# Patient Record
Sex: FEMALE | Race: WHITE | NOT HISPANIC OR LATINO
[De-identification: names, ages, dates, MRNs, and addresses within clinical notes are randomized per-mention and may not be internally consistent; named-entity substitution may affect disease eponyms.]

---

## 2018-07-10 ENCOUNTER — TRANSCRIPTION ENCOUNTER (OUTPATIENT)
Age: 73
End: 2018-07-10

## 2020-12-11 ENCOUNTER — TRANSCRIPTION ENCOUNTER (OUTPATIENT)
Age: 75
End: 2020-12-11

## 2020-12-12 ENCOUNTER — APPOINTMENT (OUTPATIENT)
Dept: DISASTER EMERGENCY | Facility: HOSPITAL | Age: 75
End: 2020-12-12

## 2020-12-12 ENCOUNTER — OUTPATIENT (OUTPATIENT)
Dept: OUTPATIENT SERVICES | Facility: HOSPITAL | Age: 75
LOS: 1 days | End: 2020-12-12
Payer: MEDICARE

## 2020-12-12 VITALS
OXYGEN SATURATION: 95 % | TEMPERATURE: 99 F | HEART RATE: 83 BPM | RESPIRATION RATE: 18 BRPM | HEIGHT: 60 IN | WEIGHT: 179.9 LBS | DIASTOLIC BLOOD PRESSURE: 82 MMHG | SYSTOLIC BLOOD PRESSURE: 138 MMHG

## 2020-12-12 VITALS
TEMPERATURE: 99 F | OXYGEN SATURATION: 97 % | DIASTOLIC BLOOD PRESSURE: 74 MMHG | HEART RATE: 77 BPM | RESPIRATION RATE: 18 BRPM | SYSTOLIC BLOOD PRESSURE: 135 MMHG

## 2020-12-12 DIAGNOSIS — U07.1 COVID-19: ICD-10-CM

## 2020-12-12 PROCEDURE — M0239: CPT

## 2020-12-12 RX ORDER — BAMLANIVIMAB 35 MG/ML
700 INJECTION, SOLUTION INTRAVENOUS ONCE
Refills: 0 | Status: COMPLETED | OUTPATIENT
Start: 2020-12-12 | End: 2020-12-12

## 2020-12-12 RX ADMIN — BAMLANIVIMAB 200 MILLIGRAM(S): 35 INJECTION, SOLUTION INTRAVENOUS at 12:59

## 2020-12-12 NOTE — CHART NOTE - PRIOR COVID TREATMENT
76 y/o F with PMHx of DM, HTN, AFib (Coumadin) and HDL presents referred by [facility] to outpatient infusion clinic for Monoclonal Antibody with Bamlanivimab. Patient with onset of symptoms 12/8/20 (endorsed cough. Tested positive at  facility on 12/11/20.         Exam/findings:    Vital Signs:    T(C): 37   T(F): 98.6   HR: 74   BP: 130/82   RR: 18  SpO2: 96%     PE:     Appearance: A&Ox3, NAD   HEENT: Normal oral mucosa,   Lymphatic: No lymphadenopathy  Cardiovascular: Normal S1 S2, no murmurs   Respiratory: Lungs CTABL, no wheezing   Gastrointestinal: Soft, NT/ND, + BS   Skin: warm and dry         ASSESSMENT:  76 y/o F with PMHx of DM, HTN, AFib (Coumadin) and HDL presents referred by [facility] to outpatient infusion clinic for Monoclonal Antibody with Bamlanivimab. Patient with onset of symptoms 12/8/20 (endorsed cough. Tested positive at  facility on 12/11/20.       Symptoms/ Criteria: HA, malaise, muscle pain    Risk Profile includes: Age >= 65, DM         PLAN:    - Infusion procedure explained to patient     - Consent for monoclonal antibody infusion obtained     - Risk & benefits discussed/all questions answered    - Infuse Bamlanivimab 700mg IV over one hour     - Observe patient for one hour post infusion 74 y/o F with PMHx of DM, HTN, AFib (Coumadin) and HDL presents referred by [facility] to outpatient infusion clinic for Monoclonal Antibody with Bamlanivimab. Patient with onset of symptoms 12/8/20 (endorsed cough. Tested positive at  facility on 12/11/20.         Exam/findings:    Vital Signs:    T(C): 37   T(F): 98.6   HR: 74   BP: 130/82   RR: 18  SpO2: 96%     PE:     Appearance: A&Ox3, NAD   HEENT: Normal oral mucosa,   Lymphatic: No lymphadenopathy  Cardiovascular: Normal S1 S2, no murmurs   Respiratory: Lungs CTABL, no wheezing   Gastrointestinal: Soft, NT/ND, + BS   Skin: warm and dry         ASSESSMENT:  74 y/o F with PMHx of DM, HTN, AFib (Coumadin) and HDL presents referred by [facility] to outpatient infusion clinic for Monoclonal Antibody with Bamlanivimab. Patient with onset of symptoms 12/8/20 (endorsed cough. Tested positive at  facility on 12/11/20.  Allergies reviewed and updated as needed.        Symptoms/ Criteria: HA, malaise, muscle pain    Risk Profile includes: Age >= 65, DM         PLAN:    - Infusion procedure explained to patient     - Consent for monoclonal antibody infusion obtained     - Risk & benefits discussed/all questions answered    - Infuse Bamlanivimab 700mg IV over one hour         Pt tolerated infusion well with no adverse reactions reported/noted. Patient education was provided at discharge.  IV access was removed.  Pt stable for discharge home.- Observe patient for one hour post infusion

## 2020-12-13 ENCOUNTER — TRANSCRIPTION ENCOUNTER (OUTPATIENT)
Age: 75
End: 2020-12-13

## 2020-12-14 PROBLEM — Z00.00 ENCOUNTER FOR PREVENTIVE HEALTH EXAMINATION: Status: ACTIVE | Noted: 2020-12-14

## 2020-12-15 ENCOUNTER — TRANSCRIPTION ENCOUNTER (OUTPATIENT)
Age: 75
End: 2020-12-15

## 2020-12-29 ENCOUNTER — TRANSCRIPTION ENCOUNTER (OUTPATIENT)
Age: 75
End: 2020-12-29

## 2022-06-19 ENCOUNTER — NON-APPOINTMENT (OUTPATIENT)
Age: 77
End: 2022-06-19

## 2022-10-14 ENCOUNTER — APPOINTMENT (OUTPATIENT)
Dept: VASCULAR SURGERY | Facility: CLINIC | Age: 77
End: 2022-10-14

## 2023-11-26 ENCOUNTER — INPATIENT (INPATIENT)
Facility: HOSPITAL | Age: 78
LOS: 8 days | Discharge: HOME | DRG: 563 | End: 2023-12-05
Attending: STUDENT IN AN ORGANIZED HEALTH CARE EDUCATION/TRAINING PROGRAM | Admitting: STUDENT IN AN ORGANIZED HEALTH CARE EDUCATION/TRAINING PROGRAM
Payer: MEDICARE

## 2023-11-26 VITALS
SYSTOLIC BLOOD PRESSURE: 150 MMHG | HEART RATE: 88 BPM | DIASTOLIC BLOOD PRESSURE: 83 MMHG | OXYGEN SATURATION: 98 % | RESPIRATION RATE: 18 BRPM | TEMPERATURE: 97 F
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
## 2023-11-26 DIAGNOSIS — Z29.9 ENCOUNTER FOR PROPHYLACTIC MEASURES, UNSPECIFIED: ICD-10-CM

## 2023-11-26 DIAGNOSIS — E11.9 TYPE 2 DIABETES MELLITUS WITHOUT COMPLICATIONS: ICD-10-CM

## 2023-11-26 DIAGNOSIS — I48.91 UNSPECIFIED ATRIAL FIBRILLATION: ICD-10-CM

## 2023-11-26 DIAGNOSIS — I10 ESSENTIAL (PRIMARY) HYPERTENSION: ICD-10-CM

## 2023-11-26 DIAGNOSIS — E78.5 HYPERLIPIDEMIA, UNSPECIFIED: ICD-10-CM

## 2023-11-26 LAB
ALBUMIN SERPL ELPH-MCNC: 3.4 G/DL — LOW (ref 3.5–5)
ALBUMIN SERPL ELPH-MCNC: 3.4 G/DL — LOW (ref 3.5–5)
ALP SERPL-CCNC: 83 U/L — SIGNIFICANT CHANGE UP (ref 40–120)
ALP SERPL-CCNC: 83 U/L — SIGNIFICANT CHANGE UP (ref 40–120)
ALT FLD-CCNC: 37 U/L DA — SIGNIFICANT CHANGE UP (ref 10–60)
ALT FLD-CCNC: 37 U/L DA — SIGNIFICANT CHANGE UP (ref 10–60)
ANION GAP SERPL CALC-SCNC: 4 MMOL/L — LOW (ref 5–17)
ANION GAP SERPL CALC-SCNC: 4 MMOL/L — LOW (ref 5–17)
AST SERPL-CCNC: 25 U/L — SIGNIFICANT CHANGE UP (ref 10–40)
AST SERPL-CCNC: 25 U/L — SIGNIFICANT CHANGE UP (ref 10–40)
BASOPHILS # BLD AUTO: 0.03 K/UL — SIGNIFICANT CHANGE UP (ref 0–0.2)
BASOPHILS # BLD AUTO: 0.03 K/UL — SIGNIFICANT CHANGE UP (ref 0–0.2)
BASOPHILS NFR BLD AUTO: 0.4 % — SIGNIFICANT CHANGE UP (ref 0–2)
BASOPHILS NFR BLD AUTO: 0.4 % — SIGNIFICANT CHANGE UP (ref 0–2)
BILIRUB SERPL-MCNC: 0.8 MG/DL — SIGNIFICANT CHANGE UP (ref 0.2–1.2)
BILIRUB SERPL-MCNC: 0.8 MG/DL — SIGNIFICANT CHANGE UP (ref 0.2–1.2)
BUN SERPL-MCNC: 16 MG/DL — SIGNIFICANT CHANGE UP (ref 7–18)
BUN SERPL-MCNC: 16 MG/DL — SIGNIFICANT CHANGE UP (ref 7–18)
CALCIUM SERPL-MCNC: 9.1 MG/DL — SIGNIFICANT CHANGE UP (ref 8.4–10.5)
CALCIUM SERPL-MCNC: 9.1 MG/DL — SIGNIFICANT CHANGE UP (ref 8.4–10.5)
CHLORIDE SERPL-SCNC: 104 MMOL/L — SIGNIFICANT CHANGE UP (ref 96–108)
CHLORIDE SERPL-SCNC: 104 MMOL/L — SIGNIFICANT CHANGE UP (ref 96–108)
CK MB BLD-MCNC: 1.9 % — SIGNIFICANT CHANGE UP (ref 0–3.5)
CK MB BLD-MCNC: 1.9 % — SIGNIFICANT CHANGE UP (ref 0–3.5)
CK MB CFR SERPL CALC: 2.2 NG/ML — SIGNIFICANT CHANGE UP (ref 0–3.6)
CK MB CFR SERPL CALC: 2.2 NG/ML — SIGNIFICANT CHANGE UP (ref 0–3.6)
CK SERPL-CCNC: 117 U/L — SIGNIFICANT CHANGE UP (ref 21–215)
CK SERPL-CCNC: 117 U/L — SIGNIFICANT CHANGE UP (ref 21–215)
CO2 SERPL-SCNC: 30 MMOL/L — SIGNIFICANT CHANGE UP (ref 22–31)
CO2 SERPL-SCNC: 30 MMOL/L — SIGNIFICANT CHANGE UP (ref 22–31)
CREAT SERPL-MCNC: 0.79 MG/DL — SIGNIFICANT CHANGE UP (ref 0.5–1.3)
CREAT SERPL-MCNC: 0.79 MG/DL — SIGNIFICANT CHANGE UP (ref 0.5–1.3)
EGFR: 77 ML/MIN/1.73M2 — SIGNIFICANT CHANGE UP
EGFR: 77 ML/MIN/1.73M2 — SIGNIFICANT CHANGE UP
EOSINOPHIL # BLD AUTO: 0.01 K/UL — SIGNIFICANT CHANGE UP (ref 0–0.5)
EOSINOPHIL # BLD AUTO: 0.01 K/UL — SIGNIFICANT CHANGE UP (ref 0–0.5)
EOSINOPHIL NFR BLD AUTO: 0.1 % — SIGNIFICANT CHANGE UP (ref 0–6)
EOSINOPHIL NFR BLD AUTO: 0.1 % — SIGNIFICANT CHANGE UP (ref 0–6)
GLUCOSE SERPL-MCNC: 160 MG/DL — HIGH (ref 70–99)
GLUCOSE SERPL-MCNC: 160 MG/DL — HIGH (ref 70–99)
HCT VFR BLD CALC: 40.2 % — SIGNIFICANT CHANGE UP (ref 34.5–45)
HCT VFR BLD CALC: 40.2 % — SIGNIFICANT CHANGE UP (ref 34.5–45)
HGB BLD-MCNC: 13.3 G/DL — SIGNIFICANT CHANGE UP (ref 11.5–15.5)
HGB BLD-MCNC: 13.3 G/DL — SIGNIFICANT CHANGE UP (ref 11.5–15.5)
IMM GRANULOCYTES NFR BLD AUTO: 0.5 % — SIGNIFICANT CHANGE UP (ref 0–0.9)
IMM GRANULOCYTES NFR BLD AUTO: 0.5 % — SIGNIFICANT CHANGE UP (ref 0–0.9)
LYMPHOCYTES # BLD AUTO: 0.64 K/UL — LOW (ref 1–3.3)
LYMPHOCYTES # BLD AUTO: 0.64 K/UL — LOW (ref 1–3.3)
LYMPHOCYTES # BLD AUTO: 7.6 % — LOW (ref 13–44)
LYMPHOCYTES # BLD AUTO: 7.6 % — LOW (ref 13–44)
MCHC RBC-ENTMCNC: 32.2 PG — SIGNIFICANT CHANGE UP (ref 27–34)
MCHC RBC-ENTMCNC: 32.2 PG — SIGNIFICANT CHANGE UP (ref 27–34)
MCHC RBC-ENTMCNC: 33.1 GM/DL — SIGNIFICANT CHANGE UP (ref 32–36)
MCHC RBC-ENTMCNC: 33.1 GM/DL — SIGNIFICANT CHANGE UP (ref 32–36)
MCV RBC AUTO: 97.3 FL — SIGNIFICANT CHANGE UP (ref 80–100)
MCV RBC AUTO: 97.3 FL — SIGNIFICANT CHANGE UP (ref 80–100)
MONOCYTES # BLD AUTO: 0.44 K/UL — SIGNIFICANT CHANGE UP (ref 0–0.9)
MONOCYTES # BLD AUTO: 0.44 K/UL — SIGNIFICANT CHANGE UP (ref 0–0.9)
MONOCYTES NFR BLD AUTO: 5.2 % — SIGNIFICANT CHANGE UP (ref 2–14)
MONOCYTES NFR BLD AUTO: 5.2 % — SIGNIFICANT CHANGE UP (ref 2–14)
NEUTROPHILS # BLD AUTO: 7.23 K/UL — SIGNIFICANT CHANGE UP (ref 1.8–7.4)
NEUTROPHILS # BLD AUTO: 7.23 K/UL — SIGNIFICANT CHANGE UP (ref 1.8–7.4)
NEUTROPHILS NFR BLD AUTO: 86.2 % — HIGH (ref 43–77)
NEUTROPHILS NFR BLD AUTO: 86.2 % — HIGH (ref 43–77)
NRBC # BLD: 0 /100 WBCS — SIGNIFICANT CHANGE UP (ref 0–0)
NRBC # BLD: 0 /100 WBCS — SIGNIFICANT CHANGE UP (ref 0–0)
PLATELET # BLD AUTO: 155 K/UL — SIGNIFICANT CHANGE UP (ref 150–400)
PLATELET # BLD AUTO: 155 K/UL — SIGNIFICANT CHANGE UP (ref 150–400)
POTASSIUM SERPL-MCNC: 3.7 MMOL/L — SIGNIFICANT CHANGE UP (ref 3.5–5.3)
POTASSIUM SERPL-MCNC: 3.7 MMOL/L — SIGNIFICANT CHANGE UP (ref 3.5–5.3)
POTASSIUM SERPL-SCNC: 3.7 MMOL/L — SIGNIFICANT CHANGE UP (ref 3.5–5.3)
POTASSIUM SERPL-SCNC: 3.7 MMOL/L — SIGNIFICANT CHANGE UP (ref 3.5–5.3)
PROT SERPL-MCNC: 6.8 G/DL — SIGNIFICANT CHANGE UP (ref 6–8.3)
PROT SERPL-MCNC: 6.8 G/DL — SIGNIFICANT CHANGE UP (ref 6–8.3)
RBC # BLD: 4.13 M/UL — SIGNIFICANT CHANGE UP (ref 3.8–5.2)
RBC # BLD: 4.13 M/UL — SIGNIFICANT CHANGE UP (ref 3.8–5.2)
RBC # FLD: 13.8 % — SIGNIFICANT CHANGE UP (ref 10.3–14.5)
RBC # FLD: 13.8 % — SIGNIFICANT CHANGE UP (ref 10.3–14.5)
SODIUM SERPL-SCNC: 138 MMOL/L — SIGNIFICANT CHANGE UP (ref 135–145)
SODIUM SERPL-SCNC: 138 MMOL/L — SIGNIFICANT CHANGE UP (ref 135–145)
TROPONIN I, HIGH SENSITIVITY RESULT: 8.9 NG/L — SIGNIFICANT CHANGE UP
TROPONIN I, HIGH SENSITIVITY RESULT: 8.9 NG/L — SIGNIFICANT CHANGE UP
WBC # BLD: 8.39 K/UL — SIGNIFICANT CHANGE UP (ref 3.8–10.5)
WBC # BLD: 8.39 K/UL — SIGNIFICANT CHANGE UP (ref 3.8–10.5)
WBC # FLD AUTO: 8.39 K/UL — SIGNIFICANT CHANGE UP (ref 3.8–10.5)
WBC # FLD AUTO: 8.39 K/UL — SIGNIFICANT CHANGE UP (ref 3.8–10.5)

## 2023-11-26 PROCEDURE — 73060 X-RAY EXAM OF HUMERUS: CPT | Mod: 26,LT

## 2023-11-26 PROCEDURE — 99285 EMERGENCY DEPT VISIT HI MDM: CPT

## 2023-11-26 PROCEDURE — 73562 X-RAY EXAM OF KNEE 3: CPT | Mod: 26,RT

## 2023-11-26 PROCEDURE — 73110 X-RAY EXAM OF WRIST: CPT | Mod: 26,LT

## 2023-11-26 PROCEDURE — 73030 X-RAY EXAM OF SHOULDER: CPT | Mod: 26,LT

## 2023-11-26 PROCEDURE — 99222 1ST HOSP IP/OBS MODERATE 55: CPT

## 2023-11-26 RX ORDER — APIXABAN 2.5 MG/1
5 TABLET, FILM COATED ORAL
Refills: 0 | Status: DISCONTINUED | OUTPATIENT
Start: 2023-11-26 | End: 2023-11-27

## 2023-11-26 RX ORDER — INSULIN LISPRO 100/ML
VIAL (ML) SUBCUTANEOUS
Refills: 0 | Status: DISCONTINUED | OUTPATIENT
Start: 2023-11-26 | End: 2023-12-01

## 2023-11-26 RX ORDER — ACETAMINOPHEN 500 MG
750 TABLET ORAL ONCE
Refills: 0 | Status: COMPLETED | OUTPATIENT
Start: 2023-11-26 | End: 2023-11-26

## 2023-11-26 RX ORDER — OXYCODONE HYDROCHLORIDE 5 MG/1
5 TABLET ORAL ONCE
Refills: 0 | Status: DISCONTINUED | OUTPATIENT
Start: 2023-11-26 | End: 2023-11-26

## 2023-11-26 RX ORDER — ATORVASTATIN CALCIUM 80 MG/1
20 TABLET, FILM COATED ORAL AT BEDTIME
Refills: 0 | Status: DISCONTINUED | OUTPATIENT
Start: 2023-11-26 | End: 2023-12-05

## 2023-11-26 RX ORDER — DILTIAZEM HCL 120 MG
180 CAPSULE, EXT RELEASE 24 HR ORAL DAILY
Refills: 0 | Status: DISCONTINUED | OUTPATIENT
Start: 2023-11-26 | End: 2023-11-30

## 2023-11-26 RX ORDER — ACETAMINOPHEN 500 MG
650 TABLET ORAL EVERY 6 HOURS
Refills: 0 | Status: DISCONTINUED | OUTPATIENT
Start: 2023-11-26 | End: 2023-12-05

## 2023-11-26 RX ORDER — ONDANSETRON 8 MG/1
4 TABLET, FILM COATED ORAL EVERY 8 HOURS
Refills: 0 | Status: DISCONTINUED | OUTPATIENT
Start: 2023-11-26 | End: 2023-12-05

## 2023-11-26 RX ORDER — LANOLIN ALCOHOL/MO/W.PET/CERES
3 CREAM (GRAM) TOPICAL AT BEDTIME
Refills: 0 | Status: DISCONTINUED | OUTPATIENT
Start: 2023-11-26 | End: 2023-12-05

## 2023-11-26 RX ORDER — METOPROLOL TARTRATE 50 MG
100 TABLET ORAL
Refills: 0 | Status: DISCONTINUED | OUTPATIENT
Start: 2023-11-26 | End: 2023-12-05

## 2023-11-26 RX ORDER — LISINOPRIL 2.5 MG/1
10 TABLET ORAL DAILY
Refills: 0 | Status: DISCONTINUED | OUTPATIENT
Start: 2023-11-26 | End: 2023-12-05

## 2023-11-26 RX ADMIN — Medication 300 MILLIGRAM(S): at 22:26

## 2023-11-26 RX ADMIN — Medication 750 MILLIGRAM(S): at 22:56

## 2023-11-26 NOTE — ED PROVIDER NOTE - CARE PLAN
Principal Discharge DX:	Fall  Secondary Diagnosis:	Unable to ambulate  Secondary Diagnosis:	Fracture of left shoulder   1 Principal Discharge DX:	Humerus fracture  Secondary Diagnosis:	Atrial fibrillation  Secondary Diagnosis:	HTN (hypertension)  Secondary Diagnosis:	HLD (hyperlipidemia)  Secondary Diagnosis:	DM (diabetes mellitus)  Secondary Diagnosis:	Fall

## 2023-11-26 NOTE — H&P ADULT - HISTORY OF PRESENT ILLNESS
77F from home ambulates independently, lives alone with no HHA, PMH of Afib?, on Eliquis, HTN, DM, HLD presenting with fall this evening. Patient states taht she was trying to get something on the floor when she got tripped up and landed hard on her left shoulder. She was too weak and in pain to be able to get up. She was on the ground for 2 hours, until she was found by her son. She has no history of falls, and is unable to explain why she is on Eliquis, and asks to defer to her PCP, Dr. Nieves (doesn't remember name).    She denies, LOC, dizziness, or feeling lightheaded during the event. No fevers, chills, NVD    In the ED, Xrays show communited fracture in left shoulder, pending offical read, labs WNL 77F from home ambulates independently, lives alone with no HHA, PMH of Afib?, on Eliquis, HTN, DM, HLD presenting with fall this evening. Patient states taht she was trying to get something on the floor when she got tripped up and landed hard on her left shoulder. She was too weak and in pain to be able to get up. She was on the ground for 2 hours, until she was found by her son. She has no history of falls, and is unable to explain why she is on Eliquis, and asks to defer to her PCP, Dr. Neives (doesn't remember name).    She denies, LOC, dizziness, or feeling lightheaded during the event. No fevers, chills, NVD    In the ED, Xrays show communited fracture in left shoulder, pending offical read, labs WNL 77F from home ambulates independently, lives alone with no HHA, PMH of Afib?, on Eliquis, HTN, DM, HLD presenting with fall this evening. Patient states taht she was trying to get something on the floor when she got tripped up and landed hard on her left shoulder. She was too weak and in pain to be able to get up. She was on the ground for 2 hours, until she was found by her son. She has no history of falls, and is unable to explain why she is on Eliquis, and asks to defer to her PCP, Dr. Nieves (doesn't remember name).    She denies, LOC, dizziness, or feeling lightheaded during the event. No fevers, chills, NVD    In the ED, Xrays show comminuted fracture/dislocation in left shoulder, pending offical read, labs WNL

## 2023-11-26 NOTE — H&P ADULT - PROBLEM SELECTOR PLAN 2
Patient does not know why shes on Eliquis  Contact PCP  Continue Eliquis s/p fall this evening, no LOC, no dizziness, likely mechanical fall  XR imaging pending official read, but Left shoulder looks  proximal humeral fracture/ s/p sling in ED  Pain Control  PT  Fall Precautions  Ortho Consult if Reducing Needed Primary Team to Consult in AM

## 2023-11-26 NOTE — ED ADULT TRIAGE NOTE - NS ED NURSE AMBULANCES
[facility]

## 2023-11-26 NOTE — H&P ADULT - ATTENDING COMMENTS
Vital Signs Last 24 Hrs  T(C): 36.6 (27 Nov 2023 00:29), Max: 37.3 (26 Nov 2023 19:29)  T(F): 97.9 (27 Nov 2023 00:29), Max: 99.2 (26 Nov 2023 19:29)  HR: 104 (27 Nov 2023 00:29) (88 - 104)  BP: 142/80 (27 Nov 2023 00:29) (142/80 - 151/87)  RR: 18 (27 Nov 2023 00:29) (18 - 18)  SpO2: 96% (27 Nov 2023 00:29) (96% - 98%)  Parameters below as of 27 Nov 2023 00:29  Patient On (Oxygen Delivery Method): room air    Labs   unremarkable     Left shoulder X ray   Displaced proximal humeral fracture dislocation     Plan   77 year old woman with PMH of A- fib on OAC, HTN, DM2, HLD with a mechanical fall onto  the left shoulder who is admitted for inability to care for herself at home.     Problems  # Left proximal humeral fracture  # Mechanical fall   # Physical debility   # DM 2  # HTN     Plan   Admit to medicine   s/p  splinting   Pain control   Fall precaution   Med reconciliation   Resume home meds   PT consult in AM Vital Signs Last 24 Hrs  T(C): 36.6 (27 Nov 2023 00:29), Max: 37.3 (26 Nov 2023 19:29)  T(F): 97.9 (27 Nov 2023 00:29), Max: 99.2 (26 Nov 2023 19:29)  HR: 104 (27 Nov 2023 00:29) (88 - 104)  BP: 142/80 (27 Nov 2023 00:29) (142/80 - 151/87)  RR: 18 (27 Nov 2023 00:29) (18 - 18)  SpO2: 96% (27 Nov 2023 00:29) (96% - 98%)  Parameters below as of 27 Nov 2023 00:29  Patient On (Oxygen Delivery Method): room air    Labs   unremarkable     Left shoulder X ray   Displaced proximal humeral fracture dislocation     Plan   77 year old woman with PMH of A- fib on OAC, HTN, DM2, HLD with a mechanical fall onto  the left shoulder who is admitted for inability to care for herself at home.     Problems  # Left proximal humeral fracture  # suspected left humeral dislocation  # Mechanical fall   # Physical debility   # DM 2  # HTN     Plan   Admit to medicine   s/p  splinting   Pain control   Fall precaution   Med reconciliation   Resume home meds   PT consult in AM Vital Signs Last 24 Hrs  T(C): 36.6 (27 Nov 2023 00:29), Max: 37.3 (26 Nov 2023 19:29)  T(F): 97.9 (27 Nov 2023 00:29), Max: 99.2 (26 Nov 2023 19:29)  HR: 104 (27 Nov 2023 00:29) (88 - 104)  BP: 142/80 (27 Nov 2023 00:29) (142/80 - 151/87)  RR: 18 (27 Nov 2023 00:29) (18 - 18)  SpO2: 96% (27 Nov 2023 00:29) (96% - 98%)  Parameters below as of 27 Nov 2023 00:29  Patient On (Oxygen Delivery Method): room air    - Labs   unremarkable     - Left shoulder X ray   Displaced proximal humeral fracture dislocation     Plan   77 year old woman with PMH of A- fib on OAC, HTN, DM2, HLD with a mechanical fall onto  the left shoulder who is admitted for inability to care for herself at home.     Problems  # Left proximal humeral fracture  # suspected left humeral dislocation  # Mechanical fall   # Physical debility   # DM 2  # HTN     Plan   Admit to medicine   s/p  splinting   Pain control   Fall precaution   Med reconciliation   Resume home meds   PT consult in AM

## 2023-11-26 NOTE — H&P ADULT - PROBLEM SELECTOR PLAN 1
s/p fall this evening, no LOC, no dizziness, likely mechanical fall  XR imaging pending official read, but Left shoulder looks communiated fracture/ s/p sling in ED  Pain Control  PT  Fall Precautions s/p fall this evening, no LOC, no dizziness, likely mechanical fall  XR imaging pending official read, but Left shoulder looks communiated fracture/ s/p sling in ED  Pain Control  PT  Fall Precautions  Ortho Consult if Reducing Needed Primary Team to Consult in AM s/p fall this evening, no LOC, no dizziness, likely mechanical fall  XR imaging pending official read, but Left shoulder looks  proximal humeral fracture/ s/p sling in ED  Pain Control  PT  Fall Precautions  Ortho Consult if Reducing Needed Primary Team to Consult in AM

## 2023-11-26 NOTE — ED ADULT TRIAGE NOTE - CHIEF COMPLAINT QUOTE
Pt c/o left shoulder and right knee pain s/p trip and fall at home x 1 hour ago. Pt denies hitting head, no LOC. Pt currently on Eliquis.

## 2023-11-26 NOTE — ED PROVIDER NOTE - PROGRESS NOTE DETAILS
Received from Dr Saturnino bailey XR pending.  XR showing comminuted L shoulder fx. Sling applied  Pt unable to ambulate and has no services at home. Lives alone. Pt not safe to go home at this time.  Will admit for further care/management

## 2023-11-26 NOTE — H&P ADULT - NSHPREVIEWOFSYSTEMS_GEN_ALL_CORE
CONSTITUTIONAL: No fever  RESPIRATORY:  No shortness of breath  CARDIOVASCULAR: No chest pain  GASTROINTESTINAL: No abdominal pain.  GENITOURINARY: No dysuria   NEUROLOGICAL: No headaches,  ENDOCRINE: No polyuria  musculoskeletal Left shoulder pain  HEME: no easy bruisability  SKIN: No itching, burning, rashes, or lesions .

## 2023-11-26 NOTE — ED PROVIDER NOTE - CLINICAL SUMMARY MEDICAL DECISION MAKING FREE TEXT BOX
77-year-old female history of hypertension on Eliquis presents to ED following mechanical fall.  Patient does live alone and only has 2 to 3 hours a visiting nurse services today.  Left shoulder pain right knee pain.  Will check labs analgesia x-rays reassess.  As patient lives alone may be unsafe for discharge and may need admission.  Will reassess

## 2023-11-26 NOTE — H&P ADULT - PROBLEM SELECTOR PLAN 3
Cont Home Med Pt unable to care for herself post humeral fracture   Admitted to Medicine for SW intervention

## 2023-11-26 NOTE — H&P ADULT - NSHPPHYSICALEXAM_GEN_ALL_CORE
GENERAL: NAD, elderly  HEAD:  Atraumatic, Normocephalic  NERVOUS SYSTEM:  Alert & Oriented X3,  moves extremities spontaneously  CHEST/LUNG: Lungs clear to auscultation bilaterally, No rales, rhonchi, wheezing   HEART: Regular rate and rhythm; No murmurs, rubs, or gallops  ABDOMEN: Soft, Nontender, Nondistended; Bowel sounds present  EXTREMITIES:  No edema  SKIN: Chronic venous stasis ulcers on legs bilaterally

## 2023-11-26 NOTE — ED PROVIDER NOTE - OBJECTIVE STATEMENT
77-year-old female history of hypertension, CAD, heart issues on Eliquis (patient does not know exactly why).  Patient presents to ED following a mechanical fall.  As per patient she was at home and tripped over a bag on the floor landing on her stomach and her left side.  Patient complaining of right knee, left shoulder, left wrist pain.  Patient denies head trauma no LOC.

## 2023-11-26 NOTE — H&P ADULT - ASSESSMENT
77F from home ambulates independently, lives alone with no HHA, PMH of Afib?, on Eliquis, HTN, DM, HLD presenting with fall this evening. Patient states taht she was trying to get something on the floor when she got tripped up and landed hard on her left shoulder. She was too weak and in pain to be able to get up. She was on the ground for 2 hours, until she was found by her son. She has no history of falls, and is unable to explain why she is on Eliquis, and asks to defer to her PCP, Dr. Nieves (doesn't remember name).    She denies, LOC, dizziness, or feeling lightheaded during the event. No fevers, chills, NVD    In the ED, Xrays show communited fracture in left shoulder, pending offical read, labs WNL   77F from home ambulates independently, lives alone with no HHA, PMH of Afib?, on Eliquis, HTN, DM, HLD presenting with fall this evening. Patient states taht she was trying to get something on the floor when she got tripped up and landed hard on her left shoulder. She was too weak and in pain to be able to get up. She was on the ground for 2 hours, until she was found by her son. She has no history of falls, and is unable to explain why she is on Eliquis, and asks to defer to her PCP, Dr. Nieves (doesn't remember name).    She denies, LOC, dizziness, or feeling lightheaded during the event. No fevers, chills, NVD    In the ED, Xrays show comminuted fracture in left shoulder, pending offical read, labs WNL

## 2023-11-26 NOTE — ED PROVIDER NOTE - SECONDARY DIAGNOSIS.
Fracture of left shoulder Unable to ambulate HLD (hyperlipidemia) Atrial fibrillation HTN (hypertension) Fall DM (diabetes mellitus)

## 2023-11-27 DIAGNOSIS — Z65.9 PROBLEM RELATED TO UNSPECIFIED PSYCHOSOCIAL CIRCUMSTANCES: ICD-10-CM

## 2023-11-27 DIAGNOSIS — M19.90 UNSPECIFIED OSTEOARTHRITIS, UNSPECIFIED SITE: ICD-10-CM

## 2023-11-27 DIAGNOSIS — I48.91 UNSPECIFIED ATRIAL FIBRILLATION: ICD-10-CM

## 2023-11-27 DIAGNOSIS — S42.291A OTHER DISPLACED FRACTURE OF UPPER END OF RIGHT HUMERUS, INITIAL ENCOUNTER FOR CLOSED FRACTURE: ICD-10-CM

## 2023-11-27 LAB
A1C WITH ESTIMATED AVERAGE GLUCOSE RESULT: 7 % — HIGH (ref 4–5.6)
A1C WITH ESTIMATED AVERAGE GLUCOSE RESULT: 7 % — HIGH (ref 4–5.6)
ALBUMIN SERPL ELPH-MCNC: 3.1 G/DL — LOW (ref 3.5–5)
ALBUMIN SERPL ELPH-MCNC: 3.1 G/DL — LOW (ref 3.5–5)
ALP SERPL-CCNC: 83 U/L — SIGNIFICANT CHANGE UP (ref 40–120)
ALP SERPL-CCNC: 83 U/L — SIGNIFICANT CHANGE UP (ref 40–120)
ALT FLD-CCNC: 32 U/L DA — SIGNIFICANT CHANGE UP (ref 10–60)
ALT FLD-CCNC: 32 U/L DA — SIGNIFICANT CHANGE UP (ref 10–60)
ANION GAP SERPL CALC-SCNC: 7 MMOL/L — SIGNIFICANT CHANGE UP (ref 5–17)
ANION GAP SERPL CALC-SCNC: 7 MMOL/L — SIGNIFICANT CHANGE UP (ref 5–17)
AST SERPL-CCNC: 18 U/L — SIGNIFICANT CHANGE UP (ref 10–40)
AST SERPL-CCNC: 18 U/L — SIGNIFICANT CHANGE UP (ref 10–40)
BILIRUB DIRECT SERPL-MCNC: 0.3 MG/DL — SIGNIFICANT CHANGE UP (ref 0–0.3)
BILIRUB DIRECT SERPL-MCNC: 0.3 MG/DL — SIGNIFICANT CHANGE UP (ref 0–0.3)
BILIRUB INDIRECT FLD-MCNC: 0.8 MG/DL — SIGNIFICANT CHANGE UP (ref 0.2–1)
BILIRUB INDIRECT FLD-MCNC: 0.8 MG/DL — SIGNIFICANT CHANGE UP (ref 0.2–1)
BILIRUB SERPL-MCNC: 1.1 MG/DL — SIGNIFICANT CHANGE UP (ref 0.2–1.2)
BILIRUB SERPL-MCNC: 1.1 MG/DL — SIGNIFICANT CHANGE UP (ref 0.2–1.2)
BUN SERPL-MCNC: 16 MG/DL — SIGNIFICANT CHANGE UP (ref 7–18)
BUN SERPL-MCNC: 16 MG/DL — SIGNIFICANT CHANGE UP (ref 7–18)
CALCIUM SERPL-MCNC: 9.3 MG/DL — SIGNIFICANT CHANGE UP (ref 8.4–10.5)
CALCIUM SERPL-MCNC: 9.3 MG/DL — SIGNIFICANT CHANGE UP (ref 8.4–10.5)
CHLORIDE SERPL-SCNC: 105 MMOL/L — SIGNIFICANT CHANGE UP (ref 96–108)
CHLORIDE SERPL-SCNC: 105 MMOL/L — SIGNIFICANT CHANGE UP (ref 96–108)
CO2 SERPL-SCNC: 25 MMOL/L — SIGNIFICANT CHANGE UP (ref 22–31)
CO2 SERPL-SCNC: 25 MMOL/L — SIGNIFICANT CHANGE UP (ref 22–31)
CREAT SERPL-MCNC: 0.91 MG/DL — SIGNIFICANT CHANGE UP (ref 0.5–1.3)
CREAT SERPL-MCNC: 0.91 MG/DL — SIGNIFICANT CHANGE UP (ref 0.5–1.3)
EGFR: 65 ML/MIN/1.73M2 — SIGNIFICANT CHANGE UP
EGFR: 65 ML/MIN/1.73M2 — SIGNIFICANT CHANGE UP
ESTIMATED AVERAGE GLUCOSE: 154 MG/DL — HIGH (ref 68–114)
ESTIMATED AVERAGE GLUCOSE: 154 MG/DL — HIGH (ref 68–114)
GLUCOSE BLDC GLUCOMTR-MCNC: 130 MG/DL — HIGH (ref 70–99)
GLUCOSE BLDC GLUCOMTR-MCNC: 130 MG/DL — HIGH (ref 70–99)
GLUCOSE BLDC GLUCOMTR-MCNC: 135 MG/DL — HIGH (ref 70–99)
GLUCOSE BLDC GLUCOMTR-MCNC: 135 MG/DL — HIGH (ref 70–99)
GLUCOSE BLDC GLUCOMTR-MCNC: 137 MG/DL — HIGH (ref 70–99)
GLUCOSE BLDC GLUCOMTR-MCNC: 137 MG/DL — HIGH (ref 70–99)
GLUCOSE BLDC GLUCOMTR-MCNC: 153 MG/DL — HIGH (ref 70–99)
GLUCOSE BLDC GLUCOMTR-MCNC: 153 MG/DL — HIGH (ref 70–99)
GLUCOSE SERPL-MCNC: 217 MG/DL — HIGH (ref 70–99)
GLUCOSE SERPL-MCNC: 217 MG/DL — HIGH (ref 70–99)
HCT VFR BLD CALC: 40.1 % — SIGNIFICANT CHANGE UP (ref 34.5–45)
HCT VFR BLD CALC: 40.1 % — SIGNIFICANT CHANGE UP (ref 34.5–45)
HCV AB S/CO SERPL IA: 0.08 S/CO — SIGNIFICANT CHANGE UP (ref 0–0.99)
HCV AB S/CO SERPL IA: 0.08 S/CO — SIGNIFICANT CHANGE UP (ref 0–0.99)
HCV AB SERPL-IMP: SIGNIFICANT CHANGE UP
HCV AB SERPL-IMP: SIGNIFICANT CHANGE UP
HGB BLD-MCNC: 13.1 G/DL — SIGNIFICANT CHANGE UP (ref 11.5–15.5)
HGB BLD-MCNC: 13.1 G/DL — SIGNIFICANT CHANGE UP (ref 11.5–15.5)
MAGNESIUM SERPL-MCNC: 1.7 MG/DL — SIGNIFICANT CHANGE UP (ref 1.6–2.6)
MAGNESIUM SERPL-MCNC: 1.7 MG/DL — SIGNIFICANT CHANGE UP (ref 1.6–2.6)
MCHC RBC-ENTMCNC: 31.3 PG — SIGNIFICANT CHANGE UP (ref 27–34)
MCHC RBC-ENTMCNC: 31.3 PG — SIGNIFICANT CHANGE UP (ref 27–34)
MCHC RBC-ENTMCNC: 32.7 GM/DL — SIGNIFICANT CHANGE UP (ref 32–36)
MCHC RBC-ENTMCNC: 32.7 GM/DL — SIGNIFICANT CHANGE UP (ref 32–36)
MCV RBC AUTO: 95.7 FL — SIGNIFICANT CHANGE UP (ref 80–100)
MCV RBC AUTO: 95.7 FL — SIGNIFICANT CHANGE UP (ref 80–100)
NRBC # BLD: 0 /100 WBCS — SIGNIFICANT CHANGE UP (ref 0–0)
NRBC # BLD: 0 /100 WBCS — SIGNIFICANT CHANGE UP (ref 0–0)
PHOSPHATE SERPL-MCNC: 2.5 MG/DL — SIGNIFICANT CHANGE UP (ref 2.5–4.5)
PHOSPHATE SERPL-MCNC: 2.5 MG/DL — SIGNIFICANT CHANGE UP (ref 2.5–4.5)
PLATELET # BLD AUTO: 158 K/UL — SIGNIFICANT CHANGE UP (ref 150–400)
PLATELET # BLD AUTO: 158 K/UL — SIGNIFICANT CHANGE UP (ref 150–400)
POTASSIUM SERPL-MCNC: 3.1 MMOL/L — LOW (ref 3.5–5.3)
POTASSIUM SERPL-MCNC: 3.1 MMOL/L — LOW (ref 3.5–5.3)
POTASSIUM SERPL-SCNC: 3.1 MMOL/L — LOW (ref 3.5–5.3)
POTASSIUM SERPL-SCNC: 3.1 MMOL/L — LOW (ref 3.5–5.3)
PROT SERPL-MCNC: 6.7 G/DL — SIGNIFICANT CHANGE UP (ref 6–8.3)
PROT SERPL-MCNC: 6.7 G/DL — SIGNIFICANT CHANGE UP (ref 6–8.3)
RBC # BLD: 4.19 M/UL — SIGNIFICANT CHANGE UP (ref 3.8–5.2)
RBC # BLD: 4.19 M/UL — SIGNIFICANT CHANGE UP (ref 3.8–5.2)
RBC # FLD: 13.8 % — SIGNIFICANT CHANGE UP (ref 10.3–14.5)
RBC # FLD: 13.8 % — SIGNIFICANT CHANGE UP (ref 10.3–14.5)
SODIUM SERPL-SCNC: 137 MMOL/L — SIGNIFICANT CHANGE UP (ref 135–145)
SODIUM SERPL-SCNC: 137 MMOL/L — SIGNIFICANT CHANGE UP (ref 135–145)
WBC # BLD: 7.67 K/UL — SIGNIFICANT CHANGE UP (ref 3.8–10.5)
WBC # BLD: 7.67 K/UL — SIGNIFICANT CHANGE UP (ref 3.8–10.5)
WBC # FLD AUTO: 7.67 K/UL — SIGNIFICANT CHANGE UP (ref 3.8–10.5)
WBC # FLD AUTO: 7.67 K/UL — SIGNIFICANT CHANGE UP (ref 3.8–10.5)

## 2023-11-27 PROCEDURE — 99233 SBSQ HOSP IP/OBS HIGH 50: CPT

## 2023-11-27 PROCEDURE — 93010 ELECTROCARDIOGRAM REPORT: CPT

## 2023-11-27 RX ORDER — POTASSIUM CHLORIDE 20 MEQ
40 PACKET (EA) ORAL ONCE
Refills: 0 | Status: DISCONTINUED | OUTPATIENT
Start: 2023-11-27 | End: 2023-11-27

## 2023-11-27 RX ORDER — DILTIAZEM HCL 120 MG
180 CAPSULE, EXT RELEASE 24 HR ORAL ONCE
Refills: 0 | Status: COMPLETED | OUTPATIENT
Start: 2023-11-27 | End: 2023-11-27

## 2023-11-27 RX ORDER — MAGNESIUM OXIDE 400 MG ORAL TABLET 241.3 MG
400 TABLET ORAL
Refills: 0 | Status: COMPLETED | OUTPATIENT
Start: 2023-11-27 | End: 2023-11-28

## 2023-11-27 RX ORDER — POTASSIUM CHLORIDE 20 MEQ
40 PACKET (EA) ORAL EVERY 4 HOURS
Refills: 0 | Status: COMPLETED | OUTPATIENT
Start: 2023-11-27 | End: 2023-11-27

## 2023-11-27 RX ORDER — POTASSIUM CHLORIDE 20 MEQ
10 PACKET (EA) ORAL
Refills: 0 | Status: DISCONTINUED | OUTPATIENT
Start: 2023-11-27 | End: 2023-11-27

## 2023-11-27 RX ORDER — METOPROLOL TARTRATE 50 MG
5 TABLET ORAL ONCE
Refills: 0 | Status: COMPLETED | OUTPATIENT
Start: 2023-11-27 | End: 2023-11-27

## 2023-11-27 RX ORDER — APIXABAN 2.5 MG/1
5 TABLET, FILM COATED ORAL EVERY 12 HOURS
Refills: 0 | Status: DISCONTINUED | OUTPATIENT
Start: 2023-11-27 | End: 2023-12-05

## 2023-11-27 RX ADMIN — Medication 1: at 12:22

## 2023-11-27 RX ADMIN — MAGNESIUM OXIDE 400 MG ORAL TABLET 400 MILLIGRAM(S): 241.3 TABLET ORAL at 12:21

## 2023-11-27 RX ADMIN — MAGNESIUM OXIDE 400 MG ORAL TABLET 400 MILLIGRAM(S): 241.3 TABLET ORAL at 17:06

## 2023-11-27 RX ADMIN — Medication 100 MILLIEQUIVALENT(S): at 11:15

## 2023-11-27 RX ADMIN — Medication 40 MILLIEQUIVALENT(S): at 12:21

## 2023-11-27 RX ADMIN — Medication 650 MILLIGRAM(S): at 20:55

## 2023-11-27 RX ADMIN — ATORVASTATIN CALCIUM 20 MILLIGRAM(S): 80 TABLET, FILM COATED ORAL at 21:04

## 2023-11-27 RX ADMIN — Medication 180 MILLIGRAM(S): at 17:55

## 2023-11-27 RX ADMIN — Medication 100 MILLIGRAM(S): at 17:06

## 2023-11-27 RX ADMIN — Medication 40 MILLIEQUIVALENT(S): at 13:43

## 2023-11-27 RX ADMIN — Medication 5 MILLIGRAM(S): at 17:55

## 2023-11-27 RX ADMIN — Medication 650 MILLIGRAM(S): at 06:56

## 2023-11-27 RX ADMIN — Medication 650 MILLIGRAM(S): at 20:43

## 2023-11-27 RX ADMIN — APIXABAN 5 MILLIGRAM(S): 2.5 TABLET, FILM COATED ORAL at 17:05

## 2023-11-27 NOTE — CONSULT NOTE ADULT - NS ATTEND AMEND GEN_ALL_CORE FT
pt evaluated I evaluated the patient and agree with above. with above.  treat with closed fracture treatment / care as above.  orger CT scan / MRI.  medical optimizaiton.  will follow

## 2023-11-27 NOTE — ED ADULT NURSE NOTE - OBJECTIVE STATEMENT
As per patient c/o fall. Pt reports slipping and falling at home x1 hour prior to arrival to the ED. Patient denies hitting her head and any LOC. Pt reports taking Eliquis. No apparent distress noted.

## 2023-11-27 NOTE — ED ADULT NURSE NOTE - CAS EDP DISCH DISPOSITION ADMI
[facility] [place] [facility]

## 2023-11-27 NOTE — PATIENT PROFILE ADULT - FOOD INSECURITY
Per patients mom: coming and going of mild eczema flare last night treating with aquaphone    1. Have you been to the ER, urgent care clinic since your last visit? Hospitalized since your last visit? No    2. Have you seen or consulted any other health care providers outside of the [address] [place] [address] since your last visit? Include any pap smears or colon screening.  No     Chief Complaint   Patient presents with    Well Child        Visit Vitals  /70   Pulse 90   Temp 98.7 °F (37.1 °C)   Resp 22   Ht 5' 2\" (1.575 m)   Wt 119 lb 3.2 oz (54.1 kg)   LMP 03/26/2023   SpO2 100%   BMI 21.80 kg/m² no

## 2023-11-27 NOTE — ED ADULT NURSE REASSESSMENT NOTE - NS ED NURSE REASSESS COMMENT FT1
Patient alert and oriented x3. Patient admitted to medicine. Patient brought home medications to ED. Patient told medications had to be given to pharmacy. Pt refused to give home medications to pharmacy. Pt educated to necessity and risk. Charge MIHAELA Kumari made aware.  MD made aware.

## 2023-11-27 NOTE — PROGRESS NOTE ADULT - PROBLEM SELECTOR PLAN 8
on home med janumet and jardiance   a1c 7.0   Insulin Sliding Scale before meals and at bedtime   achs   diabetic diet  glucose controlled

## 2023-11-27 NOTE — CONSULT NOTE ADULT - TIME BILLING
- Review of records, telemetry, vital signs and daily labs.   - General and cardiovascular physical examination.  - Generation of cardiovascular treatment plan.  - Coordination of care.      Patient was seen and examined by me on  11/28/2023,interim events noted,labs and radiology studies reviewed.  Vic Novak MD,FACC.  [address] [place], [place] [id number].  [phone] - Review of records, telemetry, vital signs and daily labs.   - General and cardiovascular physical examination.  - Generation of cardiovascular treatment plan.  - Coordination of care.      Patient was seen and examined by me on  11/28/2023,interim events noted,labs and radiology studies reviewed.  Vic Novak MD,FACC.  [address] [place], [place] [id number].  [phone] - Review of records, telemetry, vital signs and daily labs.   - General and cardiovascular physical examination.  - Generation of cardiovascular treatment plan.  - Coordination of care.      Patient was seen and examined by me on  11/28/2023,interim events noted,labs and radiology studies reviewed.  Vic Novak MD,FACC.  [address] [place], [place] [id number].  [phone] - Review of records, telemetry, vital signs and daily labs.   - General and cardiovascular physical examination.  - Generation of cardiovascular treatment plan.  - Coordination of care.      Patient was seen and examined by me on  11/27/2023,interim events noted,labs and radiology studies reviewed.  Vic Novak MD,FACC.  [address] [place], [place] [id number].  [phone] - Review of records, telemetry, vital signs and daily labs.   - General and cardiovascular physical examination.  - Generation of cardiovascular treatment plan.  - Coordination of care.      Patient was seen and examined by me on  11/27/2023,interim events noted,labs and radiology studies reviewed.  Vic Novak MD,FACC.  [address] [place], [place] [id number].  [phone] - Review of records, telemetry, vital signs and daily labs.   - General and cardiovascular physical examination.  - Generation of cardiovascular treatment plan.  - Coordination of care.      Patient was seen and examined by me on  11/27/2023,interim events noted,labs and radiology studies reviewed.  Vic Novak MD,FACC.  [address] [place], [place] [id number].  [phone]

## 2023-11-27 NOTE — PROGRESS NOTE ADULT - SUBJECTIVE AND OBJECTIVE BOX
Patient is a 77y old  Female who presents with a chief complaint of Fall with left proximal humeral fx (27 Nov 2023 13:48)    OVERNIGHT EVENTS: no acute changes.     Pt is aox3, comfortable appearing, eating well. Pt has difficulty ambulating due to weakness.     REVIEW OF SYSTEMS:  CONSTITUTIONAL: No fever, chills  ENMT:  No difficulty hearing, no change in vision  NECK: No pain or stiffness  RESPIRATORY: No cough, SOB  CARDIOVASCULAR: No chest pain, palpitations  GASTROINTESTINAL: No abdominal pain. No nausea, vomiting, or diarrhea  GENITOURINARY: No dysuria  NEUROLOGICAL: No HA  SKIN: No itching, burning, rashes, or lesions   LYMPH NODES: No enlarged glands  ENDOCRINE: No heat or cold intolerance; No hair loss  MUSCULOSKELETAL: +left arm pain  PSYCHIATRIC: No depression, anxiety  HEME/LYMPH: No easy bruising, or bleeding gums    T(C): 36.6 (11-27-23 @ 13:50), Max: 37.3 (11-26-23 @ 19:29)  HR: 101 (11-27-23 @ 13:50) (88 - 104)  BP: 153/74 (11-27-23 @ 13:50) (138/85 - 153/87)  RR: 18 (11-27-23 @ 13:50) (16 - 18)  SpO2: 98% (11-27-23 @ 13:50) (95% - 98%)  Wt(kg): --Vital Signs Last 24 Hrs  T(C): 36.6 (27 Nov 2023 13:50), Max: 37.3 (26 Nov 2023 19:29)  T(F): 97.8 (27 Nov 2023 13:50), Max: 99.2 (26 Nov 2023 19:29)  HR: 101 (27 Nov 2023 13:50) (88 - 104)  BP: 153/74 (27 Nov 2023 13:50) (138/85 - 153/87)  BP(mean): --  RR: 18 (27 Nov 2023 13:50) (16 - 18)  SpO2: 98% (27 Nov 2023 13:50) (95% - 98%)    Parameters below as of 27 Nov 2023 13:50  Patient On (Oxygen Delivery Method): room air        MEDICATIONS  (STANDING):  apixaban 5 milliGRAM(s) Oral every 12 hours  atorvastatin 20 milliGRAM(s) Oral at bedtime  diltiazem    milliGRAM(s) Oral daily  insulin lispro (ADMELOG) corrective regimen sliding scale   SubCutaneous Before meals and at bedtime  lisinopril 10 milliGRAM(s) Oral daily  magnesium oxide 400 milliGRAM(s) Oral three times a day with meals  metoprolol tartrate 100 milliGRAM(s) Oral two times a day    MEDICATIONS  (PRN):  acetaminophen     Tablet .. 650 milliGRAM(s) Oral every 6 hours PRN Temp greater or equal to 38C (100.4F), Mild Pain (1 - 3)  aluminum hydroxide/magnesium hydroxide/simethicone Suspension 30 milliLiter(s) Oral every 4 hours PRN Dyspepsia  melatonin 3 milliGRAM(s) Oral at bedtime PRN Insomnia  ondansetron Injectable 4 milliGRAM(s) IV Push every 8 hours PRN Nausea and/or Vomiting      PHYSICAL EXAM:  GENERAL: NAD  EYES: clear conjunctiva  ENMT: Moist mucous membranes  NECK: Supple, No JVD, Normal thyroid  CHEST/LUNG: Clear to auscultation bilaterally; No rales, rhonchi, wheezing, or rubs  HEART: S1, S2, Regular rate and rhythm  ABDOMEN: Soft, Nontender, Nondistended; Bowel sounds present  NEURO: Alert & Oriented X3  EXTREMITIES: +Left shoulder swelling and sling in place, +LROM left arm. +B/L LE PVD with varicose veins. No LE edema, no calf tenderness  LYMPH: No lymphadenopathy noted  SKIN: No rashes or lesions    Consultant(s) Notes Reviewed:  [x ] YES  [ ] NO  Care Discussed with Consultants/Other Providers [ x] YES  [ ] NO    LABS:                        13.1   7.67  )-----------( 158      ( 27 Nov 2023 09:50 )             40.1     11-27    137  |  105  |  16  ----------------------------<  217<H>  3.1<L>   |  25  |  0.91    Ca    9.3      27 Nov 2023 09:50  Phos  2.5     11-27  Mg     1.7     11-27    TPro  6.7  /  Alb  3.1<L>  /  TBili  1.1  /  DBili  0.3  /  AST  18  /  ALT  32  /  AlkPhos  83  11-27      CAPILLARY BLOOD GLUCOSE      POCT Blood Glucose.: 153 mg/dL (27 Nov 2023 11:59)  POCT Blood Glucose.: 135 mg/dL (27 Nov 2023 08:52)        Urinalysis Basic - ( 27 Nov 2023 09:50 )    Color: x / Appearance: x / SG: x / pH: x  Gluc: 217 mg/dL / Ketone: x  / Bili: x / Urobili: x   Blood: x / Protein: x / Nitrite: x   Leuk Esterase: x / RBC: x / WBC x   Sq Epi: x / Non Sq Epi: x / Bacteria: x        RADIOLOGY & ADDITIONAL TESTS:  < from: Xray Shoulder 2 Views, Left (11.26.23 @ 20:33) >    ACC: [id number] EXAM:  XR HUMERUS MIN 2 VIEWS LT   ORDERED BY: AYAD HERNÁNDEZ     ACC: [id number] EXAM:  XR KNEE AP LAT OBL 3 VIEWS RT   ORDERED BY: AYAD HERNÁNDEZ     ACC: [id number] EXAM:  XR SHOULDER COMP MIN 2V LT   ORDERED BY: AYAD HERNÁNDEZ     ACC: [id number] EXAM:  XR WRIST COMP MIN 3 VIEWS LT   ORDERED BY: AYAD HERNÁNDEZ     PROCEDURE DATE:  11/26/2023          INTERPRETATION:  Clinical History: 77-year-old female, fall.    Three views of the left wrist, 2 views of the left humerus, the left   shoulder and 3 views of the right knee.    FINDINGS: Severely comminuted/displaced proximal humeral fracture.    No fracture/dislocation/suprapatellar effusion at the knee.    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    IMPRESSION:    Severely comminuted/displaced proximal humeral fracture    --- End of Report ---            LJ OWENS DO; Attending Radiologist  This document has been electronically signed. Nov 27 2023 11:39AM    < end of copied text >      Imaging Personally Reviewed:  [ ] YES  [ ] NO   Patient is a 77y old  Female who presents with a chief complaint of Fall with left proximal humeral fx (27 Nov 2023 13:48)    OVERNIGHT EVENTS: no acute changes.     Pt is aox3, comfortable appearing, eating well. Pt has difficulty ambulating due to weakness.     REVIEW OF SYSTEMS:  CONSTITUTIONAL: No fever, chills  ENMT:  No difficulty hearing, no change in vision  NECK: No pain or stiffness  RESPIRATORY: No cough, SOB  CARDIOVASCULAR: No chest pain, palpitations  GASTROINTESTINAL: No abdominal pain. No nausea, vomiting, or diarrhea  GENITOURINARY: No dysuria  NEUROLOGICAL: No HA  SKIN: No itching, burning, rashes, or lesions   LYMPH NODES: No enlarged glands  ENDOCRINE: No heat or cold intolerance; No hair loss  MUSCULOSKELETAL: +left arm pain  PSYCHIATRIC: No depression, anxiety  HEME/LYMPH: No easy bruising, or bleeding gums    T(C): 36.6 (11-27-23 @ 13:50), Max: 37.3 (11-26-23 @ 19:29)  HR: 101 (11-27-23 @ 13:50) (88 - 104)  BP: 153/74 (11-27-23 @ 13:50) (138/85 - 153/87)  RR: 18 (11-27-23 @ 13:50) (16 - 18)  SpO2: 98% (11-27-23 @ 13:50) (95% - 98%)  Wt(kg): --Vital Signs Last 24 Hrs  T(C): 36.6 (27 Nov 2023 13:50), Max: 37.3 (26 Nov 2023 19:29)  T(F): 97.8 (27 Nov 2023 13:50), Max: 99.2 (26 Nov 2023 19:29)  HR: 101 (27 Nov 2023 13:50) (88 - 104)  BP: 153/74 (27 Nov 2023 13:50) (138/85 - 153/87)  BP(mean): --  RR: 18 (27 Nov 2023 13:50) (16 - 18)  SpO2: 98% (27 Nov 2023 13:50) (95% - 98%)    Parameters below as of 27 Nov 2023 13:50  Patient On (Oxygen Delivery Method): room air        MEDICATIONS  (STANDING):  apixaban 5 milliGRAM(s) Oral every 12 hours  atorvastatin 20 milliGRAM(s) Oral at bedtime  diltiazem    milliGRAM(s) Oral daily  insulin lispro (ADMELOG) corrective regimen sliding scale   SubCutaneous Before meals and at bedtime  lisinopril 10 milliGRAM(s) Oral daily  magnesium oxide 400 milliGRAM(s) Oral three times a day with meals  metoprolol tartrate 100 milliGRAM(s) Oral two times a day    MEDICATIONS  (PRN):  acetaminophen     Tablet .. 650 milliGRAM(s) Oral every 6 hours PRN Temp greater or equal to 38C (100.4F), Mild Pain (1 - 3)  aluminum hydroxide/magnesium hydroxide/simethicone Suspension 30 milliLiter(s) Oral every 4 hours PRN Dyspepsia  melatonin 3 milliGRAM(s) Oral at bedtime PRN Insomnia  ondansetron Injectable 4 milliGRAM(s) IV Push every 8 hours PRN Nausea and/or Vomiting      PHYSICAL EXAM:  GENERAL: NAD  EYES: clear conjunctiva  ENMT: Moist mucous membranes  NECK: Supple, No JVD, Normal thyroid  CHEST/LUNG: Clear to auscultation bilaterally; No rales, rhonchi, wheezing, or rubs  HEART: S1, S2, Regular rate and rhythm  ABDOMEN: Soft, Nontender, Nondistended; Bowel sounds present  NEURO: Alert & Oriented X3  EXTREMITIES: +Left shoulder swelling and sling in place, +LROM left arm. +B/L LE PVD with varicose veins. No LE edema, no calf tenderness  LYMPH: No lymphadenopathy noted  SKIN: No rashes or lesions    Consultant(s) Notes Reviewed:  [x ] YES  [ ] NO  Care Discussed with Consultants/Other Providers [ x] YES  [ ] NO    LABS:                        13.1   7.67  )-----------( 158      ( 27 Nov 2023 09:50 )             40.1     11-27    137  |  105  |  16  ----------------------------<  217<H>  3.1<L>   |  25  |  0.91    Ca    9.3      27 Nov 2023 09:50  Phos  2.5     11-27  Mg     1.7     11-27    TPro  6.7  /  Alb  3.1<L>  /  TBili  1.1  /  DBili  0.3  /  AST  18  /  ALT  32  /  AlkPhos  83  11-27      CAPILLARY BLOOD GLUCOSE      POCT Blood Glucose.: 153 mg/dL (27 Nov 2023 11:59)  POCT Blood Glucose.: 135 mg/dL (27 Nov 2023 08:52)        Urinalysis Basic - ( 27 Nov 2023 09:50 )    Color: x / Appearance: x / SG: x / pH: x  Gluc: 217 mg/dL / Ketone: x  / Bili: x / Urobili: x   Blood: x / Protein: x / Nitrite: x   Leuk Esterase: x / RBC: x / WBC x   Sq Epi: x / Non Sq Epi: x / Bacteria: x        RADIOLOGY & ADDITIONAL TESTS:  < from: Xray Shoulder 2 Views, Left (11.26.23 @ 20:33) >    ACC: [id number] EXAM:  XR HUMERUS MIN 2 VIEWS LT   ORDERED BY: AYAD HERNÁNDEZ     ACC: [id number] EXAM:  XR KNEE AP LAT OBL 3 VIEWS RT   ORDERED BY: AYAD HERNÁNDEZ     ACC: [id number] EXAM:  XR SHOULDER COMP MIN 2V LT   ORDERED BY: AYAD HERNÁNDEZ     ACC: [id number] EXAM:  XR WRIST COMP MIN 3 VIEWS LT   ORDERED BY: AYAD HERNÁNDEZ     PROCEDURE DATE:  11/26/2023          INTERPRETATION:  Clinical History: 77-year-old female, fall.    Three views of the left wrist, 2 views of the left humerus, the left   shoulder and 3 views of the right knee.    FINDINGS: Severely comminuted/displaced proximal humeral fracture.    No fracture/dislocation/suprapatellar effusion at the knee.    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    IMPRESSION:    Severely comminuted/displaced proximal humeral fracture    --- End of Report ---            LJ OWENS DO; Attending Radiologist  This document has been electronically signed. Nov 27 2023 11:39AM    < end of copied text >      Imaging Personally Reviewed:  [ ] YES  [ ] NO   Patient is a 77y old  Female who presents with a chief complaint of Fall with left proximal humeral fx (27 Nov 2023 13:48)    OVERNIGHT EVENTS: no acute changes.     Pt is aox3, comfortable appearing, eating well. Pt has difficulty ambulating due to weakness.     REVIEW OF SYSTEMS:  CONSTITUTIONAL: No fever, chills  ENMT:  No difficulty hearing, no change in vision  NECK: No pain or stiffness  RESPIRATORY: No cough, SOB  CARDIOVASCULAR: No chest pain, palpitations  GASTROINTESTINAL: No abdominal pain. No nausea, vomiting, or diarrhea  GENITOURINARY: No dysuria  NEUROLOGICAL: No HA  SKIN: No itching, burning, rashes, or lesions   LYMPH NODES: No enlarged glands  ENDOCRINE: No heat or cold intolerance; No hair loss  MUSCULOSKELETAL: +left arm pain  PSYCHIATRIC: No depression, anxiety  HEME/LYMPH: No easy bruising, or bleeding gums    T(C): 36.6 (11-27-23 @ 13:50), Max: 37.3 (11-26-23 @ 19:29)  HR: 101 (11-27-23 @ 13:50) (88 - 104)  BP: 153/74 (11-27-23 @ 13:50) (138/85 - 153/87)  RR: 18 (11-27-23 @ 13:50) (16 - 18)  SpO2: 98% (11-27-23 @ 13:50) (95% - 98%)  Wt(kg): --Vital Signs Last 24 Hrs  T(C): 36.6 (27 Nov 2023 13:50), Max: 37.3 (26 Nov 2023 19:29)  T(F): 97.8 (27 Nov 2023 13:50), Max: 99.2 (26 Nov 2023 19:29)  HR: 101 (27 Nov 2023 13:50) (88 - 104)  BP: 153/74 (27 Nov 2023 13:50) (138/85 - 153/87)  BP(mean): --  RR: 18 (27 Nov 2023 13:50) (16 - 18)  SpO2: 98% (27 Nov 2023 13:50) (95% - 98%)    Parameters below as of 27 Nov 2023 13:50  Patient On (Oxygen Delivery Method): room air        MEDICATIONS  (STANDING):  apixaban 5 milliGRAM(s) Oral every 12 hours  atorvastatin 20 milliGRAM(s) Oral at bedtime  diltiazem    milliGRAM(s) Oral daily  insulin lispro (ADMELOG) corrective regimen sliding scale   SubCutaneous Before meals and at bedtime  lisinopril 10 milliGRAM(s) Oral daily  magnesium oxide 400 milliGRAM(s) Oral three times a day with meals  metoprolol tartrate 100 milliGRAM(s) Oral two times a day    MEDICATIONS  (PRN):  acetaminophen     Tablet .. 650 milliGRAM(s) Oral every 6 hours PRN Temp greater or equal to 38C (100.4F), Mild Pain (1 - 3)  aluminum hydroxide/magnesium hydroxide/simethicone Suspension 30 milliLiter(s) Oral every 4 hours PRN Dyspepsia  melatonin 3 milliGRAM(s) Oral at bedtime PRN Insomnia  ondansetron Injectable 4 milliGRAM(s) IV Push every 8 hours PRN Nausea and/or Vomiting      PHYSICAL EXAM:  GENERAL: NAD  EYES: clear conjunctiva  ENMT: Moist mucous membranes  NECK: Supple, No JVD, Normal thyroid  CHEST/LUNG: Clear to auscultation bilaterally; No rales, rhonchi, wheezing, or rubs  HEART: S1, S2, Regular rate and rhythm  ABDOMEN: Soft, Nontender, Nondistended; Bowel sounds present  NEURO: Alert & Oriented X3  EXTREMITIES: +Left shoulder swelling and sling in place, +LROM left arm. +B/L LE PVD with varicose veins. No LE edema, no calf tenderness  LYMPH: No lymphadenopathy noted  SKIN: No rashes or lesions    Consultant(s) Notes Reviewed:  [x ] YES  [ ] NO  Care Discussed with Consultants/Other Providers [ x] YES  [ ] NO    LABS:                        13.1   7.67  )-----------( 158      ( 27 Nov 2023 09:50 )             40.1     11-27    137  |  105  |  16  ----------------------------<  217<H>  3.1<L>   |  25  |  0.91    Ca    9.3      27 Nov 2023 09:50  Phos  2.5     11-27  Mg     1.7     11-27    TPro  6.7  /  Alb  3.1<L>  /  TBili  1.1  /  DBili  0.3  /  AST  18  /  ALT  32  /  AlkPhos  83  11-27      CAPILLARY BLOOD GLUCOSE      POCT Blood Glucose.: 153 mg/dL (27 Nov 2023 11:59)  POCT Blood Glucose.: 135 mg/dL (27 Nov 2023 08:52)        Urinalysis Basic - ( 27 Nov 2023 09:50 )    Color: x / Appearance: x / SG: x / pH: x  Gluc: 217 mg/dL / Ketone: x  / Bili: x / Urobili: x   Blood: x / Protein: x / Nitrite: x   Leuk Esterase: x / RBC: x / WBC x   Sq Epi: x / Non Sq Epi: x / Bacteria: x        RADIOLOGY & ADDITIONAL TESTS:  < from: Xray Shoulder 2 Views, Left (11.26.23 @ 20:33) >    ACC: [id number] EXAM:  XR HUMERUS MIN 2 VIEWS LT   ORDERED BY: AYAD HERNÁNDEZ     ACC: [id number] EXAM:  XR KNEE AP LAT OBL 3 VIEWS RT   ORDERED BY: AYAD HERNÁNDEZ     ACC: [id number] EXAM:  XR SHOULDER COMP MIN 2V LT   ORDERED BY: AYAD HERNÁNDEZ     ACC: [id number] EXAM:  XR WRIST COMP MIN 3 VIEWS LT   ORDERED BY: AYAD HERNÁNDEZ     PROCEDURE DATE:  11/26/2023          INTERPRETATION:  Clinical History: 77-year-old female, fall.    Three views of the left wrist, 2 views of the left humerus, the left   shoulder and 3 views of the right knee.    FINDINGS: Severely comminuted/displaced proximal humeral fracture.    No fracture/dislocation/suprapatellar effusion at the knee.    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    IMPRESSION:    Severely comminuted/displaced proximal humeral fracture    --- End of Report ---            LJ OWENS DO; Attending Radiologist  This document has been electronically signed. Nov 27 2023 11:39AM    < end of copied text >      Imaging Personally Reviewed:  [ ] YES  [ ] NO

## 2023-11-27 NOTE — ED ADULT NURSE NOTE - BEFAST SCREENING
Spiritual Plan of Care    Pt Name: Chadd Greer  Pt : 1953  Date: 2019    Trigger and consult from interdisciplinary team. Pt unavailable and receiving cares.  Lisa will attempt to visit another time. Additional  support available prn.   Negative

## 2023-11-27 NOTE — PHYSICAL THERAPY INITIAL EVALUATION ADULT - MANUAL MUSCLE TESTING RESULTS, REHAB EVAL
MMT on right UE and both LE grossly graded 4/5; No MMT done on left UE due to Severely comminuted/displaced proximal humeral fracture. + movement against gravity of left elbow, wrist, and hand

## 2023-11-27 NOTE — CONSULT NOTE ADULT - ASSESSMENT
77 year old woman with PMH of A- fib on OAC, HTN, DM2, HLD with a mechanical fall onto  the left shoulder -# Left proximal humeral fracture 77 year old woman with PMH of A- fib on OAC, HTN, DM2, HLD with a mechanical fall onto  the left shoulder -# Left proximal humeral fracture    #Atrial fibrillation with RVR.   ·  Plan: pt found to have afib with Rvr 130-150 due to not receiving cardizem  will order metoprolol 5 mg IVP x1 dose now  restart home med cardizem 180 mg qd, metoprolol succinate 200 mg qd, eliquis 5 mg bid  transfer to telemetry  HR now controlled      #Fracture of humeral head, right, closed.   ·  Plan: s/p fall this evening, no LOC, no dizziness, likely mechanical fall  xray left shoulder - Severely comminuted/displaced proximal humeral fracture.   Pain Control  PT  Fall Precautions   Scheduled for ORIF    - Overall this patient is at   intermediate risk (for cardiac death, nonfatal myocardial infarction, and nonfatal cardiac arrest perioperatively for this        intermediate  risk procedure).     The patient is however without evidence of ACS, Decompensated Heart Failure,Obstructive Valvular Heart disease or Unstable arrhythmia.   There  are  no further recommendation for risk stratifying imaging/stress testing prior to planned surgery    Hold DOAC 48 hrs prior to procedure      #HTN (hypertension).   ·  Plan: continue home med ramipril 2.5 mg bid, metoprolol succinate 200 mg qd  BP goal <140/90.       #HLD (hyperlipidemia).   ·  Plan: continue home med atorvastatin 20 mg qd.      #DM (diabetes mellitus).   ·  Plan: on home med janumet and jardiance   a1c 7.0   Insulin Sliding Scale before meals and at bedtime

## 2023-11-27 NOTE — PROGRESS NOTE ADULT - PROBLEM SELECTOR PLAN 1
s/p fall this evening, no LOC, no dizziness, likely mechanical fall  xray left shoulder - Severely comminuted/displaced proximal humeral fracture.   Pain Control  PT  Fall Precautions  Ortho consulted, rec surgical intervention from rehab facility  Cardiology Dr. Novak following for clearance pt found to have afib with Rvr 130-150 due to not receiving cardizem  will order metoprolol 5 mg IVP x1 dose now  restart home med cardizem 180 mg qd, metoprolol succinate 200 mg qd, eliquis 5 mg bid  transfer to telemetry  Cardiology Dr. Novak following for cardiac clearance  Discussed with PCP/Cardiology Dr. Nieves, pt has hx of afib

## 2023-11-27 NOTE — PHYSICAL THERAPY INITIAL EVALUATION ADULT - NSPTDISCHREC_GEN_A_CORE
due to limitation in mobility and ADLs from NWB and no ROM on left UE; patient lives alone and demonstrates having difficulty performing usual activities with current imposed limitation/Sub-acute Rehab

## 2023-11-27 NOTE — PHYSICAL THERAPY INITIAL EVALUATION ADULT - PATIENT/FAMILY/SIGNIFICANT OTHER GOALS STATEMENT, PT EVAL
will be able to perform transfers, ambulation, and ADLs independently and pain-free without an assistive device

## 2023-11-27 NOTE — PHYSICAL THERAPY INITIAL EVALUATION ADULT - GENERAL OBSERVATIONS, REHAB EVAL
awake, alert, NAD; +peripheral IV access on right antecubital area; +left arm sling in place; +moderate edema on left arm

## 2023-11-27 NOTE — PROGRESS NOTE ADULT - ASSESSMENT
78 y/o F from home ambulates independently, lives alone with no HHA, PMH of Afib on Eliquis, HTN, DM, HLD presenting with fall this evening. Patient states that she was trying to get something on the floor when she got tripped up and landed hard on her left shoulder. She was too weak and in pain to be able to get up. She was on the ground for 2 hours, until she was found by her son. Admitted to medicine for left proximal humeral fracture. Ortho consulted.     Pt follows with Cardiologist Dr. Myla Nieves MD, [phone].   In the ED, Xrays show Severely comminuted/displaced proximal humeral fracture.   Pt is now pending CT and MR Left shoulder.     Once optimized, physical therapy rec MEG. CM following for placement.   Awaiting possible surgery of left shoulder next week from rehab. Cardiology following for clearance.    76 y/o F from home ambulates independently, lives alone with no HHA, PMH of Afib on Eliquis, HTN, DM, HLD presenting with fall this evening. Patient states that she was trying to get something on the floor when she got tripped up and landed hard on her left shoulder. She was too weak and in pain to be able to get up. She was on the ground for 2 hours, until she was found by her son. Admitted to medicine for left proximal humeral fracture. Ortho consulted.     Pt follows with Cardiologist Dr. Myla Nieves MD, [phone].   In the ED, Xrays show Severely comminuted/displaced proximal humeral fracture.   Pt is now pending CT and MR Left shoulder.     Once optimized, physical therapy rec MEG. CM following for placement.   Awaiting possible surgery of left shoulder next week from rehab. Cardiology following for clearance.    78 y/o F from home ambulates independently, lives alone with no HHA, PMH of Afib on Eliquis, HTN, DM, HLD presenting with fall this evening. Patient states that she was trying to get something on the floor when she got tripped up and landed hard on her left shoulder. She was too weak and in pain to be able to get up. She was on the ground for 2 hours, until she was found by her son. Admitted to medicine for left proximal humeral fracture. Ortho consulted.     Pt follows with Cardiologist Dr. Myla Nieves MD, [phone].   In the ED, Xrays show Severely comminuted/displaced proximal humeral fracture.   Pt is now pending CT and MR Left shoulder.     Once optimized, physical therapy rec MEG. CM following for placement.   Awaiting possible surgery of left shoulder next week from rehab. Cardiology following for clearance.    76 y/o F from home ambulates independently, lives alone with no HHA, PMH of Afib on Eliquis, HTN, DM, HLD presenting with fall this evening. Patient states that she was trying to get something on the floor when she got tripped up and landed hard on her left shoulder. She was too weak and in pain to be able to get up. She was on the ground for 2 hours, until she was found by her son. Admitted to medicine for left proximal humeral fracture. Ortho consulted.     Pt follows with Cardiologist Dr. Myla Nieves MD, [phone].   In the ED, Xrays show Severely comminuted/displaced proximal humeral fracture.   Pt is now pending CT and MR Left shoulder.     Hospital course complicated by afib with rvr, transferred to telemetry. Cardiology Dr. Novak following.     Once optimized, physical therapy rec MEG. CM following for placement.   Awaiting possible surgery of left shoulder next week from rehab. Cardiology following for clearance.    76 y/o F from home ambulates independently, lives alone with no HHA, PMH of Afib on Eliquis, HTN, DM, HLD presenting with fall this evening. Patient states that she was trying to get something on the floor when she got tripped up and landed hard on her left shoulder. She was too weak and in pain to be able to get up. She was on the ground for 2 hours, until she was found by her son. Admitted to medicine for left proximal humeral fracture. Ortho consulted.     Pt follows with Cardiologist Dr. Myla Nieves MD, [phone].   In the ED, Xrays show Severely comminuted/displaced proximal humeral fracture.   Pt is now pending CT and MR Left shoulder.     Hospital course complicated by afib with rvr, transferred to telemetry. Cardiology Dr. Novak following.     Once optimized, physical therapy rec MEG. CM following for placement.   Awaiting possible surgery of left shoulder next week from rehab. Cardiology following for clearance.    76 y/o F from home ambulates independently, lives alone with no HHA, PMH of Afib on Eliquis, HTN, DM, HLD presenting with fall this evening. Patient states that she was trying to get something on the floor when she got tripped up and landed hard on her left shoulder. She was too weak and in pain to be able to get up. She was on the ground for 2 hours, until she was found by her son. Admitted to medicine for left proximal humeral fracture. Ortho consulted.     Pt follows with Cardiologist Dr. Myla Nieves MD, [phone].   In the ED, Xrays show Severely comminuted/displaced proximal humeral fracture.   Pt is now pending CT and MR Left shoulder.     Hospital course complicated by afib with rvr, transferred to telemetry. Cardiology Dr. Novak following.     Once optimized, physical therapy rec MEG. CM following for placement.   Awaiting possible surgery of left shoulder next week from rehab. Cardiology following for clearance.

## 2023-11-27 NOTE — PHYSICAL THERAPY INITIAL EVALUATION ADULT - ACTIVE RANGE OF MOTION EXAMINATION, REHAB EVAL
left shoulder no AROM due to "Severely comminuted/displaced proximal humeral fracture"/bilateral upper extremity Active ROM was WFL (within functional limits)/bilateral  lower extremity Active ROM was WFL (within functional limits)

## 2023-11-27 NOTE — PHYSICAL THERAPY INITIAL EVALUATION ADULT - PERTINENT HX OF CURRENT PROBLEM, REHAB EVAL
s/p fall at home; x-ray of left shoulder revealed :" Severely comminuted/displaced proximal humeral fracture"

## 2023-11-27 NOTE — PROGRESS NOTE ADULT - PROBLEM SELECTOR PLAN 4
Pt unable to care for herself post humeral fracture   CM following, unsafe dc home pt p/w weakness   PT rec MEG

## 2023-11-27 NOTE — PHYSICAL THERAPY INITIAL EVALUATION ADULT - IMPAIRMENTS FOUND, PT EVAL
Activities of Daily Living/aerobic capacity/endurance/gait, locomotion, and balance/joint integrity and mobility/muscle strength/posture/ROM

## 2023-11-27 NOTE — PHYSICAL THERAPY INITIAL EVALUATION ADULT - DIAGNOSIS, PT EVAL
s/p fall at home; Severely comminuted/displaced proximal humeral fracture on left UE; limited mobility and utilization of left UE; unsteady gait and transfers

## 2023-11-27 NOTE — CONSULT NOTE ADULT - SUBJECTIVE AND OBJECTIVE BOX
MR:- [id number] :  NAME:-ROMI MONZON:-    DATE OF SERVICE:11-27-23 @ 11:17    Patient was seen,examined and evaluated  by Vic Novak MD [id number] @ 11:17 .  ER evaluation, Labs and Hospital course was reviewed,    CHIEF COMPLAINT:Fall    HPI:HPI:  77F from home ambulates independently, lives alone with no HHA, PMH of Afib?, on Eliquis, HTN, DM, HLD presenting with fall this evening. Patient states taht she was trying to get something on the floor when she got tripped up and landed hard on her left shoulder. She was too weak and in pain to be able to get up. She was on the ground for 2 hours, until she was found by her son. She has no history of falls, and is unable to explain why she is on Eliquis, and asks to defer to her PCP, Dr. Nieves (doesn't remember name).    She denies, LOC, dizziness, or feeling lightheaded during the event. No fevers, chills, NVD    In the ED, Xrays show comminuted fracture/dislocation in left shoulder, pending offical read, labs WNL (26 Nov 2023 21:55)        CARDIAC HISTORY:  [ ] CAD [ [PCI [ ] CABG [ ] Prior Cath  [x ] Atrial Fibrillation  Devices[ ] PPM [ ] ICD [ ]ILR  Heart Failure [ ] HFrEF [ ] HFpEF    PAST MEDICAL & SURGICAL HISTORY:  HTN (hypertension)          MEDICATIONS  (STANDING):  apixaban 5 milliGRAM(s) Oral every 12 hours  atorvastatin 20 milliGRAM(s) Oral at bedtime  diltiazem    milliGRAM(s) Oral daily  insulin lispro (ADMELOG) corrective regimen sliding scale   SubCutaneous Before meals and at bedtime  lisinopril 10 milliGRAM(s) Oral daily  magnesium oxide 400 milliGRAM(s) Oral three times a day with meals  metoprolol tartrate 100 milliGRAM(s) Oral two times a day  potassium chloride    Tablet ER 40 milliEquivalent(s) Oral once  potassium chloride  10 mEq/100 mL IVPB 10 milliEquivalent(s) IV Intermittent every 1 hour    MEDICATIONS  (PRN):  acetaminophen     Tablet .. 650 milliGRAM(s) Oral every 6 hours PRN Temp greater or equal to 38C (100.4F), Mild Pain (1 - 3)  aluminum hydroxide/magnesium hydroxide/simethicone Suspension 30 milliLiter(s) Oral every 4 hours PRN Dyspepsia  melatonin 3 milliGRAM(s) Oral at bedtime PRN Insomnia  ondansetron Injectable 4 milliGRAM(s) IV Push every 8 hours PRN Nausea and/or Vomiting      FAMILY HISTORY:    No family history of premature coronary artery disease or sudden cardiac death    SOCIAL HISTORY:  Smoking-[ ] Active  [ ] Former [x ] Non Smoker  Alcohol-[ x] Denies [ ] Social [ ] Daily  Ilicit Drug use-[x ] Denies [ ] Active user    REVIEW OF SYSTEMS:  Constitutional: [ ] fever, [ ]weight loss, [ ]fatigue   Activity [ ] Bedbound,[x ] Ambulates [ x] Unassisted[ ] Cane/Walker [ ] Assistence.  Effort tolerance:[ ] Excellent [ ] Good [x ] Fair [ ] Poor [ ]  Eyes: [ ] visual changes  Respiratory: [ ]shortness of breath;  [ ] cough, [ ]wheezing, [ ]chills, [ ]hemoptysis  Cardiovascular: [ ] chest pain, [ ]palpitations, [ ]dizziness,  [ ]leg swelling[ ]orthopnea [ ]PND  Gastrointestinal: [ ] abdominal pain, [ ]nausea, [ ]vomiting,  [ ]diarrhea,[ ]constipation  Genitourinary: [ ] dysuria, [ ] hematuria  Neurologic: [ ] headaches [ ] tremors[ ] weakness  Skin: [ ] itching, [ ]burning, [ ] rashes  Endocrine: [ ] heat or cold intolerance  Musculoskeletal: [ ] joint pain or swelling; [ ] muscle, back, or extremity pain  Psychiatric: [ ] depression, [ ]anxiety, [ ]mood swings, or [ ]difficulty sleeping  Hematologic: [ ] easy bruising, [ ] bleeding gums       [ x] All others negative	  [ ] Unable to obtain    Vital Signs Last 24 Hrs  T(C): 36.6 (27 Nov 2023 08:40), Max: 37.3 (26 Nov 2023 19:29)  T(F): 97.9 (27 Nov 2023 08:40), Max: 99.2 (26 Nov 2023 19:29)  HR: 96 (27 Nov 2023 08:40) (88 - 104)  BP: 138/85 (27 Nov 2023 08:40) (138/85 - 153/87)  RR: 16 (27 Nov 2023 08:40) (16 - 18)  SpO2: 96% (27 Nov 2023 08:40) (95% - 98%)    Parameters below as of 27 Nov 2023 08:40  Patient On (Oxygen Delivery Method): room air      I&O's Summary      PHYSICAL EXAM:  General: No acute distress BMI-29  HEENT: EOMI, PERRL[ ] Icteric  Neck: Supple, [ ] JVD  Lungs: Equal air entry bilaterally; [ ] Rales [ ] Rhonchi [ ] Wheezing  Heart: Regular rate and rhythm;[x ] Murmurs-   2/6 [ x] Systolic [ ] Diastolic [ ] Radiation,No rubs, or gallops  Abdomen: Nontender, bowel sounds present  Extremities: No clubbing, cyanosis, [ ] edema[ ] Calf tenderness  Nervous system:  Alert & Oriented X3, no focal deficits  Psychiatric: Normal affect  Skin: No rashes or lesions      LABS:  11-27    137  |  105  |  16  ----------------------------<  217<H>  3.1<L>   |  25  |  0.91    Ca    9.3      27 Nov 2023 09:50  Phos  2.5     11-27  Mg     1.7     11-27    TPro  6.7  /  Alb  3.1<L>  /  TBili  1.1  /  DBili  0.3  /  AST  18  /  ALT  32  /  AlkPhos  83  11-27    Creatinine Trend: 0.91<--, 0.79<--                        13.1   7.67  )-----------( 158      ( 27 Nov 2023 09:50 )             40.1     Cardiac Enzymes: CARDIAC MARKERS ( 26 Nov 2023 19:00 )  x     / x     / 117 U/L / x     / 2.2 ng/mL         MR:- [id number] :  NAME:-ROMI MONZON:-    DATE OF SERVICE:11-27-23 @ 11:17    Patient was seen,examined and evaluated  by Vic Novak MD [id number] @ 11:17 .  ER evaluation, Labs and Hospital course was reviewed,    CHIEF COMPLAINT:Fall    HPI:HPI:  77F from home ambulates independently, lives alone with no HHA, PMH of Afib?, on Eliquis, HTN, DM, HLD presenting with fall this evening. Patient states taht she was trying to get something on the floor when she got tripped up and landed hard on her left shoulder. She was too weak and in pain to be able to get up. She was on the ground for 2 hours, until she was found by her son. She has no history of falls, and is unable to explain why she is on Eliquis, and asks to defer to her PCP, Dr. Nieves (doesn't remember name).    She denies, LOC, dizziness, or feeling lightheaded during the event. No fevers, chills, NVD    In the ED, Xrays show comminuted fracture/dislocation in left shoulder, pending offical read, labs WNL (26 Nov 2023 21:55)        CARDIAC HISTORY:  [ ] CAD [ [PCI [ ] CABG [ ] Prior Cath  [x ] Atrial Fibrillation  Devices[ ] PPM [ ] ICD [ ]ILR  Heart Failure [ ] HFrEF [ ] HFpEF    PAST MEDICAL & SURGICAL HISTORY:  HTN (hypertension)          MEDICATIONS  (STANDING):  apixaban 5 milliGRAM(s) Oral every 12 hours  atorvastatin 20 milliGRAM(s) Oral at bedtime  diltiazem    milliGRAM(s) Oral daily  insulin lispro (ADMELOG) corrective regimen sliding scale   SubCutaneous Before meals and at bedtime  lisinopril 10 milliGRAM(s) Oral daily  magnesium oxide 400 milliGRAM(s) Oral three times a day with meals  metoprolol tartrate 100 milliGRAM(s) Oral two times a day  potassium chloride    Tablet ER 40 milliEquivalent(s) Oral once  potassium chloride  10 mEq/100 mL IVPB 10 milliEquivalent(s) IV Intermittent every 1 hour    MEDICATIONS  (PRN):  acetaminophen     Tablet .. 650 milliGRAM(s) Oral every 6 hours PRN Temp greater or equal to 38C (100.4F), Mild Pain (1 - 3)  aluminum hydroxide/magnesium hydroxide/simethicone Suspension 30 milliLiter(s) Oral every 4 hours PRN Dyspepsia  melatonin 3 milliGRAM(s) Oral at bedtime PRN Insomnia  ondansetron Injectable 4 milliGRAM(s) IV Push every 8 hours PRN Nausea and/or Vomiting      FAMILY HISTORY:    No family history of premature coronary artery disease or sudden cardiac death    SOCIAL HISTORY:  Smoking-[ ] Active  [ ] Former [x ] Non Smoker  Alcohol-[ x] Denies [ ] Social [ ] Daily  Ilicit Drug use-[x ] Denies [ ] Active user    REVIEW OF SYSTEMS:  Constitutional: [ ] fever, [ ]weight loss, [ ]fatigue   Activity [ ] Bedbound,[x ] Ambulates [ x] Unassisted[ ] Cane/Walker [ ] Assistence.  Effort tolerance:[ ] Excellent [ ] Good [x ] Fair [ ] Poor [ ]  Eyes: [ ] visual changes  Respiratory: [ ]shortness of breath;  [ ] cough, [ ]wheezing, [ ]chills, [ ]hemoptysis  Cardiovascular: [ ] chest pain, [ ]palpitations, [ ]dizziness,  [ ]leg swelling[ ]orthopnea [ ]PND  Gastrointestinal: [ ] abdominal pain, [ ]nausea, [ ]vomiting,  [ ]diarrhea,[ ]constipation  Genitourinary: [ ] dysuria, [ ] hematuria  Neurologic: [ ] headaches [ ] tremors[ ] weakness  Skin: [ ] itching, [ ]burning, [ ] rashes  Endocrine: [ ] heat or cold intolerance  Musculoskeletal: [ ] joint pain or swelling; [ ] muscle, back, or extremity pain  Psychiatric: [ ] depression, [ ]anxiety, [ ]mood swings, or [ ]difficulty sleeping  Hematologic: [ ] easy bruising, [ ] bleeding gums       [ x] All others negative	  [ ] Unable to obtain    Vital Signs Last 24 Hrs  T(C): 36.6 (27 Nov 2023 08:40), Max: 37.3 (26 Nov 2023 19:29)  T(F): 97.9 (27 Nov 2023 08:40), Max: 99.2 (26 Nov 2023 19:29)  HR: 96 (27 Nov 2023 08:40) (88 - 104)  BP: 138/85 (27 Nov 2023 08:40) (138/85 - 153/87)  RR: 16 (27 Nov 2023 08:40) (16 - 18)  SpO2: 96% (27 Nov 2023 08:40) (95% - 98%)    Parameters below as of 27 Nov 2023 08:40  Patient On (Oxygen Delivery Method): room air      I&O's Summary      PHYSICAL EXAM:  General: No acute distress BMI-29  HEENT: EOMI, PERRL[ ] Icteric  Neck: Supple, [ ] JVD  Lungs: Equal air entry bilaterally; [ ] Rales [ ] Rhonchi [ ] Wheezing  Heart: Regular rate and rhythm;[x ] Murmurs-   2/6 [ x] Systolic [ ] Diastolic [ ] Radiation,No rubs, or gallops  Abdomen: Nontender, bowel sounds present  Extremities: No clubbing, cyanosis, [ ] edema[ ] Calf tenderness  Nervous system:  Alert & Oriented X3, no focal deficits  Psychiatric: Normal affect  Skin: No rashes or lesions      LABS:  11-27    137  |  105  |  16  ----------------------------<  217<H>  3.1<L>   |  25  |  0.91    Ca    9.3      27 Nov 2023 09:50  Phos  2.5     11-27  Mg     1.7     11-27    TPro  6.7  /  Alb  3.1<L>  /  TBili  1.1  /  DBili  0.3  /  AST  18  /  ALT  32  /  AlkPhos  83  11-27    Creatinine Trend: 0.91<--, 0.79<--                        13.1   7.67  )-----------( 158      ( 27 Nov 2023 09:50 )             40.1     Cardiac Enzymes: CARDIAC MARKERS ( 26 Nov 2023 19:00 )  x     / x     / 117 U/L / x     / 2.2 ng/mL         MR:- [id number] :  NAME:-ROMI MONZON:-    DATE OF SERVICE:11-27-23 @ 11:17    Patient was seen,examined and evaluated  by Vic Novak MD [id number] @ 11:17 .  ER evaluation, Labs and Hospital course was reviewed,    CHIEF COMPLAINT:Fall    HPI:HPI:  77F from home ambulates independently, lives alone with no HHA, PMH of Afib?, on Eliquis, HTN, DM, HLD presenting with fall this evening. Patient states taht she was trying to get something on the floor when she got tripped up and landed hard on her left shoulder. She was too weak and in pain to be able to get up. She was on the ground for 2 hours, until she was found by her son. She has no history of falls, and is unable to explain why she is on Eliquis, and asks to defer to her PCP, Dr. Nieves (doesn't remember name).    She denies, LOC, dizziness, or feeling lightheaded during the event. No fevers, chills, NVD    In the ED, Xrays show comminuted fracture/dislocation in left shoulder, pending offical read, labs WNL (26 Nov 2023 21:55)        CARDIAC HISTORY:  [ ] CAD [ [PCI [ ] CABG [ ] Prior Cath  [x ] Atrial Fibrillation  Devices[ ] PPM [ ] ICD [ ]ILR  Heart Failure [ ] HFrEF [ ] HFpEF    PAST MEDICAL & SURGICAL HISTORY:  HTN (hypertension)          MEDICATIONS  (STANDING):  apixaban 5 milliGRAM(s) Oral every 12 hours  atorvastatin 20 milliGRAM(s) Oral at bedtime  diltiazem    milliGRAM(s) Oral daily  insulin lispro (ADMELOG) corrective regimen sliding scale   SubCutaneous Before meals and at bedtime  lisinopril 10 milliGRAM(s) Oral daily  magnesium oxide 400 milliGRAM(s) Oral three times a day with meals  metoprolol tartrate 100 milliGRAM(s) Oral two times a day  potassium chloride    Tablet ER 40 milliEquivalent(s) Oral once  potassium chloride  10 mEq/100 mL IVPB 10 milliEquivalent(s) IV Intermittent every 1 hour    MEDICATIONS  (PRN):  acetaminophen     Tablet .. 650 milliGRAM(s) Oral every 6 hours PRN Temp greater or equal to 38C (100.4F), Mild Pain (1 - 3)  aluminum hydroxide/magnesium hydroxide/simethicone Suspension 30 milliLiter(s) Oral every 4 hours PRN Dyspepsia  melatonin 3 milliGRAM(s) Oral at bedtime PRN Insomnia  ondansetron Injectable 4 milliGRAM(s) IV Push every 8 hours PRN Nausea and/or Vomiting      FAMILY HISTORY:    No family history of premature coronary artery disease or sudden cardiac death    SOCIAL HISTORY:  Smoking-[ ] Active  [ ] Former [x ] Non Smoker  Alcohol-[ x] Denies [ ] Social [ ] Daily  Ilicit Drug use-[x ] Denies [ ] Active user    REVIEW OF SYSTEMS:  Constitutional: [ ] fever, [ ]weight loss, [ ]fatigue   Activity [ ] Bedbound,[x ] Ambulates [ x] Unassisted[ ] Cane/Walker [ ] Assistence.  Effort tolerance:[ ] Excellent [ ] Good [x ] Fair [ ] Poor [ ]  Eyes: [ ] visual changes  Respiratory: [ ]shortness of breath;  [ ] cough, [ ]wheezing, [ ]chills, [ ]hemoptysis  Cardiovascular: [ ] chest pain, [ ]palpitations, [ ]dizziness,  [ ]leg swelling[ ]orthopnea [ ]PND  Gastrointestinal: [ ] abdominal pain, [ ]nausea, [ ]vomiting,  [ ]diarrhea,[ ]constipation  Genitourinary: [ ] dysuria, [ ] hematuria  Neurologic: [ ] headaches [ ] tremors[ ] weakness  Skin: [ ] itching, [ ]burning, [ ] rashes  Endocrine: [ ] heat or cold intolerance  Musculoskeletal: [ ] joint pain or swelling; [ ] muscle, back, or extremity pain  Psychiatric: [ ] depression, [ ]anxiety, [ ]mood swings, or [ ]difficulty sleeping  Hematologic: [ ] easy bruising, [ ] bleeding gums       [ x] All others negative	  [ ] Unable to obtain    Vital Signs Last 24 Hrs  T(C): 36.6 (27 Nov 2023 08:40), Max: 37.3 (26 Nov 2023 19:29)  T(F): 97.9 (27 Nov 2023 08:40), Max: 99.2 (26 Nov 2023 19:29)  HR: 96 (27 Nov 2023 08:40) (88 - 104)  BP: 138/85 (27 Nov 2023 08:40) (138/85 - 153/87)  RR: 16 (27 Nov 2023 08:40) (16 - 18)  SpO2: 96% (27 Nov 2023 08:40) (95% - 98%)    Parameters below as of 27 Nov 2023 08:40  Patient On (Oxygen Delivery Method): room air      I&O's Summary      PHYSICAL EXAM:  General: No acute distress BMI-29  HEENT: EOMI, PERRL[ ] Icteric  Neck: Supple, [ ] JVD  Lungs: Equal air entry bilaterally; [ ] Rales [ ] Rhonchi [ ] Wheezing  Heart: Regular rate and rhythm;[x ] Murmurs-   2/6 [ x] Systolic [ ] Diastolic [ ] Radiation,No rubs, or gallops  Abdomen: Nontender, bowel sounds present  Extremities: No clubbing, cyanosis, [ ] edema[ ] Calf tenderness  Nervous system:  Alert & Oriented X3, no focal deficits  Psychiatric: Normal affect  Skin: No rashes or lesions      LABS:  11-27    137  |  105  |  16  ----------------------------<  217<H>  3.1<L>   |  25  |  0.91    Ca    9.3      27 Nov 2023 09:50  Phos  2.5     11-27  Mg     1.7     11-27    TPro  6.7  /  Alb  3.1<L>  /  TBili  1.1  /  DBili  0.3  /  AST  18  /  ALT  32  /  AlkPhos  83  11-27    Creatinine Trend: 0.91<--, 0.79<--                        13.1   7.67  )-----------( 158      ( 27 Nov 2023 09:50 )             40.1     Cardiac Enzymes: CARDIAC MARKERS ( 26 Nov 2023 19:00 )  x     / x     / 117 U/L / x     / 2.2 ng/mL

## 2023-11-27 NOTE — PROGRESS NOTE ADULT - PROBLEM SELECTOR PLAN 5
Discussed with PCP/Cardiology Dr. Nieves, confirmed afib  on home med cardizem 180 mg qd, metoprolol succinate 200 mg qd, eliquis 5 mg bid  Cardiology Dr. Novak following for cardiac clearance Pt unable to care for herself post humeral fracture   CM following, unsafe dc home

## 2023-11-27 NOTE — PATIENT PROFILE ADULT - NSPROSPHOSPCHAPLAINYN_GEN_A_NUR
normal (ped)... In no apparent distress, appears well developed and well nourished. Crying with tears no

## 2023-11-27 NOTE — PROGRESS NOTE ADULT - NS ATTEND AMEND GEN_ALL_CORE FT
[place]  [place]- [place] [id number]  Patient c/o pain in left shoulder, concerned about surgery and living alone at home. I d/w Orthopedics- Bharti CHAPMAN, at bedside, patient will have surgery next week and need CT scan and MRI in the meantime  -I D/w Dr. Dharmesh Brewster- patient w/ h.o afib. c.w home rate control and eliquis for now  -case d/w [place] - STEFAN patient will go to SNF as unable to take care of herself at home  -in evening afib w/ rvr- will do home dosages of cardizem as patient didn't received in the morning- tfer to telemetry [place]  [place]- [place] [id number]  Patient c/o pain in left shoulder, concerned about surgery and living alone at home. I d/w Orthopedics- Bharti CHAPMAN, at bedside, patient will have surgery next week and need CT scan and MRI in the meantime  -I D/w Dr. Dharmesh Brewster- patient w/ h.o afib. c.w home rate control and eliquis for now  -case d/w [place] - STEFAN patient will go to SNF as unable to take care of herself at home  -in evening afib w/ rvr- will do home dosages of cardizem as patient didn't received in the morning- tfer to telemetry [place]  [place]- [place] [id number]  Patient c/o pain in left shoulder, concerned about surgery and living alone at home. I d/w Orthopedics- Bharti CHAPMAN, at bedside, patient will have surgery next week and need CT scan and MRI in the meantime  -I D/w Dr. Dharmesh Brewster- patient w/ h.o afib. c.w home rate control and eliquis for now  -case d/w [place] - STEFAN patient will go to SNF as unable to take care of herself at home  -in evening afib w/ rvr- will do home dosages of cardizem as patient didn't received in the morning- tfer to telemetry

## 2023-11-27 NOTE — PROGRESS NOTE ADULT - PROBLEM SELECTOR PLAN 2
xrays noted for OA  pain control - tylenol PRN s/p fall this evening, no LOC, no dizziness, likely mechanical fall  xray left shoulder - Severely comminuted/displaced proximal humeral fracture.   Pain Control  PT  Fall Precautions  Ortho consulted, rec surgical intervention from rehab facility  Cardiology Dr. Novak following for clearance

## 2023-11-27 NOTE — ED ADULT NURSE NOTE - NSFALLHARMRISKINTERV_ED_ALL_ED
Communicate risk of Fall with Harm to all staff, patient, and family/Provide visual cue: red socks, yellow wristband, yellow gown, etc/Reinforce activity limits and safety measures with patient and family/Bed in lowest position, wheels locked, appropriate side rails in place/Call bell, personal items and telephone in reach/Instruct patient to call for assistance before getting out of bed/chair/stretcher/Non-slip footwear applied when patient is off stretcher/[place] to call system/Physically safe environment - no spills, clutter or unnecessary equipment/Purposeful Proactive Rounding/Room/bathroom lighting operational, light cord in reach Communicate risk of Fall with Harm to all staff, patient, and family/Provide visual cue: red socks, yellow wristband, yellow gown, etc/Reinforce activity limits and safety measures with patient and family/Bed in lowest position, wheels locked, appropriate side rails in place/Call bell, personal items and telephone in reach/Instruct patient to call for assistance before getting out of bed/chair/stretcher/Non-slip footwear applied when patient is off stretcher/[place] to call system/Physically safe environment - no spills, clutter or unnecessary equipment/Purposeful Proactive Rounding/Room/bathroom lighting operational, light cord in reach Communicate risk of Fall with Harm to all staff, patient, and family/Provide visual cue: red socks, yellow wristband, yellow gown, etc/Reinforce activity limits and safety measures with patient and family/Bed in lowest position, wheels locked, appropriate side rails in place/Call bell, personal items and telephone in reach/Instruct patient to call for assistance before getting out of bed/chair/stretcher/Non-slip footwear applied when patient is off stretcher/[place] to call system/Physically safe environment - no spills, clutter or unnecessary equipment/Purposeful Proactive Rounding/Room/bathroom lighting operational, light cord in reach

## 2023-11-27 NOTE — PHYSICAL THERAPY INITIAL EVALUATION ADULT - PASSIVE RANGE OF MOTION EXAMINATION, REHAB EVAL
left shoulder no PROM due to "Severely comminuted/displaced proximal humeral fracture"/bilateral upper extremity Passive ROM was WFL (within functional limits)/bilateral lower extremity Passive ROM was WFL (within functional limits)

## 2023-11-27 NOTE — PATIENT PROFILE ADULT - FALL HARM RISK - HARM RISK INTERVENTIONS
Assistance with ambulation/Assistance OOB with selected safe patient handling equipment/Communicate Risk of Fall with Harm to all staff/Reinforce activity limits and safety measures with patient and family/Tailored Fall Risk Interventions/Visual Cue: Yellow wristband and red socks/Bed in lowest position, wheels locked, appropriate side rails in place/Call bell, personal items and telephone in reach/Instruct patient to call for assistance before getting out of bed or chair/Non-slip footwear when patient is out of bed/[place] to call system/Physically safe environment - no spills, clutter or unnecessary equipment/Purposeful Proactive Rounding/Room/bathroom lighting operational, light cord in reach Assistance with ambulation/Assistance OOB with selected safe patient handling equipment/Communicate Risk of Fall with Harm to all staff/Reinforce activity limits and safety measures with patient and family/Tailored Fall Risk Interventions/Visual Cue: Yellow wristband and red socks/Bed in lowest position, wheels locked, appropriate side rails in place/Call bell, personal items and telephone in reach/Instruct patient to call for assistance before getting out of bed or chair/Non-slip footwear when patient is out of bed/[place] to call system/Physically safe environment - no spills, clutter or unnecessary equipment/Purposeful Proactive Rounding/Room/bathroom lighting operational, light cord in reach Assistance with ambulation/Assistance OOB with selected safe patient handling equipment/Communicate Risk of Fall with Harm to all staff/Reinforce activity limits and safety measures with patient and family/Tailored Fall Risk Interventions/Visual Cue: Yellow wristband and red socks/Bed in lowest position, wheels locked, appropriate side rails in place/Call bell, personal items and telephone in reach/Instruct patient to call for assistance before getting out of bed or chair/Non-slip footwear when patient is out of bed/[place] to call system/Physically safe environment - no spills, clutter or unnecessary equipment/Purposeful Proactive Rounding/Room/bathroom lighting operational, light cord in reach

## 2023-11-28 ENCOUNTER — TRANSCRIPTION ENCOUNTER (OUTPATIENT)
Age: 78
End: 2023-11-28

## 2023-11-28 DIAGNOSIS — I48.91 UNSPECIFIED ATRIAL FIBRILLATION: ICD-10-CM

## 2023-11-28 DIAGNOSIS — S42.309A UNSPECIFIED FRACTURE OF SHAFT OF HUMERUS, UNSPECIFIED ARM, INITIAL ENCOUNTER FOR CLOSED FRACTURE: ICD-10-CM

## 2023-11-28 LAB
ANION GAP SERPL CALC-SCNC: 10 MMOL/L — SIGNIFICANT CHANGE UP (ref 5–17)
ANION GAP SERPL CALC-SCNC: 10 MMOL/L — SIGNIFICANT CHANGE UP (ref 5–17)
BUN SERPL-MCNC: 17 MG/DL — SIGNIFICANT CHANGE UP (ref 7–18)
BUN SERPL-MCNC: 17 MG/DL — SIGNIFICANT CHANGE UP (ref 7–18)
CALCIUM SERPL-MCNC: 9.5 MG/DL — SIGNIFICANT CHANGE UP (ref 8.4–10.5)
CALCIUM SERPL-MCNC: 9.5 MG/DL — SIGNIFICANT CHANGE UP (ref 8.4–10.5)
CHLORIDE SERPL-SCNC: 104 MMOL/L — SIGNIFICANT CHANGE UP (ref 96–108)
CHLORIDE SERPL-SCNC: 104 MMOL/L — SIGNIFICANT CHANGE UP (ref 96–108)
CO2 SERPL-SCNC: 23 MMOL/L — SIGNIFICANT CHANGE UP (ref 22–31)
CO2 SERPL-SCNC: 23 MMOL/L — SIGNIFICANT CHANGE UP (ref 22–31)
CREAT SERPL-MCNC: 0.78 MG/DL — SIGNIFICANT CHANGE UP (ref 0.5–1.3)
CREAT SERPL-MCNC: 0.78 MG/DL — SIGNIFICANT CHANGE UP (ref 0.5–1.3)
EGFR: 78 ML/MIN/1.73M2 — SIGNIFICANT CHANGE UP
EGFR: 78 ML/MIN/1.73M2 — SIGNIFICANT CHANGE UP
GLUCOSE BLDC GLUCOMTR-MCNC: 160 MG/DL — HIGH (ref 70–99)
GLUCOSE BLDC GLUCOMTR-MCNC: 179 MG/DL — HIGH (ref 70–99)
GLUCOSE BLDC GLUCOMTR-MCNC: 179 MG/DL — HIGH (ref 70–99)
GLUCOSE BLDC GLUCOMTR-MCNC: 298 MG/DL — HIGH (ref 70–99)
GLUCOSE BLDC GLUCOMTR-MCNC: 298 MG/DL — HIGH (ref 70–99)
GLUCOSE SERPL-MCNC: 191 MG/DL — HIGH (ref 70–99)
GLUCOSE SERPL-MCNC: 191 MG/DL — HIGH (ref 70–99)
MAGNESIUM SERPL-MCNC: 2 MG/DL — SIGNIFICANT CHANGE UP (ref 1.6–2.6)
MAGNESIUM SERPL-MCNC: 2 MG/DL — SIGNIFICANT CHANGE UP (ref 1.6–2.6)
POTASSIUM SERPL-MCNC: 3.8 MMOL/L — SIGNIFICANT CHANGE UP (ref 3.5–5.3)
POTASSIUM SERPL-MCNC: 3.8 MMOL/L — SIGNIFICANT CHANGE UP (ref 3.5–5.3)
POTASSIUM SERPL-SCNC: 3.8 MMOL/L — SIGNIFICANT CHANGE UP (ref 3.5–5.3)
POTASSIUM SERPL-SCNC: 3.8 MMOL/L — SIGNIFICANT CHANGE UP (ref 3.5–5.3)
SODIUM SERPL-SCNC: 137 MMOL/L — SIGNIFICANT CHANGE UP (ref 135–145)
SODIUM SERPL-SCNC: 137 MMOL/L — SIGNIFICANT CHANGE UP (ref 135–145)

## 2023-11-28 PROCEDURE — 99233 SBSQ HOSP IP/OBS HIGH 50: CPT | Mod: GC

## 2023-11-28 PROCEDURE — 73221 MRI JOINT UPR EXTREM W/O DYE: CPT | Mod: 26,LT

## 2023-11-28 RX ORDER — OXYCODONE HYDROCHLORIDE 5 MG/1
2.5 TABLET ORAL EVERY 6 HOURS
Refills: 0 | Status: DISCONTINUED | OUTPATIENT
Start: 2023-11-28 | End: 2023-11-28

## 2023-11-28 RX ADMIN — Medication 1: at 17:08

## 2023-11-28 RX ADMIN — ATORVASTATIN CALCIUM 20 MILLIGRAM(S): 80 TABLET, FILM COATED ORAL at 21:23

## 2023-11-28 RX ADMIN — Medication 1: at 12:35

## 2023-11-28 RX ADMIN — Medication 180 MILLIGRAM(S): at 06:00

## 2023-11-28 RX ADMIN — Medication 650 MILLIGRAM(S): at 14:05

## 2023-11-28 RX ADMIN — Medication 1: at 08:05

## 2023-11-28 RX ADMIN — Medication 3: at 21:22

## 2023-11-28 RX ADMIN — Medication 100 MILLIGRAM(S): at 06:00

## 2023-11-28 RX ADMIN — APIXABAN 5 MILLIGRAM(S): 2.5 TABLET, FILM COATED ORAL at 17:08

## 2023-11-28 RX ADMIN — Medication 650 MILLIGRAM(S): at 23:32

## 2023-11-28 RX ADMIN — MAGNESIUM OXIDE 400 MG ORAL TABLET 400 MILLIGRAM(S): 241.3 TABLET ORAL at 08:05

## 2023-11-28 RX ADMIN — LISINOPRIL 10 MILLIGRAM(S): 2.5 TABLET ORAL at 10:16

## 2023-11-28 RX ADMIN — Medication 650 MILLIGRAM(S): at 13:05

## 2023-11-28 RX ADMIN — APIXABAN 5 MILLIGRAM(S): 2.5 TABLET, FILM COATED ORAL at 06:00

## 2023-11-28 RX ADMIN — Medication 650 MILLIGRAM(S): at 22:58

## 2023-11-28 RX ADMIN — Medication 100 MILLIGRAM(S): at 17:08

## 2023-11-28 NOTE — DISCHARGE NOTE PROVIDER - NSDCCPCAREPLAN_GEN_ALL_CORE_FT
PRINCIPAL DISCHARGE DIAGNOSIS  Diagnosis: Humeral fracture  Assessment and Plan of Treatment: You presented to the hospital after a mechanical fall. You stated that you tried to  something from the floor, tripped, and landed on your left shoulder. You denied, LOC, dizziness, or feeling lightheaded during the event. XRAY of your shoulder was done, which showed  a severely comminuted/displaced Left proximal humeral fracture. CT shoulder showed XXX. MRI shoulder showed XXX. You were evaluated by Orthopedic Surgery specialists during your hosptial stay. You are stable for discharge and tentatively scheduled for surgical procedure on 12/12/23. You pain was controlled.   Please FOLLOW UP with Dr. El Bauer (Orthopedic Surgery) as outpatient within 1 week, call office at [phone] for appointment.         SECONDARY DISCHARGE DIAGNOSES  Diagnosis: Fall  Assessment and Plan of Treatment: See as above.   Physical therapy evaluated you during your hosptial stay and recommended subacute rehab.       Diagnosis: HTN (hypertension)  Assessment and Plan of Treatment: You have high blood pressure. Please continue to taking your medications as prescribed. Please measure your blood pressure at least once daily. Maintain a healthy diet which includes incorporating more vegetables and decreasing the amount of salt (low sodium) and sugar in your diet such as rice, bread, and pasta low sugar, low fat, low sodium diet. Exercise frequently if possible.  Please follow up with your primary care provider within one week of your discharge.      Diagnosis: HLD (hyperlipidemia)  Assessment and Plan of Treatment: You have hyperlipidemia. Please continue to take your cholesterol medications. Maintain a healthy diet that consist of low sugar, low fat, low sodium. Decrease the amount of fried foods that you consume. Exercise frequently if possible. Please follow up with  your primary care provider within 1 week of your discharge.  You are recommended a DASH Diet that emphasizes mostly vegetables, fruits, and fat-free or low-fat dairy products. Please limit intake of sodium, sweets, beverages w/ high sugar/carbohydrate content.      Diagnosis: DM (diabetes mellitus)  Assessment and Plan of Treatment: Continue with your blood sugar medication. HbA1C on admission was ____. This is a reflection of your blood sugar level over the last 3 months.  Please check your blood sugar levels twice daily - Morning/Afternoon, and Lunch/Bedtime the following day. Keep a record of your blood sugar readings  You must maintain a healthy diet that consists of low sugar and low fat. Your diet must consist of mostly vegetables. Please limit your intake of high sugar and high carbohydrate foods such as pasta, rice, and bread. Exercise frequently if possible. Follow up with primary care physician within one week of your discharge to further manage your diabetes and monitor your Hemoglobin A1c levels after 3 months to evaluate your diabetes control.      Diagnosis: Atrial fibrillation  Assessment and Plan of Treatment: You have history of atrial fibrillation. Please CONTINUE to take you XXX on discharge.   Please follow up with your primary care physician for further management of your atrial fibrillation.       PRINCIPAL DISCHARGE DIAGNOSIS  Diagnosis: Humeral fracture  Assessment and Plan of Treatment: You presented to the hospital after a mechanical fall. You stated that you tried to  something from the floor, tripped, and landed on your left shoulder. You denied, LOC, dizziness, or feeling lightheaded during the event. XRAY of your shoulder was done, which showed  a severely comminuted/displaced Left proximal humeral fracture. CT shoulder showed XXX. MRI shoulder showed XXX. You were evaluated by Orthopedic Surgery specialists during your hosptial stay. You are stable for discharge and tentatively scheduled for surgical procedure on 12/12/23. You pain was controlled.   Please FOLLOW UP with Dr. El Bauer (Orthopedic Surgery) as outpatient within 1 week, call office at [phone] for appointment.         SECONDARY DISCHARGE DIAGNOSES  Diagnosis: Fall  Assessment and Plan of Treatment: See as above.   Physical therapy evaluated you during your hosptial stay and recommended subacute rehab.       Diagnosis: HTN (hypertension)  Assessment and Plan of Treatment: You have high blood pressure. Please continue to taking your medications as prescribed. Please measure your blood pressure at least once daily. Maintain a healthy diet which includes incorporating more vegetables and decreasing the amount of salt (low sodium) and sugar in your diet such as rice, bread, and pasta low sugar, low fat, low sodium diet. Exercise frequently if possible.  Please follow up with your primary care provider within one week of your discharge.      Diagnosis: HLD (hyperlipidemia)  Assessment and Plan of Treatment: You have hyperlipidemia. Please continue to take your cholesterol medications. Maintain a healthy diet that consist of low sugar, low fat, low sodium. Decrease the amount of fried foods that you consume. Exercise frequently if possible. Please follow up with  your primary care provider within 1 week of your discharge.  You are recommended a DASH Diet that emphasizes mostly vegetables, fruits, and fat-free or low-fat dairy products. Please limit intake of sodium, sweets, beverages w/ high sugar/carbohydrate content.      Diagnosis: DM (diabetes mellitus)  Assessment and Plan of Treatment: Continue with your blood sugar medication. HbA1C on admission was ____. This is a reflection of your blood sugar level over the last 3 months.  Please check your blood sugar levels twice daily - Morning/Afternoon, and Lunch/Bedtime the following day. Keep a record of your blood sugar readings  You must maintain a healthy diet that consists of low sugar and low fat. Your diet must consist of mostly vegetables. Please limit your intake of high sugar and high carbohydrate foods such as pasta, rice, and bread. Exercise frequently if possible. Follow up with primary care physician within one week of your discharge to further manage your diabetes and monitor your Hemoglobin A1c levels after 3 months to evaluate your diabetes control.      Diagnosis: Atrial fibrillation  Assessment and Plan of Treatment: You have history of atrial fibrillation. Please CONTINUE to take you XXX on discharge.   Please follow up with your primary care physician for further management of your atrial fibrillation.       PRINCIPAL DISCHARGE DIAGNOSIS  Diagnosis: Humeral fracture  Assessment and Plan of Treatment: You presented to the hospital after a mechanical fall. You stated that you tried to  something from the floor, tripped, and landed on your left shoulder. You denied, LOC, dizziness, or feeling lightheaded during the event. XRAY of your shoulder was done, which showed  a severely comminuted/displaced Left proximal humeral fracture. CT shoulder showed XXX. MRI shoulder showed XXX. You were evaluated by Orthopedic Surgery specialists during your hosptial stay. You are stable for discharge and tentatively scheduled for surgical procedure on 12/12/23. You pain was controlled.   Please FOLLOW UP with Dr. El Bauer (Orthopedic Surgery) as outpatient within 1 week, call office at [phone] for appointment.         SECONDARY DISCHARGE DIAGNOSES  Diagnosis: Fall  Assessment and Plan of Treatment: See as above.   Physical therapy evaluated you during your hosptial stay and recommended subacute rehab.       Diagnosis: HTN (hypertension)  Assessment and Plan of Treatment: You have high blood pressure. Please continue to taking your medications as prescribed. Please measure your blood pressure at least once daily. Maintain a healthy diet which includes incorporating more vegetables and decreasing the amount of salt (low sodium) and sugar in your diet such as rice, bread, and pasta low sugar, low fat, low sodium diet. Exercise frequently if possible.  Please follow up with your primary care provider within one week of your discharge.      Diagnosis: HLD (hyperlipidemia)  Assessment and Plan of Treatment: You have hyperlipidemia. Please continue to take your cholesterol medications. Maintain a healthy diet that consist of low sugar, low fat, low sodium. Decrease the amount of fried foods that you consume. Exercise frequently if possible. Please follow up with  your primary care provider within 1 week of your discharge.  You are recommended a DASH Diet that emphasizes mostly vegetables, fruits, and fat-free or low-fat dairy products. Please limit intake of sodium, sweets, beverages w/ high sugar/carbohydrate content.      Diagnosis: DM (diabetes mellitus)  Assessment and Plan of Treatment: Continue with your blood sugar medication. HbA1C on admission was ____. This is a reflection of your blood sugar level over the last 3 months.  Please check your blood sugar levels twice daily - Morning/Afternoon, and Lunch/Bedtime the following day. Keep a record of your blood sugar readings  You must maintain a healthy diet that consists of low sugar and low fat. Your diet must consist of mostly vegetables. Please limit your intake of high sugar and high carbohydrate foods such as pasta, rice, and bread. Exercise frequently if possible. Follow up with primary care physician within one week of your discharge to further manage your diabetes and monitor your Hemoglobin A1c levels after 3 months to evaluate your diabetes control.      Diagnosis: Atrial fibrillation  Assessment and Plan of Treatment: You have history of atrial fibrillation. Please CONTINUE to take you XXX on discharge.   Please follow up with your primary care physician for further management of your atrial fibrillation.       PRINCIPAL DISCHARGE DIAGNOSIS  Diagnosis: Humeral fracture  Assessment and Plan of Treatment: You presented to the hospital after a mechanical fall. You stated that you tried to  something from the floor, tripped, and landed on your left shoulder. You denied, LOC, dizziness, or feeling lightheaded during the event. XRAY of your shoulder was done, which showed  a severely comminuted/displaced Left proximal humeral fracture. CT shoulder showed XXX. MRI shoulder showed Acute comminuted and displaced fracture of the left humeral neck with extension to the greater tuberosity, as above. Associated inferior subluxation of the humeral head fragment at the glenohumeral joint. You were evaluated by Orthopedic Surgery specialists during your hosptial stay. You are stable for discharge and tentatively scheduled for surgical procedure on 12/12/23. You pain was controlled. Please FOLLOW UP with Dr. El Bauer (Orthopedic Surgery) as outpatient within 1 week, call office at [phone] for appointment.         SECONDARY DISCHARGE DIAGNOSES  Diagnosis: Fall  Assessment and Plan of Treatment: See as above.       Diagnosis: HTN (hypertension)  Assessment and Plan of Treatment: You have high blood pressure. Please continue to taking your medications as prescribed. Please measure your blood pressure at least once daily. Maintain a healthy diet which includes incorporating more vegetables and decreasing the amount of salt (low sodium) and sugar in your diet such as rice, bread, and pasta low sugar, low fat, low sodium diet. Exercise frequently if possible.  Please follow up with your primary care provider within one week of your discharge.      Diagnosis: HLD (hyperlipidemia)  Assessment and Plan of Treatment: You have hyperlipidemia. Please continue to take your cholesterol medications. Maintain a healthy diet that consist of low sugar, low fat, low sodium. Decrease the amount of fried foods that you consume. Exercise frequently if possible. Please follow up with  your primary care provider within 1 week of your discharge.  You are recommended a DASH Diet that emphasizes mostly vegetables, fruits, and fat-free or low-fat dairy products. Please limit intake of sodium, sweets, beverages w/ high sugar/carbohydrate content.      Diagnosis: DM (diabetes mellitus)  Assessment and Plan of Treatment: Continue with your blood sugar medication. HbA1C on admission was 7.0. This is a reflection of your blood sugar level over the last 3 months.  Please check your blood sugar levels twice daily - Morning/Afternoon, and Lunch/Bedtime the following day. Keep a record of your blood sugar readings  You must maintain a healthy diet that consists of low sugar and low fat. Your diet must consist of mostly vegetables. Please limit your intake of high sugar and high carbohydrate foods such as pasta, rice, and bread. Exercise frequently if possible. Follow up with primary care physician within one week of your discharge to further manage your diabetes and monitor your Hemoglobin A1c levels after 3 months to evaluate your diabetes control.      Diagnosis: Atrial fibrillation  Assessment and Plan of Treatment: You have history of atrial fibrillation. Please CONTINUE to take you XXX on discharge.   Please follow up with your primary care physician for further management of your atrial fibrillation.       PRINCIPAL DISCHARGE DIAGNOSIS  Diagnosis: Humeral fracture  Assessment and Plan of Treatment: You presented to the hospital after a mechanical fall. You stated that you tried to  something from the floor, tripped, and landed on your left shoulder. You denied, LOC, dizziness, or feeling lightheaded during the event. XRAY of your shoulder was done, which showed  a severely comminuted/displaced Left proximal humeral fracture. CT shoulder showed XXX. MRI shoulder showed Acute comminuted and displaced fracture of the left humeral neck with extension to the greater tuberosity, as above. Associated inferior subluxation of the humeral head fragment at the glenohumeral joint. You were evaluated by Orthopedic Surgery specialists during your hosptial stay. You are stable for discharge and tentatively scheduled for surgical procedure on 12/12/23. You pain was controlled. Please FOLLOW UP with Dr. El Bauer (Orthopedic Surgery) as outpatient within 1 week, call office at [phone] for appointment.         SECONDARY DISCHARGE DIAGNOSES  Diagnosis: Fall  Assessment and Plan of Treatment: See as above.       Diagnosis: HTN (hypertension)  Assessment and Plan of Treatment: You have high blood pressure. Please continue to taking your medications as prescribed. Please measure your blood pressure at least once daily. Maintain a healthy diet which includes incorporating more vegetables and decreasing the amount of salt (low sodium) and sugar in your diet such as rice, bread, and pasta low sugar, low fat, low sodium diet. Exercise frequently if possible.  Please follow up with your primary care provider within one week of your discharge.      Diagnosis: HLD (hyperlipidemia)  Assessment and Plan of Treatment: You have hyperlipidemia. Please continue to take your cholesterol medications. Maintain a healthy diet that consist of low sugar, low fat, low sodium. Decrease the amount of fried foods that you consume. Exercise frequently if possible. Please follow up with  your primary care provider within 1 week of your discharge.  You are recommended a DASH Diet that emphasizes mostly vegetables, fruits, and fat-free or low-fat dairy products. Please limit intake of sodium, sweets, beverages w/ high sugar/carbohydrate content.      Diagnosis: DM (diabetes mellitus)  Assessment and Plan of Treatment: Continue with your blood sugar medication. HbA1C on admission was 7.0. This is a reflection of your blood sugar level over the last 3 months.  Please check your blood sugar levels twice daily - Morning/Afternoon, and Lunch/Bedtime the following day. Keep a record of your blood sugar readings  You must maintain a healthy diet that consists of low sugar and low fat. Your diet must consist of mostly vegetables. Please limit your intake of high sugar and high carbohydrate foods such as pasta, rice, and bread. Exercise frequently if possible. Follow up with primary care physician within one week of your discharge to further manage your diabetes and monitor your Hemoglobin A1c levels after 3 months to evaluate your diabetes control.      Diagnosis: Atrial fibrillation  Assessment and Plan of Treatment: You have history of atrial fibrillation. Please CONTINUE to take you XXX on discharge.   Please follow up with your primary care physician for further management of your atrial fibrillation.       PRINCIPAL DISCHARGE DIAGNOSIS  Diagnosis: Humeral fracture  Assessment and Plan of Treatment: You presented to the hospital after a mechanical fall. You stated that you tried to  something from the floor, tripped, and landed on your left shoulder. You denied, LOC, dizziness, or feeling lightheaded during the event. XRAY of your shoulder was done, which showed  a severely comminuted/displaced Left proximal humeral fracture. CT shoulder showed XXX. MRI shoulder showed Acute comminuted and displaced fracture of the left humeral neck with extension to the greater tuberosity, as above. Associated inferior subluxation of the humeral head fragment at the glenohumeral joint. You were evaluated by Orthopedic Surgery specialists during your hosptial stay. You are stable for discharge and tentatively scheduled for surgical procedure on 12/12/23. You pain was controlled. Please FOLLOW UP with Dr. El Bauer (Orthopedic Surgery) as outpatient within 1 week, call office at [phone] for appointment.         SECONDARY DISCHARGE DIAGNOSES  Diagnosis: Fall  Assessment and Plan of Treatment: See as above.       Diagnosis: HTN (hypertension)  Assessment and Plan of Treatment: You have high blood pressure. Please continue to taking your medications as prescribed. Please measure your blood pressure at least once daily. Maintain a healthy diet which includes incorporating more vegetables and decreasing the amount of salt (low sodium) and sugar in your diet such as rice, bread, and pasta low sugar, low fat, low sodium diet. Exercise frequently if possible.  Please follow up with your primary care provider within one week of your discharge.      Diagnosis: HLD (hyperlipidemia)  Assessment and Plan of Treatment: You have hyperlipidemia. Please continue to take your cholesterol medications. Maintain a healthy diet that consist of low sugar, low fat, low sodium. Decrease the amount of fried foods that you consume. Exercise frequently if possible. Please follow up with  your primary care provider within 1 week of your discharge.  You are recommended a DASH Diet that emphasizes mostly vegetables, fruits, and fat-free or low-fat dairy products. Please limit intake of sodium, sweets, beverages w/ high sugar/carbohydrate content.      Diagnosis: DM (diabetes mellitus)  Assessment and Plan of Treatment: Continue with your blood sugar medication. HbA1C on admission was 7.0. This is a reflection of your blood sugar level over the last 3 months.  Please check your blood sugar levels twice daily - Morning/Afternoon, and Lunch/Bedtime the following day. Keep a record of your blood sugar readings  You must maintain a healthy diet that consists of low sugar and low fat. Your diet must consist of mostly vegetables. Please limit your intake of high sugar and high carbohydrate foods such as pasta, rice, and bread. Exercise frequently if possible. Follow up with primary care physician within one week of your discharge to further manage your diabetes and monitor your Hemoglobin A1c levels after 3 months to evaluate your diabetes control.      Diagnosis: Atrial fibrillation  Assessment and Plan of Treatment: You have history of atrial fibrillation. Please CONTINUE to take you XXX on discharge.   Please follow up with your primary care physician for further management of your atrial fibrillation.       PRINCIPAL DISCHARGE DIAGNOSIS  Diagnosis: Humeral fracture  Assessment and Plan of Treatment: You presented to the hospital after a mechanical fall. You stated that you tried to  something from the floor, tripped, and landed on your left shoulder. You denied, LOC, dizziness, or feeling lightheaded during the event. XRAY of your shoulder was done, which showed  a severely comminuted/displaced Left proximal humeral fracture. CT shoulder showed XXX. MRI shoulder showed Acute comminuted and displaced fracture of the left humeral neck with extension to the greater tuberosity, as above. Associated inferior subluxation of the humeral head fragment at the glenohumeral joint. You were evaluated by Orthopedic Surgery specialists during your hosptial stay. You are stable for discharge and tentatively scheduled for surgical procedure on 12/12/23. You pain was controlled. Please FOLLOW UP with Dr. El Bauer (Orthopedic Surgery) as outpatient within 1 week, call office at [phone] for appointment.         SECONDARY DISCHARGE DIAGNOSES  Diagnosis: Fall  Assessment and Plan of Treatment: See as above.       Diagnosis: HTN (hypertension)  Assessment and Plan of Treatment: You have high blood pressure. Please continue to taking your medications as prescribed. Please measure your blood pressure at least once daily. Maintain a healthy diet which includes incorporating more vegetables and decreasing the amount of salt (low sodium) and sugar in your diet such as rice, bread, and pasta low sugar, low fat, low sodium diet. Exercise frequently if possible.  Please follow up with your primary care provider within one week of your discharge.      Diagnosis: HLD (hyperlipidemia)  Assessment and Plan of Treatment: You have hyperlipidemia. Please continue to take your cholesterol medication Atorvastatin as prescribed. Maintain a healthy diet that consist of low sugar, low fat, low sodium. Decrease the amount of fried foods that you consume. Exercise frequently if possible. Please follow up with  your primary care provider within 1 week of your discharge.  You are recommended a DASH Diet that emphasizes mostly vegetables, fruits, and fat-free or low-fat dairy products. Please limit intake of sodium, sweets, beverages w/ high sugar/carbohydrate content.      Diagnosis: DM (diabetes mellitus)  Assessment and Plan of Treatment: Continue with your blood sugar medication. HbA1C on admission was 7.0. This is a reflection of your blood sugar level over the last 3 months.  Please check your blood sugar levels twice daily - Morning/Afternoon, and Lunch/Bedtime the following day. Keep a record of your blood sugar readings.  Your home medications Jardiance and Janumet were held during your hospital stay and your sugars were controlled on an insulin sliding scale. Please CONTINUE to indira your Jardiance and Janumet as prescribed on discharge.   You must maintain a healthy diet that consists of low sugar and low fat. Your diet must consist of mostly vegetables. Please limit your intake of high sugar and high carbohydrate foods such as pasta, rice, and bread. Exercise frequently if possible. Follow up with primary care physician within one week of your discharge to further manage your diabetes and monitor your Hemoglobin A1c levels after 3 months to evaluate your diabetes control.      Diagnosis: Atrial fibrillation  Assessment and Plan of Treatment: You have history of atrial fibrillation. Please CONTINUE to take you Eliquis on discharge.   For your future Orthopedic Surgery for your shoulder, you will need to XXX (hold Eliquis for 48 hours prior to procedure).  Please follow up with your primary care physician for further management of your atrial fibrillation.       PRINCIPAL DISCHARGE DIAGNOSIS  Diagnosis: Humeral fracture  Assessment and Plan of Treatment: You presented to the hospital after a mechanical fall. You stated that you tried to  something from the floor, tripped, and landed on your left shoulder. You denied, LOC, dizziness, or feeling lightheaded during the event. XRAY of your shoulder was done, which showed  a severely comminuted/displaced Left proximal humeral fracture. CT shoulder showed XXX. MRI shoulder showed Acute comminuted and displaced fracture of the left humeral neck with extension to the greater tuberosity, as above. Associated inferior subluxation of the humeral head fragment at the glenohumeral joint. You were evaluated by Orthopedic Surgery specialists during your hosptial stay. You are stable for discharge and tentatively scheduled for surgical procedure on 12/12/23. You pain was controlled. Please FOLLOW UP with Dr. El Bauer (Orthopedic Surgery) as outpatient within 1 week, call office at [phone] for appointment.         SECONDARY DISCHARGE DIAGNOSES  Diagnosis: Fall  Assessment and Plan of Treatment: See as above.       Diagnosis: HTN (hypertension)  Assessment and Plan of Treatment: You have high blood pressure. Please continue to taking your medications as prescribed. Please measure your blood pressure at least once daily. Maintain a healthy diet which includes incorporating more vegetables and decreasing the amount of salt (low sodium) and sugar in your diet such as rice, bread, and pasta low sugar, low fat, low sodium diet. Exercise frequently if possible.  Please follow up with your primary care provider within one week of your discharge.      Diagnosis: HLD (hyperlipidemia)  Assessment and Plan of Treatment: You have hyperlipidemia. Please continue to take your cholesterol medication Atorvastatin as prescribed. Maintain a healthy diet that consist of low sugar, low fat, low sodium. Decrease the amount of fried foods that you consume. Exercise frequently if possible. Please follow up with  your primary care provider within 1 week of your discharge.  You are recommended a DASH Diet that emphasizes mostly vegetables, fruits, and fat-free or low-fat dairy products. Please limit intake of sodium, sweets, beverages w/ high sugar/carbohydrate content.      Diagnosis: DM (diabetes mellitus)  Assessment and Plan of Treatment: Continue with your blood sugar medication. HbA1C on admission was 7.0. This is a reflection of your blood sugar level over the last 3 months.  Please check your blood sugar levels twice daily - Morning/Afternoon, and Lunch/Bedtime the following day. Keep a record of your blood sugar readings.  Your home medications Jardiance and Janumet were held during your hospital stay and your sugars were controlled on an insulin sliding scale. Please CONTINUE to indira your Jardiance and Janumet as prescribed on discharge.   You must maintain a healthy diet that consists of low sugar and low fat. Your diet must consist of mostly vegetables. Please limit your intake of high sugar and high carbohydrate foods such as pasta, rice, and bread. Exercise frequently if possible. Follow up with primary care physician within one week of your discharge to further manage your diabetes and monitor your Hemoglobin A1c levels after 3 months to evaluate your diabetes control.      Diagnosis: Atrial fibrillation  Assessment and Plan of Treatment: You have history of atrial fibrillation. Please CONTINUE to take you Eliquis on discharge.   For your future Orthopedic Surgery for your shoulder, you will need to XXX (hold Eliquis for 48 hours prior to procedure).  Please follow up with your primary care physician for further management of your atrial fibrillation.       PRINCIPAL DISCHARGE DIAGNOSIS  Diagnosis: Humeral fracture  Assessment and Plan of Treatment: You presented to the hospital after a mechanical fall. You stated that you tried to  something from the floor, tripped, and landed on your left shoulder. You denied, LOC, dizziness, or feeling lightheaded during the event. XRAY of your shoulder was done, which showed  a severely comminuted/displaced Left proximal humeral fracture. CT shoulder showed XXX. MRI shoulder showed Acute comminuted and displaced fracture of the left humeral neck with extension to the greater tuberosity, as above. Associated inferior subluxation of the humeral head fragment at the glenohumeral joint. You were evaluated by Orthopedic Surgery specialists during your hosptial stay. You are stable for discharge and tentatively scheduled for surgical procedure on 12/12/23. You pain was controlled. Please FOLLOW UP with Dr. El Bauer (Orthopedic Surgery) as outpatient within 1 week, call office at [phone] for appointment.         SECONDARY DISCHARGE DIAGNOSES  Diagnosis: Fall  Assessment and Plan of Treatment: See as above.       Diagnosis: HTN (hypertension)  Assessment and Plan of Treatment: You have high blood pressure. Please continue to taking your medications as prescribed. Please measure your blood pressure at least once daily. Maintain a healthy diet which includes incorporating more vegetables and decreasing the amount of salt (low sodium) and sugar in your diet such as rice, bread, and pasta low sugar, low fat, low sodium diet. Exercise frequently if possible.  Please follow up with your primary care provider within one week of your discharge.      Diagnosis: HLD (hyperlipidemia)  Assessment and Plan of Treatment: You have hyperlipidemia. Please continue to take your cholesterol medication Atorvastatin as prescribed. Maintain a healthy diet that consist of low sugar, low fat, low sodium. Decrease the amount of fried foods that you consume. Exercise frequently if possible. Please follow up with  your primary care provider within 1 week of your discharge.  You are recommended a DASH Diet that emphasizes mostly vegetables, fruits, and fat-free or low-fat dairy products. Please limit intake of sodium, sweets, beverages w/ high sugar/carbohydrate content.      Diagnosis: DM (diabetes mellitus)  Assessment and Plan of Treatment: Continue with your blood sugar medication. HbA1C on admission was 7.0. This is a reflection of your blood sugar level over the last 3 months.  Please check your blood sugar levels twice daily - Morning/Afternoon, and Lunch/Bedtime the following day. Keep a record of your blood sugar readings.  Your home medications Jardiance and Janumet were held during your hospital stay and your sugars were controlled on an insulin sliding scale. Please CONTINUE to indira your Jardiance and Janumet as prescribed on discharge.   You must maintain a healthy diet that consists of low sugar and low fat. Your diet must consist of mostly vegetables. Please limit your intake of high sugar and high carbohydrate foods such as pasta, rice, and bread. Exercise frequently if possible. Follow up with primary care physician within one week of your discharge to further manage your diabetes and monitor your Hemoglobin A1c levels after 3 months to evaluate your diabetes control.      Diagnosis: Atrial fibrillation  Assessment and Plan of Treatment: You have history of atrial fibrillation. Please CONTINUE to take you Eliquis on discharge.   For your future Orthopedic Surgery for your shoulder, you will need to XXX (hold Eliquis for 48 hours prior to procedure).  Please follow up with your primary care physician for further management of your atrial fibrillation.       PRINCIPAL DISCHARGE DIAGNOSIS  Diagnosis: Humerus fracture  Assessment and Plan of Treatment:       SECONDARY DISCHARGE DIAGNOSES  Diagnosis: Fall  Assessment and Plan of Treatment: See as above.       Diagnosis: HTN (hypertension)  Assessment and Plan of Treatment: You have high blood pressure. Please continue to taking your medications as prescribed. Please measure your blood pressure at least once daily. Maintain a healthy diet which includes incorporating more vegetables and decreasing the amount of salt (low sodium) and sugar in your diet such as rice, bread, and pasta low sugar, low fat, low sodium diet. Exercise frequently if possible.  Please follow up with your primary care provider within one week of your discharge.      Diagnosis: HLD (hyperlipidemia)  Assessment and Plan of Treatment: You have hyperlipidemia. Please continue to take your cholesterol medication Atorvastatin as prescribed. Maintain a healthy diet that consist of low sugar, low fat, low sodium. Decrease the amount of fried foods that you consume. Exercise frequently if possible. Please follow up with  your primary care provider within 1 week of your discharge.  You are recommended a DASH Diet that emphasizes mostly vegetables, fruits, and fat-free or low-fat dairy products. Please limit intake of sodium, sweets, beverages w/ high sugar/carbohydrate content.      Diagnosis: DM (diabetes mellitus)  Assessment and Plan of Treatment: Continue with your blood sugar medication. HbA1C on admission was 7.0. This is a reflection of your blood sugar level over the last 3 months.  Please check your blood sugar levels twice daily - Morning/Afternoon, and Lunch/Bedtime the following day. Keep a record of your blood sugar readings.  Your home medications Jardiance and Janumet were held during your hospital stay and your sugars were controlled on an insulin sliding scale. Please CONTINUE to indira your Jardiance and Janumet as prescribed on discharge.   You must maintain a healthy diet that consists of low sugar and low fat. Your diet must consist of mostly vegetables. Please limit your intake of high sugar and high carbohydrate foods such as pasta, rice, and bread. Exercise frequently if possible. Follow up with primary care physician within one week of your discharge to further manage your diabetes and monitor your Hemoglobin A1c levels after 3 months to evaluate your diabetes control.      Diagnosis: Atrial fibrillation  Assessment and Plan of Treatment: You have history of atrial fibrillation.   Your home medication for rate control is Diltiazem 180mg daily. Goal heart rate is <110, however your heart rate continued to run high. Your dose of the medication was increased to Diltiazem 240mg daily with better control of heart rate.   Please CONTINUE to take you Eliquis on discharge.   For your future Orthopedic Surgery for your shoulder, you will need to XXX (hold Eliquis for 48 hours prior to procedure).  Please follow up with your primary care physician for further management of your atrial fibrillation.       PRINCIPAL DISCHARGE DIAGNOSIS  Diagnosis: Humerus fracture  Assessment and Plan of Treatment: You presented after a mechanical fall. You stated that you tried to  something from the floor, tripped, and landed on your left shoulder. You denied any loss of consciouness, dizziness, or feeling lightheaded during the event. Imaging studies were done including XRAY, which sowed severely comminuted/displaced Left proximal humeral fracture. CT shoulder was done. MRI shoulder was also done, which showed Acute comminuted and displaced fracture of the left humeral neck with extension to the greater tuberosity, as above. Associated inferior subluxation of the humeral head fragment at the glenohumeral joint. Orthopedic Surgery was consulted, with recommendation stable for discharge and plan for surgical procedure on 12/12/23. You pain was controlled while you were hospitalized.  Please FOLLOW UP with Orthopedic Surgery Dr. El Bauer as outpatient within 1 week, call office at [phone] for appointment.         SECONDARY DISCHARGE DIAGNOSES  Diagnosis: Fall  Assessment and Plan of Treatment: See as above.       Diagnosis: HTN (hypertension)  Assessment and Plan of Treatment: You have high blood pressure. Please continue to taking your medications as prescribed. Please measure your blood pressure at least once daily. Maintain a healthy diet which includes incorporating more vegetables and decreasing the amount of salt (low sodium) and sugar in your diet such as rice, bread, and pasta low sugar, low fat, low sodium diet. Exercise frequently if possible.  Please follow up with your primary care provider within one week of your discharge.      Diagnosis: HLD (hyperlipidemia)  Assessment and Plan of Treatment: You have hyperlipidemia. Please continue to take your cholesterol medication Atorvastatin as prescribed. Maintain a healthy diet that consist of low sugar, low fat, low sodium. Decrease the amount of fried foods that you consume. Exercise frequently if possible. Please follow up with  your primary care provider within 1 week of your discharge.  You are recommended a DASH Diet that emphasizes mostly vegetables, fruits, and fat-free or low-fat dairy products. Please limit intake of sodium, sweets, beverages w/ high sugar/carbohydrate content.      Diagnosis: DM (diabetes mellitus)  Assessment and Plan of Treatment: Continue with your blood sugar medication. HbA1C on admission was 7.0. This is a reflection of your blood sugar level over the last 3 months.  Please check your blood sugar levels twice daily - Morning/Afternoon, and Lunch/Bedtime the following day. Keep a record of your blood sugar readings.  Your home medications Jardiance and Janumet were held during your hospital stay and your sugars were controlled on an insulin sliding scale. Please CONTINUE to indira your Jardiance and Janumet as prescribed on discharge.   You must maintain a healthy diet that consists of low sugar and low fat. Your diet must consist of mostly vegetables. Please limit your intake of high sugar and high carbohydrate foods such as pasta, rice, and bread. Exercise frequently if possible. Follow up with primary care physician within one week of your discharge to further manage your diabetes and monitor your Hemoglobin A1c levels after 3 months to evaluate your diabetes control.      Diagnosis: Atrial fibrillation  Assessment and Plan of Treatment: You have history of atrial fibrillation.   Your home medication for rate control is Diltiazem 180mg daily and Metoprolol. Your goal heart rate is <110, however your heart rate continued to run high during your hospital stay. Your dose of the medication was increased to Diltiazem 240mg daily with better control of heart rate.   Please START taking Diltiazem at the prescribed dose and CONTINUE to take Metoprolol as prescribed. Please CONTINUE to take you Eliquis on discharge.   Please follow up with your primary care physician for further management of your atrial fibrillation.       PRINCIPAL DISCHARGE DIAGNOSIS  Diagnosis: Humerus fracture  Assessment and Plan of Treatment: You presented after a mechanical fall. You stated that you tried to  something from the floor, tripped, and landed on your left shoulder. You denied any loss of consciouness, dizziness, or feeling lightheaded during the event. Imaging studies were done including XRAY, which sowed severely comminuted/displaced Left proximal humeral fracture. CT shoulder was done. MRI shoulder was also done, which showed Acute comminuted and displaced fracture of the left humeral neck with extension to the greater tuberosity, as above. Associated inferior subluxation of the humeral head fragment at the glenohumeral joint. Orthopedic Surgery was consulted, with recommendation stable for discharge and plan for surgical procedure on 12/12/23. You pain was controlled while you were hospitalized.  Please FOLLOW UP with Orthopedic Surgery Dr. El Bauer as outpatient within 1 week, call office at [phone] for appointment.         SECONDARY DISCHARGE DIAGNOSES  Diagnosis: Fall  Assessment and Plan of Treatment: See as above.       Diagnosis: HTN (hypertension)  Assessment and Plan of Treatment: You have high blood pressure. Please continue to taking your medications as prescribed. Please measure your blood pressure at least once daily. Maintain a healthy diet which includes incorporating more vegetables and decreasing the amount of salt (low sodium) and sugar in your diet such as rice, bread, and pasta low sugar, low fat, low sodium diet. Exercise frequently if possible.  Please follow up with your primary care provider within one week of your discharge.      Diagnosis: HLD (hyperlipidemia)  Assessment and Plan of Treatment: You have hyperlipidemia. Please continue to take your cholesterol medication Atorvastatin as prescribed. Maintain a healthy diet that consist of low sugar, low fat, low sodium. Decrease the amount of fried foods that you consume. Exercise frequently if possible. Please follow up with  your primary care provider within 1 week of your discharge.  You are recommended a DASH Diet that emphasizes mostly vegetables, fruits, and fat-free or low-fat dairy products. Please limit intake of sodium, sweets, beverages w/ high sugar/carbohydrate content.      Diagnosis: DM (diabetes mellitus)  Assessment and Plan of Treatment: Continue with your blood sugar medication. HbA1C on admission was 7.0. This is a reflection of your blood sugar level over the last 3 months.  Please check your blood sugar levels twice daily - Morning/Afternoon, and Lunch/Bedtime the following day. Keep a record of your blood sugar readings.  Your home medications Jardiance and Janumet were held during your hospital stay and your sugars were controlled on an insulin sliding scale. Please CONTINUE to indira your Jardiance and Janumet as prescribed on discharge.   You must maintain a healthy diet that consists of low sugar and low fat. Your diet must consist of mostly vegetables. Please limit your intake of high sugar and high carbohydrate foods such as pasta, rice, and bread. Exercise frequently if possible. Follow up with primary care physician within one week of your discharge to further manage your diabetes and monitor your Hemoglobin A1c levels after 3 months to evaluate your diabetes control.      Diagnosis: Atrial fibrillation  Assessment and Plan of Treatment: You have history of atrial fibrillation.   Your home medication for rate control is Diltiazem 180mg daily and Metoprolol. Your goal heart rate is <110, however your heart rate continued to run high during your hospital stay. Your dose of the medication was increased to Diltiazem 240mg daily with better control of heart rate.   Please START taking Diltiazem at the prescribed dose and CONTINUE to take Metoprolol as prescribed. Please CONTINUE to take you Eliquis on discharge.   Please follow up with your primary care physician for further management of your atrial fibrillation.       PRINCIPAL DISCHARGE DIAGNOSIS  Diagnosis: Humerus fracture  Assessment and Plan of Treatment: You presented after a mechanical fall. You stated that you tried to  something from the floor, tripped, and landed on your left shoulder. You denied any loss of consciouness, dizziness, or feeling lightheaded during the event. Imaging studies were done including XRAY, which sowed severely comminuted/displaced Left proximal humeral fracture. CT shoulder was done. MRI shoulder was also done, which showed Acute comminuted and displaced fracture of the left humeral neck with extension to the greater tuberosity, as above. Associated inferior subluxation of the humeral head fragment at the glenohumeral joint. Orthopedic Surgery was consulted, with recommendation stable for discharge and plan for surgical procedure on 12/12/23. You pain was controlled while you were hospitalized.  Please FOLLOW UP with Orthopedic Surgery Dr. El Bauer as outpatient within 1 week, call office at [phone] for appointment.         SECONDARY DISCHARGE DIAGNOSES  Diagnosis: Fall  Assessment and Plan of Treatment: See as above.       Diagnosis: HTN (hypertension)  Assessment and Plan of Treatment: You have high blood pressure. Please continue to taking your medications as prescribed. Please measure your blood pressure at least once daily. Maintain a healthy diet which includes incorporating more vegetables and decreasing the amount of salt (low sodium) and sugar in your diet such as rice, bread, and pasta low sugar, low fat, low sodium diet. Exercise frequently if possible.  Please follow up with your primary care provider within one week of your discharge.      Diagnosis: HLD (hyperlipidemia)  Assessment and Plan of Treatment: You have hyperlipidemia. Please continue to take your cholesterol medication Atorvastatin as prescribed. Maintain a healthy diet that consist of low sugar, low fat, low sodium. Decrease the amount of fried foods that you consume. Exercise frequently if possible. Please follow up with  your primary care provider within 1 week of your discharge.  You are recommended a DASH Diet that emphasizes mostly vegetables, fruits, and fat-free or low-fat dairy products. Please limit intake of sodium, sweets, beverages w/ high sugar/carbohydrate content.      Diagnosis: DM (diabetes mellitus)  Assessment and Plan of Treatment: Continue with your blood sugar medication. HbA1C on admission was 7.0. This is a reflection of your blood sugar level over the last 3 months.  Please check your blood sugar levels twice daily - Morning/Afternoon, and Lunch/Bedtime the following day. Keep a record of your blood sugar readings.  Your home medications Jardiance and Janumet were held during your hospital stay and your sugars were controlled on an insulin sliding scale. Please CONTINUE to indira your Jardiance and Janumet as prescribed on discharge.   You must maintain a healthy diet that consists of low sugar and low fat. Your diet must consist of mostly vegetables. Please limit your intake of high sugar and high carbohydrate foods such as pasta, rice, and bread. Exercise frequently if possible. Follow up with primary care physician within one week of your discharge to further manage your diabetes and monitor your Hemoglobin A1c levels after 3 months to evaluate your diabetes control.      Diagnosis: Atrial fibrillation  Assessment and Plan of Treatment: You have history of atrial fibrillation.   Your home medication for rate control is Diltiazem 180mg daily and Metoprolol. Your goal heart rate is <110, however your heart rate continued to run high during your hospital stay. Your dose of the medication was increased to Diltiazem 240mg daily with better control of heart rate.   Please START taking Diltiazem at the prescribed dose and CONTINUE to take Metoprolol as prescribed. Please CONTINUE to take you Eliquis on discharge.   Please follow up with your primary care physician for further management of your atrial fibrillation.       PRINCIPAL DISCHARGE DIAGNOSIS  Diagnosis: Humerus fracture  Assessment and Plan of Treatment: You presented after a mechanical fall. You stated that you tried to  something from the floor, tripped, and landed on your left shoulder. You denied any loss of consciouness, dizziness, or feeling lightheaded during the event. Imaging studies were done including XRAY, which sowed severely comminuted/displaced Left proximal humeral fracture. CT shoulder was done. MRI shoulder was also done, which showed Acute comminuted and displaced fracture of the left humeral neck with extension to the greater tuberosity, as above. Associated inferior subluxation of the humeral head fragment at the glenohumeral joint. Orthopedic Surgery was consulted, with recommendation stable for discharge and plan for surgical procedure on 12/12/23. You pain was controlled while you were hospitalized.  HOld eliquis per Dr Bauer recommendation prior to surgery   - hold NSAIDS, such as ibuprofen, motrin, aleve, and ASA for atleast 1 week prior to sugery   Please FOLLOW UP with Orthopedic Surgery Dr. El Bauer as outpatient within 1 week, call office at [phone] for appointment.         SECONDARY DISCHARGE DIAGNOSES  Diagnosis: DM (diabetes mellitus)  Assessment and Plan of Treatment: YOu blood sugar were uncontrolled , we consulted endorcronologist for you to manage your diabetes.  COntinue taking your Jardiance.  YOu were seen by Endocronologist while yoiu were here. and you were given insulin .  You were recommeded to start Metformin 100mg twice a day and Janivia 100mg daily and continue home medicaiton Janivia as recommeded by your doctor.   YOu a1c was 7 here , poorly controlled . It is impotant that you see your endocronologist in 1-2 weeeks    Diagnosis: Atrial fibrillation  Assessment and Plan of Treatment: You have history of atrial fibrillation.   Your home medication for rate control is Diltiazem 180mg daily and Metoprolol. Your goal heart rate is <110, however your heart rate continued to run high during your hospital stay. Your dose of the medication was increased to Diltiazem 240mg daily with better control of heart rate.   Please START taking Diltiazem at the prescribed dose and CONTINUE to take Metoprolol as prescribed. Please CONTINUE to take you Eliquis on discharge.   - Hold eliquis per Dr Bauer recommendation for sugery and your cardirolgist      Diagnosis: HTN (hypertension)  Assessment and Plan of Treatment: You have high blood pressure. Please continue to taking your medications as prescribed. Please measure your blood pressure at least once daily. Maintain a healthy diet which includes incorporating more vegetables and decreasing the amount of salt (low sodium) and sugar in your diet such as rice, bread, and pasta low sugar, low fat, low sodium diet. Exercise frequently if possible.  Please follow up with your primary care provider within one week of your discharge.      Diagnosis: HLD (hyperlipidemia)  Assessment and Plan of Treatment: You have hyperlipidemia. Please continue to take your cholesterol medication Atorvastatin as prescribed. Maintain a healthy diet that consist of low sugar, low fat, low sodium. Decrease the amount of fried foods that you consume. Exercise frequently if possible. Please follow up with  your primary care provider within 1 week of your discharge.  You are recommended a DASH Diet that emphasizes mostly vegetables, fruits, and fat-free or low-fat dairy products. Please limit intake of sodium, sweets, beverages w/ high sugar/carbohydrate content.      Diagnosis: DM (diabetes mellitus)  Assessment and Plan of Treatment: Continue with your blood sugar medication. HbA1C on admission was 7.0. This is a reflection of your blood sugar level over the last 3 months.  Please check your blood sugar levels twice daily - Morning/Afternoon, and Lunch/Bedtime the following day. Keep a record of your blood sugar readings.  Your home medications Jardiance and Janumet were held during your hospital stay and your sugars were controlled on an insulin sliding scale. Please CONTINUE to indira your Jardiance and Janumet as prescribed on discharge.   You must maintain a healthy diet that consists of low sugar and low fat. Your diet must consist of mostly vegetables. Please limit your intake of high sugar and high carbohydrate foods such as pasta, rice, and bread. Exercise frequently if possible. Follow up with primary care physician within one week of your discharge to further manage your diabetes and monitor your Hemoglobin A1c levels after 3 months to evaluate your diabetes control.      Diagnosis: Fall  Assessment and Plan of Treatment: See as above.        PRINCIPAL DISCHARGE DIAGNOSIS  Diagnosis: Humerus fracture  Assessment and Plan of Treatment: You presented after a mechanical fall. You stated that you tried to  something from the floor, tripped, and landed on your left shoulder. You denied any loss of consciouness, dizziness, or feeling lightheaded during the event. Imaging studies were done including XRAY, which sowed severely comminuted/displaced Left proximal humeral fracture. CT shoulder was done. MRI shoulder was also done, which showed Acute comminuted and displaced fracture of the left humeral neck with extension to the greater tuberosity, as above. Associated inferior subluxation of the humeral head fragment at the glenohumeral joint. Orthopedic Surgery was consulted, with recommendation stable for discharge and plan for surgical procedure on 12/12/23. You pain was controlled while you were hospitalized.  HOld eliquis per Dr Bauer recommendation prior to surgery   - hold NSAIDS, such as ibuprofen, motrin, aleve, and ASA for atleast 1 week prior to sugery   Please FOLLOW UP with Orthopedic Surgery Dr. El Bauer as outpatient within 1 week, call office at [phone] for appointment.         SECONDARY DISCHARGE DIAGNOSES  Diagnosis: DM (diabetes mellitus)  Assessment and Plan of Treatment: YOu blood sugar were uncontrolled , we consulted endorcronologist for you to manage your diabetes.  COntinue taking your Jardiance.  YOu were seen by Endocronologist while yoiu were here. and you were given insulin .  You were recommeded to start Metformin 100mg twice a day and Janivia 100mg daily and continue home medicaiton Janivia as recommeded by your doctor.   YOu a1c was 7 here , poorly controlled . It is impotant that you see your endocronologist in 1-2 weeeks    Diagnosis: Atrial fibrillation  Assessment and Plan of Treatment: You have history of atrial fibrillation.   Your home medication for rate control is Diltiazem 180mg daily and Metoprolol. Your goal heart rate is <110, however your heart rate continued to run high during your hospital stay. Your dose of the medication was increased to Diltiazem 240mg daily with better control of heart rate.   Please START taking Diltiazem at the prescribed dose and CONTINUE to take Metoprolol as prescribed. Please CONTINUE to take you Eliquis on discharge.   - Hold eliquis per Dr Bauer recommendation for sugery and your cardirolgist      Diagnosis: HTN (hypertension)  Assessment and Plan of Treatment: You have high blood pressure. Please continue to taking your medications as prescribed. Please measure your blood pressure at least once daily. Maintain a healthy diet which includes incorporating more vegetables and decreasing the amount of salt (low sodium) and sugar in your diet such as rice, bread, and pasta low sugar, low fat, low sodium diet. Exercise frequently if possible.  Please follow up with your primary care provider within one week of your discharge.      Diagnosis: HLD (hyperlipidemia)  Assessment and Plan of Treatment: You have hyperlipidemia. Please continue to take your cholesterol medication Atorvastatin as prescribed. Maintain a healthy diet that consist of low sugar, low fat, low sodium. Decrease the amount of fried foods that you consume. Exercise frequently if possible. Please follow up with  your primary care provider within 1 week of your discharge.  You are recommended a DASH Diet that emphasizes mostly vegetables, fruits, and fat-free or low-fat dairy products. Please limit intake of sodium, sweets, beverages w/ high sugar/carbohydrate content.      Diagnosis: DM (diabetes mellitus)  Assessment and Plan of Treatment: Continue with your blood sugar medication. HbA1C on admission was 7.0. This is a reflection of your blood sugar level over the last 3 months.  Please check your blood sugar levels twice daily - Morning/Afternoon, and Lunch/Bedtime the following day. Keep a record of your blood sugar readings.  Your home medications Jardiance and Janumet were held during your hospital stay and your sugars were controlled on an insulin sliding scale. Please CONTINUE to indira your Jardiance and Janumet as prescribed on discharge.   You must maintain a healthy diet that consists of low sugar and low fat. Your diet must consist of mostly vegetables. Please limit your intake of high sugar and high carbohydrate foods such as pasta, rice, and bread. Exercise frequently if possible. Follow up with primary care physician within one week of your discharge to further manage your diabetes and monitor your Hemoglobin A1c levels after 3 months to evaluate your diabetes control.      Diagnosis: Fall  Assessment and Plan of Treatment: See as above.        PRINCIPAL DISCHARGE DIAGNOSIS  Diagnosis: Humerus fracture  Assessment and Plan of Treatment: You presented after a mechanical fall. You stated that you tried to  something from the floor, tripped, and landed on your left shoulder. You denied any loss of consciouness, dizziness, or feeling lightheaded during the event. Imaging studies were done including XRAY, which sowed severely comminuted/displaced Left proximal humeral fracture. CT shoulder was done. MRI shoulder was also done, which showed Acute comminuted and displaced fracture of the left humeral neck with extension to the greater tuberosity, as above. Associated inferior subluxation of the humeral head fragment at the glenohumeral joint. Orthopedic Surgery was consulted, with recommendation stable for discharge and plan for surgical procedure on 12/12/23. You pain was controlled while you were hospitalized.  HOld eliquis per Dr Bauer recommendation prior to surgery   - hold NSAIDS, such as ibuprofen, motrin, aleve, and ASA for atleast 1 week prior to sugery   Please FOLLOW UP with Orthopedic Surgery Dr. El Bauer as outpatient within 1 week, call office at [phone] for appointment.         SECONDARY DISCHARGE DIAGNOSES  Diagnosis: DM (diabetes mellitus)  Assessment and Plan of Treatment: YOu blood sugar were uncontrolled , we consulted endorcronologist for you to manage your diabetes.  COntinue taking your Jardiance.  YOu were seen by Endocronologist while yoiu were here. and you were given insulin .  You were recommeded to start Metformin 100mg twice a day and Janivia 100mg daily and continue home medicaiton Janivia as recommeded by your doctor.   YOu a1c was 7 here , poorly controlled . It is impotant that you see your endocronologist in 1-2 weeeks    Diagnosis: Atrial fibrillation  Assessment and Plan of Treatment: You have history of atrial fibrillation.   Your home medication for rate control is Diltiazem 180mg daily and Metoprolol. Your goal heart rate is <110, however your heart rate continued to run high during your hospital stay. Your dose of the medication was increased to Diltiazem 240mg daily with better control of heart rate.   Please START taking Diltiazem at the prescribed dose and CONTINUE to take Metoprolol as prescribed. Please CONTINUE to take you Eliquis on discharge.   - Hold eliquis per Dr Bauer recommendation for sugery and your cardirolgist      Diagnosis: HTN (hypertension)  Assessment and Plan of Treatment: You have high blood pressure. Please continue to taking your medications as prescribed. Please measure your blood pressure at least once daily. Maintain a healthy diet which includes incorporating more vegetables and decreasing the amount of salt (low sodium) and sugar in your diet such as rice, bread, and pasta low sugar, low fat, low sodium diet. Exercise frequently if possible.  Please follow up with your primary care provider within one week of your discharge.      Diagnosis: HLD (hyperlipidemia)  Assessment and Plan of Treatment: You have hyperlipidemia. Please continue to take your cholesterol medication Atorvastatin as prescribed. Maintain a healthy diet that consist of low sugar, low fat, low sodium. Decrease the amount of fried foods that you consume. Exercise frequently if possible. Please follow up with  your primary care provider within 1 week of your discharge.  You are recommended a DASH Diet that emphasizes mostly vegetables, fruits, and fat-free or low-fat dairy products. Please limit intake of sodium, sweets, beverages w/ high sugar/carbohydrate content.      Diagnosis: DM (diabetes mellitus)  Assessment and Plan of Treatment: Continue with your blood sugar medication. HbA1C on admission was 7.0. This is a reflection of your blood sugar level over the last 3 months.  Please check your blood sugar levels twice daily - Morning/Afternoon, and Lunch/Bedtime the following day. Keep a record of your blood sugar readings.  Your home medications Jardiance and Janumet were held during your hospital stay and your sugars were controlled on an insulin sliding scale. Please CONTINUE to indira your Jardiance and Janumet as prescribed on discharge.   You must maintain a healthy diet that consists of low sugar and low fat. Your diet must consist of mostly vegetables. Please limit your intake of high sugar and high carbohydrate foods such as pasta, rice, and bread. Exercise frequently if possible. Follow up with primary care physician within one week of your discharge to further manage your diabetes and monitor your Hemoglobin A1c levels after 3 months to evaluate your diabetes control.      Diagnosis: Fall  Assessment and Plan of Treatment: See as above.

## 2023-11-28 NOTE — DISCHARGE NOTE PROVIDER - CARE PROVIDERS DIRECT ADDRESSES
,[email or web address],[email or web address] [email or web address],[email or web address],[email or web address],DirectAddress_Unknown

## 2023-11-28 NOTE — DISCHARGE NOTE PROVIDER - NSDCCAREPROVSEEN_GEN_ALL_CORE_FT
Adam, Basilio Mcneal, Shemar Davis Adam, Basilio Mcneal, Bel Finn, Shemar Caballero, Diego Adam, Basilio Mcneal, Bel Finn, Shemar Caballero, Diego Stafford, Jason

## 2023-11-28 NOTE — PROGRESS NOTE ADULT - PROBLEM SELECTOR PLAN 7
continue home med atorvastatin 20 mg qd Pt on atorvastatin 20 mg qd at home.  - C/w atorvastatin XR noted OA of the hand  - acetaminophen prn for pain control - Eliquis.

## 2023-11-28 NOTE — PROGRESS NOTE ADULT - PROBLEM SELECTOR PLAN 8
on home med janumet and jardiance   a1c 7.0   Insulin Sliding Scale before meals and at bedtime   achs   diabetic diet  glucose controlled Patient is on Janumet and Jardiance at home  - C/w sliding scale insulin while inpatient DVT - on Eliquis

## 2023-11-28 NOTE — PROGRESS NOTE ADULT - ASSESSMENT
76 y/o F from home ambulates independently, lives alone with no HHA, PMH of Afib on Eliquis, HTN, DM, HLD presenting with fall this evening. Patient states that she was trying to get something on the floor when she got tripped up and landed hard on her left shoulder. She was too weak and in pain to be able to get up. She was on the ground for 2 hours, until she was found by her son. Admitted to medicine for left proximal humeral fracture. Ortho consulted.     Pt follows with Cardiologist Dr. Myla Nieves MD, [phone].   In the ED, Xrays show Severely comminuted/displaced proximal humeral fracture.   Pt is now pending CT and MR Left shoulder.     Hospital course complicated by afib with rvr, transferred to telemetry. Cardiology Dr. Novak following.     Once optimized, physical therapy rec MEG. CM following for placement.   Awaiting possible surgery of left shoulder next week from rehab. Cardiology following for clearance.    76 y/o F from home ambulates independently, lives alone with no HHA, PMH of Afib on Eliquis, HTN, DM, HLD presenting with fall this evening. Patient states that she was trying to get something on the floor when she got tripped up and landed hard on her left shoulder. She was too weak and in pain to be able to get up. She was on the ground for 2 hours, until she was found by her son. Admitted to medicine for left proximal humeral fracture. Ortho consulted.     Pt follows with Cardiologist Dr. Myla Nieves MD, [phone].   In the ED, Xrays show Severely comminuted/displaced proximal humeral fracture.   Pt is now pending CT and MR Left shoulder.     Hospital course complicated by afib with rvr, transferred to telemetry. Cardiology Dr. Novak following.     Once optimized, physical therapy rec MEG. CM following for placement.   Awaiting possible surgery of left shoulder next week from rehab. Cardiology following for clearance.    78 y/o F from home ambulates independently, lives alone with no HHA, PMH of Afib on Eliquis, HTN, DM, HLD presenting with fall this evening. Patient states that she was trying to get something on the floor when she got tripped up and landed hard on her left shoulder. She was too weak and in pain to be able to get up. She was on the ground for 2 hours, until she was found by her son. Admitted to medicine for left proximal humeral fracture. Ortho consulted.     Pt follows with Cardiologist Dr. Myla Nieves MD, [phone].   In the ED, Xrays show Severely comminuted/displaced proximal humeral fracture.   Pt is now pending CT and MR Left shoulder.     Hospital course complicated by afib with rvr, transferred to telemetry. Cardiology Dr. Novak following.     Once optimized, physical therapy rec MEG. CM following for placement.   Awaiting possible surgery of left shoulder next week from rehab. Cardiology following for clearance.    77 F, from home, ambulates independently, lives alone, PMHx of Afib on Eliquis, HTN, DM, HLD presenting with fall this evening. Patient states that she was trying to get something on the floor when she got tripped up and landed hard on her left shoulder. She was too weak and in pain to be able to get up. She was on the ground for 2 hours, until she was found by her son. Admitted to medicine for left proximal humeral fracture. Ortho consulted.     Pt follows with Cardiologist Dr. Myla Nieves MD, [phone].   In the ED, Xrays show Severely comminuted/displaced proximal humeral fracture.   Pt is now pending CT and MR Left shoulder.     Hospital course complicated by afib with rvr, transferred to telemetry. Cardiology Dr. Novak following.     Once optimized, physical therapy rec MEG. CM following for placement.   Awaiting possible surgery of left shoulder next week from rehab. Cardiology following for clearance.    77 F, from home, ambulates independently, lives alone, PMHx of Afib on Eliquis, HTN, DM, HLD presenting with fall this evening. Patient states that she was trying to get something on the floor when she got tripped up and landed hard on her left shoulder. She was too weak and in pain to be able to get up. She was on the ground for 2 hours, until she was found by her son. Admitted to medicine for left proximal humeral fracture. Ortho consulted.     Pt follows with Cardiologist Dr. Myla Nieves MD, [phone].   In the ED, Xrays show Severely comminuted/displaced proximal humeral fracture.   Pt is now pending CT and MR Left shoulder.     Hospital course complicated by afib with rvr, transferred to telemetry. Cardiology Dr. Novak following.     Once optimized, physical therapy rec MEG. CM following for placement.   Awaiting possible surgery of left shoulder next week from rehab. Cardiology following for clearance.    77 F, from home, ambulates independently, lives alone, PMHx of Afib on Eliquis, HTN, DM, HLD presenting with fall this evening. Patient states that she was trying to get something on the floor when she got tripped up and landed hard on her left shoulder. She was too weak and in pain to be able to get up. She was on the ground for 2 hours, until she was found by her son. Admitted to medicine for left proximal humeral fracture. Ortho consulted.     Pt follows with Cardiologist Dr. Myla Nieves MD, [phone].   In the ED, Xrays show Severely comminuted/displaced proximal humeral fracture.   Pt is now pending CT and MR Left shoulder.     Hospital course complicated by afib with rvr, transferred to telemetry. Cardiology Dr. Novak following.     Once optimized, physical therapy rec MEG. CM following for placement.   Awaiting possible surgery of left shoulder next week from rehab. Cardiology following for clearance.    77 F, from home, ambulates independently, lives alone, PMHx of Afib on Eliquis, HTN, DM, HLD presenting after mechanical fall. Patient was found to have a left humeral fracture and was admitted for inability to care for herself alone at home post-fracture. Ortho was consulted. Patient's hospital course was c/b A-Fib with RVR therefore patient was transferred to [facility]. Once optimized, pt will be discharged to [facility]. Awaiting possible surgery of left shoulder next week from rehab.    77 F, from home, ambulates independently, lives alone, PMHx of Afib on Eliquis, HTN, DM, HLD presenting after mechanical fall. Patient was found to have a left humeral fracture and was admitted for inability to care for herself alone at home post-fracture. Ortho was consulted. Patient's hospital course was c/b A-Fib with RVR therefore patient was transferred to [facility]. Once optimized, pt will be discharged to [facility]. Awaiting possible surgery of left shoulder next week from rehab.    77 F, from home, ambulates independently, lives alone, PMHx of Afib on Eliquis, HTN, DM, HLD presenting after mechanical fall. Patient was found to have a left humeral fracture and was admitted for inability to care for herself alone at home post-fracture. Ortho was consulted. Patient's hospital course was c/b A-Fib with RVR therefore patient was transferred to [facility]. Once optimized, pt will be discharged to [facility]. Awaiting possible surgery of left shoulder next week from rehab.    77 F, from home, ambulates independently, lives alone, PMHx of Afib on Eliquis, HTN, DM, HLD presenting after mechanical fall. Found to have a left humeral fracture and was admitted for inability to care for herself alone at home post-fracture. Ortho was consulted, pending possible surgical intervention outpatient vs. inpatient (will need to call to verify) ?   77 F, from home, ambulates independently, lives alone, PMHx of Afib on Eliquis, HTN, DM, HLD presenting after mechanical fall. Found to have a left humeral fracture and was admitted for inability to care for herself alone at home post-fracture. Ortho was consulted - patient pending rehab decision - will schedule patient for 12/10 re-admission through the ED for procedure 12/12. If not approved for rehab, will DC on pain control and appointment for procedure as mentioned prior.

## 2023-11-28 NOTE — PROGRESS NOTE ADULT - PROBLEM SELECTOR PLAN 5
Pt unable to care for herself post humeral fracture   CM following, unsafe dc home Unable to care for herself post-humeral fracture, lives alone  - Case Management following h/o HLD, home med atorvastatin 20 mg.  - c/w home med.

## 2023-11-28 NOTE — PROGRESS NOTE ADULT - PROBLEM SELECTOR PLAN 1
pt found to have afib with Rvr 130-150 due to not receiving cardizem  will order metoprolol 5 mg IVP x1 dose now  restart home med cardizem 180 mg qd, metoprolol succinate 200 mg qd, eliquis 5 mg bid  transfer to telemetry  Cardiology Dr. Novak following for cardiac clearance  Discussed with PCP/Cardiology Dr. Nieves, pt has hx of afib Patient found to have A-Fib with RVR due to not receiving home med Cardizem while inpatient, now converted to sinus  - Restarted home med Cardizem 180 mg qd  - C/w home metoprolol succinate 200 mg qd, Eliquis 5 mg bid  - Cardiology Dr. Novak following for cardiac optimization presents after mechanical fall, landed on L. shoulder.  XRAY Left shoulder / humerus - Severely comminuted/displaced proximal humeral fracture.  XRAY Left knee - No fracture/dislocation/suprapatellar effusion at the knee.  Ortho consulted - per Ortho, patient to follow up with Dr. El Bauer as outpatient within 1 week, call office at [phone]  for appointment.  >> will need to call to see when surgery will be ?    - f/u MRI shoulder  - f/u CT shoulder presents after mechanical fall, landed on L. shoulder.  XRAY Left shoulder / humerus - Severely comminuted/displaced proximal humeral fracture.  XRAY Left knee - No fracture/dislocation/suprapatellar effusion at the knee.  Ortho consulted - per Ortho, patient to follow up with Dr. El Bauer as outpatient within 1 week, call office at [phone]  for appointment.  >> will need to call to see when surgery will be ?    - f/u MRI shoulder  - f/u CT shoulder presents after mechanical fall, landed on L. shoulder.  XRAY Left shoulder / humerus - Severely comminuted/displaced proximal humeral fracture.  XRAY Left knee - No fracture/dislocation/suprapatellar effusion at the knee.  Ortho consulted - per Ortho, patient to follow up with Dr. El Bauer as outpatient within 1 week, call office at [phone]  for appointment.  >> will need to call to see when surgery will be ?    - f/u MRI shoulder  - f/u CT shoulder presents after mechanical fall, landed on L. shoulder.  XRAY Left shoulder / humerus - Severely comminuted/displaced proximal humeral fracture.  XRAY Left knee - No fracture/dislocation/suprapatellar effusion at the knee.  Ortho consulted - per Ortho, patient to follow up with Dr. El Bauer as outpatient within 1 week, call office at [phone]  for appointment.  >>pending disposition (home vs. rehab) - patient to be re-admitted through ED 12/10 for procedure 12/12.    - f/u MRI shoulder - pending official read.  - f/u CT shoulder - pending official read. presents after mechanical fall, landed on L. shoulder.  XRAY Left shoulder / humerus - Severely comminuted/displaced proximal humeral fracture.  XRAY Left knee - No fracture/dislocation/suprapatellar effusion at the knee.  Ortho consulted - per Ortho, patient to follow up with Dr. El Bauer as outpatient within 1 week, call office at [phone]  for appointment.  >>pending disposition (home vs. rehab) - patient to be re-admitted through ED 12/10 for procedure 12/12.    - f/u MRI shoulder - pending official read.  - f/u CT shoulder - pending official read. presents after mechanical fall, landed on L. shoulder.  XRAY Left shoulder / humerus - Severely comminuted/displaced proximal humeral fracture.  XRAY Left knee - No fracture/dislocation/suprapatellar effusion at the knee.  Ortho consulted - per Ortho, patient to follow up with Dr. El Bauer as outpatient within 1 week, call office at [phone]  for appointment.  >>pending disposition (home vs. rehab) - patient to be re-admitted through ED 12/10 for procedure 12/12.    - f/u MRI shoulder - pending official read.  - f/u CT shoulder - pending official read.

## 2023-11-28 NOTE — DISCHARGE NOTE PROVIDER - NPI NUMBER (FOR SYSADMIN USE ONLY) :
[[id number]] [[id number]] [[id number]] [[id number]],[UNKNOWN],[UNKNOWN] [[id number]],[UNKNOWN],[UNKNOWN] [[id number]],[UNKNOWN],[UNKNOWN]

## 2023-11-28 NOTE — DISCHARGE NOTE PROVIDER - NSDCMRMEDTOKEN_GEN_ALL_CORE_FT
atorvastatin 20 mg oral tablet: 1 tab(s) orally once a day (at bedtime)  DilTIAZem (Eqv-Cardizem CD) 180 mg/24 hours oral capsule, extended release: 1 cap(s) orally once a day  Eliquis 5 mg oral tablet: 1 tab(s) orally 2 times a day  Janumet 50 mg-1000 mg oral tablet: 1 tab(s) orally once a day  Jardiance 10 mg oral tablet: 1 tab(s) orally once a day  metoprolol succinate 200 mg oral tablet, extended release: 1 tab(s) orally once a day  ramipril 2.5 mg oral tablet: orally once a day   acetaminophen 325 mg oral tablet: 2 tab(s) orally every 6 hours As needed Temp greater or equal to 38C (100.4F), Mild Pain (1 - 3)  atorvastatin 20 mg oral tablet: 1 tab(s) orally once a day (at bedtime)  DilTIAZem (Eqv-Cardizem CD) 180 mg/24 hours oral capsule, extended release: 1 cap(s) orally once a day  Eliquis 5 mg oral tablet: 1 tab(s) orally 2 times a day  Januvia 100 mg oral tablet: 1 tab(s) orally once a day  Jardiance 10 mg oral tablet: 1 tab(s) orally once a day  metFORMIN 1000 mg oral tablet: 1 tab(s) orally 2 times a day  metoprolol succinate 200 mg oral tablet, extended release: 1 tab(s) orally once a day  oxyCODONE 5 mg oral tablet: 1 tab(s) orally every 6 hours as needed for  severe pain MDD: 4  ramipril 2.5 mg oral tablet: orally once a day

## 2023-11-28 NOTE — PROGRESS NOTE ADULT - TIME BILLING
- Review of records, telemetry, vital signs and daily labs.   - General and cardiovascular physical examination.  - Generation of cardiovascular treatment plan.  - Coordination of care.      Patient was seen and examined by me on  11/28/2023,interim events noted,labs and radiology studies reviewed.  Vic Novak MD,FACC.  [address] [place], [place] [id number].  [phone] - Review of records, telemetry, vital signs and daily labs.   - General and cardiovascular physical examination.  - Generation of cardiovascular treatment plan.  - Coordination of care.      Patient was seen and examined by me on  11/28/2023,interim events noted,labs and radiology studies reviewed.  Vic Novak MD,FACC.  [address] [place], [place] [id number].  [phone] - Review of records, telemetry, vital signs and daily labs.   - General and cardiovascular physical examination.  - Generation of cardiovascular treatment plan.  - Coordination of care.      Patient was seen and examined by me on  11/28/2023,interim events noted,labs and radiology studies reviewed.  Vic Novak MD,FACC.  [address] [place], [place] [id number].  [phone]

## 2023-11-28 NOTE — PROGRESS NOTE ADULT - PROBLEM SELECTOR PLAN 3
xrays noted for OA  pain control - tylenol PRN XR noted OA of the hand  - acetaminophen prn for pain control h/o A fib, home med diltiazem and Eliquis.  - c/w home meds.  - tele

## 2023-11-28 NOTE — PROGRESS NOTE ADULT - PROBLEM SELECTOR PLAN 6
continue home med ramipril 2.5 mg bid, metoprolol succinate 200 mg qd  BP goal <140/90 Pt on home ramipril 2.5 mg bid, metoprolol succinate 200 mg qd  - Continue on home meds Patient is on Janumet and Jardiance at home  - C/w sliding scale insulin while inpatient h/o DM, home meds Janumet and Jardiance.  - ISS, FS

## 2023-11-28 NOTE — PROGRESS NOTE ADULT - PROBLEM SELECTOR PLAN 4
pt p/w weakness   PT rec MEG pt p/w weakness   PT recommended MEG h/o HTN, home meds ramipril 2.5 mg bid, metoprolol succinate 200 mg qd  - c/w home meds.

## 2023-11-28 NOTE — PROGRESS NOTE ADULT - SUBJECTIVE AND OBJECTIVE BOX
PATIENT SEEN AND EXAMINED BY EDDY SILVER M.D. ON :- 11/28/23  DATE OF SERVICE:  11/28/23           Interim events noted,Labs ,Radiological studies and Cardiology tests reviewed .  DISCUSSED WITH ACP/MEDICAL RESIDENT ON PLAN OF CARE.    MR#[id number]  PATIENT NAME:[facility] COURSE: HPI:  77F from home ambulates independently, lives alone with no HHA, PMH of Afib?, on Eliquis, HTN, DM, HLD presenting with fall this evening. Patient states taht she was trying to get something on the floor when she got tripped up and landed hard on her left shoulder. She was too weak and in pain to be able to get up. She was on the ground for 2 hours, until she was found by her son. She has no history of falls, and is unable to explain why she is on Eliquis, and asks to defer to her PCP, Dr. Nieves (doesn't remember name).    She denies, LOC, dizziness, or feeling lightheaded during the event. No fevers, chills, NVD    In the ED, Xrays show comminuted fracture/dislocation in left shoulder, pending offical read, labs WNL (26 Nov 2023 21:55)      INTERIM EVENTS:Patient seen at bedside ,interim events noted.      PMH -reviewed admission note, no change since admission  HEART FAILURE: Acute[ ]Chronic[ ] Systolic[ ] Diastolic[ ] Combined Systolic and Diastolic[ ]  CAD[ ] CABG[ ] PCI[ ]  DEVICES[ ] PPM[ ] ICD[ ] ILR[ ]  ATRIAL FIBRILLATION[ ] Paroxysmal[ ] Permanent[ ] CHADS2-[  ]  JIMENEZ[ ] CKD1[ ] CKD2[ ] CKD3[ ] CKD4[ ] ESRD[ ]  COPD[ ] HTN[ ]   DM[ ] Type1[ ] Type 2[ ]   CVA[ ] Paresis[ ]    AMBULATION: Assisted[ ] Cane/walker[ ] Independent[ ]    MEDICATIONS  (STANDING):  apixaban 5 milliGRAM(s) Oral every 12 hours  atorvastatin 20 milliGRAM(s) Oral at bedtime  diltiazem    milliGRAM(s) Oral daily  insulin lispro (ADMELOG) corrective regimen sliding scale   SubCutaneous Before meals and at bedtime  lisinopril 10 milliGRAM(s) Oral daily  metoprolol tartrate 100 milliGRAM(s) Oral two times a day    MEDICATIONS  (PRN):  acetaminophen     Tablet .. 650 milliGRAM(s) Oral every 6 hours PRN Temp greater or equal to 38C (100.4F), Mild Pain (1 - 3)  aluminum hydroxide/magnesium hydroxide/simethicone Suspension 30 milliLiter(s) Oral every 4 hours PRN Dyspepsia  melatonin 3 milliGRAM(s) Oral at bedtime PRN Insomnia  ondansetron Injectable 4 milliGRAM(s) IV Push every 8 hours PRN Nausea and/or Vomiting  oxyCODONE    IR 2.5 milliGRAM(s) Oral every 6 hours PRN Moderate Pain (4 - 6)            REVIEW OF SYSTEMS:  Constitutional: [ ] fever, [ ]weight loss,  [ ]fatigue [ ]weight gain  Eyes: [ ] visual changes  Respiratory: [ ]shortness of breath;  [ ] cough, [ ]wheezing, [ ]chills, [ ]hemoptysis  Cardiovascular: [ ] chest pain, [ ]palpitations, [ ]dizziness,  [ ]leg swelling[ ]orthopnea[ ]PND  Gastrointestinal: [ ] abdominal pain, [ ]nausea, [ ]vomiting,  [ ]diarrhea [ ]Constipation [ ]Melena  Genitourinary: [ ] dysuria, [ ] hematuria [ ]Craft  Neurologic: [ ] headaches [ ] tremors[ ]weakness [ ]Paralysis Right[ ] Left[ ]  Skin: [ ] itching, [ ]burning, [ ] rashes  Endocrine: [ ] heat or cold intolerance  Musculoskeletal: [ ] joint pain or swelling; [ ] muscle, back, or extremity pain  Psychiatric: [ ] depression, [ ]anxiety, [ ]mood swings, or [ ]difficulty sleeping  Hematologic: [ ] easy bruising, [ ] bleeding gums    [ ] All remaining systems negative except as per above.   [ ]Unable to obtain.  [x] No change in ROS since admission      Vital Signs Last 24 Hrs  T(C): 36.6 (28 Nov 2023 20:01), Max: 36.9 (28 Nov 2023 07:47)  T(F): 97.8 (28 Nov 2023 20:01), Max: 98.4 (28 Nov 2023 07:47)  HR: 79 (28 Nov 2023 20:01) (77 - 93)  BP: 129/79 (28 Nov 2023 20:01) (127/83 - 174/121)  BP(mean): --  RR: 18 (28 Nov 2023 20:01) (18 - 18)  SpO2: 98% (28 Nov 2023 20:01) (97% - 98%)    Parameters below as of 28 Nov 2023 20:01  Patient On (Oxygen Delivery Method): room air      I&O's Summary    28 Nov 2023 07:01  -  28 Nov 2023 21:14  --------------------------------------------------------  IN: 430 mL / OUT: 0 mL / NET: 430 mL        PHYSICAL EXAM:  General: No acute distress BMI-  HEENT: EOMI, PERRL  Neck: Supple, [ ] JVD  Lungs: Equal air entry bilaterally; [ ] rales [ ] wheezing [ ] rhonchi  Heart: Regular rate and rhythm; [x ] murmur   2/6 [ x] systolic [ ] diastolic [ ] radiation[ ] rubs [ ]  gallops  Abdomen: Nontender, bowel sounds present  Extremities: No clubbing, cyanosis, [ ] edema [ ]Pulses  equal and intact  Nervous system:  Alert & Oriented X3, no focal deficits  Psychiatric: Normal affect  Skin: No rashes or lesions    LABS:  11-28    137  |  104  |  17  ----------------------------<  191<H>  3.8   |  23  |  0.78    Ca    9.5      28 Nov 2023 06:33  Phos  2.5     11-27  Mg     2.0     11-28    TPro  6.7  /  Alb  3.1<L>  /  TBili  1.1  /  DBili  0.3  /  AST  18  /  ALT  32  /  AlkPhos  83  11-27    Creatinine Trend: 0.78<--, 0.91<--, 0.79<--                        13.1   7.67  )-----------( 158      ( 27 Nov 2023 09:50 )             40.1                PATIENT SEEN AND EXAMINED BY EDDY SILVER M.D. ON :- 11/28/23  DATE OF SERVICE:  11/28/23           Interim events noted,Labs ,Radiological studies and Cardiology tests reviewed .  DISCUSSED WITH ACP/MEDICAL RESIDENT ON PLAN OF CARE.    MR#[id number]  PATIENT NAME:[facility] COURSE: HPI:  77F from home ambulates independently, lives alone with no HHA, PMH of Afib?, on Eliquis, HTN, DM, HLD presenting with fall this evening. Patient states taht she was trying to get something on the floor when she got tripped up and landed hard on her left shoulder. She was too weak and in pain to be able to get up. She was on the ground for 2 hours, until she was found by her son. She has no history of falls, and is unable to explain why she is on Eliquis, and asks to defer to her PCP, Dr. Nieves (doesn't remember name).    She denies, LOC, dizziness, or feeling lightheaded during the event. No fevers, chills, NVD    In the ED, Xrays show comminuted fracture/dislocation in left shoulder, pending offical read, labs WNL (26 Nov 2023 21:55)      INTERIM EVENTS:Patient seen at bedside ,interim events noted.      PMH -reviewed admission note, no change since admission  HEART FAILURE: Acute[ ]Chronic[ ] Systolic[ ] Diastolic[ ] Combined Systolic and Diastolic[ ]  CAD[ ] CABG[ ] PCI[ ]  DEVICES[ ] PPM[ ] ICD[ ] ILR[ ]  ATRIAL FIBRILLATION[ ] Paroxysmal[ ] Permanent[ ] CHADS2-[  ]  JIMENEZ[ ] CKD1[ ] CKD2[ ] CKD3[ ] CKD4[ ] ESRD[ ]  COPD[ ] HTN[ ]   DM[ ] Type1[ ] Type 2[ ]   CVA[ ] Paresis[ ]    AMBULATION: Assisted[ ] Cane/walker[ ] Independent[ ]    MEDICATIONS  (STANDING):  apixaban 5 milliGRAM(s) Oral every 12 hours  atorvastatin 20 milliGRAM(s) Oral at bedtime  diltiazem    milliGRAM(s) Oral daily  insulin lispro (ADMELOG) corrective regimen sliding scale   SubCutaneous Before meals and at bedtime  lisinopril 10 milliGRAM(s) Oral daily  metoprolol tartrate 100 milliGRAM(s) Oral two times a day    MEDICATIONS  (PRN):  acetaminophen     Tablet .. 650 milliGRAM(s) Oral every 6 hours PRN Temp greater or equal to 38C (100.4F), Mild Pain (1 - 3)  aluminum hydroxide/magnesium hydroxide/simethicone Suspension 30 milliLiter(s) Oral every 4 hours PRN Dyspepsia  melatonin 3 milliGRAM(s) Oral at bedtime PRN Insomnia  ondansetron Injectable 4 milliGRAM(s) IV Push every 8 hours PRN Nausea and/or Vomiting  oxyCODONE    IR 2.5 milliGRAM(s) Oral every 6 hours PRN Moderate Pain (4 - 6)            REVIEW OF SYSTEMS:  Constitutional: [ ] fever, [ ]weight loss,  [ ]fatigue [ ]weight gain  Eyes: [ ] visual changes  Respiratory: [ ]shortness of breath;  [ ] cough, [ ]wheezing, [ ]chills, [ ]hemoptysis  Cardiovascular: [ ] chest pain, [ ]palpitations, [ ]dizziness,  [ ]leg swelling[ ]orthopnea[ ]PND  Gastrointestinal: [ ] abdominal pain, [ ]nausea, [ ]vomiting,  [ ]diarrhea [ ]Constipation [ ]Melena  Genitourinary: [ ] dysuria, [ ] hematuria [ ]Craft  Neurologic: [ ] headaches [ ] tremors[ ]weakness [ ]Paralysis Right[ ] Left[ ]  Skin: [ ] itching, [ ]burning, [ ] rashes  Endocrine: [ ] heat or cold intolerance  Musculoskeletal: [ ] joint pain or swelling; [ ] muscle, back, or extremity pain  Psychiatric: [ ] depression, [ ]anxiety, [ ]mood swings, or [ ]difficulty sleeping  Hematologic: [ ] easy bruising, [ ] bleeding gums    [ ] All remaining systems negative except as per above.   [ ]Unable to obtain.  [x] No change in ROS since admission      Vital Signs Last 24 Hrs  T(C): 36.6 (28 Nov 2023 20:01), Max: 36.9 (28 Nov 2023 07:47)  T(F): 97.8 (28 Nov 2023 20:01), Max: 98.4 (28 Nov 2023 07:47)  HR: 79 (28 Nov 2023 20:01) (77 - 93)  BP: 129/79 (28 Nov 2023 20:01) (127/83 - 174/121)  BP(mean): --  RR: 18 (28 Nov 2023 20:01) (18 - 18)  SpO2: 98% (28 Nov 2023 20:01) (97% - 98%)    Parameters below as of 28 Nov 2023 20:01  Patient On (Oxygen Delivery Method): room air      I&O's Summary    28 Nov 2023 07:01  -  28 Nov 2023 21:14  --------------------------------------------------------  IN: 430 mL / OUT: 0 mL / NET: 430 mL        PHYSICAL EXAM:  General: No acute distress BMI-  HEENT: EOMI, PERRL  Neck: Supple, [ ] JVD  Lungs: Equal air entry bilaterally; [ ] rales [ ] wheezing [ ] rhonchi  Heart: Regular rate and rhythm; [x ] murmur   2/6 [ x] systolic [ ] diastolic [ ] radiation[ ] rubs [ ]  gallops  Abdomen: Nontender, bowel sounds present  Extremities: No clubbing, cyanosis, [ ] edema [ ]Pulses  equal and intact  Nervous system:  Alert & Oriented X3, no focal deficits  Psychiatric: Normal affect  Skin: No rashes or lesions    LABS:  11-28    137  |  104  |  17  ----------------------------<  191<H>  3.8   |  23  |  0.78    Ca    9.5      28 Nov 2023 06:33  Phos  2.5     11-27  Mg     2.0     11-28    TPro  6.7  /  Alb  3.1<L>  /  TBili  1.1  /  DBili  0.3  /  AST  18  /  ALT  32  /  AlkPhos  83  11-27    Creatinine Trend: 0.78<--, 0.91<--, 0.79<--                        13.1   7.67  )-----------( 158      ( 27 Nov 2023 09:50 )             40.1                PATIENT SEEN AND EXAMINED BY EDDY SILVER M.D. ON :- 11/28/23  DATE OF SERVICE:  11/28/23           Interim events noted,Labs ,Radiological studies and Cardiology tests reviewed .  DISCUSSED WITH ACP/MEDICAL RESIDENT ON PLAN OF CARE.    MR#[id number]  PATIENT NAME:[facility] COURSE: HPI:  77F from home ambulates independently, lives alone with no HHA, PMH of Afib?, on Eliquis, HTN, DM, HLD presenting with fall this evening. Patient states taht she was trying to get something on the floor when she got tripped up and landed hard on her left shoulder. She was too weak and in pain to be able to get up. She was on the ground for 2 hours, until she was found by her son. She has no history of falls, and is unable to explain why she is on Eliquis, and asks to defer to her PCP, Dr. Nieves (doesn't remember name).    She denies, LOC, dizziness, or feeling lightheaded during the event. No fevers, chills, NVD    In the ED, Xrays show comminuted fracture/dislocation in left shoulder, pending offical read, labs WNL (26 Nov 2023 21:55)      INTERIM EVENTS:Patient seen at bedside ,interim events noted.      PMH -reviewed admission note, no change since admission  HEART FAILURE: Acute[ ]Chronic[ ] Systolic[ ] Diastolic[ ] Combined Systolic and Diastolic[ ]  CAD[ ] CABG[ ] PCI[ ]  DEVICES[ ] PPM[ ] ICD[ ] ILR[ ]  ATRIAL FIBRILLATION[ ] Paroxysmal[ ] Permanent[ ] CHADS2-[  ]  JIMENEZ[ ] CKD1[ ] CKD2[ ] CKD3[ ] CKD4[ ] ESRD[ ]  COPD[ ] HTN[ ]   DM[ ] Type1[ ] Type 2[ ]   CVA[ ] Paresis[ ]    AMBULATION: Assisted[ ] Cane/walker[ ] Independent[ ]    MEDICATIONS  (STANDING):  apixaban 5 milliGRAM(s) Oral every 12 hours  atorvastatin 20 milliGRAM(s) Oral at bedtime  diltiazem    milliGRAM(s) Oral daily  insulin lispro (ADMELOG) corrective regimen sliding scale   SubCutaneous Before meals and at bedtime  lisinopril 10 milliGRAM(s) Oral daily  metoprolol tartrate 100 milliGRAM(s) Oral two times a day    MEDICATIONS  (PRN):  acetaminophen     Tablet .. 650 milliGRAM(s) Oral every 6 hours PRN Temp greater or equal to 38C (100.4F), Mild Pain (1 - 3)  aluminum hydroxide/magnesium hydroxide/simethicone Suspension 30 milliLiter(s) Oral every 4 hours PRN Dyspepsia  melatonin 3 milliGRAM(s) Oral at bedtime PRN Insomnia  ondansetron Injectable 4 milliGRAM(s) IV Push every 8 hours PRN Nausea and/or Vomiting  oxyCODONE    IR 2.5 milliGRAM(s) Oral every 6 hours PRN Moderate Pain (4 - 6)            REVIEW OF SYSTEMS:  Constitutional: [ ] fever, [ ]weight loss,  [ ]fatigue [ ]weight gain  Eyes: [ ] visual changes  Respiratory: [ ]shortness of breath;  [ ] cough, [ ]wheezing, [ ]chills, [ ]hemoptysis  Cardiovascular: [ ] chest pain, [ ]palpitations, [ ]dizziness,  [ ]leg swelling[ ]orthopnea[ ]PND  Gastrointestinal: [ ] abdominal pain, [ ]nausea, [ ]vomiting,  [ ]diarrhea [ ]Constipation [ ]Melena  Genitourinary: [ ] dysuria, [ ] hematuria [ ]Craft  Neurologic: [ ] headaches [ ] tremors[ ]weakness [ ]Paralysis Right[ ] Left[ ]  Skin: [ ] itching, [ ]burning, [ ] rashes  Endocrine: [ ] heat or cold intolerance  Musculoskeletal: [ ] joint pain or swelling; [ ] muscle, back, or extremity pain  Psychiatric: [ ] depression, [ ]anxiety, [ ]mood swings, or [ ]difficulty sleeping  Hematologic: [ ] easy bruising, [ ] bleeding gums    [ ] All remaining systems negative except as per above.   [ ]Unable to obtain.  [x] No change in ROS since admission      Vital Signs Last 24 Hrs  T(C): 36.6 (28 Nov 2023 20:01), Max: 36.9 (28 Nov 2023 07:47)  T(F): 97.8 (28 Nov 2023 20:01), Max: 98.4 (28 Nov 2023 07:47)  HR: 79 (28 Nov 2023 20:01) (77 - 93)  BP: 129/79 (28 Nov 2023 20:01) (127/83 - 174/121)  BP(mean): --  RR: 18 (28 Nov 2023 20:01) (18 - 18)  SpO2: 98% (28 Nov 2023 20:01) (97% - 98%)    Parameters below as of 28 Nov 2023 20:01  Patient On (Oxygen Delivery Method): room air      I&O's Summary    28 Nov 2023 07:01  -  28 Nov 2023 21:14  --------------------------------------------------------  IN: 430 mL / OUT: 0 mL / NET: 430 mL        PHYSICAL EXAM:  General: No acute distress BMI-  HEENT: EOMI, PERRL  Neck: Supple, [ ] JVD  Lungs: Equal air entry bilaterally; [ ] rales [ ] wheezing [ ] rhonchi  Heart: Regular rate and rhythm; [x ] murmur   2/6 [ x] systolic [ ] diastolic [ ] radiation[ ] rubs [ ]  gallops  Abdomen: Nontender, bowel sounds present  Extremities: No clubbing, cyanosis, [ ] edema [ ]Pulses  equal and intact  Nervous system:  Alert & Oriented X3, no focal deficits  Psychiatric: Normal affect  Skin: No rashes or lesions    LABS:  11-28    137  |  104  |  17  ----------------------------<  191<H>  3.8   |  23  |  0.78    Ca    9.5      28 Nov 2023 06:33  Phos  2.5     11-27  Mg     2.0     11-28    TPro  6.7  /  Alb  3.1<L>  /  TBili  1.1  /  DBili  0.3  /  AST  18  /  ALT  32  /  AlkPhos  83  11-27    Creatinine Trend: 0.78<--, 0.91<--, 0.79<--                        13.1   7.67  )-----------( 158      ( 27 Nov 2023 09:50 )             40.1

## 2023-11-28 NOTE — DISCHARGE NOTE PROVIDER - PROVIDER TOKENS
PROVIDER:[TOKEN:[[id number]]] PROVIDER:[TOKEN:[[id number]]] PROVIDER:[TOKEN:[[id number]]] PROVIDER:[TOKEN:[[id number]]],FREE:[LAST:[PCP],FIRST:[PCP],PHONE:[(   )    -],FAX:[(   )    -],FOLLOWUP:[2 weeks]],FREE:[LAST:[ENdocronologist],PHONE:[(   )    -],FAX:[(   )    -],ADDRESS:[Pt has her own Endocrobologist],FOLLOWUP:[1 week]] PROVIDER:[TOKEN:[[id number]]],FREE:[LAST:[PCP],FIRST:[PCP],PHONE:[(   )    -],FAX:[(   )    -],FOLLOWUP:[2 weeks]],FREE:[LAST:[ENdocronologist],PHONE:[(   )    -],FAX:[(   )    -],ADDRESS:[Pt has her own Endocrobologist],FOLLOWUP:[1 week]] PROVIDER:[TOKEN:[[id number]]],FREE:[LAST:[PCP],FIRST:[PCP],PHONE:[(   )    -],FAX:[(   )    -],FOLLOWUP:[2 weeks]],FREE:[LAST:[ENdocronologist],PHONE:[(   )    -],FAX:[(   )    -],ADDRESS:[Pt has her own Endocrobologist],FOLLOWUP:[1 week]]

## 2023-11-28 NOTE — DISCHARGE NOTE PROVIDER - HOSPITAL COURSE
77 F, from home, ambulates independently, lives alone, PMHx of Afib on Eliquis, HTN, DM, HLD presenting after mechanical fall. Patient states that she was tried to  something from the floor, tripped, and landed on her left shoulder. Denied, LOC, dizziness, or feeling lightheaded during the event. Found to have a severely comminuted/displaced Left proximal humeral fracture. Admitted for inability to care for herself alone at home post-fracture. CT shoulder showed XXX. MRI shoulder showed XXX. Ortho was consulted, with recommendation stable for discharge and plan for surgical procedure on 12/12/23. Pain controlled while inpatient.     Home medications resumed for all other chronic medical conditions.    Patient is able to ambulate and tolerate diet prior to discharge. Patient is stable for discharge per attending and is advised to follow up with PCP as outpatient. Patient to follow up with Dr. El Bauer as outpatient within 1 week, call office at [phone]  for appointment. Please refer to patient's complete medical chart with documents for a full hospital course, for this is only a brief summary.         77 F, from home, ambulates independently, lives alone, PMHx of Afib on Eliquis, HTN, DM, HLD presenting after mechanical fall. Patient states that she was tried to  something from the floor, tripped, and landed on her left shoulder. Denied, LOC, dizziness, or feeling lightheaded during the event. Found to have a severely comminuted/displaced Left proximal humeral fracture. Admitted for inability to care for herself alone at home post-fracture. CT shoulder showed XXX. MRI shoulder showed XXX. Ortho was consulted, with recommendation stable for discharge and plan for surgical procedure on 12/12/23. Pain controlled while inpatient.     Home medications resumed for all other chronic medical conditions.    Patient is able to ambulate and tolerate diet prior to discharge. Patient is stable for discharge per attending and is advised to follow up with PCP as outpatient. Patient to follow up with Dr. El Bauer as outpatient within 1 week, call office at [phone]  for appointment. Please refer to patient's complete medical chart with documents for a full hospital course, for this is only a brief summary.         77 F, from home, ambulates independently, lives alone, PMHx of Afib on Eliquis, HTN, DM, HLD presenting after mechanical fall. Patient states that she was tried to  something from the floor, tripped, and landed on her left shoulder. Denied, LOC, dizziness, or feeling lightheaded during the event. Found to have a severely comminuted/displaced Left proximal humeral fracture. Admitted for inability to care for herself alone at home post-fracture. CT shoulder showed XXX. MRI shoulder showed XXX. Ortho was consulted, with recommendation stable for discharge and plan for surgical procedure on 12/12/23. Pain controlled while inpatient.     Home medications resumed for all other chronic medical conditions.    Patient is able to ambulate and tolerate diet prior to discharge. Patient is stable for discharge per attending and is advised to follow up with PCP as outpatient. Patient to follow up with Dr. El Bauer as outpatient within 1 week, call office at [phone]  for appointment. Please refer to patient's complete medical chart with documents for a full hospital course, for this is only a brief summary.         77 F, from home, ambulates independently, lives alone, PMHx of Afib on Eliquis, HTN, DM, HLD presenting after mechanical fall. Patient states that she was tried to  something from the floor, tripped, and landed on her left shoulder. Denied, LOC, dizziness, or feeling lightheaded during the event. Found to have a severely comminuted/displaced Left proximal humeral fracture. Admitted for inability to care for herself alone at home post-fracture. CT shoulder showed XXX. MRI shoulder showed Acute comminuted and displaced fracture of the left humeral neck with extension to the greater tuberosity, as above. Associated inferior subluxation of the humeral head fragment at the glenohumeral joint. Ortho was consulted, with recommendation stable for discharge and plan for surgical procedure on 12/12/23. Pain controlled while inpatient.     Home medications resumed for all other chronic medical conditions.    Patient is able to ambulate and tolerate diet prior to discharge. Patient is stable for discharge per attending and is advised to follow up with PCP as outpatient. Patient to follow up with Dr. El Bauer as outpatient within 1 week, call office at [phone]  for appointment. Please refer to patient's complete medical chart with documents for a full hospital course, for this is only a brief summary.         77 F, from home, ambulates independently, lives alone, PMHx of Afib on Eliquis, HTN, DM, HLD presenting after mechanical fall. Patient states that she was tried to  something from the floor, tripped, and landed on her left shoulder. Denied, LOC, dizziness, or feeling lightheaded during the event. Found to have a severely comminuted/displaced Left proximal humeral fracture. Admitted for inability to care for herself alone at home post-fracture. CT shoulder showed XXX. MRI shoulder showed Acute comminuted and displaced fracture of the left humeral neck with extension to the greater tuberosity, as above. Associated inferior subluxation of the humeral head fragment at the glenohumeral joint. Ortho was consulted, with recommendation stable for discharge and plan for surgical procedure on 12/12/23. Pain controlled while inpatient.     Home medications resumed for all other chronic medical conditions.    Patient is able to ambulate and tolerate diet prior to discharge. Patient is stable for discharge per attending and is advised to follow up with PCP as outpatient. Patient to follow up with Dr. El Bauer as outpatient within 1 week, call office at [phone]  for appointment. Please refer to patient's complete medical chart with documents for a full hospital course, for this is only a brief summary.         77 F, from home, ambulates independently, lives alone, PMHx of Afib on Eliquis, HTN, DM, HLD presenting after mechanical fall. Patient states that she was tried to  something from the floor, tripped, and landed on her left shoulder. Denied, LOC, dizziness, or feeling lightheaded during the event. Found to have a severely comminuted/displaced Left proximal humeral fracture. Admitted for inability to care for herself alone at home post-fracture. CT shoulder showed XXX. MRI shoulder showed Acute comminuted and displaced fracture of the left humeral neck with extension to the greater tuberosity, as above. Associated inferior subluxation of the humeral head fragment at the glenohumeral joint. Ortho was consulted, with recommendation stable for discharge and plan for surgical procedure on 12/12/23. Pain controlled while inpatient.     Home medications resumed for all other chronic medical conditions.    Patient is able to ambulate and tolerate diet prior to discharge. Patient is stable for discharge per attending and is advised to follow up with PCP as outpatient. Patient to follow up with Dr. El Bauer as outpatient within 1 week, call office at [phone]  for appointment. Please refer to patient's complete medical chart with documents for a full hospital course, for this is only a brief summary.         77 F, from home, ambulates independently, lives alone, PMHx of Afib on Eliquis, HTN, DM, HLD presenting after mechanical fall. Patient states that she was tried to  something from the floor, tripped, and landed on her left shoulder. Denied, LOC, dizziness, or feeling lightheaded during the event. Found to have a severely comminuted/displaced Left proximal humeral fracture. Admitted for inability to care for herself alone at home post-fracture. CT shoulder showed XXX. MRI shoulder showed Acute comminuted and displaced fracture of the left humeral neck with extension to the greater tuberosity, as above. Associated inferior subluxation of the humeral head fragment at the glenohumeral joint. Ortho was consulted, with recommendation stable for discharge and plan for surgical procedure on 12/12/23. Pain controlled while inpatient.     Home medications resumed for all other chronic medical conditions.    Patient is able to ambulate and tolerate diet prior to discharge. Patient is stable for discharge per attending and is advised to follow up with PCP as outpatient. Patient to follow up with Dr. El Bauer as outpatient within 1 week, call office at [phone]  for appointment. Please refer to patient's complete medical chart with documents for a full hospital course, for this is only a brief summary.          77 F, from home, ambulates independently, lives alone, PMHx of Afib on Eliquis, HTN, DM, HLD presenting after mechanical fall. Patient states that she was tried to  something from the floor, tripped, and landed on her left shoulder. Denied, LOC, dizziness, or feeling lightheaded during the event. Found to have a severely comminuted/displaced Left proximal humeral fracture. Admitted for inability to care for herself alone at home post-fracture. CT shoulder showed XXX. MRI shoulder showed Acute comminuted and displaced fracture of the left humeral neck with extension to the greater tuberosity, as above. Associated inferior subluxation of the humeral head fragment at the glenohumeral joint. Ortho was consulted, with recommendation stable for discharge and plan for surgical procedure on 12/12/23. Pain controlled while inpatient.     Home medications resumed for all other chronic medical conditions.    Patient is able to ambulate and tolerate diet prior to discharge. Patient is stable for discharge per attending and is advised to follow up with PCP as outpatient. Patient to follow up with Dr. El Bauer as outpatient within 1 week, call office at [phone]  for appointment. Please refer to patient's complete medical chart with documents for a full hospital course, for this is only a brief summary.          77 F, from home, ambulates independently, lives alone, PMHx of Afib on Eliquis, HTN, DM, HLD presenting after mechanical fall. Patient states that she was tried to  something from the floor, tripped, and landed on her left shoulder. Denied, LOC, dizziness, or feeling lightheaded during the event. Found to have a severely comminuted/displaced Left proximal humeral fracture. Admitted for inability to care for herself alone at home post-fracture. CT shoulder showed XXX. MRI shoulder showed Acute comminuted and displaced fracture of the left humeral neck with extension to the greater tuberosity, as above. Associated inferior subluxation of the humeral head fragment at the glenohumeral joint. Ortho was consulted, with recommendation stable for discharge and plan for surgical procedure on 12/12/23. Pain controlled while inpatient.     Home medications resumed for all other chronic medical conditions.    Patient is able to ambulate and tolerate diet prior to discharge. Patient is stable for discharge per attending and is advised to follow up with PCP as outpatient. Patient to follow up with Dr. El Bauer as outpatient within 1 week, call office at [phone]  for appointment. Please refer to patient's complete medical chart with documents for a full hospital course, for this is only a brief summary.          Patient is a 77 F, from home, ambulates independently, lives alone, PMHx of Afib on Eliquis, HTN, DM, HLD presenting after mechanical fall. Patient states that she was tried to  something from the floor, tripped, and landed on her left shoulder. Denied, LOC, dizziness, or feeling lightheaded during the event. Found to have a severely comminuted/displaced Left proximal humeral fracture. Admitted for inability to care for herself alone at home post-fracture. CT shoulder showed XXX. MRI shoulder showed Acute comminuted and displaced fracture of the left humeral neck with extension to the greater tuberosity, as above. Associated inferior subluxation of the humeral head fragment at the glenohumeral joint. Ortho was consulted, with recommendation stable for discharge and plan for surgical procedure on 12/12/23. Pain controlled while inpatient.     Home medications resumed for all other chronic medical conditions.    Patient is able to ambulate and tolerate diet prior to discharge. Patient is stable for discharge per attending and is advised to follow up with PCP as outpatient. Patient to follow up with Dr. El Bauer as outpatient within 1 week, call office at [phone]  for appointment. Please refer to patient's complete medical chart with documents for a full hospital course, for this is only a brief summary.          Patient is a 77 F, from home, ambulates independently, lives alone, PMHx of Afib on Eliquis, HTN, DM, HLD presenting after mechanical fall. Patient states that she was tried to  something from the floor, tripped, and landed on her left shoulder. Denied, LOC, dizziness, or feeling lightheaded during the event. Found to have a severely comminuted/displaced Left proximal humeral fracture. Admitted for inability to care for herself alone at home post-fracture. CT shoulder showed XXX. MRI shoulder showed Acute comminuted and displaced fracture of the left humeral neck with extension to the greater tuberosity, as above. Associated inferior subluxation of the humeral head fragment at the glenohumeral joint. Ortho was consulted, with recommendation stable for discharge and plan for surgical procedure on 12/12/23. Pain controlled while inpatient.     Home medications resumed for all other chronic medical conditions.    Patient is able to ambulate and tolerate diet prior to discharge. Patient is stable for discharge per attending and is advised to follow up with PCP as outpatient. Patient to follow up with Dr. El Bauer as outpatient within 1 week, call office at [phone]  for appointment. Please refer to patient's complete medical chart with documents for a full hospital course, for this is only a brief summary.          Patient is a 77 F, from home, ambulates independently, lives alone, PMHx of Afib on Eliquis, HTN, DM, HLD presenting after mechanical fall. Patient states that she was tried to  something from the floor, tripped, and landed on her left shoulder. Denied, LOC, dizziness, or feeling lightheaded during the event. Found to have a severely comminuted/displaced Left proximal humeral fracture. Admitted for inability to care for herself alone at home post-fracture. CT shoulder showed XXX. MRI shoulder showed Acute comminuted and displaced fracture of the left humeral neck with extension to the greater tuberosity, as above. Associated inferior subluxation of the humeral head fragment at the glenohumeral joint. Ortho was consulted, with recommendation stable for discharge and plan for surgical procedure on 12/12/23. Pain controlled while inpatient.     Home medications resumed for all other chronic medical conditions.    Patient is able to ambulate and tolerate diet prior to discharge. Patient is stable for discharge per attending and is advised to follow up with PCP as outpatient. Patient to follow up with Dr. El Bauer as outpatient within 1 week, call office at [phone]  for appointment. Please refer to patient's complete medical chart with documents for a full hospital course, for this is only a brief summary.          Patient is a 77 F, from home, ambulates independently, lives alone, PMHx of Afib on Eliquis, HTN, DM, HLD presenting after mechanical fall. Patient states that she was tried to  something from the floor, tripped, and landed on her left shoulder. Denied, LOC, dizziness, or feeling lightheaded during the event. Found to have a severely comminuted/displaced Left proximal humeral fracture. Admitted for inability to care for herself alone at home post-fracture. CT shoulder showed XXX. MRI shoulder showed Acute comminuted and displaced fracture of the left humeral neck with extension to the greater tuberosity, as above. Associated inferior subluxation of the humeral head fragment at the glenohumeral joint. Ortho was consulted, with recommendation stable for discharge and plan for surgical procedure on 12/12/23. Pain controlled while inpatient.     Hospital course complicated by Afib with RVR. Patient was monitored on tele. Home meds are Diltiazem 180mg daily. Increased to Diltiazem 240mg daily with better control of heart rate.     Home medications resumed for all other chronic medical conditions.    Patient is able to ambulate and tolerate diet prior to discharge. Patient is stable for discharge per attending and is advised to follow up with PCP as outpatient. Patient to follow up with Dr. El Bauer as outpatient within 1 week, call office at [phone]  for appointment. Please refer to patient's complete medical chart with documents for a full hospital course, for this is only a brief summary.          Patient is a 77 F, from home, ambulates independently, lives alone, PMHx of Afib on Eliquis, HTN, DM, HLD presenting after mechanical fall. Patient states that she was tried to  something from the floor, tripped, and landed on her left shoulder. Denied, LOC, dizziness, or feeling lightheaded during the event. Found to have a severely comminuted/displaced Left proximal humeral fracture. Admitted for inability to care for herself alone at home post-fracture. CT shoulder showed XXX. MRI shoulder showed Acute comminuted and displaced fracture of the left humeral neck with extension to the greater tuberosity, as above. Associated inferior subluxation of the humeral head fragment at the glenohumeral joint. Ortho was consulted, with recommendation stable for discharge and plan for surgical procedure on 12/12/23. Pain controlled while inpatient.     Hospital course complicated by Afib with RVR. Patient was monitored on tele. Home meds are Diltiazem 180mg daily. Increased to Diltiazem 240mg daily with better control of heart rate.     Home medications resumed for all other chronic medical conditions.    Patient is able to ambulate and tolerate diet prior to discharge. Patient is stable for discharge per attending and is advised to follow up with PCP as outpatient. Patient to follow up with Dr. El Bauer as outpatient within 1 week, call office at [phone]  for appointment. Please refer to patient's complete medical chart with documents for a full hospital course, for this is only a brief summary.          Patient is a 77 F, from home, ambulates independently, lives alone, PMHx of Afib on Eliquis, HTN, DM, HLD presenting after mechanical fall. Patient states that she was tried to  something from the floor, tripped, and landed on her left shoulder. Denied, LOC, dizziness, or feeling lightheaded during the event. Found to have a severely comminuted/displaced Left proximal humeral fracture. Admitted for inability to care for herself alone at home post-fracture. CT shoulder showed XXX. MRI shoulder showed Acute comminuted and displaced fracture of the left humeral neck with extension to the greater tuberosity, as above. Associated inferior subluxation of the humeral head fragment at the glenohumeral joint. Ortho was consulted, with recommendation stable for discharge and plan for surgical procedure on 12/12/23. Pain controlled while inpatient.     Hospital course complicated by Afib with RVR. Patient was monitored on tele. Home meds are Diltiazem 180mg daily. Increased to Diltiazem 240mg daily with better control of heart rate.     Home medications resumed for all other chronic medical conditions.    Patient is able to ambulate and tolerate diet prior to discharge. Patient is stable for discharge per attending and is advised to follow up with PCP as outpatient. Patient to follow up with Dr. El Bauer as outpatient within 1 week, call office at [phone]  for appointment. Please refer to patient's complete medical chart with documents for a full hospital course, for this is only a brief summary.          Patient is a 77 F, from home, ambulates independently, lives alone, PMHx of Afib on Eliquis, HTN, DM, HLD presenting after mechanical fall. Patient states that she was tried to  something from the floor, tripped, and landed on her left shoulder. Denied, LOC, dizziness, or feeling lightheaded during the event. Found to have a severely comminuted/displaced Left proximal humeral fracture. Admitted for inability to care for herself alone at home post-fracture. CT shoulder showed XXX. MRI shoulder showed Acute comminuted and displaced fracture of the left humeral neck with extension to the greater tuberosity, as above. Associated inferior subluxation of the humeral head fragment at the glenohumeral joint. Ortho was consulted, with recommendation stable for discharge and plan for surgical procedure on 12/12/23. Pain controlled while inpatient.     Hospital course complicated by Afib with RVR and high fasting sugars. Patient was monitored on tele. Home meds are Diltiazem 180mg daily and Metoprolol tartate 100mg BID. Increased to Diltiazem 240mg daily with better control of heart rate. For DM, patient's home oral glycemic agents were held. A1c 7.0. Fasting sugars in the 220s, thus patient was started on Lantus 6 units with moderate intensity ISS with better control of sugars.    Home medications resumed for all other chronic medical conditions.    Patient is able to ambulate and tolerate diet prior to discharge. Patient is stable for discharge per attending and is advised to follow up with PCP as outpatient. Patient to follow up with Dr. El Bauer as outpatient within 1 week, call office at [phone]  for appointment. Please refer to patient's complete medical chart with documents for a full hospital course, for this is only a brief summary.          Patient is a 77 F, from home, ambulates independently, lives alone, PMHx of Afib on Eliquis, HTN, DM, HLD presenting after mechanical fall. Patient states that she was tried to  something from the floor, tripped, and landed on her left shoulder. Denied, LOC, dizziness, or feeling lightheaded during the event. Found to have a severely comminuted/displaced Left proximal humeral fracture. Admitted for inability to care for herself alone at home post-fracture. CT shoulder showed XXX. MRI shoulder showed Acute comminuted and displaced fracture of the left humeral neck with extension to the greater tuberosity, as above. Associated inferior subluxation of the humeral head fragment at the glenohumeral joint. Ortho was consulted, with recommendation stable for discharge and plan for surgical procedure on 12/12/23. Pain controlled while inpatient.     Hospital course complicated by Afib with RVR and high fasting sugars. Patient was monitored on tele. Home meds are Diltiazem 180mg daily and Metoprolol tartate 100mg BID. Increased to Diltiazem 240mg daily with better control of heart rate. For DM, patient's home oral glycemic agents were held. A1c 7.0. Fasting sugars in the 220s, thus patient was started on Lantus 6 units with moderate intensity ISS with better control of sugars.    Home medications resumed for all other chronic medical conditions.    Patient is able to ambulate and tolerate diet prior to discharge. Patient is stable for discharge per attending and is advised to follow up with PCP as outpatient. Patient to follow up with Dr. El Bauer as outpatient within 1 week, call office at [phone]  for appointment. Please refer to patient's complete medical chart with documents for a full hospital course, for this is only a brief summary.          Patient is a 77 F, from home, ambulates independently, lives alone, PMHx of Afib on Eliquis, HTN, DM, HLD presenting after mechanical fall. Patient states that she was tried to  something from the floor, tripped, and landed on her left shoulder. Denied, LOC, dizziness, or feeling lightheaded during the event. Found to have a severely comminuted/displaced Left proximal humeral fracture. Admitted for inability to care for herself alone at home post-fracture. CT shoulder showed XXX. MRI shoulder showed Acute comminuted and displaced fracture of the left humeral neck with extension to the greater tuberosity, as above. Associated inferior subluxation of the humeral head fragment at the glenohumeral joint. Ortho was consulted, with recommendation stable for discharge and plan for surgical procedure on 12/12/23. Pain controlled while inpatient.     Hospital course complicated by Afib with RVR and high fasting sugars. Patient was monitored on tele. Home meds are Diltiazem 180mg daily and Metoprolol tartate 100mg BID. Increased to Diltiazem 240mg daily with better control of heart rate. For DM, patient's home oral glycemic agents were held. A1c 7.0. Fasting sugars in the 220s, thus patient was started on Lantus 6 units with moderate intensity ISS with better control of sugars.    Home medications resumed for all other chronic medical conditions.    Patient is able to ambulate and tolerate diet prior to discharge. Patient is stable for discharge per attending and is advised to follow up with PCP as outpatient. Patient to follow up with Dr. El Bauer as outpatient within 1 week, call office at [phone]  for appointment. Please refer to patient's complete medical chart with documents for a full hospital course, for this is only a brief summary.          Patient is a 77 F, from home, ambulates independently, lives alone, PMHx of Afib on Eliquis, HTN, DM, HLD presenting after mechanical fall. Patient states that she was tried to  something from the floor, tripped, and landed on her left shoulder. Denied, LOC, dizziness, or feeling lightheaded during the event. Found to have a severely comminuted/displaced Left proximal humeral fracture. Admitted for inability to care for herself alone at home post-fracture. CT shoulder showed XXX. MRI shoulder showed Acute comminuted and displaced fracture of the left humeral neck with extension to the greater tuberosity, as above. Associated inferior subluxation of the humeral head fragment at the glenohumeral joint. Ortho was consulted, with recommendation stable for discharge and plan for surgical procedure on 12/12/23. Pain controlled while inpatient.     Hospital course complicated by Afib with RVR and high fasting sugars. Patient was monitored on tele. Home meds are Diltiazem 180mg daily and Metoprolol tartate 100mg BID. Increased to Diltiazem 240mg daily with better control of heart rate. For DM, patient's home oral glycemic agents were held. A1c 7.0. Fasting sugars in the 220s, thus patient was started on Lantus 6 units with moderate intensity ISS with better control of sugars.    Home medications resumed for all other chronic medical conditions.    Patient is able to ambulate and tolerate diet prior to discharge. Patient is stable for discharge per attending and is advised to follow up with PCP as outpatient. Patient to follow up with Dr. El Bauer as outpatient within 1 week, call office at [phone]  for appointment. Please refer to patient's complete medical chart with documents for a full hospital course, for this is only a brief summary.       d/c instruction given to wilfred Gomez Patient is a 77 F, from home, ambulates independently, lives alone, PMHx of Afib on Eliquis, HTN, DM, HLD presenting after mechanical fall. Patient states that she was tried to  something from the floor, tripped, and landed on her left shoulder. Denied, LOC, dizziness, or feeling lightheaded during the event. Found to have a severely comminuted/displaced Left proximal humeral fracture. Admitted for inability to care for herself alone at home post-fracture. CT shoulder showed XXX. MRI shoulder showed Acute comminuted and displaced fracture of the left humeral neck with extension to the greater tuberosity, as above. Associated inferior subluxation of the humeral head fragment at the glenohumeral joint. Ortho was consulted, with recommendation stable for discharge and plan for surgical procedure on 12/12/23. Pain controlled while inpatient.     Hospital course complicated by Afib with RVR and high fasting sugars. Patient was monitored on tele. Home meds are Diltiazem 180mg daily and Metoprolol tartate 100mg BID. Increased to Diltiazem 240mg daily with better control of heart rate. For DM, patient's home oral glycemic agents were held. A1c 7.0. Fasting sugars in the 220s, thus patient was started on Lantus 6 units with moderate intensity ISS with better control of sugars.    Home medications resumed for all other chronic medical conditions.    Patient is able to ambulate and tolerate diet prior to discharge. Patient is stable for discharge per attending and is advised to follow up with PCP as outpatient. Patient to follow up with Dr. El Bauer as outpatient within 1 week, call office at [phone]  for appointment. Please refer to patient's complete medical chart with documents for a full hospital course, for this is only a brief summary.       d/c instruction given to wilfred Gomez Patient is a 77 F, from home, ambulates independently, lives alone, PMHx of Afib on Eliquis, HTN, DM, HLD presenting after mechanical fall. Patient states that she was tried to  something from the floor, tripped, and landed on her left shoulder. Denied, LOC, dizziness, or feeling lightheaded during the event. Found to have a severely comminuted/displaced Left proximal humeral fracture. Admitted for inability to care for herself alone at home post-fracture. CT shoulder showed XXX. MRI shoulder showed Acute comminuted and displaced fracture of the left humeral neck with extension to the greater tuberosity, as above. Associated inferior subluxation of the humeral head fragment at the glenohumeral joint. Ortho was consulted, with recommendation stable for discharge and plan for surgical procedure on 12/12/23. Pain controlled while inpatient.     Hospital course complicated by Afib with RVR and high fasting sugars. Patient was monitored on tele. Home meds are Diltiazem 180mg daily and Metoprolol tartate 100mg BID. Increased to Diltiazem 240mg daily with better control of heart rate. For DM, patient's home oral glycemic agents were held. A1c 7.0. Fasting sugars in the 220s, thus patient was started on Lantus 6 units with moderate intensity ISS with better control of sugars.    Home medications resumed for all other chronic medical conditions.    Patient is able to ambulate and tolerate diet prior to discharge. Patient is stable for discharge per attending and is advised to follow up with PCP as outpatient. Patient to follow up with Dr. El Bauer as outpatient within 1 week, call office at [phone]  for appointment. Please refer to patient's complete medical chart with documents for a full hospital course, for this is only a brief summary.       d/c instruction given to wilferd Gomez Patient is a 77 F, from home, ambulates independently, lives alone, PMHx of Afib on Eliquis, HTN, DM, HLD presenting after mechanical fall. Patient states that she was tried to  something from the floor, tripped, and landed on her left shoulder. Denied, LOC, dizziness, or feeling lightheaded during the event. Found to have a severely comminuted/displaced Left proximal humeral fracture. Admitted for inability to care for herself alone at home post-fracture. MRI shoulder showed Acute comminuted and displaced fracture of the left humeral neck with extension to the greater tuberosity, as above. Associated inferior subluxation of the humeral head fragment at the glenohumeral joint. Ortho was consulted. Discussed with Dr. Bauer: patient is stable for discharge and planned for surgical procedure on 12/12/23. Family insists on surgery with Dr. Bauer specificially and Dr. Bauer is only available 12/12. However, surgery cannot wait to be scheduled as ambulatory procedure. Therefore, Dr. Bauer recommends discharge and readmit for OR.    Hospital course complicated by Afib with RVR and high fasting sugars. Patient was monitored on tele. Home meds are Diltiazem 180mg daily and Metoprolol tartate 100mg BID. Increased to Diltiazem 240mg daily with better control of heart rate. For DM, patient's home oral glycemic agents were held. A1c 7.0. Fasting sugars in the 220s, thus patient was started on Lantus 6 units with moderate intensity ISS with better control of sugars.    Home medications resumed for all other chronic medical conditions.    Patient is able to ambulate and tolerate diet prior to discharge. Patient is stable for discharge per attending and is advised to follow up with PCP as outpatient. Patient to follow up with Dr. El Bauer as outpatient within 1 week, call office at [phone]  for appointment. Please refer to patient's complete medical chart with documents for a full hospital course, for this is only a brief summary.       d/c instruction given to wilfred Gomez Patient is a 77 F, from home, ambulates independently, lives alone, PMHx of Afib on Eliquis, HTN, DM, HLD presenting after mechanical fall. Patient states that she was tried to  something from the floor, tripped, and landed on her left shoulder. Denied, LOC, dizziness, or feeling lightheaded during the event. Found to have a severely comminuted/displaced Left proximal humeral fracture. Admitted for inability to care for herself alone at home post-fracture. MRI shoulder showed Acute comminuted and displaced fracture of the left humeral neck with extension to the greater tuberosity, as above. Associated inferior subluxation of the humeral head fragment at the glenohumeral joint. Ortho was consulted. Discussed with Dr. Bauer: patient is stable for discharge and planned for surgical procedure on 12/12/23. Family insists on surgery with Dr. Bauer specificially and Dr. Bauer is only available 12/12. However, surgery cannot wait to be scheduled as ambulatory procedure. Therefore, Dr. Bauer recommends discharge and readmit for OR.    Hospital course complicated by Afib with RVR and high fasting sugars. Patient was monitored on tele. Home meds are Diltiazem 180mg daily and Metoprolol tartate 100mg BID. Increased to Diltiazem 240mg daily with better control of heart rate. For DM, patient's home oral glycemic agents were held. A1c 7.0. Fasting sugars in the 220s, thus patient was started on Lantus 6 units with moderate intensity ISS with better control of sugars.    Home medications resumed for all other chronic medical conditions.    Patient is able to ambulate and tolerate diet prior to discharge. Patient is stable for discharge per attending and is advised to follow up with PCP as outpatient. Patient to follow up with Dr. El Bauer as outpatient within 1 week, call office at [phone]  for appointment. Please refer to patient's complete medical chart with documents for a full hospital course, for this is only a brief summary.       d/c instruction given to wilfred Gomez Patient is a 77 F, from home, ambulates independently, lives alone, PMHx of Afib on Eliquis, HTN, DM, HLD presenting after mechanical fall. Patient states that she was tried to  something from the floor, tripped, and landed on her left shoulder. Denied, LOC, dizziness, or feeling lightheaded during the event. Found to have a severely comminuted/displaced Left proximal humeral fracture. Admitted for inability to care for herself alone at home post-fracture. MRI shoulder showed Acute comminuted and displaced fracture of the left humeral neck with extension to the greater tuberosity, as above. Associated inferior subluxation of the humeral head fragment at the glenohumeral joint. Ortho was consulted. Discussed with Dr. Bauer: patient is stable for discharge and planned for surgical procedure on 12/12/23. Family insists on surgery with Dr. Bauer specificially and Dr. Bauer is only available 12/12. However, surgery cannot wait to be scheduled as ambulatory procedure. Therefore, Dr. Bauer recommends discharge and readmit for OR.    Hospital course complicated by Afib with RVR and high fasting sugars. Patient was monitored on tele. Home meds are Diltiazem 180mg daily and Metoprolol tartate 100mg BID. Increased to Diltiazem 240mg daily with better control of heart rate. For DM, patient's home oral glycemic agents were held. A1c 7.0. Fasting sugars in the 220s, thus patient was started on Lantus 6 units with moderate intensity ISS with better control of sugars.    Home medications resumed for all other chronic medical conditions.    Patient is able to ambulate and tolerate diet prior to discharge. Patient is stable for discharge per attending and is advised to follow up with PCP as outpatient. Patient to follow up with Dr. El Bauer as outpatient within 1 week, call office at [phone]  for appointment. Please refer to patient's complete medical chart with documents for a full hospital course, for this is only a brief summary.       d/c instruction given to wilfred Gomez

## 2023-11-28 NOTE — DISCHARGE NOTE PROVIDER - CARE PROVIDER_API CALL
El Bauer  Orthopaedic Surgery  [address] [place] [address], [address] [place] [address]  Phone: [phone]  Fax: [phone]  Follow Up Time:    El Bauer  Orthopaedic Surgery  [address] [place] [address], [address]th [place] [address]  Phone: [phone]  Fax: [phone]  Follow Up Time:    El Bauer  Orthopaedic Surgery  [address] [place] [address], [address]th [place] [address]  Phone: [phone]  Fax: [phone]  Follow Up Time:    El Bauer  Orthopaedic Surgery  [address] [place] [address], [address]th [place] [address]  Phone: [phone]  Fax: [phone]  Follow Up Time:     PCP, PCP  Phone: (   )    -  Fax: (   )    -  Follow Up Time: 2 weeks    ENdocronologist,   Pt has her own Endocrobologist  Phone: (   )    -  Fax: (   )    -  Follow Up Time: 1 week   El Bauer  Orthopaedic Surgery  [address] [place] [address], [address]th [place] [address]  Phone: [phone]  Fax: [phone]  Follow Up Time:     PCP, PCP  Phone: (   )    -  Fax: (   )    -  Follow Up Time: 2 weeks    ENdocronologist,   Pt has her own Endocrobologist  Phone: (   )    -  Fax: (   )    -  Follow Up Time: 1 week   El Bauer  Orthopaedic Surgery  [address] [place] [address], [address]th [place] [address]  Phone: [phone]  Fax: [phone]  Follow Up Time:     PCP, PCP  Phone: (   )    -  Fax: (   )    -  Follow Up Time: 2 weeks    ENdocronologist,   Pt has her own Endocrobologist  Phone: (   )    -  Fax: (   )    -  Follow Up Time: 1 week

## 2023-11-28 NOTE — PROGRESS NOTE ADULT - ASSESSMENT
77 year old woman with PMH of A- fib on OAC, HTN, DM2, HLD with a mechanical fall onto  the left shoulder -# Left proximal humeral fracture    #Atrial fibrillation with RVR.   ·  Plan: pt found to have afib with Rvr 130-150 due to not receiving cardizem  will order metoprolol 5 mg IVP x1 dose now  restart home med cardizem 180 mg qd, metoprolol succinate 200 mg qd, eliquis 5 mg bid  transfer to telemetry  HR now controlled      #Fracture of humeral head, right, closed.   s/p fall  xray left shoulder - Severely comminuted/displaced proximal humeral fracture.   -NWB LUE with sling    -Possible need for surgical intervention plan in future   -Follow up with Dr. El Bauer as OP  fir future  intervention       - Overall this patient is at   intermediate risk (for cardiac death, nonfatal myocardial infarction, and nonfatal cardiac arrest perioperatively for this        intermediate  risk procedure).     The patient is however without evidence of ACS, Decompensated Heart Failure,Obstructive Valvular Heart disease or Unstable arrhythmia.   There  are  no further recommendation for risk stratifying imaging/stress testing prior to planned surgery    Hold DOAC 48 hrs prior to procedure      #HTN (hypertension).   ·  Plan: continue home med ramipril 2.5 mg bid, metoprolol succinate 200 mg qd  BP goal <140/90.       #HLD (hyperlipidemia).   ·  Plan: continue home med atorvastatin 20 mg qd.      #DM (diabetes mellitus).   ·  Plan: on home med janumet and jardiance   a1c 7.0   Insulin Sliding Scale before meals and at bedtime

## 2023-11-28 NOTE — DISCHARGE NOTE PROVIDER - NSDCHHATTENDCERT_GEN_ALL_CORE
normal
My signature below certifies that the above stated patient is homebound and upon completion of the Face-To-Face encounter, has the need for intermittent skilled nursing, physical therapy and/or speech or occupational therapy services in their home for their current diagnosis as outlined in their initial plan of care. These services will continue to be monitored by myself or another physician.

## 2023-11-28 NOTE — PROGRESS NOTE ADULT - PROBLEM SELECTOR PLAN 2
s/p fall this evening, no LOC, no dizziness, likely mechanical fall  xray left shoulder - Severely comminuted/displaced proximal humeral fracture.   Pain Control  PT  Fall Precautions  Ortho consulted, rec surgical intervention from rehab facility  Cardiology Dr. Novak following for clearance S/p mechanical fall   XR reveals left severely comminuted/displaced proximal humeral fracture.   - acetaminophen prn for pain control  - PT consulted, recommend MEG  - Ortho consulted, plan for surgery in 1 week after discharge  - F/u CT and MRI  - Cardiology Dr. Novak following for optimization presents after mechanical fall, landed on L. shoulder.  - PT eval - rec is for MEG.

## 2023-11-28 NOTE — PROGRESS NOTE ADULT - SUBJECTIVE AND OBJECTIVE BOX
PGY-1 Progress Note discussed with attending.    PAGER #: [[phone]] TILL 5:00 PM  PLEASE CONTACT ON CALL TEAM:  - On Call Team (Please refer to Diana) FROM 5:00 PM - 8:30PM  - Nightfloat Team FROM 8:30 -7:30 AM      INTERVAL HPI/OVERNIGHT EVENTS:       REVIEW OF SYSTEMS:  CONSTITUTIONAL: No fever, weight loss, or fatigue.  RESPIRATORY: No cough, wheezing, chills, or hemoptysis. No shortness of breath.  CARDIOVASCULAR: No chest pain, palpitations, dizziness, or leg swelling.  GASTROINTESTINAL: No abdominal pain. No nausea, vomiting, or hematemesis. No diarrhea or constipation. No melena or hematochezia.  GENITOURINARY: No dysuria or hematuria, urinary frequency.  NEUROLOGICAL: No headaches, memory loss, loss of strength, numbness, or tremors.  SKIN: No itching, burning, rashes, or lesions.    Vital Signs Last 24 Hrs  T(C): 36.9 (28 Nov 2023 07:47), Max: 36.9 (27 Nov 2023 20:17)  T(F): 98.4 (28 Nov 2023 07:47), Max: 98.4 (27 Nov 2023 20:17)  HR: 79 (28 Nov 2023 07:47) (79 - 150)  BP: 158/94 (28 Nov 2023 07:47) (119/90 - 174/121)  BP(mean): 95 (27 Nov 2023 20:17) (95 - 95)  RR: 18 (28 Nov 2023 07:47) (18 - 18)  SpO2: 98% (28 Nov 2023 07:47) (97% - 98%)    Parameters below as of 28 Nov 2023 07:47  Patient On (Oxygen Delivery Method): room air        PHYSICAL EXAMINATION:  GENERAL: NAD   HEAD:  Atraumatic, Normocephalic.  EYES:  Conjunctiva and sclera clear.  NECK: Supple, No JVD, Normal thyroid.  CHEST/LUNG: Clear to auscultation. Clear to percussion bilaterally. No rales, rhonchi, wheezing, or rubs.  HEART: Regular rate and rhythm. No murmurs, rubs, or gallops.  ABDOMEN: Soft, Nontender, Nondistended. Bowel sounds present.  NERVOUS SYSTEM:  Alert & Oriented X3  EXTREMITIES:  2+ Peripheral Pulses. No clubbing, cyanosis, or edema.  SKIN: Warm, dry.                          13.1   7.67  )-----------( 158      ( 27 Nov 2023 09:50 )             40.1     11-28    137  |  104  |  17  ----------------------------<  191<H>  3.8   |  23  |  0.78    Ca    9.5      28 Nov 2023 06:33  Phos  2.5     11-27  Mg     2.0     11-28    TPro  6.7  /  Alb  3.1<L>  /  TBili  1.1  /  DBili  0.3  /  AST  18  /  ALT  32  /  AlkPhos  83  11-27    LIVER FUNCTIONS - ( 27 Nov 2023 09:50 )  Alb: 3.1 g/dL / Pro: 6.7 g/dL / ALK PHOS: 83 U/L / ALT: 32 U/L DA / AST: 18 U/L / GGT: x           CARDIAC MARKERS ( 26 Nov 2023 19:00 )  x     / x     / 117 U/L / x     / 2.2 ng/mL          CAPILLARY BLOOD GLUCOSE:      RADIOLOGY & ADDITIONAL TESTS:               PGY-1 Progress Note discussed with attending.    PAGER #: [[phone]] TILL 5:00 PM  PLEASE CONTACT ON CALL TEAM:  - On Call Team (Please refer to Diana) FROM 5:00 PM - 8:30PM  - Nightfloat Team FROM 8:30 -7:30 AM      INTERVAL HPI/OVERNIGHT EVENTS:       REVIEW OF SYSTEMS:  CONSTITUTIONAL: No fever, weight loss, or fatigue.  RESPIRATORY: No cough, wheezing, chills, or hemoptysis. No shortness of breath.  CARDIOVASCULAR: No chest pain, palpitations, dizziness, or leg swelling.  GASTROINTESTINAL: No abdominal pain. No nausea, vomiting, or hematemesis. No diarrhea or constipation. No melena or hematochezia.  GENITOURINARY: No dysuria or hematuria, urinary frequency.  NEUROLOGICAL: No headaches, memory loss, loss of strength, numbness, or tremors.  SKIN: No itching, burning, rashes, or lesions.    Vital Signs Last 24 Hrs  T(C): 36.9 (28 Nov 2023 07:47), Max: 36.9 (27 Nov 2023 20:17)  T(F): 98.4 (28 Nov 2023 07:47), Max: 98.4 (27 Nov 2023 20:17)  HR: 79 (28 Nov 2023 07:47) (79 - 150)  BP: 158/94 (28 Nov 2023 07:47) (119/90 - 174/121)  BP(mean): 95 (27 Nov 2023 20:17) (95 - 95)  RR: 18 (28 Nov 2023 07:47) (18 - 18)  SpO2: 98% (28 Nov 2023 07:47) (97% - 98%)    Parameters below as of 28 Nov 2023 07:47  Patient On (Oxygen Delivery Method): room air        PHYSICAL EXAMINATION:  GENERAL: NAD   HEAD:  Atraumatic, Normocephalic.  EYES:  Conjunctiva and sclera clear.  NECK: Supple, No JVD, Normal thyroid.  CHEST/LUNG: Clear to auscultation. Clear to percussion bilaterally. No rales, rhonchi, wheezing, or rubs.  HEART: Regular rate and rhythm. No murmurs, rubs, or gallops.  ABDOMEN: Soft, Nontender, Nondistended. Bowel sounds present.  NERVOUS SYSTEM:  Alert & Oriented X3  EXTREMITIES:  2+ Peripheral Pulses. No clubbing, cyanosis, or edema.  SKIN: Warm, dry.                          13.1   7.67  )-----------( 158      ( 27 Nov 2023 09:50 )             40.1     11-28    137  |  104  |  17  ----------------------------<  191<H>  3.8   |  23  |  0.78    Ca    9.5      28 Nov 2023 06:33  Phos  2.5     11-27  Mg     2.0     11-28    TPro  6.7  /  Alb  3.1<L>  /  TBili  1.1  /  DBili  0.3  /  AST  18  /  ALT  32  /  AlkPhos  83  11-27    LIVER FUNCTIONS - ( 27 Nov 2023 09:50 )  Alb: 3.1 g/dL / Pro: 6.7 g/dL / ALK PHOS: 83 U/L / ALT: 32 U/L DA / AST: 18 U/L / GGT: x           CARDIAC MARKERS ( 26 Nov 2023 19:00 )  x     / x     / 117 U/L / x     / 2.2 ng/mL          CAPILLARY BLOOD GLUCOSE:      RADIOLOGY & ADDITIONAL TESTS:               PGY-1 Progress Note discussed with attending.    PAGER #: [[phone]] TILL 5:00 PM  PLEASE CONTACT ON CALL TEAM:  - On Call Team (Please refer to Diana) FROM 5:00 PM - 8:30PM  - Nightfloat Team FROM 8:30 -7:30 AM      INTERVAL HPI/OVERNIGHT EVENTS:       REVIEW OF SYSTEMS:  CONSTITUTIONAL: No fever, weight loss, or fatigue.  RESPIRATORY: No cough, wheezing, chills, or hemoptysis. No shortness of breath.  CARDIOVASCULAR: No chest pain, palpitations, dizziness, or leg swelling.  GASTROINTESTINAL: No abdominal pain. No nausea, vomiting, or hematemesis. No diarrhea or constipation. No melena or hematochezia.  GENITOURINARY: No dysuria or hematuria, urinary frequency.  NEUROLOGICAL: No headaches, memory loss, loss of strength, numbness, or tremors.  SKIN: No itching, burning, rashes, or lesions.    Vital Signs Last 24 Hrs  T(C): 36.9 (28 Nov 2023 07:47), Max: 36.9 (27 Nov 2023 20:17)  T(F): 98.4 (28 Nov 2023 07:47), Max: 98.4 (27 Nov 2023 20:17)  HR: 79 (28 Nov 2023 07:47) (79 - 150)  BP: 158/94 (28 Nov 2023 07:47) (119/90 - 174/121)  BP(mean): 95 (27 Nov 2023 20:17) (95 - 95)  RR: 18 (28 Nov 2023 07:47) (18 - 18)  SpO2: 98% (28 Nov 2023 07:47) (97% - 98%)    Parameters below as of 28 Nov 2023 07:47  Patient On (Oxygen Delivery Method): room air        PHYSICAL EXAMINATION:  GENERAL: NAD   HEAD:  Atraumatic, Normocephalic.  EYES:  Conjunctiva and sclera clear.  NECK: Supple, No JVD, Normal thyroid.  CHEST/LUNG: Clear to auscultation. Clear to percussion bilaterally. No rales, rhonchi, wheezing, or rubs.  HEART: Regular rate and rhythm. No murmurs, rubs, or gallops.  ABDOMEN: Soft, Nontender, Nondistended. Bowel sounds present.  NERVOUS SYSTEM:  Alert & Oriented X3  EXTREMITIES:  2+ Peripheral Pulses. No clubbing, cyanosis, or edema.  SKIN: Warm, dry.                          13.1   7.67  )-----------( 158      ( 27 Nov 2023 09:50 )             40.1     11-28    137  |  104  |  17  ----------------------------<  191<H>  3.8   |  23  |  0.78    Ca    9.5      28 Nov 2023 06:33  Phos  2.5     11-27  Mg     2.0     11-28    TPro  6.7  /  Alb  3.1<L>  /  TBili  1.1  /  DBili  0.3  /  AST  18  /  ALT  32  /  AlkPhos  83  11-27    LIVER FUNCTIONS - ( 27 Nov 2023 09:50 )  Alb: 3.1 g/dL / Pro: 6.7 g/dL / ALK PHOS: 83 U/L / ALT: 32 U/L DA / AST: 18 U/L / GGT: x           CARDIAC MARKERS ( 26 Nov 2023 19:00 )  x     / x     / 117 U/L / x     / 2.2 ng/mL          CAPILLARY BLOOD GLUCOSE:      RADIOLOGY & ADDITIONAL TESTS:               PGY-1 Progress Note discussed with attending.    PAGER #: [[phone]] TILL 5:00 PM  PLEASE CONTACT ON CALL TEAM:  - On Call Team (Please refer to Diana) FROM 5:00 PM - 8:30PM  - Nightfloat Team FROM 8:30 -7:30 AM      INTERVAL HPI/OVERNIGHT EVENTS: Patient seen and examined at bedside. Resting comfortably. No acute overnight events. Pain controlled.      REVIEW OF SYSTEMS:  CONSTITUTIONAL: No fever, weight loss, or fatigue.  RESPIRATORY: No cough, wheezing, chills, or hemoptysis. No shortness of breath.  CARDIOVASCULAR: No chest pain, palpitations, dizziness, or leg swelling.  GASTROINTESTINAL: No abdominal pain. No nausea, vomiting, or hematemesis. No diarrhea or constipation. No melena or hematochezia.  GENITOURINARY: No dysuria or hematuria, urinary frequency.  NEUROLOGICAL: No headaches, memory loss, loss of strength, numbness, or tremors.  SKIN: No itching, burning, rashes, or lesions.    Vital Signs Last 24 Hrs  T(C): 36.9 (28 Nov 2023 07:47), Max: 36.9 (27 Nov 2023 20:17)  T(F): 98.4 (28 Nov 2023 07:47), Max: 98.4 (27 Nov 2023 20:17)  HR: 79 (28 Nov 2023 07:47) (79 - 150)  BP: 158/94 (28 Nov 2023 07:47) (119/90 - 174/121)  BP(mean): 95 (27 Nov 2023 20:17) (95 - 95)  RR: 18 (28 Nov 2023 07:47) (18 - 18)  SpO2: 98% (28 Nov 2023 07:47) (97% - 98%)    Parameters below as of 28 Nov 2023 07:47  Patient On (Oxygen Delivery Method): room air        PHYSICAL EXAMINATION:  GENERAL: NAD   HEAD:  Atraumatic, Normocephalic.  EYES:  Conjunctiva and sclera clear.  NECK: Supple, No JVD, Normal thyroid.  CHEST/LUNG: Clear to auscultation. Clear to percussion bilaterally. No rales, rhonchi, wheezing, or rubs.  HEART: Regular rate and rhythm. No murmurs, rubs, or gallops.  ABDOMEN: Soft, Nontender, Nondistended. Bowel sounds present.  NERVOUS SYSTEM:  Alert & Oriented X3  EXTREMITIES: +L shoulder in sling. +chronic lower extremity skin changes. +varicose veins. 2+ Peripheral Pulses. No clubbing, cyanosis, or edema.  SKIN: Warm, dry.                          13.1   7.67  )-----------( 158      ( 27 Nov 2023 09:50 )             40.1     11-28    137  |  104  |  17  ----------------------------<  191<H>  3.8   |  23  |  0.78    Ca    9.5      28 Nov 2023 06:33  Phos  2.5     11-27  Mg     2.0     11-28    TPro  6.7  /  Alb  3.1<L>  /  TBili  1.1  /  DBili  0.3  /  AST  18  /  ALT  32  /  AlkPhos  83  11-27    LIVER FUNCTIONS - ( 27 Nov 2023 09:50 )  Alb: 3.1 g/dL / Pro: 6.7 g/dL / ALK PHOS: 83 U/L / ALT: 32 U/L DA / AST: 18 U/L / GGT: x           CARDIAC MARKERS ( 26 Nov 2023 19:00 )  x     / x     / 117 U/L / x     / 2.2 ng/mL          CAPILLARY BLOOD GLUCOSE:      RADIOLOGY & ADDITIONAL TESTS:               PGY-1 Progress Note discussed with attending.    PAGER #: [[phone]] TILL 5:00 PM  PLEASE CONTACT ON CALL TEAM:  - On Call Team (Please refer to Diana) FROM 5:00 PM - 8:30PM  - Nightfloat Team FROM 8:30 -7:30 AM      INTERVAL HPI/OVERNIGHT EVENTS: Patient seen and examined at bedside. Resting comfortably. No acute overnight events. Pain controlled.      REVIEW OF SYSTEMS:  CONSTITUTIONAL: No fever, weight loss, or fatigue.  RESPIRATORY: No cough, wheezing, chills, or hemoptysis. No shortness of breath.  CARDIOVASCULAR: No chest pain, palpitations, dizziness, or leg swelling.  GASTROINTESTINAL: No abdominal pain. No nausea, vomiting, or hematemesis. No diarrhea or constipation. No melena or hematochezia.  GENITOURINARY: No dysuria or hematuria, urinary frequency.  NEUROLOGICAL: No headaches, memory loss, loss of strength, numbness, or tremors.  SKIN: No itching, burning, rashes, or lesions.    Vital Signs Last 24 Hrs  T(C): 36.9 (28 Nov 2023 07:47), Max: 36.9 (27 Nov 2023 20:17)  T(F): 98.4 (28 Nov 2023 07:47), Max: 98.4 (27 Nov 2023 20:17)  HR: 79 (28 Nov 2023 07:47) (79 - 150)  BP: 158/94 (28 Nov 2023 07:47) (119/90 - 174/121)  BP(mean): 95 (27 Nov 2023 20:17) (95 - 95)  RR: 18 (28 Nov 2023 07:47) (18 - 18)  SpO2: 98% (28 Nov 2023 07:47) (97% - 98%)    Parameters below as of 28 Nov 2023 07:47  Patient On (Oxygen Delivery Method): room air        PHYSICAL EXAMINATION:  GENERAL: NAD   HEAD:  Atraumatic, Normocephalic.  EYES:  Conjunctiva and sclera clear.  NECK: Supple, No JVD, Normal thyroid.  CHEST/LUNG: Clear to auscultation. Clear to percussion bilaterally. No rales, rhonchi, wheezing, or rubs.  HEART: Regular rate and rhythm. No murmurs, rubs, or gallops.  ABDOMEN: Soft, Nontender, Nondistended. Bowel sounds present.  NERVOUS SYSTEM:  Alert & Oriented X3  EXTREMITIES: +L shoulder in sling. +chronic lower extremity skin changes. +varicose veins. 2+ Peripheral Pulses. No clubbing, cyanosis, or edema.  SKIN: Warm, dry.                          13.1   7.67  )-----------( 158      ( 27 Nov 2023 09:50 )             40.1     11-28    137  |  104  |  17  ----------------------------<  191<H>  3.8   |  23  |  0.78    Ca    9.5      28 Nov 2023 06:33  Phos  2.5     11-27  Mg     2.0     11-28    TPro  6.7  /  Alb  3.1<L>  /  TBili  1.1  /  DBili  0.3  /  AST  18  /  ALT  32  /  AlkPhos  83  11-27    LIVER FUNCTIONS - ( 27 Nov 2023 09:50 )  Alb: 3.1 g/dL / Pro: 6.7 g/dL / ALK PHOS: 83 U/L / ALT: 32 U/L DA / AST: 18 U/L / GGT: x           CARDIAC MARKERS ( 26 Nov 2023 19:00 )  x     / x     / 117 U/L / x     / 2.2 ng/mL          CAPILLARY BLOOD GLUCOSE:      RADIOLOGY & ADDITIONAL TESTS:               PGY-1 Progress Note discussed with attending.    PAGER #: [[phone]] TILL 5:00 PM  PLEASE CONTACT ON CALL TEAM:  - On Call Team (Please refer to Diana) FROM 5:00 PM - 8:30PM  - Nightfloat Team FROM 8:30 -7:30 AM      INTERVAL HPI/OVERNIGHT EVENTS: Patient seen and examined at bedside. Resting comfortably. No acute overnight events. Pain controlled.      REVIEW OF SYSTEMS:  CONSTITUTIONAL: No fever, weight loss, or fatigue.  RESPIRATORY: No cough, wheezing, chills, or hemoptysis. No shortness of breath.  CARDIOVASCULAR: No chest pain, palpitations, dizziness, or leg swelling.  GASTROINTESTINAL: No abdominal pain. No nausea, vomiting, or hematemesis. No diarrhea or constipation. No melena or hematochezia.  GENITOURINARY: No dysuria or hematuria, urinary frequency.  NEUROLOGICAL: No headaches, memory loss, loss of strength, numbness, or tremors.  SKIN: No itching, burning, rashes, or lesions.    Vital Signs Last 24 Hrs  T(C): 36.9 (28 Nov 2023 07:47), Max: 36.9 (27 Nov 2023 20:17)  T(F): 98.4 (28 Nov 2023 07:47), Max: 98.4 (27 Nov 2023 20:17)  HR: 79 (28 Nov 2023 07:47) (79 - 150)  BP: 158/94 (28 Nov 2023 07:47) (119/90 - 174/121)  BP(mean): 95 (27 Nov 2023 20:17) (95 - 95)  RR: 18 (28 Nov 2023 07:47) (18 - 18)  SpO2: 98% (28 Nov 2023 07:47) (97% - 98%)    Parameters below as of 28 Nov 2023 07:47  Patient On (Oxygen Delivery Method): room air        PHYSICAL EXAMINATION:  GENERAL: NAD   HEAD:  Atraumatic, Normocephalic.  EYES:  Conjunctiva and sclera clear.  NECK: Supple, No JVD, Normal thyroid.  CHEST/LUNG: Clear to auscultation. Clear to percussion bilaterally. No rales, rhonchi, wheezing, or rubs.  HEART: Regular rate and rhythm. No murmurs, rubs, or gallops.  ABDOMEN: Soft, Nontender, Nondistended. Bowel sounds present.  NERVOUS SYSTEM:  Alert & Oriented X3  EXTREMITIES: +L shoulder in sling. +chronic lower extremity skin changes. +varicose veins. 2+ Peripheral Pulses. No clubbing, cyanosis, or edema.  SKIN: Warm, dry.                          13.1   7.67  )-----------( 158      ( 27 Nov 2023 09:50 )             40.1     11-28    137  |  104  |  17  ----------------------------<  191<H>  3.8   |  23  |  0.78    Ca    9.5      28 Nov 2023 06:33  Phos  2.5     11-27  Mg     2.0     11-28    TPro  6.7  /  Alb  3.1<L>  /  TBili  1.1  /  DBili  0.3  /  AST  18  /  ALT  32  /  AlkPhos  83  11-27    LIVER FUNCTIONS - ( 27 Nov 2023 09:50 )  Alb: 3.1 g/dL / Pro: 6.7 g/dL / ALK PHOS: 83 U/L / ALT: 32 U/L DA / AST: 18 U/L / GGT: x           CARDIAC MARKERS ( 26 Nov 2023 19:00 )  x     / x     / 117 U/L / x     / 2.2 ng/mL          CAPILLARY BLOOD GLUCOSE:      RADIOLOGY & ADDITIONAL TESTS:               PGY-1 Progress Note discussed with attending.    PAGER #: [[phone]] TILL 5:00 PM  PLEASE CONTACT ON CALL TEAM:  - On Call Team (Please refer to Diana) FROM 5:00 PM - 8:30PM  - Nightfloat Team FROM 8:30 -7:30 AM      INTERVAL HPI/OVERNIGHT EVENTS: Patient seen and examined at bedside. Resting comfortably. No acute overnight events. Pain controlled.      REVIEW OF SYSTEMS:  CONSTITUTIONAL: No fever, weight loss, or fatigue.  RESPIRATORY: No cough, wheezing, chills, or hemoptysis. No shortness of breath.  CARDIOVASCULAR: No chest pain, palpitations, dizziness, or leg swelling.  GASTROINTESTINAL: No abdominal pain. No nausea, vomiting, or hematemesis. No diarrhea or constipation. No melena or hematochezia.  GENITOURINARY: No dysuria or hematuria, urinary frequency.  NEUROLOGICAL: No headaches, memory loss, loss of strength, numbness, or tremors.  SKIN: No itching, burning, rashes, or lesions.    Vital Signs Last 24 Hrs  T(C): 36.9 (28 Nov 2023 07:47), Max: 36.9 (27 Nov 2023 20:17)  T(F): 98.4 (28 Nov 2023 07:47), Max: 98.4 (27 Nov 2023 20:17)  HR: 79 (28 Nov 2023 07:47) (79 - 150)  BP: 158/94 (28 Nov 2023 07:47) (119/90 - 174/121)  BP(mean): 95 (27 Nov 2023 20:17) (95 - 95)  RR: 18 (28 Nov 2023 07:47) (18 - 18)  SpO2: 98% (28 Nov 2023 07:47) (97% - 98%)    Parameters below as of 28 Nov 2023 07:47  Patient On (Oxygen Delivery Method): room air        PHYSICAL EXAMINATION:  GENERAL: NAD   HEAD:  Atraumatic, Normocephalic.  EYES:  Conjunctiva and sclera clear.  NECK: Supple, No JVD, Normal thyroid.  CHEST/LUNG: Clear to auscultation. Clear to percussion bilaterally. No rales, rhonchi, wheezing, or rubs.  HEART: Regular rate and rhythm. No murmurs, rubs, or gallops.  ABDOMEN: Soft, Nontender, Nondistended. Bowel sounds present.  NERVOUS SYSTEM:  Alert & Oriented X3  EXTREMITIES: +L shoulder in sling. +chronic lower extremity skin changes. +varicose veins. 2+ Peripheral Pulses. Sensation intact to light touch. No clubbing, cyanosis, or edema.  SKIN: Warm, dry.                          13.1   7.67  )-----------( 158      ( 27 Nov 2023 09:50 )             40.1     11-28    137  |  104  |  17  ----------------------------<  191<H>  3.8   |  23  |  0.78    Ca    9.5      28 Nov 2023 06:33  Phos  2.5     11-27  Mg     2.0     11-28    TPro  6.7  /  Alb  3.1<L>  /  TBili  1.1  /  DBili  0.3  /  AST  18  /  ALT  32  /  AlkPhos  83  11-27    LIVER FUNCTIONS - ( 27 Nov 2023 09:50 )  Alb: 3.1 g/dL / Pro: 6.7 g/dL / ALK PHOS: 83 U/L / ALT: 32 U/L DA / AST: 18 U/L / GGT: x           CARDIAC MARKERS ( 26 Nov 2023 19:00 )  x     / x     / 117 U/L / x     / 2.2 ng/mL          CAPILLARY BLOOD GLUCOSE:      RADIOLOGY & ADDITIONAL TESTS:               PGY-1 Progress Note discussed with attending.    PAGER #: [[phone]] TILL 5:00 PM  PLEASE CONTACT ON CALL TEAM:  - On Call Team (Please refer to Diana) FROM 5:00 PM - 8:30PM  - Nightfloat Team FROM 8:30 -7:30 AM      INTERVAL HPI/OVERNIGHT EVENTS: Patient seen and examined at bedside. Resting comfortably. No acute overnight events. Pain controlled.      REVIEW OF SYSTEMS:  CONSTITUTIONAL: No fever, weight loss, or fatigue.  RESPIRATORY: No cough, wheezing, chills, or hemoptysis. No shortness of breath.  CARDIOVASCULAR: No chest pain, palpitations, dizziness, or leg swelling.  GASTROINTESTINAL: No abdominal pain. No nausea, vomiting, or hematemesis. No diarrhea or constipation. No melena or hematochezia.  GENITOURINARY: No dysuria or hematuria, urinary frequency.  NEUROLOGICAL: No headaches, memory loss, loss of strength, numbness, or tremors.  SKIN: No itching, burning, rashes, or lesions.    Vital Signs Last 24 Hrs  T(C): 36.9 (28 Nov 2023 07:47), Max: 36.9 (27 Nov 2023 20:17)  T(F): 98.4 (28 Nov 2023 07:47), Max: 98.4 (27 Nov 2023 20:17)  HR: 79 (28 Nov 2023 07:47) (79 - 150)  BP: 158/94 (28 Nov 2023 07:47) (119/90 - 174/121)  BP(mean): 95 (27 Nov 2023 20:17) (95 - 95)  RR: 18 (28 Nov 2023 07:47) (18 - 18)  SpO2: 98% (28 Nov 2023 07:47) (97% - 98%)    Parameters below as of 28 Nov 2023 07:47  Patient On (Oxygen Delivery Method): room air        PHYSICAL EXAMINATION:  GENERAL: NAD   HEAD:  Atraumatic, Normocephalic.  EYES:  Conjunctiva and sclera clear.  NECK: Supple, No JVD, Normal thyroid.  CHEST/LUNG: Clear to auscultation. Clear to percussion bilaterally. No rales, rhonchi, wheezing, or rubs.  HEART: Regular rate and rhythm. No murmurs, rubs, or gallops.  ABDOMEN: Soft, Nontender, Nondistended. Bowel sounds present.  NERVOUS SYSTEM:  Alert & Oriented X3  EXTREMITIES: +L shoulder in sling. +chronic lower extremity skin changes. +varicose veins. 2+ Peripheral Pulses. Sensation intact to light touch. No clubbing, cyanosis, or edema.  SKIN: Warm, dry.                          13.1   7.67  )-----------( 158      ( 27 Nov 2023 09:50 )             40.1     11-28    137  |  104  |  17  ----------------------------<  191<H>  3.8   |  23  |  0.78    Ca    9.5      28 Nov 2023 06:33  Phos  2.5     11-27  Mg     2.0     11-28    TPro  6.7  /  Alb  3.1<L>  /  TBili  1.1  /  DBili  0.3  /  AST  18  /  ALT  32  /  AlkPhos  83  11-27    LIVER FUNCTIONS - ( 27 Nov 2023 09:50 )  Alb: 3.1 g/dL / Pro: 6.7 g/dL / ALK PHOS: 83 U/L / ALT: 32 U/L DA / AST: 18 U/L / GGT: x           CARDIAC MARKERS ( 26 Nov 2023 19:00 )  x     / x     / 117 U/L / x     / 2.2 ng/mL          CAPILLARY BLOOD GLUCOSE:      RADIOLOGY & ADDITIONAL TESTS:               PGY-1 Progress Note discussed with attending.    PAGER #: [[phone]] TILL 5:00 PM  PLEASE CONTACT ON CALL TEAM:  - On Call Team (Please refer to Diana) FROM 5:00 PM - 8:30PM  - Nightfloat Team FROM 8:30 -7:30 AM      INTERVAL HPI/OVERNIGHT EVENTS: Patient seen and examined at bedside. Resting comfortably. No acute overnight events. Pain controlled.      REVIEW OF SYSTEMS:  CONSTITUTIONAL: No fever, weight loss, or fatigue.  RESPIRATORY: No cough, wheezing, chills, or hemoptysis. No shortness of breath.  CARDIOVASCULAR: No chest pain, palpitations, dizziness, or leg swelling.  GASTROINTESTINAL: No abdominal pain. No nausea, vomiting, or hematemesis. No diarrhea or constipation. No melena or hematochezia.  GENITOURINARY: No dysuria or hematuria, urinary frequency.  NEUROLOGICAL: No headaches, memory loss, loss of strength, numbness, or tremors.  SKIN: No itching, burning, rashes, or lesions.    Vital Signs Last 24 Hrs  T(C): 36.9 (28 Nov 2023 07:47), Max: 36.9 (27 Nov 2023 20:17)  T(F): 98.4 (28 Nov 2023 07:47), Max: 98.4 (27 Nov 2023 20:17)  HR: 79 (28 Nov 2023 07:47) (79 - 150)  BP: 158/94 (28 Nov 2023 07:47) (119/90 - 174/121)  BP(mean): 95 (27 Nov 2023 20:17) (95 - 95)  RR: 18 (28 Nov 2023 07:47) (18 - 18)  SpO2: 98% (28 Nov 2023 07:47) (97% - 98%)    Parameters below as of 28 Nov 2023 07:47  Patient On (Oxygen Delivery Method): room air        PHYSICAL EXAMINATION:  GENERAL: NAD   HEAD:  Atraumatic, Normocephalic.  EYES:  Conjunctiva and sclera clear.  NECK: Supple, No JVD, Normal thyroid.  CHEST/LUNG: Clear to auscultation. Clear to percussion bilaterally. No rales, rhonchi, wheezing, or rubs.  HEART: Regular rate and rhythm. No murmurs, rubs, or gallops.  ABDOMEN: Soft, Nontender, Nondistended. Bowel sounds present.  NERVOUS SYSTEM:  Alert & Oriented X3  EXTREMITIES: +L shoulder in sling. +chronic lower extremity skin changes. +varicose veins. 2+ Peripheral Pulses. Sensation intact to light touch. No clubbing, cyanosis, or edema.  SKIN: Warm, dry.                          13.1   7.67  )-----------( 158      ( 27 Nov 2023 09:50 )             40.1     11-28    137  |  104  |  17  ----------------------------<  191<H>  3.8   |  23  |  0.78    Ca    9.5      28 Nov 2023 06:33  Phos  2.5     11-27  Mg     2.0     11-28    TPro  6.7  /  Alb  3.1<L>  /  TBili  1.1  /  DBili  0.3  /  AST  18  /  ALT  32  /  AlkPhos  83  11-27    LIVER FUNCTIONS - ( 27 Nov 2023 09:50 )  Alb: 3.1 g/dL / Pro: 6.7 g/dL / ALK PHOS: 83 U/L / ALT: 32 U/L DA / AST: 18 U/L / GGT: x           CARDIAC MARKERS ( 26 Nov 2023 19:00 )  x     / x     / 117 U/L / x     / 2.2 ng/mL          CAPILLARY BLOOD GLUCOSE:      RADIOLOGY & ADDITIONAL TESTS:

## 2023-11-29 LAB
ANION GAP SERPL CALC-SCNC: 5 MMOL/L — SIGNIFICANT CHANGE UP (ref 5–17)
ANION GAP SERPL CALC-SCNC: 5 MMOL/L — SIGNIFICANT CHANGE UP (ref 5–17)
BUN SERPL-MCNC: 21 MG/DL — HIGH (ref 7–18)
BUN SERPL-MCNC: 21 MG/DL — HIGH (ref 7–18)
CALCIUM SERPL-MCNC: 9.4 MG/DL — SIGNIFICANT CHANGE UP (ref 8.4–10.5)
CALCIUM SERPL-MCNC: 9.4 MG/DL — SIGNIFICANT CHANGE UP (ref 8.4–10.5)
CHLORIDE SERPL-SCNC: 107 MMOL/L — SIGNIFICANT CHANGE UP (ref 96–108)
CHLORIDE SERPL-SCNC: 107 MMOL/L — SIGNIFICANT CHANGE UP (ref 96–108)
CO2 SERPL-SCNC: 26 MMOL/L — SIGNIFICANT CHANGE UP (ref 22–31)
CO2 SERPL-SCNC: 26 MMOL/L — SIGNIFICANT CHANGE UP (ref 22–31)
CREAT SERPL-MCNC: 0.81 MG/DL — SIGNIFICANT CHANGE UP (ref 0.5–1.3)
CREAT SERPL-MCNC: 0.81 MG/DL — SIGNIFICANT CHANGE UP (ref 0.5–1.3)
EGFR: 75 ML/MIN/1.73M2 — SIGNIFICANT CHANGE UP
EGFR: 75 ML/MIN/1.73M2 — SIGNIFICANT CHANGE UP
GLUCOSE BLDC GLUCOMTR-MCNC: 206 MG/DL — HIGH (ref 70–99)
GLUCOSE BLDC GLUCOMTR-MCNC: 206 MG/DL — HIGH (ref 70–99)
GLUCOSE BLDC GLUCOMTR-MCNC: 225 MG/DL — HIGH (ref 70–99)
GLUCOSE BLDC GLUCOMTR-MCNC: 225 MG/DL — HIGH (ref 70–99)
GLUCOSE BLDC GLUCOMTR-MCNC: 231 MG/DL — HIGH (ref 70–99)
GLUCOSE BLDC GLUCOMTR-MCNC: 231 MG/DL — HIGH (ref 70–99)
GLUCOSE BLDC GLUCOMTR-MCNC: 235 MG/DL — HIGH (ref 70–99)
GLUCOSE BLDC GLUCOMTR-MCNC: 235 MG/DL — HIGH (ref 70–99)
GLUCOSE SERPL-MCNC: 281 MG/DL — HIGH (ref 70–99)
GLUCOSE SERPL-MCNC: 281 MG/DL — HIGH (ref 70–99)
HCT VFR BLD CALC: 37.4 % — SIGNIFICANT CHANGE UP (ref 34.5–45)
HCT VFR BLD CALC: 37.4 % — SIGNIFICANT CHANGE UP (ref 34.5–45)
HGB BLD-MCNC: 12.4 G/DL — SIGNIFICANT CHANGE UP (ref 11.5–15.5)
HGB BLD-MCNC: 12.4 G/DL — SIGNIFICANT CHANGE UP (ref 11.5–15.5)
MAGNESIUM SERPL-MCNC: 1.9 MG/DL — SIGNIFICANT CHANGE UP (ref 1.6–2.6)
MAGNESIUM SERPL-MCNC: 1.9 MG/DL — SIGNIFICANT CHANGE UP (ref 1.6–2.6)
MCHC RBC-ENTMCNC: 31.6 PG — SIGNIFICANT CHANGE UP (ref 27–34)
MCHC RBC-ENTMCNC: 31.6 PG — SIGNIFICANT CHANGE UP (ref 27–34)
MCHC RBC-ENTMCNC: 33.2 GM/DL — SIGNIFICANT CHANGE UP (ref 32–36)
MCHC RBC-ENTMCNC: 33.2 GM/DL — SIGNIFICANT CHANGE UP (ref 32–36)
MCV RBC AUTO: 95.4 FL — SIGNIFICANT CHANGE UP (ref 80–100)
MCV RBC AUTO: 95.4 FL — SIGNIFICANT CHANGE UP (ref 80–100)
NRBC # BLD: 0 /100 WBCS — SIGNIFICANT CHANGE UP (ref 0–0)
NRBC # BLD: 0 /100 WBCS — SIGNIFICANT CHANGE UP (ref 0–0)
PHOSPHATE SERPL-MCNC: 2.3 MG/DL — LOW (ref 2.5–4.5)
PHOSPHATE SERPL-MCNC: 2.3 MG/DL — LOW (ref 2.5–4.5)
PLATELET # BLD AUTO: 147 K/UL — LOW (ref 150–400)
PLATELET # BLD AUTO: 147 K/UL — LOW (ref 150–400)
POTASSIUM SERPL-MCNC: 3.6 MMOL/L — SIGNIFICANT CHANGE UP (ref 3.5–5.3)
POTASSIUM SERPL-MCNC: 3.6 MMOL/L — SIGNIFICANT CHANGE UP (ref 3.5–5.3)
POTASSIUM SERPL-SCNC: 3.6 MMOL/L — SIGNIFICANT CHANGE UP (ref 3.5–5.3)
POTASSIUM SERPL-SCNC: 3.6 MMOL/L — SIGNIFICANT CHANGE UP (ref 3.5–5.3)
RBC # BLD: 3.92 M/UL — SIGNIFICANT CHANGE UP (ref 3.8–5.2)
RBC # BLD: 3.92 M/UL — SIGNIFICANT CHANGE UP (ref 3.8–5.2)
RBC # FLD: 14.1 % — SIGNIFICANT CHANGE UP (ref 10.3–14.5)
RBC # FLD: 14.1 % — SIGNIFICANT CHANGE UP (ref 10.3–14.5)
SODIUM SERPL-SCNC: 138 MMOL/L — SIGNIFICANT CHANGE UP (ref 135–145)
SODIUM SERPL-SCNC: 138 MMOL/L — SIGNIFICANT CHANGE UP (ref 135–145)
WBC # BLD: 6.96 K/UL — SIGNIFICANT CHANGE UP (ref 3.8–10.5)
WBC # BLD: 6.96 K/UL — SIGNIFICANT CHANGE UP (ref 3.8–10.5)
WBC # FLD AUTO: 6.96 K/UL — SIGNIFICANT CHANGE UP (ref 3.8–10.5)
WBC # FLD AUTO: 6.96 K/UL — SIGNIFICANT CHANGE UP (ref 3.8–10.5)

## 2023-11-29 PROCEDURE — 99232 SBSQ HOSP IP/OBS MODERATE 35: CPT | Mod: GC

## 2023-11-29 RX ORDER — POTASSIUM PHOSPHATE, MONOBASIC POTASSIUM PHOSPHATE, DIBASIC 236; 224 MG/ML; MG/ML
15 INJECTION, SOLUTION INTRAVENOUS ONCE
Refills: 0 | Status: DISCONTINUED | OUTPATIENT
Start: 2023-11-29 | End: 2023-11-29

## 2023-11-29 RX ORDER — SODIUM,POTASSIUM PHOSPHATES 278-250MG
1 POWDER IN PACKET (EA) ORAL ONCE
Refills: 0 | Status: COMPLETED | OUTPATIENT
Start: 2023-11-29 | End: 2023-11-29

## 2023-11-29 RX ADMIN — LISINOPRIL 10 MILLIGRAM(S): 2.5 TABLET ORAL at 05:40

## 2023-11-29 RX ADMIN — Medication 2: at 21:45

## 2023-11-29 RX ADMIN — Medication 100 MILLIGRAM(S): at 17:03

## 2023-11-29 RX ADMIN — Medication 100 MILLIGRAM(S): at 05:40

## 2023-11-29 RX ADMIN — Medication 1 PACKET(S): at 08:13

## 2023-11-29 RX ADMIN — Medication 650 MILLIGRAM(S): at 13:47

## 2023-11-29 RX ADMIN — APIXABAN 5 MILLIGRAM(S): 2.5 TABLET, FILM COATED ORAL at 17:03

## 2023-11-29 RX ADMIN — Medication 2: at 17:03

## 2023-11-29 RX ADMIN — Medication 2: at 07:42

## 2023-11-29 RX ADMIN — Medication 650 MILLIGRAM(S): at 05:45

## 2023-11-29 RX ADMIN — ATORVASTATIN CALCIUM 20 MILLIGRAM(S): 80 TABLET, FILM COATED ORAL at 21:45

## 2023-11-29 RX ADMIN — Medication 650 MILLIGRAM(S): at 14:15

## 2023-11-29 RX ADMIN — Medication 2: at 12:05

## 2023-11-29 RX ADMIN — APIXABAN 5 MILLIGRAM(S): 2.5 TABLET, FILM COATED ORAL at 05:40

## 2023-11-29 RX ADMIN — Medication 650 MILLIGRAM(S): at 07:08

## 2023-11-29 RX ADMIN — Medication 180 MILLIGRAM(S): at 05:40

## 2023-11-29 NOTE — PROGRESS NOTE ADULT - PROBLEM SELECTOR PLAN 1
presents after mechanical fall, landed on L. shoulder.  XRAY Left shoulder / humerus - Severely comminuted/displaced proximal humeral fracture.  XRAY Left knee - No fracture/dislocation/suprapatellar effusion at the knee.  Ortho consulted - per Ortho, patient to follow up with Dr. El Bauer as outpatient within 1 week, call office at [phone]  for appointment.  >>pending disposition (home vs. rehab) - patient to be re-admitted through ED 12/10 for procedure 12/12.    - f/u MRI shoulder - pending official read.  - f/u CT shoulder - pending official read. presents after mechanical fall, landed on L. shoulder.  XRAY Left shoulder / humerus - Severely comminuted/displaced proximal humeral fracture.  XRAY Left knee - No fracture/dislocation/suprapatellar effusion at the knee.  Ortho consulted - per Ortho, patient to follow up with Dr. El Bauer as outpatient within 1 week, call office at [phone]  for appointment.  >>pending disposition (home vs. rehab) - patient to be re-admitted through ED 12/10 for procedure 12/12.    - f/u MRI shoulder - pending official read.  - f/u CT shoulder - pending official read. presents after mechanical fall, landed on L. shoulder.  XRAY Left shoulder / humerus - Severely comminuted/displaced proximal humeral fracture.  XRAY Left knee - No fracture/dislocation/suprapatellar effusion at the knee.  Ortho consulted - per Ortho, patient to follow up with Dr. El Bauer as outpatient within 1 week, call office at [phone]  for appointment.  >>pending disposition (home vs. rehab) - patient to be re-admitted through ED 12/10 for procedure 12/12.    - f/u MRI shoulder - pending official read.  - f/u CT shoulder - pending official read.

## 2023-11-29 NOTE — PROGRESS NOTE ADULT - SUBJECTIVE AND OBJECTIVE BOX
PGY-1 Progress Note discussed with attending.    PAGER #: [[phone]] TILL 5:00 PM  PLEASE CONTACT ON CALL TEAM:  - On Call Team (Please refer to Diana) FROM 5:00 PM - 8:30PM  - Nightfloat Team FROM 8:30 -7:30 AM      INTERVAL HPI/OVERNIGHT EVENTS:       REVIEW OF SYSTEMS:  CONSTITUTIONAL: No fever, weight loss, or fatigue.  RESPIRATORY: No cough, wheezing, chills, or hemoptysis. No shortness of breath.  CARDIOVASCULAR: No chest pain, palpitations, dizziness, or leg swelling.  GASTROINTESTINAL: No abdominal pain. No nausea, vomiting, or hematemesis. No diarrhea or constipation. No melena or hematochezia.  GENITOURINARY: No dysuria or hematuria, urinary frequency.  NEUROLOGICAL: No headaches, memory loss, loss of strength, numbness, or tremors.  SKIN: No itching, burning, rashes, or lesions.    Vital Signs Last 24 Hrs  T(C): 36.6 (29 Nov 2023 07:58), Max: 36.9 (29 Nov 2023 04:35)  T(F): 97.9 (29 Nov 2023 07:58), Max: 98.4 (29 Nov 2023 04:35)  HR: 82 (29 Nov 2023 07:58) (77 - 96)  BP: 142/80 (29 Nov 2023 07:58) (127/83 - 143/94)  BP(mean): --  RR: 17 (29 Nov 2023 07:58) (17 - 18)  SpO2: 97% (29 Nov 2023 07:58) (97% - 98%)    Parameters below as of 29 Nov 2023 07:58  Patient On (Oxygen Delivery Method): room air        PHYSICAL EXAMINATION:  GENERAL: NAD   HEAD:  Atraumatic, Normocephalic.  EYES:  Conjunctiva and sclera clear.  NECK: Supple, No JVD, Normal thyroid.  CHEST/LUNG: Clear to auscultation. Clear to percussion bilaterally. No rales, rhonchi, wheezing, or rubs.  HEART: Regular rate and rhythm. No murmurs, rubs, or gallops.  ABDOMEN: Soft, Nontender, Nondistended. Bowel sounds present.  NERVOUS SYSTEM:  Alert & Oriented X3  EXTREMITIES:  2+ Peripheral Pulses. No clubbing, cyanosis, or edema.  SKIN: Warm, dry.                          12.4   6.96  )-----------( 147      ( 29 Nov 2023 06:10 )             37.4     11-29    138  |  107  |  21<H>  ----------------------------<  281<H>  3.6   |  26  |  0.81    Ca    9.4      29 Nov 2023 06:10  Phos  2.3     11-29  Mg     1.9     11-29    TPro  6.7  /  Alb  3.1<L>  /  TBili  1.1  /  DBili  0.3  /  AST  18  /  ALT  32  /  AlkPhos  83  11-27    LIVER FUNCTIONS - ( 27 Nov 2023 09:50 )  Alb: 3.1 g/dL / Pro: 6.7 g/dL / ALK PHOS: 83 U/L / ALT: 32 U/L DA / AST: 18 U/L / GGT: x                   CAPILLARY BLOOD GLUCOSE:      RADIOLOGY & ADDITIONAL TESTS:               PGY-1 Progress Note discussed with attending.    PAGER #: [[phone]] TILL 5:00 PM  PLEASE CONTACT ON CALL TEAM:  - On Call Team (Please refer to Diana) FROM 5:00 PM - 8:30PM  - Nightfloat Team FROM 8:30 -7:30 AM      INTERVAL HPI/OVERNIGHT EVENTS:       REVIEW OF SYSTEMS:  CONSTITUTIONAL: No fever, weight loss, or fatigue.  RESPIRATORY: No cough, wheezing, chills, or hemoptysis. No shortness of breath.  CARDIOVASCULAR: No chest pain, palpitations, dizziness, or leg swelling.  GASTROINTESTINAL: No abdominal pain. No nausea, vomiting, or hematemesis. No diarrhea or constipation. No melena or hematochezia.  GENITOURINARY: No dysuria or hematuria, urinary frequency.  NEUROLOGICAL: No headaches, memory loss, loss of strength, numbness, or tremors.  SKIN: No itching, burning, rashes, or lesions.    Vital Signs Last 24 Hrs  T(C): 36.6 (29 Nov 2023 07:58), Max: 36.9 (29 Nov 2023 04:35)  T(F): 97.9 (29 Nov 2023 07:58), Max: 98.4 (29 Nov 2023 04:35)  HR: 82 (29 Nov 2023 07:58) (77 - 96)  BP: 142/80 (29 Nov 2023 07:58) (127/83 - 143/94)  BP(mean): --  RR: 17 (29 Nov 2023 07:58) (17 - 18)  SpO2: 97% (29 Nov 2023 07:58) (97% - 98%)    Parameters below as of 29 Nov 2023 07:58  Patient On (Oxygen Delivery Method): room air        PHYSICAL EXAMINATION:  GENERAL: NAD   HEAD:  Atraumatic, Normocephalic.  EYES:  Conjunctiva and sclera clear.  NECK: Supple, No JVD, Normal thyroid.  CHEST/LUNG: Clear to auscultation. Clear to percussion bilaterally. No rales, rhonchi, wheezing, or rubs.  HEART: Regular rate and rhythm. No murmurs, rubs, or gallops.  ABDOMEN: Soft, Nontender, Nondistended. Bowel sounds present.  NERVOUS SYSTEM:  Alert & Oriented X3  EXTREMITIES:  2+ Peripheral Pulses. No clubbing, cyanosis, or edema.  SKIN: Warm, dry.                          12.4   6.96  )-----------( 147      ( 29 Nov 2023 06:10 )             37.4     11-29    138  |  107  |  21<H>  ----------------------------<  281<H>  3.6   |  26  |  0.81    Ca    9.4      29 Nov 2023 06:10  Phos  2.3     11-29  Mg     1.9     11-29    TPro  6.7  /  Alb  3.1<L>  /  TBili  1.1  /  DBili  0.3  /  AST  18  /  ALT  32  /  AlkPhos  83  11-27    LIVER FUNCTIONS - ( 27 Nov 2023 09:50 )  Alb: 3.1 g/dL / Pro: 6.7 g/dL / ALK PHOS: 83 U/L / ALT: 32 U/L DA / AST: 18 U/L / GGT: x                   CAPILLARY BLOOD GLUCOSE:      RADIOLOGY & ADDITIONAL TESTS:               PGY-1 Progress Note discussed with attending.    PAGER #: [[phone]] TILL 5:00 PM  PLEASE CONTACT ON CALL TEAM:  - On Call Team (Please refer to Diana) FROM 5:00 PM - 8:30PM  - Nightfloat Team FROM 8:30 -7:30 AM      INTERVAL HPI/OVERNIGHT EVENTS:       REVIEW OF SYSTEMS:  CONSTITUTIONAL: No fever, weight loss, or fatigue.  RESPIRATORY: No cough, wheezing, chills, or hemoptysis. No shortness of breath.  CARDIOVASCULAR: No chest pain, palpitations, dizziness, or leg swelling.  GASTROINTESTINAL: No abdominal pain. No nausea, vomiting, or hematemesis. No diarrhea or constipation. No melena or hematochezia.  GENITOURINARY: No dysuria or hematuria, urinary frequency.  NEUROLOGICAL: No headaches, memory loss, loss of strength, numbness, or tremors.  SKIN: No itching, burning, rashes, or lesions.    Vital Signs Last 24 Hrs  T(C): 36.6 (29 Nov 2023 07:58), Max: 36.9 (29 Nov 2023 04:35)  T(F): 97.9 (29 Nov 2023 07:58), Max: 98.4 (29 Nov 2023 04:35)  HR: 82 (29 Nov 2023 07:58) (77 - 96)  BP: 142/80 (29 Nov 2023 07:58) (127/83 - 143/94)  BP(mean): --  RR: 17 (29 Nov 2023 07:58) (17 - 18)  SpO2: 97% (29 Nov 2023 07:58) (97% - 98%)    Parameters below as of 29 Nov 2023 07:58  Patient On (Oxygen Delivery Method): room air        PHYSICAL EXAMINATION:  GENERAL: NAD   HEAD:  Atraumatic, Normocephalic.  EYES:  Conjunctiva and sclera clear.  NECK: Supple, No JVD, Normal thyroid.  CHEST/LUNG: Clear to auscultation. Clear to percussion bilaterally. No rales, rhonchi, wheezing, or rubs.  HEART: Regular rate and rhythm. No murmurs, rubs, or gallops.  ABDOMEN: Soft, Nontender, Nondistended. Bowel sounds present.  NERVOUS SYSTEM:  Alert & Oriented X3  EXTREMITIES:  2+ Peripheral Pulses. No clubbing, cyanosis, or edema.  SKIN: Warm, dry.                          12.4   6.96  )-----------( 147      ( 29 Nov 2023 06:10 )             37.4     11-29    138  |  107  |  21<H>  ----------------------------<  281<H>  3.6   |  26  |  0.81    Ca    9.4      29 Nov 2023 06:10  Phos  2.3     11-29  Mg     1.9     11-29    TPro  6.7  /  Alb  3.1<L>  /  TBili  1.1  /  DBili  0.3  /  AST  18  /  ALT  32  /  AlkPhos  83  11-27    LIVER FUNCTIONS - ( 27 Nov 2023 09:50 )  Alb: 3.1 g/dL / Pro: 6.7 g/dL / ALK PHOS: 83 U/L / ALT: 32 U/L DA / AST: 18 U/L / GGT: x                   CAPILLARY BLOOD GLUCOSE:      RADIOLOGY & ADDITIONAL TESTS:               PGY-1 Progress Note discussed with attending.    PAGER #: [[phone]] TILL 5:00 PM  PLEASE CONTACT ON CALL TEAM:  - On Call Team (Please refer to Diana) FROM 5:00 PM - 8:30PM  - Nightfloat Team FROM 8:30 -7:30 AM      INTERVAL HPI/OVERNIGHT EVENTS: Patient seen and examined at bedside. Resting comfortably. No acute overnight events. Endorsing L breast itchiness. Appears like rash, however exam limited by shoulder pain and sling positioning.      REVIEW OF SYSTEMS:  CONSTITUTIONAL: No fever, weight loss, or fatigue.  RESPIRATORY: No cough, wheezing, chills, or hemoptysis. No shortness of breath.  CARDIOVASCULAR: No chest pain, palpitations, dizziness, or leg swelling.  GASTROINTESTINAL: No abdominal pain. No nausea, vomiting, or hematemesis. No diarrhea or constipation. No melena or hematochezia.  GENITOURINARY: No dysuria or hematuria, urinary frequency.  NEUROLOGICAL: No headaches, memory loss, loss of strength, numbness, or tremors.  SKIN: +L breast itchiness.     Vital Signs Last 24 Hrs  T(C): 36.6 (29 Nov 2023 07:58), Max: 36.9 (29 Nov 2023 04:35)  T(F): 97.9 (29 Nov 2023 07:58), Max: 98.4 (29 Nov 2023 04:35)  HR: 82 (29 Nov 2023 07:58) (77 - 96)  BP: 142/80 (29 Nov 2023 07:58) (127/83 - 143/94)  BP(mean): --  RR: 17 (29 Nov 2023 07:58) (17 - 18)  SpO2: 97% (29 Nov 2023 07:58) (97% - 98%)    Parameters below as of 29 Nov 2023 07:58  Patient On (Oxygen Delivery Method): room air        PHYSICAL EXAMINATION:  GENERAL: NAD   HEAD:  Atraumatic, Normocephalic.  EYES:  Conjunctiva and sclera clear.  NECK: Supple, No JVD, Normal thyroid.  CHEST/LUNG: Clear to auscultation. Clear to percussion bilaterally. No rales, rhonchi, wheezing, or rubs.  HEART: Regular rate and rhythm. No murmurs, rubs, or gallops.  ABDOMEN: Soft, Nontender, Nondistended. Bowel sounds present.  NERVOUS SYSTEM:  Alert & Oriented X3  EXTREMITIES: +L shoulder in sling. +chronic lower extremity skin changes. +varicose veins. 2+ Peripheral Pulses. Sensation intact to light touch. No clubbing, cyanosis, or edema.  SKIN: Warm, dry.                            12.4   6.96  )-----------( 147      ( 29 Nov 2023 06:10 )             37.4     11-29    138  |  107  |  21<H>  ----------------------------<  281<H>  3.6   |  26  |  0.81    Ca    9.4      29 Nov 2023 06:10  Phos  2.3     11-29  Mg     1.9     11-29    TPro  6.7  /  Alb  3.1<L>  /  TBili  1.1  /  DBili  0.3  /  AST  18  /  ALT  32  /  AlkPhos  83  11-27    LIVER FUNCTIONS - ( 27 Nov 2023 09:50 )  Alb: 3.1 g/dL / Pro: 6.7 g/dL / ALK PHOS: 83 U/L / ALT: 32 U/L DA / AST: 18 U/L / GGT: x                   CAPILLARY BLOOD GLUCOSE:      RADIOLOGY & ADDITIONAL TESTS:               PGY-1 Progress Note discussed with attending.    PAGER #: [[phone]] TILL 5:00 PM  PLEASE CONTACT ON CALL TEAM:  - On Call Team (Please refer to Diana) FROM 5:00 PM - 8:30PM  - Nightfloat Team FROM 8:30 -7:30 AM      INTERVAL HPI/OVERNIGHT EVENTS: Patient seen and examined at bedside. Resting comfortably. No acute overnight events. Endorsing L breast itchiness. Appears like rash, however exam limited by shoulder pain and sling positioning.      REVIEW OF SYSTEMS:  CONSTITUTIONAL: No fever, weight loss, or fatigue.  RESPIRATORY: No cough, wheezing, chills, or hemoptysis. No shortness of breath.  CARDIOVASCULAR: No chest pain, palpitations, dizziness, or leg swelling.  GASTROINTESTINAL: No abdominal pain. No nausea, vomiting, or hematemesis. No diarrhea or constipation. No melena or hematochezia.  GENITOURINARY: No dysuria or hematuria, urinary frequency.  NEUROLOGICAL: No headaches, memory loss, loss of strength, numbness, or tremors.  SKIN: +L breast itchiness.     Vital Signs Last 24 Hrs  T(C): 36.6 (29 Nov 2023 07:58), Max: 36.9 (29 Nov 2023 04:35)  T(F): 97.9 (29 Nov 2023 07:58), Max: 98.4 (29 Nov 2023 04:35)  HR: 82 (29 Nov 2023 07:58) (77 - 96)  BP: 142/80 (29 Nov 2023 07:58) (127/83 - 143/94)  BP(mean): --  RR: 17 (29 Nov 2023 07:58) (17 - 18)  SpO2: 97% (29 Nov 2023 07:58) (97% - 98%)    Parameters below as of 29 Nov 2023 07:58  Patient On (Oxygen Delivery Method): room air        PHYSICAL EXAMINATION:  GENERAL: NAD   HEAD:  Atraumatic, Normocephalic.  EYES:  Conjunctiva and sclera clear.  NECK: Supple, No JVD, Normal thyroid.  CHEST/LUNG: Clear to auscultation. Clear to percussion bilaterally. No rales, rhonchi, wheezing, or rubs.  HEART: Regular rate and rhythm. No murmurs, rubs, or gallops.  ABDOMEN: Soft, Nontender, Nondistended. Bowel sounds present.  NERVOUS SYSTEM:  Alert & Oriented X3  EXTREMITIES: +L shoulder in sling. +chronic lower extremity skin changes. +varicose veins. 2+ Peripheral Pulses. Sensation intact to light touch. No clubbing, cyanosis, or edema.  SKIN: Warm, dry.                            12.4   6.96  )-----------( 147      ( 29 Nov 2023 06:10 )             37.4     11-29    138  |  107  |  21<H>  ----------------------------<  281<H>  3.6   |  26  |  0.81    Ca    9.4      29 Nov 2023 06:10  Phos  2.3     11-29  Mg     1.9     11-29    TPro  6.7  /  Alb  3.1<L>  /  TBili  1.1  /  DBili  0.3  /  AST  18  /  ALT  32  /  AlkPhos  83  11-27    LIVER FUNCTIONS - ( 27 Nov 2023 09:50 )  Alb: 3.1 g/dL / Pro: 6.7 g/dL / ALK PHOS: 83 U/L / ALT: 32 U/L DA / AST: 18 U/L / GGT: x                   CAPILLARY BLOOD GLUCOSE:      RADIOLOGY & ADDITIONAL TESTS:               PGY-1 Progress Note discussed with attending.    PAGER #: [[phone]] TILL 5:00 PM  PLEASE CONTACT ON CALL TEAM:  - On Call Team (Please refer to Diana) FROM 5:00 PM - 8:30PM  - Nightfloat Team FROM 8:30 -7:30 AM      INTERVAL HPI/OVERNIGHT EVENTS: Patient seen and examined at bedside. Resting comfortably. No acute overnight events. Endorsing L breast itchiness. Appears like rash, however exam limited by shoulder pain and sling positioning.      REVIEW OF SYSTEMS:  CONSTITUTIONAL: No fever, weight loss, or fatigue.  RESPIRATORY: No cough, wheezing, chills, or hemoptysis. No shortness of breath.  CARDIOVASCULAR: No chest pain, palpitations, dizziness, or leg swelling.  GASTROINTESTINAL: No abdominal pain. No nausea, vomiting, or hematemesis. No diarrhea or constipation. No melena or hematochezia.  GENITOURINARY: No dysuria or hematuria, urinary frequency.  NEUROLOGICAL: No headaches, memory loss, loss of strength, numbness, or tremors.  SKIN: +L breast itchiness.     Vital Signs Last 24 Hrs  T(C): 36.6 (29 Nov 2023 07:58), Max: 36.9 (29 Nov 2023 04:35)  T(F): 97.9 (29 Nov 2023 07:58), Max: 98.4 (29 Nov 2023 04:35)  HR: 82 (29 Nov 2023 07:58) (77 - 96)  BP: 142/80 (29 Nov 2023 07:58) (127/83 - 143/94)  BP(mean): --  RR: 17 (29 Nov 2023 07:58) (17 - 18)  SpO2: 97% (29 Nov 2023 07:58) (97% - 98%)    Parameters below as of 29 Nov 2023 07:58  Patient On (Oxygen Delivery Method): room air        PHYSICAL EXAMINATION:  GENERAL: NAD   HEAD:  Atraumatic, Normocephalic.  EYES:  Conjunctiva and sclera clear.  NECK: Supple, No JVD, Normal thyroid.  CHEST/LUNG: Clear to auscultation. Clear to percussion bilaterally. No rales, rhonchi, wheezing, or rubs.  HEART: Regular rate and rhythm. No murmurs, rubs, or gallops.  ABDOMEN: Soft, Nontender, Nondistended. Bowel sounds present.  NERVOUS SYSTEM:  Alert & Oriented X3  EXTREMITIES: +L shoulder in sling. +chronic lower extremity skin changes. +varicose veins. 2+ Peripheral Pulses. Sensation intact to light touch. No clubbing, cyanosis, or edema.  SKIN: Warm, dry.                            12.4   6.96  )-----------( 147      ( 29 Nov 2023 06:10 )             37.4     11-29    138  |  107  |  21<H>  ----------------------------<  281<H>  3.6   |  26  |  0.81    Ca    9.4      29 Nov 2023 06:10  Phos  2.3     11-29  Mg     1.9     11-29    TPro  6.7  /  Alb  3.1<L>  /  TBili  1.1  /  DBili  0.3  /  AST  18  /  ALT  32  /  AlkPhos  83  11-27    LIVER FUNCTIONS - ( 27 Nov 2023 09:50 )  Alb: 3.1 g/dL / Pro: 6.7 g/dL / ALK PHOS: 83 U/L / ALT: 32 U/L DA / AST: 18 U/L / GGT: x                   CAPILLARY BLOOD GLUCOSE:      RADIOLOGY & ADDITIONAL TESTS:

## 2023-11-29 NOTE — PROGRESS NOTE ADULT - ASSESSMENT
77 F, from home, ambulates independently, lives alone, PMHx of Afib on Eliquis, HTN, DM, HLD presenting after mechanical fall. Found to have a left humeral fracture and was admitted for inability to care for herself alone at home post-fracture. Ortho was consulted - patient pending rehab decision - will schedule patient for 12/10 re-admission through the ED for procedure 12/12. If not approved for rehab, will DC on pain control and appointment for procedure as mentioned prior.

## 2023-11-30 LAB
ANION GAP SERPL CALC-SCNC: 5 MMOL/L — SIGNIFICANT CHANGE UP (ref 5–17)
ANION GAP SERPL CALC-SCNC: 5 MMOL/L — SIGNIFICANT CHANGE UP (ref 5–17)
BUN SERPL-MCNC: 16 MG/DL — SIGNIFICANT CHANGE UP (ref 7–18)
BUN SERPL-MCNC: 16 MG/DL — SIGNIFICANT CHANGE UP (ref 7–18)
CALCIUM SERPL-MCNC: 9.1 MG/DL — SIGNIFICANT CHANGE UP (ref 8.4–10.5)
CALCIUM SERPL-MCNC: 9.1 MG/DL — SIGNIFICANT CHANGE UP (ref 8.4–10.5)
CHLORIDE SERPL-SCNC: 107 MMOL/L — SIGNIFICANT CHANGE UP (ref 96–108)
CHLORIDE SERPL-SCNC: 107 MMOL/L — SIGNIFICANT CHANGE UP (ref 96–108)
CO2 SERPL-SCNC: 28 MMOL/L — SIGNIFICANT CHANGE UP (ref 22–31)
CO2 SERPL-SCNC: 28 MMOL/L — SIGNIFICANT CHANGE UP (ref 22–31)
CREAT SERPL-MCNC: 0.74 MG/DL — SIGNIFICANT CHANGE UP (ref 0.5–1.3)
CREAT SERPL-MCNC: 0.74 MG/DL — SIGNIFICANT CHANGE UP (ref 0.5–1.3)
EGFR: 83 ML/MIN/1.73M2 — SIGNIFICANT CHANGE UP
EGFR: 83 ML/MIN/1.73M2 — SIGNIFICANT CHANGE UP
GLUCOSE BLDC GLUCOMTR-MCNC: 220 MG/DL — HIGH (ref 70–99)
GLUCOSE BLDC GLUCOMTR-MCNC: 220 MG/DL — HIGH (ref 70–99)
GLUCOSE BLDC GLUCOMTR-MCNC: 260 MG/DL — HIGH (ref 70–99)
GLUCOSE BLDC GLUCOMTR-MCNC: 260 MG/DL — HIGH (ref 70–99)
GLUCOSE BLDC GLUCOMTR-MCNC: 264 MG/DL — HIGH (ref 70–99)
GLUCOSE BLDC GLUCOMTR-MCNC: 264 MG/DL — HIGH (ref 70–99)
GLUCOSE BLDC GLUCOMTR-MCNC: 367 MG/DL — HIGH (ref 70–99)
GLUCOSE BLDC GLUCOMTR-MCNC: 367 MG/DL — HIGH (ref 70–99)
GLUCOSE SERPL-MCNC: 246 MG/DL — HIGH (ref 70–99)
GLUCOSE SERPL-MCNC: 246 MG/DL — HIGH (ref 70–99)
HCT VFR BLD CALC: 34.8 % — SIGNIFICANT CHANGE UP (ref 34.5–45)
HCT VFR BLD CALC: 34.8 % — SIGNIFICANT CHANGE UP (ref 34.5–45)
HGB BLD-MCNC: 11.5 G/DL — SIGNIFICANT CHANGE UP (ref 11.5–15.5)
HGB BLD-MCNC: 11.5 G/DL — SIGNIFICANT CHANGE UP (ref 11.5–15.5)
MAGNESIUM SERPL-MCNC: 1.7 MG/DL — SIGNIFICANT CHANGE UP (ref 1.6–2.6)
MAGNESIUM SERPL-MCNC: 1.7 MG/DL — SIGNIFICANT CHANGE UP (ref 1.6–2.6)
MCHC RBC-ENTMCNC: 31.3 PG — SIGNIFICANT CHANGE UP (ref 27–34)
MCHC RBC-ENTMCNC: 31.3 PG — SIGNIFICANT CHANGE UP (ref 27–34)
MCHC RBC-ENTMCNC: 33 GM/DL — SIGNIFICANT CHANGE UP (ref 32–36)
MCHC RBC-ENTMCNC: 33 GM/DL — SIGNIFICANT CHANGE UP (ref 32–36)
MCV RBC AUTO: 94.8 FL — SIGNIFICANT CHANGE UP (ref 80–100)
MCV RBC AUTO: 94.8 FL — SIGNIFICANT CHANGE UP (ref 80–100)
NRBC # BLD: 0 /100 WBCS — SIGNIFICANT CHANGE UP (ref 0–0)
NRBC # BLD: 0 /100 WBCS — SIGNIFICANT CHANGE UP (ref 0–0)
PHOSPHATE SERPL-MCNC: 2.2 MG/DL — LOW (ref 2.5–4.5)
PHOSPHATE SERPL-MCNC: 2.2 MG/DL — LOW (ref 2.5–4.5)
PLATELET # BLD AUTO: 150 K/UL — SIGNIFICANT CHANGE UP (ref 150–400)
PLATELET # BLD AUTO: 150 K/UL — SIGNIFICANT CHANGE UP (ref 150–400)
POTASSIUM SERPL-MCNC: 3.5 MMOL/L — SIGNIFICANT CHANGE UP (ref 3.5–5.3)
POTASSIUM SERPL-MCNC: 3.5 MMOL/L — SIGNIFICANT CHANGE UP (ref 3.5–5.3)
POTASSIUM SERPL-SCNC: 3.5 MMOL/L — SIGNIFICANT CHANGE UP (ref 3.5–5.3)
POTASSIUM SERPL-SCNC: 3.5 MMOL/L — SIGNIFICANT CHANGE UP (ref 3.5–5.3)
RBC # BLD: 3.67 M/UL — LOW (ref 3.8–5.2)
RBC # BLD: 3.67 M/UL — LOW (ref 3.8–5.2)
RBC # FLD: 14.1 % — SIGNIFICANT CHANGE UP (ref 10.3–14.5)
RBC # FLD: 14.1 % — SIGNIFICANT CHANGE UP (ref 10.3–14.5)
SODIUM SERPL-SCNC: 140 MMOL/L — SIGNIFICANT CHANGE UP (ref 135–145)
SODIUM SERPL-SCNC: 140 MMOL/L — SIGNIFICANT CHANGE UP (ref 135–145)
WBC # BLD: 5.8 K/UL — SIGNIFICANT CHANGE UP (ref 3.8–10.5)
WBC # BLD: 5.8 K/UL — SIGNIFICANT CHANGE UP (ref 3.8–10.5)
WBC # FLD AUTO: 5.8 K/UL — SIGNIFICANT CHANGE UP (ref 3.8–10.5)
WBC # FLD AUTO: 5.8 K/UL — SIGNIFICANT CHANGE UP (ref 3.8–10.5)

## 2023-11-30 PROCEDURE — 99233 SBSQ HOSP IP/OBS HIGH 50: CPT | Mod: GC

## 2023-11-30 PROCEDURE — 93010 ELECTROCARDIOGRAM REPORT: CPT

## 2023-11-30 RX ORDER — INSULIN GLARGINE 100 [IU]/ML
6 INJECTION, SOLUTION SUBCUTANEOUS AT BEDTIME
Refills: 0 | Status: DISCONTINUED | OUTPATIENT
Start: 2023-11-30 | End: 2023-12-02

## 2023-11-30 RX ORDER — DILTIAZEM HCL 120 MG
60 CAPSULE, EXT RELEASE 24 HR ORAL ONCE
Refills: 0 | Status: COMPLETED | OUTPATIENT
Start: 2023-11-30 | End: 2023-11-30

## 2023-11-30 RX ORDER — DILTIAZEM HCL 120 MG
240 CAPSULE, EXT RELEASE 24 HR ORAL DAILY
Refills: 0 | Status: DISCONTINUED | OUTPATIENT
Start: 2023-11-30 | End: 2023-12-05

## 2023-11-30 RX ADMIN — INSULIN GLARGINE 6 UNIT(S): 100 INJECTION, SOLUTION SUBCUTANEOUS at 22:20

## 2023-11-30 RX ADMIN — Medication 650 MILLIGRAM(S): at 02:01

## 2023-11-30 RX ADMIN — Medication 180 MILLIGRAM(S): at 06:49

## 2023-11-30 RX ADMIN — APIXABAN 5 MILLIGRAM(S): 2.5 TABLET, FILM COATED ORAL at 17:19

## 2023-11-30 RX ADMIN — Medication 5: at 11:50

## 2023-11-30 RX ADMIN — Medication 2: at 08:05

## 2023-11-30 RX ADMIN — Medication 3: at 21:30

## 2023-11-30 RX ADMIN — ATORVASTATIN CALCIUM 20 MILLIGRAM(S): 80 TABLET, FILM COATED ORAL at 21:17

## 2023-11-30 RX ADMIN — Medication 100 MILLIGRAM(S): at 06:49

## 2023-11-30 RX ADMIN — Medication 60 MILLIGRAM(S): at 16:15

## 2023-11-30 RX ADMIN — Medication 650 MILLIGRAM(S): at 21:17

## 2023-11-30 RX ADMIN — APIXABAN 5 MILLIGRAM(S): 2.5 TABLET, FILM COATED ORAL at 06:48

## 2023-11-30 RX ADMIN — Medication 3: at 17:16

## 2023-11-30 RX ADMIN — Medication 100 MILLIGRAM(S): at 17:19

## 2023-11-30 RX ADMIN — Medication 650 MILLIGRAM(S): at 03:48

## 2023-11-30 RX ADMIN — LISINOPRIL 10 MILLIGRAM(S): 2.5 TABLET ORAL at 06:49

## 2023-11-30 RX ADMIN — Medication 650 MILLIGRAM(S): at 22:20

## 2023-11-30 NOTE — PROGRESS NOTE ADULT - ASSESSMENT
77 F, from home, ambulates independently, lives alone, PMHx of Afib on Eliquis, HTN, DM, HLD presenting after mechanical fall. Found to have a left humeral fracture and was admitted for inability to care for herself alone at home post-fracture. Ortho was consulted - patient pending rehab decision - will schedule patient for 12/10 re-admission through the ED for procedure 12/12. If not approved for rehab, will DC on pain control and appointment for procedure as mentioned prior.   77 F, from home, ambulates independently, lives alone, PMHx of Afib on Eliquis, HTN, DM, HLD presenting after mechanical fall. Found to have a left humeral fracture and was admitted for inability to care for herself alone at home post-fracture. Ortho was consulted - patient pending rehab decision - will schedule patient for 12/10 re-admission through the ED for procedure 12/12. If not approved for rehab, will DC on pain control and appointment for procedure as mentioned prior. Hospital course c/b Afib with RVR.

## 2023-11-30 NOTE — PROGRESS NOTE ADULT - SUBJECTIVE AND OBJECTIVE BOX
PGY-1 Progress Note discussed with attending.    PAGER #: [[phone]] TILL 5:00 PM  PLEASE CONTACT ON CALL TEAM:  - On Call Team (Please refer to Diana) FROM 5:00 PM - 8:30PM  - Nightfloat Team FROM 8:30 -7:30 AM      INTERVAL HPI/OVERNIGHT EVENTS:       REVIEW OF SYSTEMS:  CONSTITUTIONAL: No fever, weight loss, or fatigue.  RESPIRATORY: No cough, wheezing, chills, or hemoptysis. No shortness of breath.  CARDIOVASCULAR: No chest pain, palpitations, dizziness, or leg swelling.  GASTROINTESTINAL: No abdominal pain. No nausea, vomiting, or hematemesis. No diarrhea or constipation. No melena or hematochezia.  GENITOURINARY: No dysuria or hematuria, urinary frequency.  NEUROLOGICAL: No headaches, memory loss, loss of strength, numbness, or tremors.  SKIN: +L breast itchiness.     Vital Signs Last 24 Hrs  T(C): 36.5 (30 Nov 2023 04:36), Max: 36.8 (29 Nov 2023 20:32)  T(F): 97.7 (30 Nov 2023 04:36), Max: 98.2 (29 Nov 2023 20:32)  HR: 104 (30 Nov 2023 04:36) (81 - 104)  BP: 139/100 (30 Nov 2023 04:36) (100/74 - 142/80)  BP(mean): --  RR: 18 (30 Nov 2023 04:36) (17 - 18)  SpO2: 96% (30 Nov 2023 04:36) (96% - 100%)    Parameters below as of 30 Nov 2023 04:36  Patient On (Oxygen Delivery Method): room air        PHYSICAL EXAMINATION:  GENERAL: NAD   HEAD:  Atraumatic, Normocephalic.  EYES:  Conjunctiva and sclera clear.  NECK: Supple, No JVD, Normal thyroid.  CHEST/LUNG: Clear to auscultation. Clear to percussion bilaterally. No rales, rhonchi, wheezing, or rubs.  HEART: Regular rate and rhythm. No murmurs, rubs, or gallops.  ABDOMEN: Soft, Nontender, Nondistended. Bowel sounds present.  NERVOUS SYSTEM:  Alert & Oriented X3  EXTREMITIES: +L shoulder in sling. +chronic lower extremity skin changes. +varicose veins. 2+ Peripheral Pulses. Sensation intact to light touch. No clubbing, cyanosis, or edema.  SKIN: Warm, dry.                          12.4   6.96  )-----------( 147      ( 29 Nov 2023 06:10 )             37.4     11-29    138  |  107  |  21<H>  ----------------------------<  281<H>  3.6   |  26  |  0.81    Ca    9.4      29 Nov 2023 06:10  Phos  2.3     11-29  Mg     1.9     11-29                CAPILLARY BLOOD GLUCOSE:      RADIOLOGY & ADDITIONAL TESTS:               PGY-1 Progress Note discussed with attending.    PAGER #: [[phone]] TILL 5:00 PM  PLEASE CONTACT ON CALL TEAM:  - On Call Team (Please refer to Diana) FROM 5:00 PM - 8:30PM  - Nightfloat Team FROM 8:30 -7:30 AM      INTERVAL HPI/OVERNIGHT EVENTS:       REVIEW OF SYSTEMS:  CONSTITUTIONAL: No fever, weight loss, or fatigue.  RESPIRATORY: No cough, wheezing, chills, or hemoptysis. No shortness of breath.  CARDIOVASCULAR: No chest pain, palpitations, dizziness, or leg swelling.  GASTROINTESTINAL: No abdominal pain. No nausea, vomiting, or hematemesis. No diarrhea or constipation. No melena or hematochezia.  GENITOURINARY: No dysuria or hematuria, urinary frequency.  NEUROLOGICAL: No headaches, memory loss, loss of strength, numbness, or tremors.  SKIN: +L breast itchiness.     Vital Signs Last 24 Hrs  T(C): 36.5 (30 Nov 2023 04:36), Max: 36.8 (29 Nov 2023 20:32)  T(F): 97.7 (30 Nov 2023 04:36), Max: 98.2 (29 Nov 2023 20:32)  HR: 104 (30 Nov 2023 04:36) (81 - 104)  BP: 139/100 (30 Nov 2023 04:36) (100/74 - 142/80)  BP(mean): --  RR: 18 (30 Nov 2023 04:36) (17 - 18)  SpO2: 96% (30 Nov 2023 04:36) (96% - 100%)    Parameters below as of 30 Nov 2023 04:36  Patient On (Oxygen Delivery Method): room air        PHYSICAL EXAMINATION:  GENERAL: NAD   HEAD:  Atraumatic, Normocephalic.  EYES:  Conjunctiva and sclera clear.  NECK: Supple, No JVD, Normal thyroid.  CHEST/LUNG: Clear to auscultation. Clear to percussion bilaterally. No rales, rhonchi, wheezing, or rubs.  HEART: Regular rate and rhythm. No murmurs, rubs, or gallops.  ABDOMEN: Soft, Nontender, Nondistended. Bowel sounds present.  NERVOUS SYSTEM:  Alert & Oriented X3  EXTREMITIES: +L shoulder in sling. +chronic lower extremity skin changes. +varicose veins. 2+ Peripheral Pulses. Sensation intact to light touch. No clubbing, cyanosis, or edema.  SKIN: Warm, dry.                          12.4   6.96  )-----------( 147      ( 29 Nov 2023 06:10 )             37.4     11-29    138  |  107  |  21<H>  ----------------------------<  281<H>  3.6   |  26  |  0.81    Ca    9.4      29 Nov 2023 06:10  Phos  2.3     11-29  Mg     1.9     11-29                CAPILLARY BLOOD GLUCOSE:      RADIOLOGY & ADDITIONAL TESTS:               PGY-1 Progress Note discussed with attending.    PAGER #: [[phone]] TILL 5:00 PM  PLEASE CONTACT ON CALL TEAM:  - On Call Team (Please refer to Diana) FROM 5:00 PM - 8:30PM  - Nightfloat Team FROM 8:30 -7:30 AM      INTERVAL HPI/OVERNIGHT EVENTS:       REVIEW OF SYSTEMS:  CONSTITUTIONAL: No fever, weight loss, or fatigue.  RESPIRATORY: No cough, wheezing, chills, or hemoptysis. No shortness of breath.  CARDIOVASCULAR: No chest pain, palpitations, dizziness, or leg swelling.  GASTROINTESTINAL: No abdominal pain. No nausea, vomiting, or hematemesis. No diarrhea or constipation. No melena or hematochezia.  GENITOURINARY: No dysuria or hematuria, urinary frequency.  NEUROLOGICAL: No headaches, memory loss, loss of strength, numbness, or tremors.  SKIN: +L breast itchiness.     Vital Signs Last 24 Hrs  T(C): 36.5 (30 Nov 2023 04:36), Max: 36.8 (29 Nov 2023 20:32)  T(F): 97.7 (30 Nov 2023 04:36), Max: 98.2 (29 Nov 2023 20:32)  HR: 104 (30 Nov 2023 04:36) (81 - 104)  BP: 139/100 (30 Nov 2023 04:36) (100/74 - 142/80)  BP(mean): --  RR: 18 (30 Nov 2023 04:36) (17 - 18)  SpO2: 96% (30 Nov 2023 04:36) (96% - 100%)    Parameters below as of 30 Nov 2023 04:36  Patient On (Oxygen Delivery Method): room air        PHYSICAL EXAMINATION:  GENERAL: NAD   HEAD:  Atraumatic, Normocephalic.  EYES:  Conjunctiva and sclera clear.  NECK: Supple, No JVD, Normal thyroid.  CHEST/LUNG: Clear to auscultation. Clear to percussion bilaterally. No rales, rhonchi, wheezing, or rubs.  HEART: Regular rate and rhythm. No murmurs, rubs, or gallops.  ABDOMEN: Soft, Nontender, Nondistended. Bowel sounds present.  NERVOUS SYSTEM:  Alert & Oriented X3  EXTREMITIES: +L shoulder in sling. +chronic lower extremity skin changes. +varicose veins. 2+ Peripheral Pulses. Sensation intact to light touch. No clubbing, cyanosis, or edema.  SKIN: Warm, dry.                          12.4   6.96  )-----------( 147      ( 29 Nov 2023 06:10 )             37.4     11-29    138  |  107  |  21<H>  ----------------------------<  281<H>  3.6   |  26  |  0.81    Ca    9.4      29 Nov 2023 06:10  Phos  2.3     11-29  Mg     1.9     11-29                CAPILLARY BLOOD GLUCOSE:      RADIOLOGY & ADDITIONAL TESTS:               PGY-1 Progress Note discussed with attending.    PAGER #: [[phone]] TILL 5:00 PM  PLEASE CONTACT ON CALL TEAM:  - On Call Team (Please refer to Diana) FROM 5:00 PM - 8:30PM  - Nightfloat Team FROM 8:30 -7:30 AM      INTERVAL HPI/OVERNIGHT EVENTS: Patient was examined at bedside this morning. Reports intermittent palpitations. States she is very worried about her symptoms. Denies any other complaints. Denies chest pain.       REVIEW OF SYSTEMS:  CONSTITUTIONAL: No fever, weight loss, or fatigue.  RESPIRATORY: No cough, wheezing, chills, or hemoptysis. No shortness of breath.  CARDIOVASCULAR: No chest pain, or leg swelling. + Palpitations  GASTROINTESTINAL: No abdominal pain. No nausea, vomiting, or hematemesis. No diarrhea or constipation. No melena or hematochezia.  GENITOURINARY: No dysuria or hematuria, urinary frequency.  NEUROLOGICAL: No headaches, memory loss, loss of strength, numbness, or tremors.  SKIN: No rashes    Vital Signs Last 24 Hrs  T(C): 36.5 (30 Nov 2023 04:36), Max: 36.8 (29 Nov 2023 20:32)  T(F): 97.7 (30 Nov 2023 04:36), Max: 98.2 (29 Nov 2023 20:32)  HR: 104 (30 Nov 2023 04:36) (81 - 104)  BP: 139/100 (30 Nov 2023 04:36) (100/74 - 142/80)  BP(mean): --  RR: 18 (30 Nov 2023 04:36) (17 - 18)  SpO2: 96% (30 Nov 2023 04:36) (96% - 100%)    Parameters below as of 30 Nov 2023 04:36  Patient On (Oxygen Delivery Method): room air        PHYSICAL EXAMINATION:  GENERAL: Elderly female sitting in chair, NAD  HEAD:  Atraumatic, Normocephalic.  EYES:  Conjunctiva and sclera clear.  NECK: Supple, No JVD, Normal thyroid.  CHEST/LUNG: Clear to auscultation. Clear to percussion bilaterally. No rales, rhonchi, wheezing, or rubs.  HEART: Irregular rate and rhythm. No murmurs, rubs, or gallops.  ABDOMEN: Soft, Nontender, Nondistended. Bowel sounds present.  NERVOUS SYSTEM:  Alert & Oriented X3  EXTREMITIES: +L shoulder in sling. +chronic lower extremity skin changes. +varicose veins. 2+ Peripheral Pulses. Sensation intact to light touch. No clubbing, cyanosis, or edema.  SKIN: Warm, dry.                          12.4   6.96  )-----------( 147      ( 29 Nov 2023 06:10 )             37.4     11-29    138  |  107  |  21<H>  ----------------------------<  281<H>  3.6   |  26  |  0.81    Ca    9.4      29 Nov 2023 06:10  Phos  2.3     11-29  Mg     1.9     11-29                CAPILLARY BLOOD GLUCOSE:      RADIOLOGY & ADDITIONAL TESTS:               PGY-1 Progress Note discussed with attending.    PAGER #: [[phone]] TILL 5:00 PM  PLEASE CONTACT ON CALL TEAM:  - On Call Team (Please refer to Diana) FROM 5:00 PM - 8:30PM  - Nightfloat Team FROM 8:30 -7:30 AM      INTERVAL HPI/OVERNIGHT EVENTS: Patient was examined at bedside this morning. Reports intermittent palpitations. States she is very worried about her symptoms. Denies any other complaints. Denies chest pain.       REVIEW OF SYSTEMS:  CONSTITUTIONAL: No fever, weight loss, or fatigue.  RESPIRATORY: No cough, wheezing, chills, or hemoptysis. No shortness of breath.  CARDIOVASCULAR: No chest pain, or leg swelling. + Palpitations  GASTROINTESTINAL: No abdominal pain. No nausea, vomiting, or hematemesis. No diarrhea or constipation. No melena or hematochezia.  GENITOURINARY: No dysuria or hematuria, urinary frequency.  NEUROLOGICAL: No headaches, memory loss, loss of strength, numbness, or tremors.  SKIN: No rashes    Vital Signs Last 24 Hrs  T(C): 36.5 (30 Nov 2023 04:36), Max: 36.8 (29 Nov 2023 20:32)  T(F): 97.7 (30 Nov 2023 04:36), Max: 98.2 (29 Nov 2023 20:32)  HR: 104 (30 Nov 2023 04:36) (81 - 104)  BP: 139/100 (30 Nov 2023 04:36) (100/74 - 142/80)  BP(mean): --  RR: 18 (30 Nov 2023 04:36) (17 - 18)  SpO2: 96% (30 Nov 2023 04:36) (96% - 100%)    Parameters below as of 30 Nov 2023 04:36  Patient On (Oxygen Delivery Method): room air        PHYSICAL EXAMINATION:  GENERAL: Elderly female sitting in chair, NAD  HEAD:  Atraumatic, Normocephalic.  EYES:  Conjunctiva and sclera clear.  NECK: Supple, No JVD, Normal thyroid.  CHEST/LUNG: Clear to auscultation. Clear to percussion bilaterally. No rales, rhonchi, wheezing, or rubs.  HEART: Irregular rate and rhythm. No murmurs, rubs, or gallops.  ABDOMEN: Soft, Nontender, Nondistended. Bowel sounds present.  NERVOUS SYSTEM:  Alert & Oriented X3  EXTREMITIES: +L shoulder in sling. +chronic lower extremity skin changes. +varicose veins. 2+ Peripheral Pulses. Sensation intact to light touch. No clubbing, cyanosis, or edema.  SKIN: Warm, dry.                          12.4   6.96  )-----------( 147      ( 29 Nov 2023 06:10 )             37.4     11-29    138  |  107  |  21<H>  ----------------------------<  281<H>  3.6   |  26  |  0.81    Ca    9.4      29 Nov 2023 06:10  Phos  2.3     11-29  Mg     1.9     11-29                CAPILLARY BLOOD GLUCOSE:      RADIOLOGY & ADDITIONAL TESTS:               PGY-1 Progress Note discussed with attending.    PAGER #: [[phone]] TILL 5:00 PM  PLEASE CONTACT ON CALL TEAM:  - On Call Team (Please refer to Diana) FROM 5:00 PM - 8:30PM  - Nightfloat Team FROM 8:30 -7:30 AM      INTERVAL HPI/OVERNIGHT EVENTS: Patient was examined at bedside this morning. Reports intermittent palpitations. States she is very worried about her symptoms. Denies any other complaints. Denies chest pain.       REVIEW OF SYSTEMS:  CONSTITUTIONAL: No fever, weight loss, or fatigue.  RESPIRATORY: No cough, wheezing, chills, or hemoptysis. No shortness of breath.  CARDIOVASCULAR: No chest pain, or leg swelling. + Palpitations  GASTROINTESTINAL: No abdominal pain. No nausea, vomiting, or hematemesis. No diarrhea or constipation. No melena or hematochezia.  GENITOURINARY: No dysuria or hematuria, urinary frequency.  NEUROLOGICAL: No headaches, memory loss, loss of strength, numbness, or tremors.  SKIN: No rashes    Vital Signs Last 24 Hrs  T(C): 36.5 (30 Nov 2023 04:36), Max: 36.8 (29 Nov 2023 20:32)  T(F): 97.7 (30 Nov 2023 04:36), Max: 98.2 (29 Nov 2023 20:32)  HR: 104 (30 Nov 2023 04:36) (81 - 104)  BP: 139/100 (30 Nov 2023 04:36) (100/74 - 142/80)  BP(mean): --  RR: 18 (30 Nov 2023 04:36) (17 - 18)  SpO2: 96% (30 Nov 2023 04:36) (96% - 100%)    Parameters below as of 30 Nov 2023 04:36  Patient On (Oxygen Delivery Method): room air        PHYSICAL EXAMINATION:  GENERAL: Elderly female sitting in chair, NAD  HEAD:  Atraumatic, Normocephalic.  EYES:  Conjunctiva and sclera clear.  NECK: Supple, No JVD, Normal thyroid.  CHEST/LUNG: Clear to auscultation. Clear to percussion bilaterally. No rales, rhonchi, wheezing, or rubs.  HEART: Irregular rate and rhythm. No murmurs, rubs, or gallops.  ABDOMEN: Soft, Nontender, Nondistended. Bowel sounds present.  NERVOUS SYSTEM:  Alert & Oriented X3  EXTREMITIES: +L shoulder in sling. +chronic lower extremity skin changes. +varicose veins. 2+ Peripheral Pulses. Sensation intact to light touch. No clubbing, cyanosis, or edema.  SKIN: Warm, dry.                          12.4   6.96  )-----------( 147      ( 29 Nov 2023 06:10 )             37.4     11-29    138  |  107  |  21<H>  ----------------------------<  281<H>  3.6   |  26  |  0.81    Ca    9.4      29 Nov 2023 06:10  Phos  2.3     11-29  Mg     1.9     11-29                CAPILLARY BLOOD GLUCOSE:      RADIOLOGY & ADDITIONAL TESTS:

## 2023-11-30 NOTE — CHART NOTE - NSCHARTNOTEFT_GEN_A_CORE
notified by RN that patient is complaining of chest pressure    STAT EKG ordered- Notified by RN that patient is complaining of chest pressure    STAT EKG ordered-    Patient examined bedside- VS stable    Patient states she does not have any chest pain/ pressure- She endorses palpitations      EKG reviewed- A fib with - 113 bpm  As per chart review patient is on diltiazem, eliquis,  and metoprolol    PLAN- will continue to monitor           - primary team to follow

## 2023-11-30 NOTE — PROGRESS NOTE ADULT - PROBLEM SELECTOR PLAN 3
h/o A fib, home med diltiazem and Eliquis.  - c/w home meds.  - tele h/o A fib, home med diltiazem and Eliquis.  c/o palpitations this morning, found to be in A fib with RVR  - c/w home meds  - placed back on tele h/o A fib, home med diltiazem and Eliquis.  c/o palpitations this morning, found to be in A fib with RVR  - c/w home meds  - placed back on tele > will consider titrating meds. h/o A fib, home med diltiazem and Eliquis.  c/o palpitations this morning, found to be in A fib with RVR  - c/w home meds  - placed back on tele > will increase to Diltiazem 240mg daily. h/o A fib, home med diltiazem, metoprolol, and Eliquis.  c/o palpitations this morning, found to be in A fib with RVR  - c/w home meds  - placed back on tele > will increase to Diltiazem 240mg daily.

## 2023-11-30 NOTE — PROGRESS NOTE ADULT - PROBLEM SELECTOR PLAN 1
presents after mechanical fall, landed on L. shoulder.  XRAY Left shoulder / humerus - Severely comminuted/displaced proximal humeral fracture.  XRAY Left knee - No fracture/dislocation/suprapatellar effusion at the knee.  MRI shoulder - Acute comminuted and displaced fracture of the left humeral neck with extension to the greater tuberosity, as above. Associated inferior subluxation of the humeral head fragment at the glenohumeral joint.  Ortho consulted - per Ortho, patient to follow up with Dr. El Bauer as outpatient within 1 week, call office at [phone]  for appointment.  >>pending disposition (home vs. rehab) - patient to be re-admitted through ED 12/10 for procedure 12/12.    - f/u CT shoulder - pending official read. presents after mechanical fall, landed on L. shoulder.  XRAY Left shoulder / humerus - Severely comminuted/displaced proximal humeral fracture.  XRAY Left knee - No fracture/dislocation/suprapatellar effusion at the knee.  MRI shoulder - Acute comminuted and displaced fracture of the left humeral neck with extension to the greater tuberosity, as above. Associated inferior subluxation of the humeral head fragment at the glenohumeral joint.  Ortho consulted - per Ortho, patient to follow up with Dr. El Bauer as outpatient within 1 week, call office at [phone]  for appointment.  >>pending disposition (home vs. rehab) - patient to be re-admitted through ED 12/10 for procedure 12/12.    - f/u CT shoulder - pending official read. presents after mechanical fall, landed on L. shoulder.  XRAY Left shoulder / humerus - Severely comminuted/displaced proximal humeral fracture.  XRAY Left knee - No fracture/dislocation/suprapatellar effusion at the knee.  MRI shoulder - Acute comminuted and displaced fracture of the left humeral neck with extension to the greater tuberosity, as above. Associated inferior subluxation of the humeral head fragment at the glenohumeral joint.  Ortho consulted - per Ortho, patient to follow up with Dr. El Bauer as outpatient within 1 week, call office at [phone]  for appointment.  >>pending disposition (home vs. rehab) - patient to be re-admitted through ED 12/10 for procedure 12/12.    - f/u CT shoulder - pending official read.

## 2023-12-01 LAB
ANION GAP SERPL CALC-SCNC: 5 MMOL/L — SIGNIFICANT CHANGE UP (ref 5–17)
ANION GAP SERPL CALC-SCNC: 5 MMOL/L — SIGNIFICANT CHANGE UP (ref 5–17)
BUN SERPL-MCNC: 15 MG/DL — SIGNIFICANT CHANGE UP (ref 7–18)
BUN SERPL-MCNC: 15 MG/DL — SIGNIFICANT CHANGE UP (ref 7–18)
CALCIUM SERPL-MCNC: 9.1 MG/DL — SIGNIFICANT CHANGE UP (ref 8.4–10.5)
CALCIUM SERPL-MCNC: 9.1 MG/DL — SIGNIFICANT CHANGE UP (ref 8.4–10.5)
CHLORIDE SERPL-SCNC: 105 MMOL/L — SIGNIFICANT CHANGE UP (ref 96–108)
CHLORIDE SERPL-SCNC: 105 MMOL/L — SIGNIFICANT CHANGE UP (ref 96–108)
CO2 SERPL-SCNC: 29 MMOL/L — SIGNIFICANT CHANGE UP (ref 22–31)
CO2 SERPL-SCNC: 29 MMOL/L — SIGNIFICANT CHANGE UP (ref 22–31)
CREAT SERPL-MCNC: 0.71 MG/DL — SIGNIFICANT CHANGE UP (ref 0.5–1.3)
CREAT SERPL-MCNC: 0.71 MG/DL — SIGNIFICANT CHANGE UP (ref 0.5–1.3)
EGFR: 87 ML/MIN/1.73M2 — SIGNIFICANT CHANGE UP
EGFR: 87 ML/MIN/1.73M2 — SIGNIFICANT CHANGE UP
GLUCOSE BLDC GLUCOMTR-MCNC: 205 MG/DL — HIGH (ref 70–99)
GLUCOSE BLDC GLUCOMTR-MCNC: 205 MG/DL — HIGH (ref 70–99)
GLUCOSE BLDC GLUCOMTR-MCNC: 226 MG/DL — HIGH (ref 70–99)
GLUCOSE BLDC GLUCOMTR-MCNC: 226 MG/DL — HIGH (ref 70–99)
GLUCOSE BLDC GLUCOMTR-MCNC: 290 MG/DL — HIGH (ref 70–99)
GLUCOSE BLDC GLUCOMTR-MCNC: 290 MG/DL — HIGH (ref 70–99)
GLUCOSE BLDC GLUCOMTR-MCNC: 350 MG/DL — HIGH (ref 70–99)
GLUCOSE BLDC GLUCOMTR-MCNC: 350 MG/DL — HIGH (ref 70–99)
GLUCOSE SERPL-MCNC: 250 MG/DL — HIGH (ref 70–99)
GLUCOSE SERPL-MCNC: 250 MG/DL — HIGH (ref 70–99)
HCT VFR BLD CALC: 36.3 % — SIGNIFICANT CHANGE UP (ref 34.5–45)
HCT VFR BLD CALC: 36.3 % — SIGNIFICANT CHANGE UP (ref 34.5–45)
HGB BLD-MCNC: 11.8 G/DL — SIGNIFICANT CHANGE UP (ref 11.5–15.5)
HGB BLD-MCNC: 11.8 G/DL — SIGNIFICANT CHANGE UP (ref 11.5–15.5)
MAGNESIUM SERPL-MCNC: 1.5 MG/DL — LOW (ref 1.6–2.6)
MAGNESIUM SERPL-MCNC: 1.5 MG/DL — LOW (ref 1.6–2.6)
MCHC RBC-ENTMCNC: 31.3 PG — SIGNIFICANT CHANGE UP (ref 27–34)
MCHC RBC-ENTMCNC: 31.3 PG — SIGNIFICANT CHANGE UP (ref 27–34)
MCHC RBC-ENTMCNC: 32.5 GM/DL — SIGNIFICANT CHANGE UP (ref 32–36)
MCHC RBC-ENTMCNC: 32.5 GM/DL — SIGNIFICANT CHANGE UP (ref 32–36)
MCV RBC AUTO: 96.3 FL — SIGNIFICANT CHANGE UP (ref 80–100)
MCV RBC AUTO: 96.3 FL — SIGNIFICANT CHANGE UP (ref 80–100)
NRBC # BLD: 0 /100 WBCS — SIGNIFICANT CHANGE UP (ref 0–0)
NRBC # BLD: 0 /100 WBCS — SIGNIFICANT CHANGE UP (ref 0–0)
PHOSPHATE SERPL-MCNC: 2 MG/DL — LOW (ref 2.5–4.5)
PHOSPHATE SERPL-MCNC: 2 MG/DL — LOW (ref 2.5–4.5)
PLATELET # BLD AUTO: 152 K/UL — SIGNIFICANT CHANGE UP (ref 150–400)
PLATELET # BLD AUTO: 152 K/UL — SIGNIFICANT CHANGE UP (ref 150–400)
POTASSIUM SERPL-MCNC: 3.6 MMOL/L — SIGNIFICANT CHANGE UP (ref 3.5–5.3)
POTASSIUM SERPL-MCNC: 3.6 MMOL/L — SIGNIFICANT CHANGE UP (ref 3.5–5.3)
POTASSIUM SERPL-SCNC: 3.6 MMOL/L — SIGNIFICANT CHANGE UP (ref 3.5–5.3)
POTASSIUM SERPL-SCNC: 3.6 MMOL/L — SIGNIFICANT CHANGE UP (ref 3.5–5.3)
RBC # BLD: 3.77 M/UL — LOW (ref 3.8–5.2)
RBC # BLD: 3.77 M/UL — LOW (ref 3.8–5.2)
RBC # FLD: 13.9 % — SIGNIFICANT CHANGE UP (ref 10.3–14.5)
RBC # FLD: 13.9 % — SIGNIFICANT CHANGE UP (ref 10.3–14.5)
SODIUM SERPL-SCNC: 139 MMOL/L — SIGNIFICANT CHANGE UP (ref 135–145)
SODIUM SERPL-SCNC: 139 MMOL/L — SIGNIFICANT CHANGE UP (ref 135–145)
WBC # BLD: 6.55 K/UL — SIGNIFICANT CHANGE UP (ref 3.8–10.5)
WBC # BLD: 6.55 K/UL — SIGNIFICANT CHANGE UP (ref 3.8–10.5)
WBC # FLD AUTO: 6.55 K/UL — SIGNIFICANT CHANGE UP (ref 3.8–10.5)
WBC # FLD AUTO: 6.55 K/UL — SIGNIFICANT CHANGE UP (ref 3.8–10.5)

## 2023-12-01 PROCEDURE — 99232 SBSQ HOSP IP/OBS MODERATE 35: CPT | Mod: GC

## 2023-12-01 RX ORDER — INSULIN LISPRO 100/ML
VIAL (ML) SUBCUTANEOUS
Refills: 0 | Status: DISCONTINUED | OUTPATIENT
Start: 2023-12-01 | End: 2023-12-04

## 2023-12-01 RX ORDER — INSULIN LISPRO 100/ML
VIAL (ML) SUBCUTANEOUS AT BEDTIME
Refills: 0 | Status: DISCONTINUED | OUTPATIENT
Start: 2023-12-01 | End: 2023-12-04

## 2023-12-01 RX ORDER — MAGNESIUM SULFATE 500 MG/ML
1 VIAL (ML) INJECTION ONCE
Refills: 0 | Status: COMPLETED | OUTPATIENT
Start: 2023-12-01 | End: 2023-12-01

## 2023-12-01 RX ADMIN — Medication 4: at 17:08

## 2023-12-01 RX ADMIN — Medication 650 MILLIGRAM(S): at 08:17

## 2023-12-01 RX ADMIN — Medication 8: at 12:12

## 2023-12-01 RX ADMIN — Medication 2: at 21:42

## 2023-12-01 RX ADMIN — LISINOPRIL 10 MILLIGRAM(S): 2.5 TABLET ORAL at 05:53

## 2023-12-01 RX ADMIN — Medication 100 MILLIGRAM(S): at 05:53

## 2023-12-01 RX ADMIN — Medication 100 GRAM(S): at 11:21

## 2023-12-01 RX ADMIN — APIXABAN 5 MILLIGRAM(S): 2.5 TABLET, FILM COATED ORAL at 17:36

## 2023-12-01 RX ADMIN — INSULIN GLARGINE 6 UNIT(S): 100 INJECTION, SOLUTION SUBCUTANEOUS at 21:42

## 2023-12-01 RX ADMIN — Medication 650 MILLIGRAM(S): at 08:47

## 2023-12-01 RX ADMIN — Medication 240 MILLIGRAM(S): at 05:54

## 2023-12-01 RX ADMIN — APIXABAN 5 MILLIGRAM(S): 2.5 TABLET, FILM COATED ORAL at 05:53

## 2023-12-01 RX ADMIN — Medication 650 MILLIGRAM(S): at 17:36

## 2023-12-01 RX ADMIN — Medication 2: at 08:14

## 2023-12-01 RX ADMIN — ATORVASTATIN CALCIUM 20 MILLIGRAM(S): 80 TABLET, FILM COATED ORAL at 21:42

## 2023-12-01 RX ADMIN — Medication 100 MILLIGRAM(S): at 17:36

## 2023-12-01 NOTE — PROGRESS NOTE ADULT - PROBLEM SELECTOR PLAN 1
presents after mechanical fall, landed on L. shoulder.  XRAY Left shoulder / humerus - Severely comminuted/displaced proximal humeral fracture.  XRAY Left knee - No fracture/dislocation/suprapatellar effusion at the knee.  MRI shoulder - Acute comminuted and displaced fracture of the left humeral neck with extension to the greater tuberosity, as above. Associated inferior subluxation of the humeral head fragment at the glenohumeral joint.  Ortho consulted - per Ortho, patient to follow up with Dr. El Bauer as outpatient within 1 week, call office at [phone]  for appointment.  >>pending disposition (home vs. rehab) - patient to be re-admitted through ED 12/10 for procedure 12/12.    - f/u CT shoulder - pending official read. presents after mechanical fall, landed on L. shoulder.  XRAY Left shoulder / humerus - Severely comminuted/displaced proximal humeral fracture.  XRAY Left knee - No fracture/dislocation/suprapatellar effusion at the knee.  MRI shoulder - Acute comminuted and displaced fracture of the left humeral neck with extension to the greater tuberosity, as above. Associated inferior subluxation of the humeral head fragment at the glenohumeral joint.  Ortho consulted - per Ortho, patient to follow up with Dr. El Bauer as outpatient within 1 week, call office at [phone]  for appointment.  >>pending disposition (home vs. rehab) - patient to be re-admitted through ED 12/10 for procedure 12/12.    - f/u CT shoulder - pending official read. presents after mechanical fall, landed on L. shoulder.  XRAY Left shoulder / humerus - Severely comminuted/displaced proximal humeral fracture.  XRAY Left knee - No fracture/dislocation/suprapatellar effusion at the knee.  MRI shoulder - Acute comminuted and displaced fracture of the left humeral neck with extension to the greater tuberosity, as above. Associated inferior subluxation of the humeral head fragment at the glenohumeral joint.  Ortho consulted - per Ortho, patient to follow up with Dr. El Bauer as outpatient within 1 week, call office at [phone]  for appointment.  >>pending disposition (home vs. rehab) - patient to be re-admitted through ED 12/10 for procedure 12/12.    - f/u CT shoulder - pending official read.

## 2023-12-01 NOTE — PROGRESS NOTE ADULT - ASSESSMENT
77 F, from home, ambulates independently, lives alone, PMHx of Afib on Eliquis, HTN, DM, HLD presenting after mechanical fall. Found to have a left humeral fracture and was admitted for inability to care for herself alone at home post-fracture. Ortho was consulted - patient pending rehab decision - will schedule patient for 12/10 re-admission through the ED for procedure 12/12. If not approved for rehab, will DC on pain control and appointment for procedure as mentioned prior. Hospital course c/b Afib with RVR.

## 2023-12-01 NOTE — PROGRESS NOTE ADULT - SUBJECTIVE AND OBJECTIVE BOX
"Date of Service: 08/28/20    CC: Left elbow Monteggia fracture    HPI: Irish Ferguson is a 5 y.o. female who presents with complaints of pain to left elbow.  This started Wednesday after ground-level fall on outstretched arm.  She was seen at [facility] yesterday where x-rays showed her to have a radial head dislocation and possible ulna fracture consistent with a Monteggia injury.  Attempted closed reduction was done but was unsuccessful.  She was placed into a long-arm splint and comes in today for more definitive treatment.  The pain is 4/10 and is described as achy.  The pain is made worse by palpation of the area and made better by rest and immobilization.    PMH: History reviewed. No pertinent past medical history.    PSH: History reviewed. No pertinent surgical history.    FH: History reviewed. No pertinent family history.    SH:   Social History     Lifestyle   • Physical activity     Days per week: Not on file     Minutes per session: Not on file   • Stress: Not on file   Relationships   • Social connections     Talks on phone: Not on file     Gets together: Not on file     Attends Jew service: Not on file     Active member of club or organization: Not on file     Attends meetings of clubs or organizations: Not on file     Relationship status: Not on file   • Intimate partner violence     Fear of current or ex partner: Not on file     Emotionally abused: Not on file     Physically abused: Not on file     Forced sexual activity: Not on file   Other Topics Concern   • Not on file   Social History Narrative   • Not on file       ROS: A 10 system review of systems was negative outside what was listed in the HPI    BP (!) 94/31   Pulse 74   Temp 36.6 °C (97.8 °F) (Temporal)   Resp 24   Ht 1.118 m (3' 8\")   Wt 20.9 kg (46 lb 1.2 oz)   SpO2 99%     Physical Exam:  General: AAOx3, NAD  HEENT: normocephalic, atraumatic  Psych: Normal mood and affect  Neck: supple, no pain to motion  Chest/Pulmonary: " breathing unlabored, no audible wheezing  Cardio: regular heart rate and rhythm  Neuro: sensation grossly intact to BUE and BLE, moving all four extremities  Skin: intact with no full thickness abrasions or lacerations  MSK: Left long-arm splint intact good capillary refill at the fingers with normal sensation normal motor function.  No pain to shoulder range of motion.    Imaging and labs: X-rays from outside facility show a radial head dislocation and likely some plastic deformation to the ulna    Assessment: Left elbow Monteggia fracture    We discussed the diagnosis and findings with the patient and her family at length.  We reviewed possible non operative and operative interventions and the risks and benefits of these.  Recommended close reduction and casting.  They had a chance to ask questions and all of these were answered to her satisfaction.        Plan:  Close reduction and casting of Monteggia fracture  Discharged home after procedure  Follow-up in clinic in 1 week for x-ray check     PGY-1 Progress Note discussed with attending.    PAGER #: [[phone]] TILL 5:00 PM  PLEASE CONTACT ON CALL TEAM:  - On Call Team (Please refer to Diana) FROM 5:00 PM - 8:30PM  - Nightfloat Team FROM 8:30 -7:30 AM      INTERVAL HPI/OVERNIGHT EVENTS:       REVIEW OF SYSTEMS:  CONSTITUTIONAL: No fever, weight loss, or fatigue.  RESPIRATORY: No cough, wheezing, chills, or hemoptysis. No shortness of breath.  CARDIOVASCULAR: No chest pain, palpitations, dizziness, or leg swelling.  GASTROINTESTINAL: No abdominal pain. No nausea, vomiting, or hematemesis. No diarrhea or constipation. No melena or hematochezia.  GENITOURINARY: No dysuria or hematuria, urinary frequency.  NEUROLOGICAL: No headaches, memory loss, loss of strength, numbness, or tremors.  SKIN: No itching, burning, rashes, or lesions.    Vital Signs Last 24 Hrs  T(C): 36.7 (01 Dec 2023 07:27), Max: 37.2 (30 Nov 2023 19:31)  T(F): 98.1 (01 Dec 2023 07:27), Max: 99 (30 Nov 2023 19:31)  HR: 76 (01 Dec 2023 07:27) (76 - 104)  BP: 134/81 (01 Dec 2023 07:27) (121/77 - 140/84)  BP(mean): --  RR: 18 (01 Dec 2023 07:27) (17 - 18)  SpO2: 98% (01 Dec 2023 07:27) (98% - 99%)    Parameters below as of 01 Dec 2023 07:27  Patient On (Oxygen Delivery Method): room air        PHYSICAL EXAMINATION:  GENERAL: NAD   HEAD:  Atraumatic, Normocephalic.  EYES:  Conjunctiva and sclera clear.  NECK: Supple, No JVD, Normal thyroid.  CHEST/LUNG: Clear to auscultation. Clear to percussion bilaterally. No rales, rhonchi, wheezing, or rubs.  HEART: Regular rate and rhythm. No murmurs, rubs, or gallops.  ABDOMEN: Soft, Nontender, Nondistended. Bowel sounds present.  NERVOUS SYSTEM:  Alert & Oriented X3  EXTREMITIES:  2+ Peripheral Pulses. No clubbing, cyanosis, or edema.  SKIN: Warm, dry.                          11.8   6.55  )-----------( 152      ( 01 Dec 2023 06:23 )             36.3     12-01    139  |  105  |  15  ----------------------------<  250<H>  3.6   |  29  |  0.71    Ca    9.1      01 Dec 2023 06:23  Phos  2.0     12-01  Mg     1.5     12-01                CAPILLARY BLOOD GLUCOSE:      RADIOLOGY & ADDITIONAL TESTS:               PGY-1 Progress Note discussed with attending.    PAGER #: [[phone]] TILL 5:00 PM  PLEASE CONTACT ON CALL TEAM:  - On Call Team (Please refer to Diana) FROM 5:00 PM - 8:30PM  - Nightfloat Team FROM 8:30 -7:30 AM      INTERVAL HPI/OVERNIGHT EVENTS:       REVIEW OF SYSTEMS:  CONSTITUTIONAL: No fever, weight loss, or fatigue.  RESPIRATORY: No cough, wheezing, chills, or hemoptysis. No shortness of breath.  CARDIOVASCULAR: No chest pain, palpitations, dizziness, or leg swelling.  GASTROINTESTINAL: No abdominal pain. No nausea, vomiting, or hematemesis. No diarrhea or constipation. No melena or hematochezia.  GENITOURINARY: No dysuria or hematuria, urinary frequency.  NEUROLOGICAL: No headaches, memory loss, loss of strength, numbness, or tremors.  SKIN: No itching, burning, rashes, or lesions.    Vital Signs Last 24 Hrs  T(C): 36.7 (01 Dec 2023 07:27), Max: 37.2 (30 Nov 2023 19:31)  T(F): 98.1 (01 Dec 2023 07:27), Max: 99 (30 Nov 2023 19:31)  HR: 76 (01 Dec 2023 07:27) (76 - 104)  BP: 134/81 (01 Dec 2023 07:27) (121/77 - 140/84)  BP(mean): --  RR: 18 (01 Dec 2023 07:27) (17 - 18)  SpO2: 98% (01 Dec 2023 07:27) (98% - 99%)    Parameters below as of 01 Dec 2023 07:27  Patient On (Oxygen Delivery Method): room air        PHYSICAL EXAMINATION:  GENERAL: NAD   HEAD:  Atraumatic, Normocephalic.  EYES:  Conjunctiva and sclera clear.  NECK: Supple, No JVD, Normal thyroid.  CHEST/LUNG: Clear to auscultation. Clear to percussion bilaterally. No rales, rhonchi, wheezing, or rubs.  HEART: Regular rate and rhythm. No murmurs, rubs, or gallops.  ABDOMEN: Soft, Nontender, Nondistended. Bowel sounds present.  NERVOUS SYSTEM:  Alert & Oriented X3  EXTREMITIES:  2+ Peripheral Pulses. No clubbing, cyanosis, or edema.  SKIN: Warm, dry.                          11.8   6.55  )-----------( 152      ( 01 Dec 2023 06:23 )             36.3     12-01    139  |  105  |  15  ----------------------------<  250<H>  3.6   |  29  |  0.71    Ca    9.1      01 Dec 2023 06:23  Phos  2.0     12-01  Mg     1.5     12-01                CAPILLARY BLOOD GLUCOSE:      RADIOLOGY & ADDITIONAL TESTS:               PGY-1 Progress Note discussed with attending.    PAGER #: [[phone]] TILL 5:00 PM  PLEASE CONTACT ON CALL TEAM:  - On Call Team (Please refer to Diana) FROM 5:00 PM - 8:30PM  - Nightfloat Team FROM 8:30 -7:30 AM      INTERVAL HPI/OVERNIGHT EVENTS:       REVIEW OF SYSTEMS:  CONSTITUTIONAL: No fever, weight loss, or fatigue.  RESPIRATORY: No cough, wheezing, chills, or hemoptysis. No shortness of breath.  CARDIOVASCULAR: No chest pain, palpitations, dizziness, or leg swelling.  GASTROINTESTINAL: No abdominal pain. No nausea, vomiting, or hematemesis. No diarrhea or constipation. No melena or hematochezia.  GENITOURINARY: No dysuria or hematuria, urinary frequency.  NEUROLOGICAL: No headaches, memory loss, loss of strength, numbness, or tremors.  SKIN: No itching, burning, rashes, or lesions.    Vital Signs Last 24 Hrs  T(C): 36.7 (01 Dec 2023 07:27), Max: 37.2 (30 Nov 2023 19:31)  T(F): 98.1 (01 Dec 2023 07:27), Max: 99 (30 Nov 2023 19:31)  HR: 76 (01 Dec 2023 07:27) (76 - 104)  BP: 134/81 (01 Dec 2023 07:27) (121/77 - 140/84)  BP(mean): --  RR: 18 (01 Dec 2023 07:27) (17 - 18)  SpO2: 98% (01 Dec 2023 07:27) (98% - 99%)    Parameters below as of 01 Dec 2023 07:27  Patient On (Oxygen Delivery Method): room air        PHYSICAL EXAMINATION:  GENERAL: NAD   HEAD:  Atraumatic, Normocephalic.  EYES:  Conjunctiva and sclera clear.  NECK: Supple, No JVD, Normal thyroid.  CHEST/LUNG: Clear to auscultation. Clear to percussion bilaterally. No rales, rhonchi, wheezing, or rubs.  HEART: Regular rate and rhythm. No murmurs, rubs, or gallops.  ABDOMEN: Soft, Nontender, Nondistended. Bowel sounds present.  NERVOUS SYSTEM:  Alert & Oriented X3  EXTREMITIES:  2+ Peripheral Pulses. No clubbing, cyanosis, or edema.  SKIN: Warm, dry.                          11.8   6.55  )-----------( 152      ( 01 Dec 2023 06:23 )             36.3     12-01    139  |  105  |  15  ----------------------------<  250<H>  3.6   |  29  |  0.71    Ca    9.1      01 Dec 2023 06:23  Phos  2.0     12-01  Mg     1.5     12-01                CAPILLARY BLOOD GLUCOSE:      RADIOLOGY & ADDITIONAL TESTS:               PGY-1 Progress Note discussed with attending.    PAGER #: [[phone]] TILL 5:00 PM  PLEASE CONTACT ON CALL TEAM:  - On Call Team (Please refer to Diana) FROM 5:00 PM - 8:30PM  - Nightfloat Team FROM 8:30 -7:30 AM      INTERVAL HPI/OVERNIGHT EVENTS: Patient seen and examined at bedside. Resting comfortably. No acute overnight events.      REVIEW OF SYSTEMS:  CONSTITUTIONAL: No fever, weight loss, or fatigue.  RESPIRATORY: No cough, wheezing, chills, or hemoptysis. No shortness of breath.  CARDIOVASCULAR: No chest pain, palpitations, dizziness, or leg swelling.  GASTROINTESTINAL: No abdominal pain. No nausea, vomiting, or hematemesis. No diarrhea or constipation. No melena or hematochezia.  GENITOURINARY: No dysuria or hematuria, urinary frequency.  NEUROLOGICAL: No headaches, memory loss, loss of strength, numbness, or tremors.  SKIN: No itching, burning, rashes, or lesions.    Vital Signs Last 24 Hrs  T(C): 36.7 (01 Dec 2023 07:27), Max: 37.2 (30 Nov 2023 19:31)  T(F): 98.1 (01 Dec 2023 07:27), Max: 99 (30 Nov 2023 19:31)  HR: 76 (01 Dec 2023 07:27) (76 - 104)  BP: 134/81 (01 Dec 2023 07:27) (121/77 - 140/84)  BP(mean): --  RR: 18 (01 Dec 2023 07:27) (17 - 18)  SpO2: 98% (01 Dec 2023 07:27) (98% - 99%)    Parameters below as of 01 Dec 2023 07:27  Patient On (Oxygen Delivery Method): room air        PHYSICAL EXAMINATION:  GENERAL: Elderly female sitting in chair, NAD  HEAD:  Atraumatic, Normocephalic.  EYES:  Conjunctiva and sclera clear.  NECK: Supple, No JVD, Normal thyroid.  CHEST/LUNG: Clear to auscultation. Clear to percussion bilaterally. No rales, rhonchi, wheezing, or rubs.  HEART: Irregular rate and rhythm. No murmurs, rubs, or gallops.  ABDOMEN: Soft, Nontender, Nondistended. Bowel sounds present.  NERVOUS SYSTEM:  Alert & Oriented X3  EXTREMITIES: +L shoulder in sling. +chronic lower extremity skin changes. +varicose veins. 2+ Peripheral Pulses. Sensation intact to light touch. No clubbing, cyanosis, or edema.  SKIN: Warm, dry.                          11.8   6.55  )-----------( 152      ( 01 Dec 2023 06:23 )             36.3     12-01    139  |  105  |  15  ----------------------------<  250<H>  3.6   |  29  |  0.71    Ca    9.1      01 Dec 2023 06:23  Phos  2.0     12-01  Mg     1.5     12-01                CAPILLARY BLOOD GLUCOSE:      RADIOLOGY & ADDITIONAL TESTS:               PGY-1 Progress Note discussed with attending.    PAGER #: [[phone]] TILL 5:00 PM  PLEASE CONTACT ON CALL TEAM:  - On Call Team (Please refer to Diana) FROM 5:00 PM - 8:30PM  - Nightfloat Team FROM 8:30 -7:30 AM      INTERVAL HPI/OVERNIGHT EVENTS: Patient seen and examined at bedside. Resting comfortably. No acute overnight events.      REVIEW OF SYSTEMS:  CONSTITUTIONAL: No fever, weight loss, or fatigue.  RESPIRATORY: No cough, wheezing, chills, or hemoptysis. No shortness of breath.  CARDIOVASCULAR: No chest pain, palpitations, dizziness, or leg swelling.  GASTROINTESTINAL: No abdominal pain. No nausea, vomiting, or hematemesis. No diarrhea or constipation. No melena or hematochezia.  GENITOURINARY: No dysuria or hematuria, urinary frequency.  NEUROLOGICAL: No headaches, memory loss, loss of strength, numbness, or tremors.  SKIN: No itching, burning, rashes, or lesions.    Vital Signs Last 24 Hrs  T(C): 36.7 (01 Dec 2023 07:27), Max: 37.2 (30 Nov 2023 19:31)  T(F): 98.1 (01 Dec 2023 07:27), Max: 99 (30 Nov 2023 19:31)  HR: 76 (01 Dec 2023 07:27) (76 - 104)  BP: 134/81 (01 Dec 2023 07:27) (121/77 - 140/84)  BP(mean): --  RR: 18 (01 Dec 2023 07:27) (17 - 18)  SpO2: 98% (01 Dec 2023 07:27) (98% - 99%)    Parameters below as of 01 Dec 2023 07:27  Patient On (Oxygen Delivery Method): room air        PHYSICAL EXAMINATION:  GENERAL: Elderly female sitting in chair, NAD  HEAD:  Atraumatic, Normocephalic.  EYES:  Conjunctiva and sclera clear.  NECK: Supple, No JVD, Normal thyroid.  CHEST/LUNG: Clear to auscultation. Clear to percussion bilaterally. No rales, rhonchi, wheezing, or rubs.  HEART: Irregular rate and rhythm. No murmurs, rubs, or gallops.  ABDOMEN: Soft, Nontender, Nondistended. Bowel sounds present.  NERVOUS SYSTEM:  Alert & Oriented X3  EXTREMITIES: +L shoulder in sling. +chronic lower extremity skin changes. +varicose veins. 2+ Peripheral Pulses. Sensation intact to light touch. No clubbing, cyanosis, or edema.  SKIN: Warm, dry.                          11.8   6.55  )-----------( 152      ( 01 Dec 2023 06:23 )             36.3     12-01    139  |  105  |  15  ----------------------------<  250<H>  3.6   |  29  |  0.71    Ca    9.1      01 Dec 2023 06:23  Phos  2.0     12-01  Mg     1.5     12-01                CAPILLARY BLOOD GLUCOSE:      RADIOLOGY & ADDITIONAL TESTS:               PGY-1 Progress Note discussed with attending.    PAGER #: [[phone]] TILL 5:00 PM  PLEASE CONTACT ON CALL TEAM:  - On Call Team (Please refer to Diana) FROM 5:00 PM - 8:30PM  - Nightfloat Team FROM 8:30 -7:30 AM      INTERVAL HPI/OVERNIGHT EVENTS: Patient seen and examined at bedside. Resting comfortably. No acute overnight events.      REVIEW OF SYSTEMS:  CONSTITUTIONAL: No fever, weight loss, or fatigue.  RESPIRATORY: No cough, wheezing, chills, or hemoptysis. No shortness of breath.  CARDIOVASCULAR: No chest pain, palpitations, dizziness, or leg swelling.  GASTROINTESTINAL: No abdominal pain. No nausea, vomiting, or hematemesis. No diarrhea or constipation. No melena or hematochezia.  GENITOURINARY: No dysuria or hematuria, urinary frequency.  NEUROLOGICAL: No headaches, memory loss, loss of strength, numbness, or tremors.  SKIN: No itching, burning, rashes, or lesions.    Vital Signs Last 24 Hrs  T(C): 36.7 (01 Dec 2023 07:27), Max: 37.2 (30 Nov 2023 19:31)  T(F): 98.1 (01 Dec 2023 07:27), Max: 99 (30 Nov 2023 19:31)  HR: 76 (01 Dec 2023 07:27) (76 - 104)  BP: 134/81 (01 Dec 2023 07:27) (121/77 - 140/84)  BP(mean): --  RR: 18 (01 Dec 2023 07:27) (17 - 18)  SpO2: 98% (01 Dec 2023 07:27) (98% - 99%)    Parameters below as of 01 Dec 2023 07:27  Patient On (Oxygen Delivery Method): room air        PHYSICAL EXAMINATION:  GENERAL: Elderly female sitting in chair, NAD  HEAD:  Atraumatic, Normocephalic.  EYES:  Conjunctiva and sclera clear.  NECK: Supple, No JVD, Normal thyroid.  CHEST/LUNG: Clear to auscultation. Clear to percussion bilaterally. No rales, rhonchi, wheezing, or rubs.  HEART: Irregular rate and rhythm. No murmurs, rubs, or gallops.  ABDOMEN: Soft, Nontender, Nondistended. Bowel sounds present.  NERVOUS SYSTEM:  Alert & Oriented X3  EXTREMITIES: +L shoulder in sling. +chronic lower extremity skin changes. +varicose veins. 2+ Peripheral Pulses. Sensation intact to light touch. No clubbing, cyanosis, or edema.  SKIN: Warm, dry.                          11.8   6.55  )-----------( 152      ( 01 Dec 2023 06:23 )             36.3     12-01    139  |  105  |  15  ----------------------------<  250<H>  3.6   |  29  |  0.71    Ca    9.1      01 Dec 2023 06:23  Phos  2.0     12-01  Mg     1.5     12-01                CAPILLARY BLOOD GLUCOSE:      RADIOLOGY & ADDITIONAL TESTS:

## 2023-12-01 NOTE — PROGRESS NOTE ADULT - PROBLEM SELECTOR PLAN 3
h/o A fib, home med diltiazem, metoprolol, and Eliquis.  c/o palpitations this morning, found to be in A fib with RVR  - c/w home meds  - placed back on tele > increased to Diltiazem 240mg daily.

## 2023-12-02 LAB
ANION GAP SERPL CALC-SCNC: 5 MMOL/L — SIGNIFICANT CHANGE UP (ref 5–17)
ANION GAP SERPL CALC-SCNC: 5 MMOL/L — SIGNIFICANT CHANGE UP (ref 5–17)
BUN SERPL-MCNC: 14 MG/DL — SIGNIFICANT CHANGE UP (ref 7–18)
BUN SERPL-MCNC: 14 MG/DL — SIGNIFICANT CHANGE UP (ref 7–18)
CALCIUM SERPL-MCNC: 9.4 MG/DL — SIGNIFICANT CHANGE UP (ref 8.4–10.5)
CALCIUM SERPL-MCNC: 9.4 MG/DL — SIGNIFICANT CHANGE UP (ref 8.4–10.5)
CHLORIDE SERPL-SCNC: 105 MMOL/L — SIGNIFICANT CHANGE UP (ref 96–108)
CHLORIDE SERPL-SCNC: 105 MMOL/L — SIGNIFICANT CHANGE UP (ref 96–108)
CO2 SERPL-SCNC: 28 MMOL/L — SIGNIFICANT CHANGE UP (ref 22–31)
CO2 SERPL-SCNC: 28 MMOL/L — SIGNIFICANT CHANGE UP (ref 22–31)
CREAT SERPL-MCNC: 0.68 MG/DL — SIGNIFICANT CHANGE UP (ref 0.5–1.3)
CREAT SERPL-MCNC: 0.68 MG/DL — SIGNIFICANT CHANGE UP (ref 0.5–1.3)
EGFR: 89 ML/MIN/1.73M2 — SIGNIFICANT CHANGE UP
EGFR: 89 ML/MIN/1.73M2 — SIGNIFICANT CHANGE UP
GLUCOSE BLDC GLUCOMTR-MCNC: 207 MG/DL — HIGH (ref 70–99)
GLUCOSE BLDC GLUCOMTR-MCNC: 207 MG/DL — HIGH (ref 70–99)
GLUCOSE BLDC GLUCOMTR-MCNC: 245 MG/DL — HIGH (ref 70–99)
GLUCOSE BLDC GLUCOMTR-MCNC: 245 MG/DL — HIGH (ref 70–99)
GLUCOSE BLDC GLUCOMTR-MCNC: 255 MG/DL — HIGH (ref 70–99)
GLUCOSE BLDC GLUCOMTR-MCNC: 255 MG/DL — HIGH (ref 70–99)
GLUCOSE BLDC GLUCOMTR-MCNC: 299 MG/DL — HIGH (ref 70–99)
GLUCOSE BLDC GLUCOMTR-MCNC: 299 MG/DL — HIGH (ref 70–99)
GLUCOSE SERPL-MCNC: 247 MG/DL — HIGH (ref 70–99)
GLUCOSE SERPL-MCNC: 247 MG/DL — HIGH (ref 70–99)
HCT VFR BLD CALC: 36.2 % — SIGNIFICANT CHANGE UP (ref 34.5–45)
HCT VFR BLD CALC: 36.2 % — SIGNIFICANT CHANGE UP (ref 34.5–45)
HGB BLD-MCNC: 12 G/DL — SIGNIFICANT CHANGE UP (ref 11.5–15.5)
HGB BLD-MCNC: 12 G/DL — SIGNIFICANT CHANGE UP (ref 11.5–15.5)
MAGNESIUM SERPL-MCNC: 1.6 MG/DL — SIGNIFICANT CHANGE UP (ref 1.6–2.6)
MAGNESIUM SERPL-MCNC: 1.6 MG/DL — SIGNIFICANT CHANGE UP (ref 1.6–2.6)
MCHC RBC-ENTMCNC: 31.5 PG — SIGNIFICANT CHANGE UP (ref 27–34)
MCHC RBC-ENTMCNC: 31.5 PG — SIGNIFICANT CHANGE UP (ref 27–34)
MCHC RBC-ENTMCNC: 33.1 GM/DL — SIGNIFICANT CHANGE UP (ref 32–36)
MCHC RBC-ENTMCNC: 33.1 GM/DL — SIGNIFICANT CHANGE UP (ref 32–36)
MCV RBC AUTO: 95 FL — SIGNIFICANT CHANGE UP (ref 80–100)
MCV RBC AUTO: 95 FL — SIGNIFICANT CHANGE UP (ref 80–100)
NRBC # BLD: 0 /100 WBCS — SIGNIFICANT CHANGE UP (ref 0–0)
NRBC # BLD: 0 /100 WBCS — SIGNIFICANT CHANGE UP (ref 0–0)
PHOSPHATE SERPL-MCNC: 2.1 MG/DL — LOW (ref 2.5–4.5)
PHOSPHATE SERPL-MCNC: 2.1 MG/DL — LOW (ref 2.5–4.5)
PLATELET # BLD AUTO: 157 K/UL — SIGNIFICANT CHANGE UP (ref 150–400)
PLATELET # BLD AUTO: 157 K/UL — SIGNIFICANT CHANGE UP (ref 150–400)
POTASSIUM SERPL-MCNC: 3.4 MMOL/L — LOW (ref 3.5–5.3)
POTASSIUM SERPL-MCNC: 3.4 MMOL/L — LOW (ref 3.5–5.3)
POTASSIUM SERPL-SCNC: 3.4 MMOL/L — LOW (ref 3.5–5.3)
POTASSIUM SERPL-SCNC: 3.4 MMOL/L — LOW (ref 3.5–5.3)
RBC # BLD: 3.81 M/UL — SIGNIFICANT CHANGE UP (ref 3.8–5.2)
RBC # BLD: 3.81 M/UL — SIGNIFICANT CHANGE UP (ref 3.8–5.2)
RBC # FLD: 14.2 % — SIGNIFICANT CHANGE UP (ref 10.3–14.5)
RBC # FLD: 14.2 % — SIGNIFICANT CHANGE UP (ref 10.3–14.5)
SODIUM SERPL-SCNC: 138 MMOL/L — SIGNIFICANT CHANGE UP (ref 135–145)
SODIUM SERPL-SCNC: 138 MMOL/L — SIGNIFICANT CHANGE UP (ref 135–145)
WBC # BLD: 6.11 K/UL — SIGNIFICANT CHANGE UP (ref 3.8–10.5)
WBC # BLD: 6.11 K/UL — SIGNIFICANT CHANGE UP (ref 3.8–10.5)
WBC # FLD AUTO: 6.11 K/UL — SIGNIFICANT CHANGE UP (ref 3.8–10.5)
WBC # FLD AUTO: 6.11 K/UL — SIGNIFICANT CHANGE UP (ref 3.8–10.5)

## 2023-12-02 PROCEDURE — 99232 SBSQ HOSP IP/OBS MODERATE 35: CPT | Mod: GC

## 2023-12-02 RX ORDER — SODIUM,POTASSIUM PHOSPHATES 278-250MG
1 POWDER IN PACKET (EA) ORAL ONCE
Refills: 0 | Status: COMPLETED | OUTPATIENT
Start: 2023-12-02 | End: 2023-12-02

## 2023-12-02 RX ORDER — INSULIN GLARGINE 100 [IU]/ML
10 INJECTION, SOLUTION SUBCUTANEOUS AT BEDTIME
Refills: 0 | Status: DISCONTINUED | OUTPATIENT
Start: 2023-12-02 | End: 2023-12-03

## 2023-12-02 RX ORDER — POTASSIUM CHLORIDE 20 MEQ
20 PACKET (EA) ORAL
Refills: 0 | Status: DISCONTINUED | OUTPATIENT
Start: 2023-12-02 | End: 2023-12-02

## 2023-12-02 RX ORDER — POTASSIUM CHLORIDE 20 MEQ
20 PACKET (EA) ORAL ONCE
Refills: 0 | Status: COMPLETED | OUTPATIENT
Start: 2023-12-02 | End: 2023-12-02

## 2023-12-02 RX ORDER — INSULIN LISPRO 100/ML
3 VIAL (ML) SUBCUTANEOUS
Refills: 0 | Status: DISCONTINUED | OUTPATIENT
Start: 2023-12-02 | End: 2023-12-03

## 2023-12-02 RX ADMIN — APIXABAN 5 MILLIGRAM(S): 2.5 TABLET, FILM COATED ORAL at 06:11

## 2023-12-02 RX ADMIN — INSULIN GLARGINE 10 UNIT(S): 100 INJECTION, SOLUTION SUBCUTANEOUS at 21:46

## 2023-12-02 RX ADMIN — LISINOPRIL 10 MILLIGRAM(S): 2.5 TABLET ORAL at 06:11

## 2023-12-02 RX ADMIN — Medication 240 MILLIGRAM(S): at 06:11

## 2023-12-02 RX ADMIN — Medication 100 MILLIGRAM(S): at 06:11

## 2023-12-02 RX ADMIN — Medication 100 MILLIGRAM(S): at 17:20

## 2023-12-02 RX ADMIN — Medication 650 MILLIGRAM(S): at 22:46

## 2023-12-02 RX ADMIN — Medication 20 MILLIEQUIVALENT(S): at 11:28

## 2023-12-02 RX ADMIN — ATORVASTATIN CALCIUM 20 MILLIGRAM(S): 80 TABLET, FILM COATED ORAL at 22:13

## 2023-12-02 RX ADMIN — APIXABAN 5 MILLIGRAM(S): 2.5 TABLET, FILM COATED ORAL at 17:20

## 2023-12-02 RX ADMIN — Medication 3 UNIT(S): at 12:14

## 2023-12-02 RX ADMIN — Medication 6: at 08:31

## 2023-12-02 RX ADMIN — Medication 650 MILLIGRAM(S): at 12:30

## 2023-12-02 RX ADMIN — Medication 650 MILLIGRAM(S): at 21:46

## 2023-12-02 RX ADMIN — Medication 650 MILLIGRAM(S): at 13:30

## 2023-12-02 RX ADMIN — Medication 3 UNIT(S): at 17:18

## 2023-12-02 RX ADMIN — Medication 6: at 12:14

## 2023-12-02 RX ADMIN — Medication 1 PACKET(S): at 11:28

## 2023-12-02 RX ADMIN — Medication 4: at 17:19

## 2023-12-02 NOTE — PROGRESS NOTE ADULT - SUBJECTIVE AND OBJECTIVE BOX
PGY-1 Progress Note discussed with attending    PAGER #: [[phone]] TILL 5:00 PM  PLEASE CONTACT ON CALL TEAM:  - On Call Team (Please refer to Diana) FROM 5:00 PM - 8:30PM  - Nightfloat Team FROM 8:30 -7:30 AM    CHIEF COMPLAINT & BRIEF HOSPITAL COURSE: No acute overnight events. seen patient at bedside. Complaining of elbow pain when medicine wears off , but otherwise pain is controlled.     INTERVAL HPI/OVERNIGHT EVENTS:   MEDICATIONS  (STANDING):  apixaban 5 milliGRAM(s) Oral every 12 hours  atorvastatin 20 milliGRAM(s) Oral at bedtime  diltiazem    milliGRAM(s) Oral daily  insulin glargine Injectable (LANTUS) 10 Unit(s) SubCutaneous at bedtime  insulin lispro (ADMELOG) corrective regimen sliding scale   SubCutaneous three times a day before meals  insulin lispro (ADMELOG) corrective regimen sliding scale   SubCutaneous at bedtime  insulin lispro Injectable (ADMELOG) 3 Unit(s) SubCutaneous three times a day before meals  lisinopril 10 milliGRAM(s) Oral daily  metoprolol tartrate 100 milliGRAM(s) Oral two times a day  potassium chloride   Powder 20 milliEquivalent(s) Oral once  potassium phosphate / sodium phosphate Powder (PHOS-NaK) 1 Packet(s) Oral once    MEDICATIONS  (PRN):  acetaminophen     Tablet .. 650 milliGRAM(s) Oral every 6 hours PRN Temp greater or equal to 38C (100.4F), Mild Pain (1 - 3)  aluminum hydroxide/magnesium hydroxide/simethicone Suspension 30 milliLiter(s) Oral every 4 hours PRN Dyspepsia  melatonin 3 milliGRAM(s) Oral at bedtime PRN Insomnia  ondansetron Injectable 4 milliGRAM(s) IV Push every 8 hours PRN Nausea and/or Vomiting  oxyCODONE    IR 2.5 milliGRAM(s) Oral every 6 hours PRN Moderate Pain (4 - 6)      REVIEW OF SYSTEMS:  CONSTITUTIONAL: No fever, weight loss, or fatigue  RESPIRATORY: No shortness of breath  CARDIOVASCULAR: No chest pain  GASTROINTESTINAL: No abdominal pain.  GENITOURINARY: No dysuria  NEUROLOGICAL: No headaches  MSK:  L shoulder/arm pain   SKIN: No itching, burning, rashes    Vital Signs Last 24 Hrs  T(C): 37.1 (02 Dec 2023 07:25), Max: 37.1 (02 Dec 2023 07:25)  T(F): 98.8 (02 Dec 2023 07:25), Max: 98.8 (02 Dec 2023 07:25)  HR: 83 (02 Dec 2023 07:25) (74 - 106)  BP: 124/89 (02 Dec 2023 07:25) (101/61 - 163/92)  BP(mean): --  RR: 18 (02 Dec 2023 07:25) (18 - 20)  SpO2: 97% (02 Dec 2023 07:25) (97% - 100%)    Parameters below as of 02 Dec 2023 07:25  Patient On (Oxygen Delivery Method): room air        PHYSICAL EXAMINATION:  GENERAL: NAD, well built  HEAD:  Atraumatic, Normocephalic  EYES:  conjunctiva and sclera clear  CHEST/LUNG: Clear to auscultation. No rales, rhonchi, wheezing, or rubs  HEART: Regular rate and rhythm; No murmurs, rubs, or gallops  ABDOMEN: Soft, Nontender, Nondistended; Bowel sounds present  NERVOUS SYSTEM:  Alert & Oriented X3,    EXTREMITIES:  Left arm in sling   SKIN: warm dry                          12.0   6.11  )-----------( 157      ( 02 Dec 2023 05:39 )             36.2     12-02    138  |  105  |  14  ----------------------------<  247<H>  3.4<L>   |  28  |  0.68    Ca    9.4      02 Dec 2023 05:39  Phos  2.1     12-02  Mg     1.6     12-02                CAPILLARY BLOOD GLUCOSE      RADIOLOGY & ADDITIONAL TESTS:                   PGY-1 Progress Note discussed with attending    PAGER #: [[phone]] TILL 5:00 PM  PLEASE CONTACT ON CALL TEAM:  - On Call Team (Please refer to Diana) FROM 5:00 PM - 8:30PM  - Nightfloat Team FROM 8:30 -7:30 AM    CHIEF COMPLAINT & BRIEF HOSPITAL COURSE: No acute overnight events. seen patient at bedside. Complaining of elbow pain when medicine wears off , but otherwise pain is controlled.     INTERVAL HPI/OVERNIGHT EVENTS:   MEDICATIONS  (STANDING):  apixaban 5 milliGRAM(s) Oral every 12 hours  atorvastatin 20 milliGRAM(s) Oral at bedtime  diltiazem    milliGRAM(s) Oral daily  insulin glargine Injectable (LANTUS) 10 Unit(s) SubCutaneous at bedtime  insulin lispro (ADMELOG) corrective regimen sliding scale   SubCutaneous three times a day before meals  insulin lispro (ADMELOG) corrective regimen sliding scale   SubCutaneous at bedtime  insulin lispro Injectable (ADMELOG) 3 Unit(s) SubCutaneous three times a day before meals  lisinopril 10 milliGRAM(s) Oral daily  metoprolol tartrate 100 milliGRAM(s) Oral two times a day  potassium chloride   Powder 20 milliEquivalent(s) Oral once  potassium phosphate / sodium phosphate Powder (PHOS-NaK) 1 Packet(s) Oral once    MEDICATIONS  (PRN):  acetaminophen     Tablet .. 650 milliGRAM(s) Oral every 6 hours PRN Temp greater or equal to 38C (100.4F), Mild Pain (1 - 3)  aluminum hydroxide/magnesium hydroxide/simethicone Suspension 30 milliLiter(s) Oral every 4 hours PRN Dyspepsia  melatonin 3 milliGRAM(s) Oral at bedtime PRN Insomnia  ondansetron Injectable 4 milliGRAM(s) IV Push every 8 hours PRN Nausea and/or Vomiting  oxyCODONE    IR 2.5 milliGRAM(s) Oral every 6 hours PRN Moderate Pain (4 - 6)      REVIEW OF SYSTEMS:  CONSTITUTIONAL: No fever, weight loss, or fatigue  RESPIRATORY: No shortness of breath  CARDIOVASCULAR: No chest pain  GASTROINTESTINAL: No abdominal pain.  GENITOURINARY: No dysuria  NEUROLOGICAL: No headaches  MSK:  L shoulder/arm pain   SKIN: No itching, burning, rashes    Vital Signs Last 24 Hrs  T(C): 37.1 (02 Dec 2023 07:25), Max: 37.1 (02 Dec 2023 07:25)  T(F): 98.8 (02 Dec 2023 07:25), Max: 98.8 (02 Dec 2023 07:25)  HR: 83 (02 Dec 2023 07:25) (74 - 106)  BP: 124/89 (02 Dec 2023 07:25) (101/61 - 163/92)  BP(mean): --  RR: 18 (02 Dec 2023 07:25) (18 - 20)  SpO2: 97% (02 Dec 2023 07:25) (97% - 100%)    Parameters below as of 02 Dec 2023 07:25  Patient On (Oxygen Delivery Method): room air        PHYSICAL EXAMINATION:  GENERAL: NAD, well built  HEAD:  Atraumatic, Normocephalic  EYES:  conjunctiva and sclera clear  CHEST/LUNG: Clear to auscultation. No rales, rhonchi, wheezing, or rubs  HEART: Regular rate and rhythm; No murmurs, rubs, or gallops  ABDOMEN: Soft, Nontender, Nondistended; Bowel sounds present  NERVOUS SYSTEM:  Alert & Oriented X3,    EXTREMITIES:  Left arm in sling   SKIN: warm dry                          12.0   6.11  )-----------( 157      ( 02 Dec 2023 05:39 )             36.2     12-02    138  |  105  |  14  ----------------------------<  247<H>  3.4<L>   |  28  |  0.68    Ca    9.4      02 Dec 2023 05:39  Phos  2.1     12-02  Mg     1.6     12-02                CAPILLARY BLOOD GLUCOSE      RADIOLOGY & ADDITIONAL TESTS:                   PGY-1 Progress Note discussed with attending    PAGER #: [[phone]] TILL 5:00 PM  PLEASE CONTACT ON CALL TEAM:  - On Call Team (Please refer to Diana) FROM 5:00 PM - 8:30PM  - Nightfloat Team FROM 8:30 -7:30 AM    CHIEF COMPLAINT & BRIEF HOSPITAL COURSE: No acute overnight events. seen patient at bedside. Complaining of elbow pain when medicine wears off , but otherwise pain is controlled.     INTERVAL HPI/OVERNIGHT EVENTS:   MEDICATIONS  (STANDING):  apixaban 5 milliGRAM(s) Oral every 12 hours  atorvastatin 20 milliGRAM(s) Oral at bedtime  diltiazem    milliGRAM(s) Oral daily  insulin glargine Injectable (LANTUS) 10 Unit(s) SubCutaneous at bedtime  insulin lispro (ADMELOG) corrective regimen sliding scale   SubCutaneous three times a day before meals  insulin lispro (ADMELOG) corrective regimen sliding scale   SubCutaneous at bedtime  insulin lispro Injectable (ADMELOG) 3 Unit(s) SubCutaneous three times a day before meals  lisinopril 10 milliGRAM(s) Oral daily  metoprolol tartrate 100 milliGRAM(s) Oral two times a day  potassium chloride   Powder 20 milliEquivalent(s) Oral once  potassium phosphate / sodium phosphate Powder (PHOS-NaK) 1 Packet(s) Oral once    MEDICATIONS  (PRN):  acetaminophen     Tablet .. 650 milliGRAM(s) Oral every 6 hours PRN Temp greater or equal to 38C (100.4F), Mild Pain (1 - 3)  aluminum hydroxide/magnesium hydroxide/simethicone Suspension 30 milliLiter(s) Oral every 4 hours PRN Dyspepsia  melatonin 3 milliGRAM(s) Oral at bedtime PRN Insomnia  ondansetron Injectable 4 milliGRAM(s) IV Push every 8 hours PRN Nausea and/or Vomiting  oxyCODONE    IR 2.5 milliGRAM(s) Oral every 6 hours PRN Moderate Pain (4 - 6)      REVIEW OF SYSTEMS:  CONSTITUTIONAL: No fever, weight loss, or fatigue  RESPIRATORY: No shortness of breath  CARDIOVASCULAR: No chest pain  GASTROINTESTINAL: No abdominal pain.  GENITOURINARY: No dysuria  NEUROLOGICAL: No headaches  MSK:  L shoulder/arm pain   SKIN: No itching, burning, rashes    Vital Signs Last 24 Hrs  T(C): 37.1 (02 Dec 2023 07:25), Max: 37.1 (02 Dec 2023 07:25)  T(F): 98.8 (02 Dec 2023 07:25), Max: 98.8 (02 Dec 2023 07:25)  HR: 83 (02 Dec 2023 07:25) (74 - 106)  BP: 124/89 (02 Dec 2023 07:25) (101/61 - 163/92)  BP(mean): --  RR: 18 (02 Dec 2023 07:25) (18 - 20)  SpO2: 97% (02 Dec 2023 07:25) (97% - 100%)    Parameters below as of 02 Dec 2023 07:25  Patient On (Oxygen Delivery Method): room air        PHYSICAL EXAMINATION:  GENERAL: NAD, well built  HEAD:  Atraumatic, Normocephalic  EYES:  conjunctiva and sclera clear  CHEST/LUNG: Clear to auscultation. No rales, rhonchi, wheezing, or rubs  HEART: Regular rate and rhythm; No murmurs, rubs, or gallops  ABDOMEN: Soft, Nontender, Nondistended; Bowel sounds present  NERVOUS SYSTEM:  Alert & Oriented X3,    EXTREMITIES:  Left arm in sling   SKIN: warm dry                          12.0   6.11  )-----------( 157      ( 02 Dec 2023 05:39 )             36.2     12-02    138  |  105  |  14  ----------------------------<  247<H>  3.4<L>   |  28  |  0.68    Ca    9.4      02 Dec 2023 05:39  Phos  2.1     12-02  Mg     1.6     12-02                CAPILLARY BLOOD GLUCOSE      RADIOLOGY & ADDITIONAL TESTS:

## 2023-12-02 NOTE — PROGRESS NOTE ADULT - PROBLEM SELECTOR PLAN 3
h/o A fib, home med diltiazem, metoprolol, and Eliquis.  c/o palpitations this morning, found to be in A fib with RVR  - c/w home meds  - placed back on tele > increased to Diltiazem 240mg daily.  - no overnight tele events  HR is controlled.

## 2023-12-02 NOTE — PROGRESS NOTE ADULT - PROBLEM SELECTOR PLAN 1
presents after mechanical fall, landed on L. shoulder.  XRAY Left shoulder / humerus - Severely comminuted/displaced proximal humeral fracture.  XRAY Left knee - No fracture/dislocation/suprapatellar effusion at the knee.  MRI shoulder - Acute comminuted and displaced fracture of the left humeral neck with extension to the greater tuberosity, as above. Associated inferior subluxation of the humeral head fragment at the glenohumeral joint.  Ortho consulted - per Ortho, patient to follow up with Dr. El Bauer as outpatient within 1 week, call office at [phone]  for appointment.  >>pending disposition (home vs. rehab) - patient to be re-admitted through ED 12/10 for procedure 12/12.  - f/u CT shoulder - pending official read. presents after mechanical fall, landed on L. shoulder.  XRAY Left shoulder / humerus - Severely comminuted/displaced proximal humeral fracture.  XRAY Left knee - No fracture/dislocation/suprapatellar effusion at the knee.  MRI shoulder - Acute comminuted and displaced fracture of the left humeral neck with extension to the greater tuberosity, as above. Associated inferior subluxation of the humeral head fragment at the glenohumeral joint.  Ortho consulted - per Ortho, patient to follow up with Dr. El Bauer as outpatient within 1 week, call office at [phone]  for appointment.  >>pending disposition (home vs. rehab) - patient to be re-admitted through ED 12/10 for procedure 12/12.  - f/u CT shoulder - pending official read. presents after mechanical fall, landed on L. shoulder.  XRAY Left shoulder / humerus - Severely comminuted/displaced proximal humeral fracture.  XRAY Left knee - No fracture/dislocation/suprapatellar effusion at the knee.  MRI shoulder - Acute comminuted and displaced fracture of the left humeral neck with extension to the greater tuberosity, as above. Associated inferior subluxation of the humeral head fragment at the glenohumeral joint.  Ortho consulted - per Ortho, patient to follow up with Dr. El Bauer as outpatient within 1 week, call office at [phone]  for appointment.  >>pending disposition (home vs. rehab) - patient to be re-admitted through ED 12/10 for procedure 12/12.  - f/u CT shoulder - pending official read.

## 2023-12-02 NOTE — PROGRESS NOTE ADULT - PROBLEM SELECTOR PLAN 6
h/o DM, home meds Janumet and Jardiance.  - ISS, FS (on moderate ISS)  - Glucose is 247 today  - increased lantus to 10 units and 4 units pre meals. h/o DM, home meds Janumet and Jardiance.  - ISS, FS (on moderate ISS)  - Glucose is 247 today  - increased lantus to 10 units and 3 units pre meals.

## 2023-12-03 ENCOUNTER — TRANSCRIPTION ENCOUNTER (OUTPATIENT)
Age: 78
End: 2023-12-03

## 2023-12-03 LAB
GLUCOSE BLDC GLUCOMTR-MCNC: 207 MG/DL — HIGH (ref 70–99)
GLUCOSE BLDC GLUCOMTR-MCNC: 207 MG/DL — HIGH (ref 70–99)
GLUCOSE BLDC GLUCOMTR-MCNC: 213 MG/DL — HIGH (ref 70–99)
GLUCOSE BLDC GLUCOMTR-MCNC: 213 MG/DL — HIGH (ref 70–99)
GLUCOSE BLDC GLUCOMTR-MCNC: 262 MG/DL — HIGH (ref 70–99)
GLUCOSE BLDC GLUCOMTR-MCNC: 262 MG/DL — HIGH (ref 70–99)
GLUCOSE BLDC GLUCOMTR-MCNC: 390 MG/DL — HIGH (ref 70–99)
GLUCOSE BLDC GLUCOMTR-MCNC: 390 MG/DL — HIGH (ref 70–99)

## 2023-12-03 PROCEDURE — 73120 X-RAY EXAM OF HAND: CPT | Mod: 26,LT

## 2023-12-03 PROCEDURE — 73090 X-RAY EXAM OF FOREARM: CPT | Mod: 26,LT

## 2023-12-03 PROCEDURE — 99232 SBSQ HOSP IP/OBS MODERATE 35: CPT | Mod: GC

## 2023-12-03 RX ORDER — IBUPROFEN 200 MG
200 TABLET ORAL ONCE
Refills: 0 | Status: COMPLETED | OUTPATIENT
Start: 2023-12-03 | End: 2023-12-03

## 2023-12-03 RX ORDER — INSULIN GLARGINE 100 [IU]/ML
18 INJECTION, SOLUTION SUBCUTANEOUS AT BEDTIME
Refills: 0 | Status: DISCONTINUED | OUTPATIENT
Start: 2023-12-03 | End: 2023-12-04

## 2023-12-03 RX ORDER — INSULIN LISPRO 100/ML
5 VIAL (ML) SUBCUTANEOUS
Refills: 0 | Status: DISCONTINUED | OUTPATIENT
Start: 2023-12-03 | End: 2023-12-04

## 2023-12-03 RX ADMIN — Medication 4: at 17:19

## 2023-12-03 RX ADMIN — Medication 100 MILLIGRAM(S): at 17:20

## 2023-12-03 RX ADMIN — Medication 240 MILLIGRAM(S): at 06:27

## 2023-12-03 RX ADMIN — LISINOPRIL 10 MILLIGRAM(S): 2.5 TABLET ORAL at 06:27

## 2023-12-03 RX ADMIN — Medication 650 MILLIGRAM(S): at 23:03

## 2023-12-03 RX ADMIN — APIXABAN 5 MILLIGRAM(S): 2.5 TABLET, FILM COATED ORAL at 17:20

## 2023-12-03 RX ADMIN — Medication 5 UNIT(S): at 17:20

## 2023-12-03 RX ADMIN — APIXABAN 5 MILLIGRAM(S): 2.5 TABLET, FILM COATED ORAL at 06:27

## 2023-12-03 RX ADMIN — Medication 10: at 12:14

## 2023-12-03 RX ADMIN — Medication 650 MILLIGRAM(S): at 22:06

## 2023-12-03 RX ADMIN — Medication 100 MILLIGRAM(S): at 06:27

## 2023-12-03 RX ADMIN — ATORVASTATIN CALCIUM 20 MILLIGRAM(S): 80 TABLET, FILM COATED ORAL at 22:06

## 2023-12-03 RX ADMIN — Medication 5 UNIT(S): at 12:14

## 2023-12-03 RX ADMIN — Medication 200 MILLIGRAM(S): at 13:17

## 2023-12-03 RX ADMIN — Medication 3 UNIT(S): at 08:19

## 2023-12-03 RX ADMIN — INSULIN GLARGINE 18 UNIT(S): 100 INJECTION, SOLUTION SUBCUTANEOUS at 22:04

## 2023-12-03 RX ADMIN — Medication 6: at 08:19

## 2023-12-03 RX ADMIN — Medication 200 MILLIGRAM(S): at 14:00

## 2023-12-03 NOTE — PROGRESS NOTE ADULT - PROBLEM SELECTOR PLAN 1
presents after mechanical fall, landed on L. shoulder.  XRAY Left shoulder / humerus - Severely comminuted/displaced proximal humeral fracture.  XRAY Left knee - No fracture/dislocation/suprapatellar effusion at the knee.  MRI shoulder - Acute comminuted and displaced fracture of the left humeral neck with extension to the greater tuberosity, as above. Associated inferior subluxation of the humeral head fragment at the glenohumeral joint.  Ortho consulted - per Ortho, patient to follow up with Dr. El Bauer as outpatient within 1 week, call office at [phone]  for appointment.  >>pending disposition (home vs. rehab) - patient to be re-admitted through ED 12/10 for procedure 12/12.  - f/u CT shoulder - pending official read. presents after mechanical fall, landed on L. shoulder.  XRAY Left shoulder / humerus - Severely comminuted/displaced proximal humeral fracture.  XRAY Left knee - No fracture/dislocation/suprapatellar effusion at the knee.  MRI shoulder - Acute comminuted and displaced fracture of the left humeral neck with extension to the greater tuberosity, as above. Associated inferior subluxation of the humeral head fragment at the glenohumeral joint.  Ortho consulted - per Ortho, patient to follow up with Dr. El Bauer as outpatient within 1 week, call office at [phone]  for appointment.  >>pending disposition (home vs. rehab) - patient to be re-admitted through ED 12/10 for procedure 12/12.  - f/u CT shoulder - pending official read. presents after mechanical fall, landed on L. shoulder.  XRAY Left shoulder / humerus - Severely comminuted/displaced proximal humeral fracture.  XRAY Left knee - No fracture/dislocation/suprapatellar effusion at the knee.  MRI shoulder - Acute comminuted and displaced fracture of the left humeral neck with extension to the greater tuberosity, as above. Associated inferior subluxation of the humeral head fragment at the glenohumeral joint.  Ortho consulted - per Ortho, patient to follow up with Dr. El Bauer as outpatient within 1 week, call office at [phone]  for appointment.  >>pending disposition (home vs. rehab) - patient to be re-admitted through ED 12/10 for procedure 12/12.  - f/u CT shoulder - pending official read. presents after mechanical fall, landed on L. shoulder.  XRAY Left shoulder / humerus - Severely comminuted/displaced proximal humeral fracture.  XRAY Left knee - No fracture/dislocation/suprapatellar effusion at the knee.  MRI shoulder - Acute comminuted and displaced fracture of the left humeral neck with extension to the greater tuberosity, as above. Associated inferior subluxation of the humeral head fragment at the glenohumeral joint.  Ortho consulted - per Ortho, patient to follow up with Dr. El Bauer as outpatient within 1 week, call office at [phone]  for appointment.  >>pending disposition (home vs. rehab) - patient to be re-admitted through ED 12/10 for procedure 12/12.  - f/u CT shoulder - pending official read.  - f/u L forearm XR. presents after mechanical fall, landed on L. shoulder.  XRAY Left shoulder / humerus - Severely comminuted/displaced proximal humeral fracture.  XRAY Left knee - No fracture/dislocation/suprapatellar effusion at the knee.  MRI shoulder - Acute comminuted and displaced fracture of the left humeral neck with extension to the greater tuberosity, as above. Associated inferior subluxation of the humeral head fragment at the glenohumeral joint.  Ortho consulted - per Ortho, patient to follow up with Dr. El Bauer as outpatient within 1 week, call office at [phone]  for appointment.  >>pending disposition (home vs. rehab) - patient to be re-admitted through ED 12/10 for procedure 12/12.  - f/u CT shoulder - pending official read.  - f/u L forearm XR. presents after mechanical fall, landed on L. shoulder.  XRAY Left shoulder / humerus - Severely comminuted/displaced proximal humeral fracture.  XRAY Left knee - No fracture/dislocation/suprapatellar effusion at the knee.  MRI shoulder - Acute comminuted and displaced fracture of the left humeral neck with extension to the greater tuberosity, as above. Associated inferior subluxation of the humeral head fragment at the glenohumeral joint.  Ortho consulted - per Ortho, patient to follow up with Dr. El Bauer as outpatient within 1 week, call office at [phone]  for appointment.  >>pending disposition (home vs. rehab) - patient to be re-admitted through ED 12/10 for procedure 12/12.  - f/u CT shoulder - pending official read.  - f/u L forearm XR.

## 2023-12-03 NOTE — PROGRESS NOTE ADULT - PROBLEM SELECTOR PLAN 6
h/o DM, home meds Janumet and Jardiance.  - ISS, FS (on moderate ISS)  - Glucose is 247 today  - increased lantus to 10 units and 3 units pre meals. h/o DM, home meds Janumet and Jardiance.  - ISS, FS (on moderate ISS)  - Glucose is 262 today  - increased lantus to 18 units and 5 units pre meals.

## 2023-12-03 NOTE — PROGRESS NOTE ADULT - SUBJECTIVE AND OBJECTIVE BOX
PGY-1 Progress Note discussed with attending.    PAGER #: [[phone]] TILL 5:00 PM  PLEASE CONTACT ON CALL TEAM:  - On Call Team (Please refer to Diana) FROM 5:00 PM - 8:30PM  - Nightfloat Team FROM 8:30 -7:30 AM      INTERVAL HPI/OVERNIGHT EVENTS:       REVIEW OF SYSTEMS:  CONSTITUTIONAL: No fever, weight loss, or fatigue.  RESPIRATORY: No cough, wheezing, chills, or hemoptysis. No shortness of breath.  CARDIOVASCULAR: No chest pain, palpitations, dizziness, or leg swelling.  GASTROINTESTINAL: No abdominal pain. No nausea, vomiting, or hematemesis. No diarrhea or constipation. No melena or hematochezia.  GENITOURINARY: No dysuria or hematuria, urinary frequency.  NEUROLOGICAL: No headaches, memory loss, loss of strength, numbness, or tremors.  SKIN: No itching, burning, rashes, or lesions.    Vital Signs Last 24 Hrs  T(C): 36.7 (03 Dec 2023 04:31), Max: 37.2 (02 Dec 2023 19:47)  T(F): 98.1 (03 Dec 2023 04:31), Max: 99 (02 Dec 2023 19:47)  HR: 94 (03 Dec 2023 04:31) (81 - 104)  BP: 125/82 (03 Dec 2023 04:31) (111/83 - 133/89)  BP(mean): --  RR: 19 (03 Dec 2023 04:31) (18 - 19)  SpO2: 97% (03 Dec 2023 04:31) (96% - 100%)    Parameters below as of 03 Dec 2023 04:31  Patient On (Oxygen Delivery Method): room air        PHYSICAL EXAMINATION:  GENERAL: NAD   HEAD:  Atraumatic, Normocephalic.  EYES:  Conjunctiva and sclera clear.  NECK: Supple, No JVD, Normal thyroid.  CHEST/LUNG: Clear to auscultation. Clear to percussion bilaterally. No rales, rhonchi, wheezing, or rubs.  HEART: Regular rate and rhythm. No murmurs, rubs, or gallops.  ABDOMEN: Soft, Nontender, Nondistended. Bowel sounds present.  NERVOUS SYSTEM:  Alert & Oriented X3  EXTREMITIES:  2+ Peripheral Pulses. No clubbing, cyanosis, or edema.  SKIN: Warm, dry.                          12.0   6.11  )-----------( 157      ( 02 Dec 2023 05:39 )             36.2     12-02    138  |  105  |  14  ----------------------------<  247<H>  3.4<L>   |  28  |  0.68    Ca    9.4      02 Dec 2023 05:39  Phos  2.1     12-02  Mg     1.6     12-02                CAPILLARY BLOOD GLUCOSE:      RADIOLOGY & ADDITIONAL TESTS:               PGY-1 Progress Note discussed with attending.    PAGER #: [[phone]] TILL 5:00 PM  PLEASE CONTACT ON CALL TEAM:  - On Call Team (Please refer to Diana) FROM 5:00 PM - 8:30PM  - Nightfloat Team FROM 8:30 -7:30 AM      INTERVAL HPI/OVERNIGHT EVENTS:       REVIEW OF SYSTEMS:  CONSTITUTIONAL: No fever, weight loss, or fatigue.  RESPIRATORY: No cough, wheezing, chills, or hemoptysis. No shortness of breath.  CARDIOVASCULAR: No chest pain, palpitations, dizziness, or leg swelling.  GASTROINTESTINAL: No abdominal pain. No nausea, vomiting, or hematemesis. No diarrhea or constipation. No melena or hematochezia.  GENITOURINARY: No dysuria or hematuria, urinary frequency.  NEUROLOGICAL: No headaches, memory loss, loss of strength, numbness, or tremors.  SKIN: No itching, burning, rashes, or lesions.    Vital Signs Last 24 Hrs  T(C): 36.7 (03 Dec 2023 04:31), Max: 37.2 (02 Dec 2023 19:47)  T(F): 98.1 (03 Dec 2023 04:31), Max: 99 (02 Dec 2023 19:47)  HR: 94 (03 Dec 2023 04:31) (81 - 104)  BP: 125/82 (03 Dec 2023 04:31) (111/83 - 133/89)  BP(mean): --  RR: 19 (03 Dec 2023 04:31) (18 - 19)  SpO2: 97% (03 Dec 2023 04:31) (96% - 100%)    Parameters below as of 03 Dec 2023 04:31  Patient On (Oxygen Delivery Method): room air        PHYSICAL EXAMINATION:  GENERAL: NAD   HEAD:  Atraumatic, Normocephalic.  EYES:  Conjunctiva and sclera clear.  NECK: Supple, No JVD, Normal thyroid.  CHEST/LUNG: Clear to auscultation. Clear to percussion bilaterally. No rales, rhonchi, wheezing, or rubs.  HEART: Regular rate and rhythm. No murmurs, rubs, or gallops.  ABDOMEN: Soft, Nontender, Nondistended. Bowel sounds present.  NERVOUS SYSTEM:  Alert & Oriented X3  EXTREMITIES:  2+ Peripheral Pulses. No clubbing, cyanosis, or edema.  SKIN: Warm, dry.                          12.0   6.11  )-----------( 157      ( 02 Dec 2023 05:39 )             36.2     12-02    138  |  105  |  14  ----------------------------<  247<H>  3.4<L>   |  28  |  0.68    Ca    9.4      02 Dec 2023 05:39  Phos  2.1     12-02  Mg     1.6     12-02                CAPILLARY BLOOD GLUCOSE:      RADIOLOGY & ADDITIONAL TESTS:               PGY-1 Progress Note discussed with attending.    PAGER #: [[phone]] TILL 5:00 PM  PLEASE CONTACT ON CALL TEAM:  - On Call Team (Please refer to Diana) FROM 5:00 PM - 8:30PM  - Nightfloat Team FROM 8:30 -7:30 AM      INTERVAL HPI/OVERNIGHT EVENTS:       REVIEW OF SYSTEMS:  CONSTITUTIONAL: No fever, weight loss, or fatigue.  RESPIRATORY: No cough, wheezing, chills, or hemoptysis. No shortness of breath.  CARDIOVASCULAR: No chest pain, palpitations, dizziness, or leg swelling.  GASTROINTESTINAL: No abdominal pain. No nausea, vomiting, or hematemesis. No diarrhea or constipation. No melena or hematochezia.  GENITOURINARY: No dysuria or hematuria, urinary frequency.  NEUROLOGICAL: No headaches, memory loss, loss of strength, numbness, or tremors.  SKIN: No itching, burning, rashes, or lesions.    Vital Signs Last 24 Hrs  T(C): 36.7 (03 Dec 2023 04:31), Max: 37.2 (02 Dec 2023 19:47)  T(F): 98.1 (03 Dec 2023 04:31), Max: 99 (02 Dec 2023 19:47)  HR: 94 (03 Dec 2023 04:31) (81 - 104)  BP: 125/82 (03 Dec 2023 04:31) (111/83 - 133/89)  BP(mean): --  RR: 19 (03 Dec 2023 04:31) (18 - 19)  SpO2: 97% (03 Dec 2023 04:31) (96% - 100%)    Parameters below as of 03 Dec 2023 04:31  Patient On (Oxygen Delivery Method): room air        PHYSICAL EXAMINATION:  GENERAL: NAD   HEAD:  Atraumatic, Normocephalic.  EYES:  Conjunctiva and sclera clear.  NECK: Supple, No JVD, Normal thyroid.  CHEST/LUNG: Clear to auscultation. Clear to percussion bilaterally. No rales, rhonchi, wheezing, or rubs.  HEART: Regular rate and rhythm. No murmurs, rubs, or gallops.  ABDOMEN: Soft, Nontender, Nondistended. Bowel sounds present.  NERVOUS SYSTEM:  Alert & Oriented X3  EXTREMITIES:  2+ Peripheral Pulses. No clubbing, cyanosis, or edema.  SKIN: Warm, dry.                          12.0   6.11  )-----------( 157      ( 02 Dec 2023 05:39 )             36.2     12-02    138  |  105  |  14  ----------------------------<  247<H>  3.4<L>   |  28  |  0.68    Ca    9.4      02 Dec 2023 05:39  Phos  2.1     12-02  Mg     1.6     12-02                CAPILLARY BLOOD GLUCOSE:      RADIOLOGY & ADDITIONAL TESTS:               PGY-1 Progress Note discussed with attending.    PAGER #: [[phone]] TILL 5:00 PM  PLEASE CONTACT ON CALL TEAM:  - On Call Team (Please refer to Diana) FROM 5:00 PM - 8:30PM  - Nightfloat Team FROM 8:30 -7:30 AM      INTERVAL HPI/OVERNIGHT EVENTS: Patient seen and examined at bedside. Resting comfortably. No acute overnight events. L forearm swelling and mild bruising in axilla.       REVIEW OF SYSTEMS:  CONSTITUTIONAL: No fever, weight loss, or fatigue.  RESPIRATORY: No cough, wheezing, chills, or hemoptysis. No shortness of breath.  CARDIOVASCULAR: No chest pain, palpitations, dizziness, or leg swelling.  GASTROINTESTINAL: No abdominal pain. No nausea, vomiting, or hematemesis. No diarrhea or constipation. No melena or hematochezia.  GENITOURINARY: No dysuria or hematuria, urinary frequency.  NEUROLOGICAL: No headaches, memory loss, loss of strength, numbness, or tremors.  SKIN: No itching, burning, rashes, or lesions.    Vital Signs Last 24 Hrs  T(C): 36.7 (03 Dec 2023 04:31), Max: 37.2 (02 Dec 2023 19:47)  T(F): 98.1 (03 Dec 2023 04:31), Max: 99 (02 Dec 2023 19:47)  HR: 94 (03 Dec 2023 04:31) (81 - 104)  BP: 125/82 (03 Dec 2023 04:31) (111/83 - 133/89)  BP(mean): --  RR: 19 (03 Dec 2023 04:31) (18 - 19)  SpO2: 97% (03 Dec 2023 04:31) (96% - 100%)    Parameters below as of 03 Dec 2023 04:31  Patient On (Oxygen Delivery Method): room air        PHYSICAL EXAMINATION:  GENERAL: NAD   HEAD:  Atraumatic, Normocephalic.  EYES:  Conjunctiva and sclera clear.  NECK: Supple, No JVD, Normal thyroid.  CHEST/LUNG: Clear to auscultation. Clear to percussion bilaterally. No rales, rhonchi, wheezing, or rubs.  HEART: Regular rate and rhythm. No murmurs, rubs, or gallops.  ABDOMEN: Soft, Nontender, Nondistended. Bowel sounds present.  NERVOUS SYSTEM:  Alert & Oriented X3  EXTREMITIES:  2+ Peripheral Pulses. No clubbing, cyanosis, or edema.  SKIN: Warm, dry.                          12.0   6.11  )-----------( 157      ( 02 Dec 2023 05:39 )             36.2     12-02    138  |  105  |  14  ----------------------------<  247<H>  3.4<L>   |  28  |  0.68    Ca    9.4      02 Dec 2023 05:39  Phos  2.1     12-02  Mg     1.6     12-02                CAPILLARY BLOOD GLUCOSE:      RADIOLOGY & ADDITIONAL TESTS:               PGY-1 Progress Note discussed with attending.    PAGER #: [[phone]] TILL 5:00 PM  PLEASE CONTACT ON CALL TEAM:  - On Call Team (Please refer to Diana) FROM 5:00 PM - 8:30PM  - Nightfloat Team FROM 8:30 -7:30 AM      INTERVAL HPI/OVERNIGHT EVENTS: Patient seen and examined at bedside. Resting comfortably. No acute overnight events. L forearm swelling and mild bruising in axilla.       REVIEW OF SYSTEMS:  CONSTITUTIONAL: No fever, weight loss, or fatigue.  RESPIRATORY: No cough, wheezing, chills, or hemoptysis. No shortness of breath.  CARDIOVASCULAR: No chest pain, palpitations, dizziness, or leg swelling.  GASTROINTESTINAL: No abdominal pain. No nausea, vomiting, or hematemesis. No diarrhea or constipation. No melena or hematochezia.  GENITOURINARY: No dysuria or hematuria, urinary frequency.  NEUROLOGICAL: No headaches, memory loss, loss of strength, numbness, or tremors.  SKIN: No itching, burning, rashes, or lesions.    Vital Signs Last 24 Hrs  T(C): 36.7 (03 Dec 2023 04:31), Max: 37.2 (02 Dec 2023 19:47)  T(F): 98.1 (03 Dec 2023 04:31), Max: 99 (02 Dec 2023 19:47)  HR: 94 (03 Dec 2023 04:31) (81 - 104)  BP: 125/82 (03 Dec 2023 04:31) (111/83 - 133/89)  BP(mean): --  RR: 19 (03 Dec 2023 04:31) (18 - 19)  SpO2: 97% (03 Dec 2023 04:31) (96% - 100%)    Parameters below as of 03 Dec 2023 04:31  Patient On (Oxygen Delivery Method): room air        PHYSICAL EXAMINATION:  GENERAL: NAD   HEAD:  Atraumatic, Normocephalic.  EYES:  Conjunctiva and sclera clear.  NECK: Supple, No JVD, Normal thyroid.  CHEST/LUNG: Clear to auscultation. Clear to percussion bilaterally. No rales, rhonchi, wheezing, or rubs.  HEART: Regular rate and rhythm. No murmurs, rubs, or gallops.  ABDOMEN: Soft, Nontender, Nondistended. Bowel sounds present.  NERVOUS SYSTEM:  Alert & Oriented X3  EXTREMITIES:  2+ Peripheral Pulses. No clubbing, cyanosis, or edema.  SKIN: Warm, dry.                          12.0   6.11  )-----------( 157      ( 02 Dec 2023 05:39 )             36.2     12-02    138  |  105  |  14  ----------------------------<  247<H>  3.4<L>   |  28  |  0.68    Ca    9.4      02 Dec 2023 05:39  Phos  2.1     12-02  Mg     1.6     12-02                CAPILLARY BLOOD GLUCOSE:      RADIOLOGY & ADDITIONAL TESTS:               PGY-1 Progress Note discussed with attending.    PAGER #: [[phone]] TILL 5:00 PM  PLEASE CONTACT ON CALL TEAM:  - On Call Team (Please refer to Diana) FROM 5:00 PM - 8:30PM  - Nightfloat Team FROM 8:30 -7:30 AM      INTERVAL HPI/OVERNIGHT EVENTS: Patient seen and examined at bedside. Resting comfortably. No acute overnight events. L forearm swelling and mild bruising in axilla.       REVIEW OF SYSTEMS:  CONSTITUTIONAL: No fever, weight loss, or fatigue.  RESPIRATORY: No cough, wheezing, chills, or hemoptysis. No shortness of breath.  CARDIOVASCULAR: No chest pain, palpitations, dizziness, or leg swelling.  GASTROINTESTINAL: No abdominal pain. No nausea, vomiting, or hematemesis. No diarrhea or constipation. No melena or hematochezia.  GENITOURINARY: No dysuria or hematuria, urinary frequency.  NEUROLOGICAL: No headaches, memory loss, loss of strength, numbness, or tremors.  SKIN: No itching, burning, rashes, or lesions.    Vital Signs Last 24 Hrs  T(C): 36.7 (03 Dec 2023 04:31), Max: 37.2 (02 Dec 2023 19:47)  T(F): 98.1 (03 Dec 2023 04:31), Max: 99 (02 Dec 2023 19:47)  HR: 94 (03 Dec 2023 04:31) (81 - 104)  BP: 125/82 (03 Dec 2023 04:31) (111/83 - 133/89)  BP(mean): --  RR: 19 (03 Dec 2023 04:31) (18 - 19)  SpO2: 97% (03 Dec 2023 04:31) (96% - 100%)    Parameters below as of 03 Dec 2023 04:31  Patient On (Oxygen Delivery Method): room air        PHYSICAL EXAMINATION:  GENERAL: NAD   HEAD:  Atraumatic, Normocephalic.  EYES:  Conjunctiva and sclera clear.  NECK: Supple, No JVD, Normal thyroid.  CHEST/LUNG: Clear to auscultation. Clear to percussion bilaterally. No rales, rhonchi, wheezing, or rubs.  HEART: Regular rate and rhythm. No murmurs, rubs, or gallops.  ABDOMEN: Soft, Nontender, Nondistended. Bowel sounds present.  NERVOUS SYSTEM:  Alert & Oriented X3  EXTREMITIES:  2+ Peripheral Pulses. No clubbing, cyanosis, or edema.  SKIN: Warm, dry.                          12.0   6.11  )-----------( 157      ( 02 Dec 2023 05:39 )             36.2     12-02    138  |  105  |  14  ----------------------------<  247<H>  3.4<L>   |  28  |  0.68    Ca    9.4      02 Dec 2023 05:39  Phos  2.1     12-02  Mg     1.6     12-02                CAPILLARY BLOOD GLUCOSE:      RADIOLOGY & ADDITIONAL TESTS:               PGY-1 Progress Note discussed with attending.    PAGER #: [[phone]] TILL 5:00 PM  PLEASE CONTACT ON CALL TEAM:  - On Call Team (Please refer to Diana) FROM 5:00 PM - 8:30PM  - Nightfloat Team FROM 8:30 -7:30 AM      INTERVAL HPI/OVERNIGHT EVENTS: Patient seen and examined at bedside. Resting comfortably. No acute overnight events. L forearm swelling and mild bruising in axilla.       REVIEW OF SYSTEMS:  CONSTITUTIONAL: No fever, weight loss, or fatigue.  RESPIRATORY: No cough, wheezing, chills, or hemoptysis. No shortness of breath.  CARDIOVASCULAR: No chest pain, palpitations, dizziness, or leg swelling.  GASTROINTESTINAL: No abdominal pain. No nausea, vomiting, or hematemesis. No diarrhea or constipation. No melena or hematochezia.  GENITOURINARY: No dysuria or hematuria, urinary frequency.  NEUROLOGICAL: No headaches, memory loss, loss of strength, numbness, or tremors.  SKIN: +L axilla brusing, swelling of L forearm and hand. No itching, burning, rashes, or lesions.    Vital Signs Last 24 Hrs  T(C): 36.7 (03 Dec 2023 04:31), Max: 37.2 (02 Dec 2023 19:47)  T(F): 98.1 (03 Dec 2023 04:31), Max: 99 (02 Dec 2023 19:47)  HR: 94 (03 Dec 2023 04:31) (81 - 104)  BP: 125/82 (03 Dec 2023 04:31) (111/83 - 133/89)  BP(mean): --  RR: 19 (03 Dec 2023 04:31) (18 - 19)  SpO2: 97% (03 Dec 2023 04:31) (96% - 100%)    Parameters below as of 03 Dec 2023 04:31  Patient On (Oxygen Delivery Method): room air        PHYSICAL EXAMINATION:  GENERAL: Elderly female sitting in chair, NAD  HEAD:  Atraumatic, Normocephalic.  EYES:  Conjunctiva and sclera clear.  NECK: Supple, No JVD, Normal thyroid.  CHEST/LUNG: Clear to auscultation. Clear to percussion bilaterally. No rales, rhonchi, wheezing, or rubs.  HEART: Irregular rate and rhythm. No murmurs, rubs, or gallops.  ABDOMEN: Soft, Nontender, Nondistended. Bowel sounds present.  NERVOUS SYSTEM:  Alert & Oriented X3  EXTREMITIES: +L shoulder in sling. +L axilla brusing, swelling of L forearm and hand. +chronic lower extremity skin changes. +varicose veins. 2+ Peripheral Pulses. Sensation intact to light touch. No clubbing, cyanosis, or edema.  SKIN: Warm, dry.                          12.0   6.11  )-----------( 157      ( 02 Dec 2023 05:39 )             36.2     12-02    138  |  105  |  14  ----------------------------<  247<H>  3.4<L>   |  28  |  0.68    Ca    9.4      02 Dec 2023 05:39  Phos  2.1     12-02  Mg     1.6     12-02                CAPILLARY BLOOD GLUCOSE:      RADIOLOGY & ADDITIONAL TESTS:               PGY-1 Progress Note discussed with attending.    PAGER #: [[phone]] TILL 5:00 PM  PLEASE CONTACT ON CALL TEAM:  - On Call Team (Please refer to Diana) FROM 5:00 PM - 8:30PM  - Nightfloat Team FROM 8:30 -7:30 AM      INTERVAL HPI/OVERNIGHT EVENTS: Patient seen and examined at bedside. Resting comfortably. No acute overnight events. L forearm swelling and mild bruising in axilla.       REVIEW OF SYSTEMS:  CONSTITUTIONAL: No fever, weight loss, or fatigue.  RESPIRATORY: No cough, wheezing, chills, or hemoptysis. No shortness of breath.  CARDIOVASCULAR: No chest pain, palpitations, dizziness, or leg swelling.  GASTROINTESTINAL: No abdominal pain. No nausea, vomiting, or hematemesis. No diarrhea or constipation. No melena or hematochezia.  GENITOURINARY: No dysuria or hematuria, urinary frequency.  NEUROLOGICAL: No headaches, memory loss, loss of strength, numbness, or tremors.  SKIN: +L axilla brusing, swelling of L forearm and hand. No itching, burning, rashes, or lesions.    Vital Signs Last 24 Hrs  T(C): 36.7 (03 Dec 2023 04:31), Max: 37.2 (02 Dec 2023 19:47)  T(F): 98.1 (03 Dec 2023 04:31), Max: 99 (02 Dec 2023 19:47)  HR: 94 (03 Dec 2023 04:31) (81 - 104)  BP: 125/82 (03 Dec 2023 04:31) (111/83 - 133/89)  BP(mean): --  RR: 19 (03 Dec 2023 04:31) (18 - 19)  SpO2: 97% (03 Dec 2023 04:31) (96% - 100%)    Parameters below as of 03 Dec 2023 04:31  Patient On (Oxygen Delivery Method): room air        PHYSICAL EXAMINATION:  GENERAL: Elderly female sitting in chair, NAD  HEAD:  Atraumatic, Normocephalic.  EYES:  Conjunctiva and sclera clear.  NECK: Supple, No JVD, Normal thyroid.  CHEST/LUNG: Clear to auscultation. Clear to percussion bilaterally. No rales, rhonchi, wheezing, or rubs.  HEART: Irregular rate and rhythm. No murmurs, rubs, or gallops.  ABDOMEN: Soft, Nontender, Nondistended. Bowel sounds present.  NERVOUS SYSTEM:  Alert & Oriented X3  EXTREMITIES: +L shoulder in sling. +L axilla brusing, swelling of L forearm and hand. +chronic lower extremity skin changes. +varicose veins. 2+ Peripheral Pulses. Sensation intact to light touch. No clubbing, cyanosis, or edema.  SKIN: Warm, dry.                          12.0   6.11  )-----------( 157      ( 02 Dec 2023 05:39 )             36.2     12-02    138  |  105  |  14  ----------------------------<  247<H>  3.4<L>   |  28  |  0.68    Ca    9.4      02 Dec 2023 05:39  Phos  2.1     12-02  Mg     1.6     12-02                CAPILLARY BLOOD GLUCOSE:      RADIOLOGY & ADDITIONAL TESTS:               PGY-1 Progress Note discussed with attending.    PAGER #: [[phone]] TILL 5:00 PM  PLEASE CONTACT ON CALL TEAM:  - On Call Team (Please refer to Diana) FROM 5:00 PM - 8:30PM  - Nightfloat Team FROM 8:30 -7:30 AM      INTERVAL HPI/OVERNIGHT EVENTS: Patient seen and examined at bedside. Resting comfortably. No acute overnight events. L forearm swelling and mild bruising in axilla.       REVIEW OF SYSTEMS:  CONSTITUTIONAL: No fever, weight loss, or fatigue.  RESPIRATORY: No cough, wheezing, chills, or hemoptysis. No shortness of breath.  CARDIOVASCULAR: No chest pain, palpitations, dizziness, or leg swelling.  GASTROINTESTINAL: No abdominal pain. No nausea, vomiting, or hematemesis. No diarrhea or constipation. No melena or hematochezia.  GENITOURINARY: No dysuria or hematuria, urinary frequency.  NEUROLOGICAL: No headaches, memory loss, loss of strength, numbness, or tremors.  SKIN: +L axilla brusing, swelling of L forearm and hand. No itching, burning, rashes, or lesions.    Vital Signs Last 24 Hrs  T(C): 36.7 (03 Dec 2023 04:31), Max: 37.2 (02 Dec 2023 19:47)  T(F): 98.1 (03 Dec 2023 04:31), Max: 99 (02 Dec 2023 19:47)  HR: 94 (03 Dec 2023 04:31) (81 - 104)  BP: 125/82 (03 Dec 2023 04:31) (111/83 - 133/89)  BP(mean): --  RR: 19 (03 Dec 2023 04:31) (18 - 19)  SpO2: 97% (03 Dec 2023 04:31) (96% - 100%)    Parameters below as of 03 Dec 2023 04:31  Patient On (Oxygen Delivery Method): room air        PHYSICAL EXAMINATION:  GENERAL: Elderly female sitting in chair, NAD  HEAD:  Atraumatic, Normocephalic.  EYES:  Conjunctiva and sclera clear.  NECK: Supple, No JVD, Normal thyroid.  CHEST/LUNG: Clear to auscultation. Clear to percussion bilaterally. No rales, rhonchi, wheezing, or rubs.  HEART: Irregular rate and rhythm. No murmurs, rubs, or gallops.  ABDOMEN: Soft, Nontender, Nondistended. Bowel sounds present.  NERVOUS SYSTEM:  Alert & Oriented X3  EXTREMITIES: +L shoulder in sling. +L axilla brusing, swelling of L forearm and hand. +chronic lower extremity skin changes. +varicose veins. 2+ Peripheral Pulses. Sensation intact to light touch. No clubbing, cyanosis, or edema.  SKIN: Warm, dry.                          12.0   6.11  )-----------( 157      ( 02 Dec 2023 05:39 )             36.2     12-02    138  |  105  |  14  ----------------------------<  247<H>  3.4<L>   |  28  |  0.68    Ca    9.4      02 Dec 2023 05:39  Phos  2.1     12-02  Mg     1.6     12-02                CAPILLARY BLOOD GLUCOSE:      RADIOLOGY & ADDITIONAL TESTS:

## 2023-12-04 LAB
ANION GAP SERPL CALC-SCNC: 4 MMOL/L — LOW (ref 5–17)
ANION GAP SERPL CALC-SCNC: 4 MMOL/L — LOW (ref 5–17)
BUN SERPL-MCNC: 21 MG/DL — HIGH (ref 7–18)
BUN SERPL-MCNC: 21 MG/DL — HIGH (ref 7–18)
CALCIUM SERPL-MCNC: 9.5 MG/DL — SIGNIFICANT CHANGE UP (ref 8.4–10.5)
CALCIUM SERPL-MCNC: 9.5 MG/DL — SIGNIFICANT CHANGE UP (ref 8.4–10.5)
CHLORIDE SERPL-SCNC: 106 MMOL/L — SIGNIFICANT CHANGE UP (ref 96–108)
CHLORIDE SERPL-SCNC: 106 MMOL/L — SIGNIFICANT CHANGE UP (ref 96–108)
CO2 SERPL-SCNC: 29 MMOL/L — SIGNIFICANT CHANGE UP (ref 22–31)
CO2 SERPL-SCNC: 29 MMOL/L — SIGNIFICANT CHANGE UP (ref 22–31)
CREAT SERPL-MCNC: 0.84 MG/DL — SIGNIFICANT CHANGE UP (ref 0.5–1.3)
CREAT SERPL-MCNC: 0.84 MG/DL — SIGNIFICANT CHANGE UP (ref 0.5–1.3)
EGFR: 71 ML/MIN/1.73M2 — SIGNIFICANT CHANGE UP
EGFR: 71 ML/MIN/1.73M2 — SIGNIFICANT CHANGE UP
GLUCOSE BLDC GLUCOMTR-MCNC: 120 MG/DL — HIGH (ref 70–99)
GLUCOSE BLDC GLUCOMTR-MCNC: 120 MG/DL — HIGH (ref 70–99)
GLUCOSE BLDC GLUCOMTR-MCNC: 215 MG/DL — HIGH (ref 70–99)
GLUCOSE BLDC GLUCOMTR-MCNC: 215 MG/DL — HIGH (ref 70–99)
GLUCOSE BLDC GLUCOMTR-MCNC: 244 MG/DL — HIGH (ref 70–99)
GLUCOSE BLDC GLUCOMTR-MCNC: 244 MG/DL — HIGH (ref 70–99)
GLUCOSE BLDC GLUCOMTR-MCNC: 338 MG/DL — HIGH (ref 70–99)
GLUCOSE BLDC GLUCOMTR-MCNC: 338 MG/DL — HIGH (ref 70–99)
GLUCOSE SERPL-MCNC: 230 MG/DL — HIGH (ref 70–99)
GLUCOSE SERPL-MCNC: 230 MG/DL — HIGH (ref 70–99)
HCT VFR BLD CALC: 34.3 % — LOW (ref 34.5–45)
HCT VFR BLD CALC: 34.3 % — LOW (ref 34.5–45)
HGB BLD-MCNC: 11.1 G/DL — LOW (ref 11.5–15.5)
HGB BLD-MCNC: 11.1 G/DL — LOW (ref 11.5–15.5)
MAGNESIUM SERPL-MCNC: 1.6 MG/DL — SIGNIFICANT CHANGE UP (ref 1.6–2.6)
MAGNESIUM SERPL-MCNC: 1.6 MG/DL — SIGNIFICANT CHANGE UP (ref 1.6–2.6)
MCHC RBC-ENTMCNC: 31.7 PG — SIGNIFICANT CHANGE UP (ref 27–34)
MCHC RBC-ENTMCNC: 31.7 PG — SIGNIFICANT CHANGE UP (ref 27–34)
MCHC RBC-ENTMCNC: 32.4 GM/DL — SIGNIFICANT CHANGE UP (ref 32–36)
MCHC RBC-ENTMCNC: 32.4 GM/DL — SIGNIFICANT CHANGE UP (ref 32–36)
MCV RBC AUTO: 98 FL — SIGNIFICANT CHANGE UP (ref 80–100)
MCV RBC AUTO: 98 FL — SIGNIFICANT CHANGE UP (ref 80–100)
NRBC # BLD: 0 /100 WBCS — SIGNIFICANT CHANGE UP (ref 0–0)
NRBC # BLD: 0 /100 WBCS — SIGNIFICANT CHANGE UP (ref 0–0)
PHOSPHATE SERPL-MCNC: 2.6 MG/DL — SIGNIFICANT CHANGE UP (ref 2.5–4.5)
PHOSPHATE SERPL-MCNC: 2.6 MG/DL — SIGNIFICANT CHANGE UP (ref 2.5–4.5)
PLATELET # BLD AUTO: 192 K/UL — SIGNIFICANT CHANGE UP (ref 150–400)
PLATELET # BLD AUTO: 192 K/UL — SIGNIFICANT CHANGE UP (ref 150–400)
POTASSIUM SERPL-MCNC: 4 MMOL/L — SIGNIFICANT CHANGE UP (ref 3.5–5.3)
POTASSIUM SERPL-MCNC: 4 MMOL/L — SIGNIFICANT CHANGE UP (ref 3.5–5.3)
POTASSIUM SERPL-SCNC: 4 MMOL/L — SIGNIFICANT CHANGE UP (ref 3.5–5.3)
POTASSIUM SERPL-SCNC: 4 MMOL/L — SIGNIFICANT CHANGE UP (ref 3.5–5.3)
RBC # BLD: 3.5 M/UL — LOW (ref 3.8–5.2)
RBC # BLD: 3.5 M/UL — LOW (ref 3.8–5.2)
RBC # FLD: 14.1 % — SIGNIFICANT CHANGE UP (ref 10.3–14.5)
RBC # FLD: 14.1 % — SIGNIFICANT CHANGE UP (ref 10.3–14.5)
SODIUM SERPL-SCNC: 139 MMOL/L — SIGNIFICANT CHANGE UP (ref 135–145)
SODIUM SERPL-SCNC: 139 MMOL/L — SIGNIFICANT CHANGE UP (ref 135–145)
WBC # BLD: 5.77 K/UL — SIGNIFICANT CHANGE UP (ref 3.8–10.5)
WBC # BLD: 5.77 K/UL — SIGNIFICANT CHANGE UP (ref 3.8–10.5)
WBC # FLD AUTO: 5.77 K/UL — SIGNIFICANT CHANGE UP (ref 3.8–10.5)
WBC # FLD AUTO: 5.77 K/UL — SIGNIFICANT CHANGE UP (ref 3.8–10.5)

## 2023-12-04 PROCEDURE — 99223 1ST HOSP IP/OBS HIGH 75: CPT

## 2023-12-04 PROCEDURE — 99232 SBSQ HOSP IP/OBS MODERATE 35: CPT | Mod: GC

## 2023-12-04 RX ORDER — IBUPROFEN 200 MG
200 TABLET ORAL EVERY 6 HOURS
Refills: 0 | Status: DISCONTINUED | OUTPATIENT
Start: 2023-12-04 | End: 2023-12-05

## 2023-12-04 RX ORDER — INSULIN LISPRO 100/ML
7 VIAL (ML) SUBCUTANEOUS
Refills: 0 | Status: DISCONTINUED | OUTPATIENT
Start: 2023-12-04 | End: 2023-12-05

## 2023-12-04 RX ORDER — INSULIN LISPRO 100/ML
VIAL (ML) SUBCUTANEOUS AT BEDTIME
Refills: 0 | Status: DISCONTINUED | OUTPATIENT
Start: 2023-12-04 | End: 2023-12-05

## 2023-12-04 RX ORDER — INSULIN LISPRO 100/ML
VIAL (ML) SUBCUTANEOUS
Refills: 0 | Status: DISCONTINUED | OUTPATIENT
Start: 2023-12-04 | End: 2023-12-05

## 2023-12-04 RX ORDER — IBUPROFEN 200 MG
200 TABLET ORAL ONCE
Refills: 0 | Status: COMPLETED | OUTPATIENT
Start: 2023-12-04 | End: 2023-12-04

## 2023-12-04 RX ORDER — INSULIN GLARGINE 100 [IU]/ML
28 INJECTION, SOLUTION SUBCUTANEOUS AT BEDTIME
Refills: 0 | Status: DISCONTINUED | OUTPATIENT
Start: 2023-12-04 | End: 2023-12-05

## 2023-12-04 RX ADMIN — Medication 200 MILLIGRAM(S): at 09:07

## 2023-12-04 RX ADMIN — Medication 650 MILLIGRAM(S): at 21:41

## 2023-12-04 RX ADMIN — INSULIN GLARGINE 28 UNIT(S): 100 INJECTION, SOLUTION SUBCUTANEOUS at 21:40

## 2023-12-04 RX ADMIN — Medication 100 MILLIGRAM(S): at 17:21

## 2023-12-04 RX ADMIN — LISINOPRIL 10 MILLIGRAM(S): 2.5 TABLET ORAL at 05:23

## 2023-12-04 RX ADMIN — ATORVASTATIN CALCIUM 20 MILLIGRAM(S): 80 TABLET, FILM COATED ORAL at 21:41

## 2023-12-04 RX ADMIN — Medication 650 MILLIGRAM(S): at 23:45

## 2023-12-04 RX ADMIN — Medication 4: at 07:55

## 2023-12-04 RX ADMIN — Medication 7 UNIT(S): at 17:20

## 2023-12-04 RX ADMIN — Medication 7 UNIT(S): at 12:02

## 2023-12-04 RX ADMIN — Medication 5 UNIT(S): at 07:55

## 2023-12-04 RX ADMIN — Medication 8: at 11:58

## 2023-12-04 RX ADMIN — Medication 240 MILLIGRAM(S): at 05:23

## 2023-12-04 RX ADMIN — APIXABAN 5 MILLIGRAM(S): 2.5 TABLET, FILM COATED ORAL at 17:20

## 2023-12-04 RX ADMIN — Medication 100 MILLIGRAM(S): at 05:23

## 2023-12-04 RX ADMIN — APIXABAN 5 MILLIGRAM(S): 2.5 TABLET, FILM COATED ORAL at 05:23

## 2023-12-04 RX ADMIN — Medication 200 MILLIGRAM(S): at 10:00

## 2023-12-04 NOTE — DIETITIAN INITIAL EVALUATION ADULT - PROBLEM SELECTOR PLAN 1
s/p fall this evening, no LOC, no dizziness, likely mechanical fall  XR imaging pending official read, but Left shoulder looks  proximal humeral fracture/ s/p sling in ED  Pain Control  PT  Fall Precautions  Ortho Consult if Reducing Needed Primary Team to Consult in AM

## 2023-12-04 NOTE — DIETITIAN INITIAL EVALUATION ADULT - PERTINENT LABORATORY DATA
12-04    139  |  106  |  21<H>  ----------------------------<  230<H>  4.0   |  29  |  0.84    Ca    9.5      04 Dec 2023 05:40  Phos  2.6     12-04  Mg     1.6     12-04    POCT Blood Glucose.: 338 mg/dL (12-04-23 @ 11:44)  A1C with Estimated Average Glucose Result: 7.0 % (11-27-23 @ 05:15)

## 2023-12-04 NOTE — PROGRESS NOTE ADULT - PROBLEM SELECTOR PLAN 3
h/o DM, home meds Janumet and Jardiance.  - Glucose is high 200-300s today  - increased lantus to 28 units and 7 units pre meals.

## 2023-12-04 NOTE — PROGRESS NOTE ADULT - PROBLEM SELECTOR PLAN 2
presents after mechanical fall, landed on L. shoulder.  - PT eval - rec is for MEG. presents after mechanical fall, landed on L. shoulder.  - PT eval - rec is for EMG.

## 2023-12-04 NOTE — DIETITIAN INITIAL EVALUATION ADULT - OTHER INFO
Vital Signs Last 24 Hrs  T(C): 39.1 (2019 05:23), Max: 39.1 (2019 05:23)  T(F): 102.4 (2019 05:23), Max: 102.4 (2019 05:23)  HR: 101 (2019 05:23) (96 - 101)  BP: 153/67 (2019 05:23) (153/67 - 196/85)  BP(mean): --  RR: 18 (2019 05:23) (18 - 18)  SpO2: 97% (2019 05:23) (94% - 97%)                        10.1   7.48  )-----------( 367      ( 2019 00:20 )             31.6           129<L>  |  88<L>  |  13  ----------------------------<  639<HH>  4.5   |  23  |  0.8    Ca    9.2      2019 00:20  cut and past plan from last admission from Neurosurgery with ct result:  from: CT Head No Cont (19 @ 10:23) >  IMPRESSION:     1.  Left posterior parietal extra calvarial soft tissue   swelling/subcutaneous scalp hematoma with contrecoup injury and right   inferior frontal 0.7 cm wide subdural hematoma layering along the right   anterior cranial fossa.    2.  Periventricular and subcortical white matter chronic small vessel   ischemic changes.    3.  No significant acute mass effect or midline shift.    TPro  6.9  /  Alb  3.9  /  TBili  0.5  /  DBili  x   /  AST  14  /  ALT  10  /  AlkPhos  112                Urinalysis Basic - ( 2019 01:45 )    Color: Yellow / Appearance: Clear / S.020 / pH: x  Gluc: x / Ketone: 80  / Bili: Negative / Urobili: 0.2   Blood: x / Protein: 30 / Nitrite: Negative   Leuk Esterase: Negative / RBC: 3-5 /HPF / WBC 1-2 /HPF   Sq Epi: x / Non Sq Epi: Few /HPF / Bacteria: Few            Lactate Trend   @ 00:20 Lactate:1.6             CAPILLARY BLOOD GLUCOSE      POCT Blood Glucose.: 498 mg/dL (2019 04:00) Pt lives alone home with active family support PTA, alert, oriented, son at bedside, pt/family well-communicated; Reported appetite good, intended some wt loss with DM medication ( details unclear), denied GI distress, chewing or swallowing problem; Unknown food allergies, food choices obtained and forwarded to Dietary; h/o DM, finger stick [id number] to [id number], [id number], trying to watch DM diet, well-controlled at home with oral medications; pt/family very concerns of elevated Glucose level in hospital, Endocrinology consult in progression; pt/family willing and very receptive to diet reinforcement on DM meal planning, written copy (Large Print/Picture preferred) with RD business card attached for contact as needed  Pt lives alone home with active family support PTA, alert, oriented, son at bedside, pt/family well-communicated; Reported appetite good, intended some wt loss with DM medication ( details unclear), denied GI distress, chewing or swallowing problem; Unknown food allergies, food choices obtained and forwarded to Dietary; h/o DM, finger stick [id number] to 390, [id number]=2, trying to watch DM diet, well-controlled at home with oral medications; pt/family very concerns of elevated Glucose level in hospital, Endocrinology consult in progression; pt/family willing and very receptive to diet reinforcement on DM meal planning, written copy (Large Print/Picture preferred) with RD business card attached for contact as needed

## 2023-12-04 NOTE — PROGRESS NOTE ADULT - SUBJECTIVE AND OBJECTIVE BOX
PGY-1 Progress Note discussed with attending    PAGER #: [[phone]] TILL 5:00 PM  PLEASE CONTACT ON CALL TEAM:  - On Call Team (Please refer to Diana) FROM 5:00 PM - 8:30PM  - Nightfloat Team FROM 8:30 -7:30 AM    CHIEF COMPLAINT & BRIEF HOSPITAL COURSE: no acute overnight events. Patient reports pain and swelling in her left arm. Willing to take motrin as needed for pain.     INTERVAL HPI/OVERNIGHT EVENTS:   MEDICATIONS  (STANDING):  apixaban 5 milliGRAM(s) Oral every 12 hours  atorvastatin 20 milliGRAM(s) Oral at bedtime  diltiazem    milliGRAM(s) Oral daily  insulin glargine Injectable (LANTUS) 28 Unit(s) SubCutaneous at bedtime  insulin lispro (ADMELOG) corrective regimen sliding scale   SubCutaneous three times a day before meals  insulin lispro (ADMELOG) corrective regimen sliding scale   SubCutaneous at bedtime  insulin lispro Injectable (ADMELOG) 7 Unit(s) SubCutaneous three times a day before meals  lisinopril 10 milliGRAM(s) Oral daily  metoprolol tartrate 100 milliGRAM(s) Oral two times a day    MEDICATIONS  (PRN):  acetaminophen     Tablet .. 650 milliGRAM(s) Oral every 6 hours PRN Temp greater or equal to 38C (100.4F), Mild Pain (1 - 3)  aluminum hydroxide/magnesium hydroxide/simethicone Suspension 30 milliLiter(s) Oral every 4 hours PRN Dyspepsia  ibuprofen  Tablet. 200 milliGRAM(s) Oral every 6 hours PRN Moderate Pain (4 - 6)  melatonin 3 milliGRAM(s) Oral at bedtime PRN Insomnia  ondansetron Injectable 4 milliGRAM(s) IV Push every 8 hours PRN Nausea and/or Vomiting  oxyCODONE    IR 2.5 milliGRAM(s) Oral every 6 hours PRN Moderate Pain (4 - 6)      REVIEW OF SYSTEMS:  CONSTITUTIONAL: No fever, weight loss, or fatigue  RESPIRATORY: No shortness of breath  CARDIOVASCULAR: No chest pain  GASTROINTESTINAL: No abdominal pain.  GENITOURINARY: No dysuria  NEUROLOGICAL: No headaches  msk: pain and swelling in the left arm.   SKIN: No itching, burning, rashes    Vital Signs Last 24 Hrs  T(C): 36.6 (04 Dec 2023 07:25), Max: 37.2 (03 Dec 2023 15:50)  T(F): 97.9 (04 Dec 2023 07:25), Max: 99 (03 Dec 2023 15:50)  HR: 80 (04 Dec 2023 07:25) (70 - 105)  BP: 124/89 (04 Dec 2023 07:25) (119/80 - 145/79)  BP(mean): --  RR: 18 (04 Dec 2023 07:25) (18 - 18)  SpO2: 97% (04 Dec 2023 07:25) (95% - 99%)    Parameters below as of 04 Dec 2023 07:25  Patient On (Oxygen Delivery Method): room air        PHYSICAL EXAMINATION:  GENERAL: NAD, well built  HEAD:  Atraumatic, Normocephalic  EYES:  conjunctiva and sclera clear  CHEST/LUNG: Clear to auscultation. No rales, rhonchi, wheezing, or rubs  HEART: Regular rate and rhythm; No murmurs, rubs, or gallops  ABDOMEN: Soft, Nontender, Nondistended; Bowel sounds present  NERVOUS SYSTEM:  Alert & Oriented X3,    EXTREMITIES:  2+ Peripheral Pulses, No clubbing, cyanosis, or edema, left arm looks more swollen and red.   SKIN: warm dry                          11.1   5.77  )-----------( 192      ( 04 Dec 2023 05:40 )             34.3     12-04    139  |  106  |  21<H>  ----------------------------<  230<H>  4.0   |  29  |  0.84    Ca    9.5      04 Dec 2023 05:40  Phos  2.6     12-04  Mg     1.6     12-04                CAPILLARY BLOOD GLUCOSE      RADIOLOGY & ADDITIONAL TESTS:                   PGY-1 Progress Note discussed with attending    PAGER #: [[phone]] TILL 5:00 PM  PLEASE CONTACT ON CALL TEAM:  - On Call Team (Please refer to Diana) FROM 5:00 PM - 8:30PM  - Nightfloat Team FROM 8:30 -7:30 AM    CHIEF COMPLAINT & BRIEF HOSPITAL COURSE: no acute overnight events. Patient reports pain and swelling in her left arm. Willing to take motrin as needed for pain.     INTERVAL HPI/OVERNIGHT EVENTS:   MEDICATIONS  (STANDING):  apixaban 5 milliGRAM(s) Oral every 12 hours  atorvastatin 20 milliGRAM(s) Oral at bedtime  diltiazem    milliGRAM(s) Oral daily  insulin glargine Injectable (LANTUS) 28 Unit(s) SubCutaneous at bedtime  insulin lispro (ADMELOG) corrective regimen sliding scale   SubCutaneous three times a day before meals  insulin lispro (ADMELOG) corrective regimen sliding scale   SubCutaneous at bedtime  insulin lispro Injectable (ADMELOG) 7 Unit(s) SubCutaneous three times a day before meals  lisinopril 10 milliGRAM(s) Oral daily  metoprolol tartrate 100 milliGRAM(s) Oral two times a day    MEDICATIONS  (PRN):  acetaminophen     Tablet .. 650 milliGRAM(s) Oral every 6 hours PRN Temp greater or equal to 38C (100.4F), Mild Pain (1 - 3)  aluminum hydroxide/magnesium hydroxide/simethicone Suspension 30 milliLiter(s) Oral every 4 hours PRN Dyspepsia  ibuprofen  Tablet. 200 milliGRAM(s) Oral every 6 hours PRN Moderate Pain (4 - 6)  melatonin 3 milliGRAM(s) Oral at bedtime PRN Insomnia  ondansetron Injectable 4 milliGRAM(s) IV Push every 8 hours PRN Nausea and/or Vomiting  oxyCODONE    IR 2.5 milliGRAM(s) Oral every 6 hours PRN Moderate Pain (4 - 6)      REVIEW OF SYSTEMS:  CONSTITUTIONAL: No fever, weight loss, or fatigue  RESPIRATORY: No shortness of breath  CARDIOVASCULAR: No chest pain  GASTROINTESTINAL: No abdominal pain.  GENITOURINARY: No dysuria  NEUROLOGICAL: No headaches  msk: pain and swelling in the left arm.   SKIN: No itching, burning, rashes    Vital Signs Last 24 Hrs  T(C): 36.6 (04 Dec 2023 07:25), Max: 37.2 (03 Dec 2023 15:50)  T(F): 97.9 (04 Dec 2023 07:25), Max: 99 (03 Dec 2023 15:50)  HR: 80 (04 Dec 2023 07:25) (70 - 105)  BP: 124/89 (04 Dec 2023 07:25) (119/80 - 145/79)  BP(mean): --  RR: 18 (04 Dec 2023 07:25) (18 - 18)  SpO2: 97% (04 Dec 2023 07:25) (95% - 99%)    Parameters below as of 04 Dec 2023 07:25  Patient On (Oxygen Delivery Method): room air        PHYSICAL EXAMINATION:  GENERAL: NAD, well built  HEAD:  Atraumatic, Normocephalic  EYES:  conjunctiva and sclera clear  CHEST/LUNG: Clear to auscultation. No rales, rhonchi, wheezing, or rubs  HEART: Regular rate and rhythm; No murmurs, rubs, or gallops  ABDOMEN: Soft, Nontender, Nondistended; Bowel sounds present  NERVOUS SYSTEM:  Alert & Oriented X3,    EXTREMITIES:  2+ Peripheral Pulses, No clubbing, cyanosis, or edema, left arm looks more swollen and red.   SKIN: warm dry                          11.1   5.77  )-----------( 192      ( 04 Dec 2023 05:40 )             34.3     12-04    139  |  106  |  21<H>  ----------------------------<  230<H>  4.0   |  29  |  0.84    Ca    9.5      04 Dec 2023 05:40  Phos  2.6     12-04  Mg     1.6     12-04                CAPILLARY BLOOD GLUCOSE      RADIOLOGY & ADDITIONAL TESTS:

## 2023-12-04 NOTE — DIETITIAN INITIAL EVALUATION ADULT - FACTORS AFF FOOD INTAKE
acute on chronic comorbidities/Buddhist/ethnic/cultural/personal food preferences acute on chronic comorbidities/Protestant/ethnic/cultural/personal food preferences

## 2023-12-04 NOTE — PROGRESS NOTE ADULT - ASSESSMENT
77 year old woman with PMH of A- fib on OAC, HTN, DM2, HLD with a mechanical fall onto  the left shoulder -# Left proximal humeral fracture    # Atrial fibrillation.   -Diltiazem 240mg daily.  HR is controlled.    #HTN (hypertension).   ·  Plan: continue home med ramipril 2.5 mg bid, metoprolol succinate 200 mg qd  BP goal <140/90.       #HLD (hyperlipidemia).   ·  Plan: continue home med atorvastatin 20 mg qd.          Insulin Sliding Scale before meals and at bedtime

## 2023-12-04 NOTE — DIETITIAN INITIAL EVALUATION ADULT - LITERATURE/VIDEOS GIVEN
Healthy Plate from [facility]  Healthy Russian Bowl; Healthy Russian Plate  Healthy Plate from [facility]  Healthy Russian Bowl; Healthy Russian Plate

## 2023-12-04 NOTE — DIETITIAN INITIAL EVALUATION ADULT - PERTINENT MEDS FT
MEDICATIONS  (STANDING):  apixaban 5 milliGRAM(s) Oral every 12 hours  atorvastatin 20 milliGRAM(s) Oral at bedtime  diltiazem    milliGRAM(s) Oral daily  insulin glargine Injectable (LANTUS) 28 Unit(s) SubCutaneous at bedtime  insulin lispro (ADMELOG) corrective regimen sliding scale   SubCutaneous three times a day before meals  insulin lispro (ADMELOG) corrective regimen sliding scale   SubCutaneous at bedtime  insulin lispro Injectable (ADMELOG) 7 Unit(s) SubCutaneous three times a day before meals  lisinopril 10 milliGRAM(s) Oral daily  metoprolol tartrate 100 milliGRAM(s) Oral two times a day    MEDICATIONS  (PRN):  acetaminophen     Tablet .. 650 milliGRAM(s) Oral every 6 hours PRN Temp greater or equal to 38C (100.4F), Mild Pain (1 - 3)  aluminum hydroxide/magnesium hydroxide/simethicone Suspension 30 milliLiter(s) Oral every 4 hours PRN Dyspepsia  ibuprofen  Tablet. 200 milliGRAM(s) Oral every 6 hours PRN Moderate Pain (4 - 6)  melatonin 3 milliGRAM(s) Oral at bedtime PRN Insomnia  ondansetron Injectable 4 milliGRAM(s) IV Push every 8 hours PRN Nausea and/or Vomiting  oxyCODONE    IR 2.5 milliGRAM(s) Oral every 6 hours PRN Moderate Pain (4 - 6)

## 2023-12-04 NOTE — PROGRESS NOTE ADULT - PROBLEM SELECTOR PLAN 4
h/o A fib, home med diltiazem, metoprolol, and Eliquis.  c/o palpitations this morning, found to be in A fib with RVR  - c/w home meds  - tele dc  -Diltiazem 240mg daily.  HR is controlled.

## 2023-12-04 NOTE — PROGRESS NOTE ADULT - PROVIDER SPECIALTY LIST ADULT
Internal Medicine
Cardiology
Internal Medicine
Cardiology
Internal Medicine

## 2023-12-04 NOTE — PROGRESS NOTE ADULT - TIME BILLING
- Review of records, telemetry, vital signs and daily labs.   - General and cardiovascular physical examination.  - Generation of cardiovascular treatment plan.  - Coordination of care.      Patient was seen and examined by me on  12/4/2023,interim events noted,labs and radiology studies reviewed.  Vic Novak MD,FACC.  [address] [place], [place] [id number].  [phone] - Review of records, telemetry, vital signs and daily labs.   - General and cardiovascular physical examination.  - Generation of cardiovascular treatment plan.  - Coordination of care.      Patient was seen and examined by me on  12/4/2023,interim events noted,labs and radiology studies reviewed.  Vic Novak MD,FACC.  [address] [place], [place] [id number].  [phone]

## 2023-12-04 NOTE — CONSULT NOTE ADULT - TIME BILLING
minutes spent the time noted on the day of this patient encounter preparing for, reviewing records/charts/labs, interview and physical examination, coordination of care with patient and primary team, providing and documenting the above E/M service and 50% of time spent face to face counseling and education provided to patient on disease course, and treatment/management. All questions and concerns were answered and addressed in detail.

## 2023-12-04 NOTE — PROGRESS NOTE ADULT - PROBLEM SELECTOR PLAN 1
presents after mechanical fall, landed on L. shoulder.  XRAY Left shoulder / humerus - Severely comminuted/displaced proximal humeral fracture.  XRAY Left knee - No fracture/dislocation/suprapatellar effusion at the knee.  MRI shoulder - Acute comminuted and displaced fracture of the left humeral neck with extension to the greater tuberosity, as above. Associated inferior subluxation of the humeral head fragment at the glenohumeral joint.  Ortho consulted - per Ortho, patient to follow up with Dr. El Bauer as outpatient within 1 week, call office at [phone]  for appointment.  pending disposition auth to rehab   patient to be re-admitted through ED 12/10 for procedure 12/12.  will document medical clearance, cardiac clear ence is done.   f/u ortho as arm looks more swollen and red. presents after mechanical fall, landed on L. shoulder.  XRAY Left shoulder / humerus - Severely comminuted/displaced proximal humeral fracture.  XRAY Left knee - No fracture/dislocation/suprapatellar effusion at the knee.  MRI shoulder - Acute comminuted and displaced fracture of the left humeral neck with extension to the greater tuberosity, as above. Associated inferior subluxation of the humeral head fragment at the glenohumeral joint.  Ortho consulted - per Ortho, patient to follow up with Dr. El Bauer as outpatient within 1 week, call office at [phone]  for appointment.  pending disposition auth to rehab   patient to be re-admitted through ED 12/10 for procedure 12/12.  will document medical clearance, cardiac clear ence is done.   f/u ortho as arm looks more swollen and red.

## 2023-12-04 NOTE — CHART NOTE - NSCHARTNOTEFT_GEN_A_CORE
Patient is coming back on 12/10 for an orthopedic procedure. Cardiac clearance was given.      Medical clearance: RCRI : 0 points, Class I Risk 3.9 %. 30-day risk of death, MI, or cardiac arrest   HESS: 0.2 % Risk of myocardial infarction or cardiac arrest, intraoperatively or up to 30 days post-op.

## 2023-12-04 NOTE — PROGRESS NOTE ADULT - SUBJECTIVE AND OBJECTIVE BOX
PATIENT SEEN AND EXAMINED BY EDDY SILVER M.D. ON :- 12/4/23  DATE OF SERVICE:   12/4/23          Interim events noted,Labs ,Radiological studies and Cardiology tests reviewed .  DISCUSSED WITH ACP/MEDICAL RESIDENT ON PLAN OF CARE.    MR#[id number]  PATIENT NAME:[facility] COURSE: HPI:  77F from home ambulates independently, lives alone with no HHA, PMH of Afib?, on Eliquis, HTN, DM, HLD presenting with fall this evening. Patient states taht she was trying to get something on the floor when she got tripped up and landed hard on her left shoulder. She was too weak and in pain to be able to get up. She was on the ground for 2 hours, until she was found by her son. She has no history of falls, and is unable to explain why she is on Eliquis, and asks to defer to her PCP, Dr. Nieves (doesn't remember name).    She denies, LOC, dizziness, or feeling lightheaded during the event. No fevers, chills, NVD    In the ED, Xrays show comminuted fracture/dislocation in left shoulder, pending offical read, labs WNL (26 Nov 2023 21:55)      INTERIM EVENTS:Patient seen at bedside ,interim events noted.      PMH -reviewed admission note, no change since admission  HEART FAILURE: Acute[ ]Chronic[ ] Systolic[ ] Diastolic[ ] Combined Systolic and Diastolic[ ]  CAD[ ] CABG[ ] PCI[ ]  DEVICES[ ] PPM[ ] ICD[ ] ILR[ ]  ATRIAL FIBRILLATION[ ] Paroxysmal[ ] Permanent[ ] CHADS2-[  ]  JIMENEZ[ ] CKD1[ ] CKD2[ ] CKD3[ ] CKD4[ ] ESRD[ ]  COPD[ ] HTN[ ]   DM[ ] Type1[ ] Type 2[ ]   CVA[ ] Paresis[ ]    AMBULATION: Assisted[ ] Cane/walker[ ] Independent[ ]    MEDICATIONS  (STANDING):  apixaban 5 milliGRAM(s) Oral every 12 hours  atorvastatin 20 milliGRAM(s) Oral at bedtime  diltiazem    milliGRAM(s) Oral daily  insulin glargine Injectable (LANTUS) 28 Unit(s) SubCutaneous at bedtime  insulin lispro (ADMELOG) corrective regimen sliding scale   SubCutaneous three times a day before meals  insulin lispro (ADMELOG) corrective regimen sliding scale   SubCutaneous at bedtime  insulin lispro Injectable (ADMELOG) 7 Unit(s) SubCutaneous three times a day before meals  lisinopril 10 milliGRAM(s) Oral daily  metoprolol tartrate 100 milliGRAM(s) Oral two times a day    MEDICATIONS  (PRN):  acetaminophen     Tablet .. 650 milliGRAM(s) Oral every 6 hours PRN Temp greater or equal to 38C (100.4F), Mild Pain (1 - 3)  aluminum hydroxide/magnesium hydroxide/simethicone Suspension 30 milliLiter(s) Oral every 4 hours PRN Dyspepsia  ibuprofen  Tablet. 200 milliGRAM(s) Oral every 6 hours PRN Moderate Pain (4 - 6)  melatonin 3 milliGRAM(s) Oral at bedtime PRN Insomnia  ondansetron Injectable 4 milliGRAM(s) IV Push every 8 hours PRN Nausea and/or Vomiting  oxyCODONE    IR 2.5 milliGRAM(s) Oral every 6 hours PRN Moderate Pain (4 - 6)            REVIEW OF SYSTEMS:  Constitutional: [ ] fever, [ ]weight loss,  [ ]fatigue [ ]weight gain  Eyes: [ ] visual changes  Respiratory: [ ]shortness of breath;  [ ] cough, [ ]wheezing, [ ]chills, [ ]hemoptysis  Cardiovascular: [ ] chest pain, [ ]palpitations, [ ]dizziness,  [ ]leg swelling[ ]orthopnea[ ]PND  Gastrointestinal: [ ] abdominal pain, [ ]nausea, [ ]vomiting,  [ ]diarrhea [ ]Constipation [ ]Melena  Genitourinary: [ ] dysuria, [ ] hematuria [ ]Craft  Neurologic: [ ] headaches [ ] tremors[ ]weakness [ ]Paralysis Right[ ] Left[ ]  Skin: [ ] itching, [ ]burning, [ ] rashes  Endocrine: [ ] heat or cold intolerance  Musculoskeletal: [ ] joint pain or swelling; [ ] muscle, back, or extremity pain  Psychiatric: [ ] depression, [ ]anxiety, [ ]mood swings, or [ ]difficulty sleeping  Hematologic: [ ] easy bruising, [ ] bleeding gums    [ ] All remaining systems negative except as per above.   [ ]Unable to obtain.  [x] No change in ROS since admission      Vital Signs Last 24 Hrs  T(C): 36.6 (04 Dec 2023 20:44), Max: 36.9 (03 Dec 2023 23:44)  T(F): 97.9 (04 Dec 2023 20:44), Max: 98.4 (03 Dec 2023 23:44)  HR: 76 (04 Dec 2023 20:44) (65 - 90)  BP: 115/70 (04 Dec 2023 20:44) (115/70 - 130/83)  BP(mean): --  RR: 18 (04 Dec 2023 20:44) (18 - 18)  SpO2: 97% (04 Dec 2023 20:44) (95% - 100%)    Parameters below as of 04 Dec 2023 20:44  Patient On (Oxygen Delivery Method): room air      I&O's Summary    03 Dec 2023 07:01  -  04 Dec 2023 07:00  --------------------------------------------------------  IN: 275 mL / OUT: 0 mL / NET: 275 mL        PHYSICAL EXAM:  General: No acute distress BMI-  HEENT: EOMI, PERRL  Neck: Supple, [ ] JVD  Lungs: Equal air entry bilaterally; [ ] rales [ ] wheezing [ ] rhonchi  Heart: Regular rate and rhythm; [x ] murmur   2/6 [ x] systolic [ ] diastolic [ ] radiation[ ] rubs [ ]  gallops  Abdomen: Nontender, bowel sounds present  Extremities: No clubbing, cyanosis, [ ] edema [ ]Pulses  equal and intact  Nervous system:  Alert & Oriented X3, no focal deficits  Psychiatric: Normal affect  Skin: No rashes or lesions    LABS:  12-04    139  |  106  |  21<H>  ----------------------------<  230<H>  4.0   |  29  |  0.84    Ca    9.5      04 Dec 2023 05:40  Phos  2.6     12-04  Mg     1.6     12-04      Creatinine Trend: 0.84<--, 0.68<--, 0.71<--, 0.74<--, 0.81<--, 0.78<--                        11.1   5.77  )-----------( 192      ( 04 Dec 2023 05:40 )             34.3                PATIENT SEEN AND EXAMINED BY EDDY SILVER M.D. ON :- 12/4/23  DATE OF SERVICE:   12/4/23          Interim events noted,Labs ,Radiological studies and Cardiology tests reviewed .  DISCUSSED WITH ACP/MEDICAL RESIDENT ON PLAN OF CARE.    MR#[id number]  PATIENT NAME:[facility] COURSE: HPI:  77F from home ambulates independently, lives alone with no HHA, PMH of Afib?, on Eliquis, HTN, DM, HLD presenting with fall this evening. Patient states taht she was trying to get something on the floor when she got tripped up and landed hard on her left shoulder. She was too weak and in pain to be able to get up. She was on the ground for 2 hours, until she was found by her son. She has no history of falls, and is unable to explain why she is on Eliquis, and asks to defer to her PCP, Dr. Nieves (doesn't remember name).    She denies, LOC, dizziness, or feeling lightheaded during the event. No fevers, chills, NVD    In the ED, Xrays show comminuted fracture/dislocation in left shoulder, pending offical read, labs WNL (26 Nov 2023 21:55)      INTERIM EVENTS:Patient seen at bedside ,interim events noted.      PMH -reviewed admission note, no change since admission  HEART FAILURE: Acute[ ]Chronic[ ] Systolic[ ] Diastolic[ ] Combined Systolic and Diastolic[ ]  CAD[ ] CABG[ ] PCI[ ]  DEVICES[ ] PPM[ ] ICD[ ] ILR[ ]  ATRIAL FIBRILLATION[ ] Paroxysmal[ ] Permanent[ ] CHADS2-[  ]  JIMENEZ[ ] CKD1[ ] CKD2[ ] CKD3[ ] CKD4[ ] ESRD[ ]  COPD[ ] HTN[ ]   DM[ ] Type1[ ] Type 2[ ]   CVA[ ] Paresis[ ]    AMBULATION: Assisted[ ] Cane/walker[ ] Independent[ ]    MEDICATIONS  (STANDING):  apixaban 5 milliGRAM(s) Oral every 12 hours  atorvastatin 20 milliGRAM(s) Oral at bedtime  diltiazem    milliGRAM(s) Oral daily  insulin glargine Injectable (LANTUS) 28 Unit(s) SubCutaneous at bedtime  insulin lispro (ADMELOG) corrective regimen sliding scale   SubCutaneous three times a day before meals  insulin lispro (ADMELOG) corrective regimen sliding scale   SubCutaneous at bedtime  insulin lispro Injectable (ADMELOG) 7 Unit(s) SubCutaneous three times a day before meals  lisinopril 10 milliGRAM(s) Oral daily  metoprolol tartrate 100 milliGRAM(s) Oral two times a day    MEDICATIONS  (PRN):  acetaminophen     Tablet .. 650 milliGRAM(s) Oral every 6 hours PRN Temp greater or equal to 38C (100.4F), Mild Pain (1 - 3)  aluminum hydroxide/magnesium hydroxide/simethicone Suspension 30 milliLiter(s) Oral every 4 hours PRN Dyspepsia  ibuprofen  Tablet. 200 milliGRAM(s) Oral every 6 hours PRN Moderate Pain (4 - 6)  melatonin 3 milliGRAM(s) Oral at bedtime PRN Insomnia  ondansetron Injectable 4 milliGRAM(s) IV Push every 8 hours PRN Nausea and/or Vomiting  oxyCODONE    IR 2.5 milliGRAM(s) Oral every 6 hours PRN Moderate Pain (4 - 6)            REVIEW OF SYSTEMS:  Constitutional: [ ] fever, [ ]weight loss,  [ ]fatigue [ ]weight gain  Eyes: [ ] visual changes  Respiratory: [ ]shortness of breath;  [ ] cough, [ ]wheezing, [ ]chills, [ ]hemoptysis  Cardiovascular: [ ] chest pain, [ ]palpitations, [ ]dizziness,  [ ]leg swelling[ ]orthopnea[ ]PND  Gastrointestinal: [ ] abdominal pain, [ ]nausea, [ ]vomiting,  [ ]diarrhea [ ]Constipation [ ]Melena  Genitourinary: [ ] dysuria, [ ] hematuria [ ]Craft  Neurologic: [ ] headaches [ ] tremors[ ]weakness [ ]Paralysis Right[ ] Left[ ]  Skin: [ ] itching, [ ]burning, [ ] rashes  Endocrine: [ ] heat or cold intolerance  Musculoskeletal: [ ] joint pain or swelling; [ ] muscle, back, or extremity pain  Psychiatric: [ ] depression, [ ]anxiety, [ ]mood swings, or [ ]difficulty sleeping  Hematologic: [ ] easy bruising, [ ] bleeding gums    [ ] All remaining systems negative except as per above.   [ ]Unable to obtain.  [x] No change in ROS since admission      Vital Signs Last 24 Hrs  T(C): 36.6 (04 Dec 2023 20:44), Max: 36.9 (03 Dec 2023 23:44)  T(F): 97.9 (04 Dec 2023 20:44), Max: 98.4 (03 Dec 2023 23:44)  HR: 76 (04 Dec 2023 20:44) (65 - 90)  BP: 115/70 (04 Dec 2023 20:44) (115/70 - 130/83)  BP(mean): --  RR: 18 (04 Dec 2023 20:44) (18 - 18)  SpO2: 97% (04 Dec 2023 20:44) (95% - 100%)    Parameters below as of 04 Dec 2023 20:44  Patient On (Oxygen Delivery Method): room air      I&O's Summary    03 Dec 2023 07:01  -  04 Dec 2023 07:00  --------------------------------------------------------  IN: 275 mL / OUT: 0 mL / NET: 275 mL        PHYSICAL EXAM:  General: No acute distress BMI-  HEENT: EOMI, PERRL  Neck: Supple, [ ] JVD  Lungs: Equal air entry bilaterally; [ ] rales [ ] wheezing [ ] rhonchi  Heart: Regular rate and rhythm; [x ] murmur   2/6 [ x] systolic [ ] diastolic [ ] radiation[ ] rubs [ ]  gallops  Abdomen: Nontender, bowel sounds present  Extremities: No clubbing, cyanosis, [ ] edema [ ]Pulses  equal and intact  Nervous system:  Alert & Oriented X3, no focal deficits  Psychiatric: Normal affect  Skin: No rashes or lesions    LABS:  12-04    139  |  106  |  21<H>  ----------------------------<  230<H>  4.0   |  29  |  0.84    Ca    9.5      04 Dec 2023 05:40  Phos  2.6     12-04  Mg     1.6     12-04      Creatinine Trend: 0.84<--, 0.68<--, 0.71<--, 0.74<--, 0.81<--, 0.78<--                        11.1   5.77  )-----------( 192      ( 04 Dec 2023 05:40 )             34.3

## 2023-12-04 NOTE — PROGRESS NOTE ADULT - ATTENDING COMMENTS
Seen and examined in the AM    78 female who presented s/p mechanical fall, currently awaiting auth for marci    Physical: AAOx3, anxious, left arm sling. heart irregular lungs clear, abd soft nontender. one son, Yassine at bedside, discussed plan with patient and son. Left hand swollen with intact pulses and sensation. Lower extremity chronic venous stasis changes    Vital Signs Last 24 Hrs  T(C): 36.6 (04 Dec 2023 20:44), Max: 36.9 (03 Dec 2023 23:44)  T(F): 97.9 (04 Dec 2023 20:44), Max: 98.4 (03 Dec 2023 23:44)  HR: 76 (04 Dec 2023 20:44) (65 - 90)  BP: 115/70 (04 Dec 2023 20:44) (115/70 - 130/83)  BP(mean): --  RR: 18 (04 Dec 2023 20:44) (18 - 18)  SpO2: 97% (04 Dec 2023 20:44) (95% - 100%)    Parameters below as of 04 Dec 2023 20:44  Patient On (Oxygen Delivery Method): room air                  #Left Humeral Fx  - Plan to dc [facility], return to hospital if any pain  - Return to hospital 12/10 if in pain, if returns to hospital 12/10 due to pain, will be scheduled surgery 12/12  - Swollen hands, started low dose nsaid  - appreciate ortho followup, sling was adjusted  - If no Auth for MARCI tomorrow then will dc home    #Chronic Afib  - had an episode of palpitations this AM with afib w/ RVR  - mostly controlled dc tele  - dc tele  - Increased cardizem  - is on two rate control medications- metoprolol and diltiazem  - eliquis for anticoagulation      #DM2 uncontrolled  - Endo consulted due to difficulties bringing sugar under control  - Likely acute stress reaction  - DC on home meds, a1c well controlled previously without any insulin    #ppx  eliquis
Seen and examined in the AM    78 female who presented s/p mechanical fall, currently awaiting auth for marci    Physical: AAOx3, anxious, left arm sling. heart irregular lungs clear, abd soft nontender. Son michele at bedside, discussed plan with patient and son    Vital Signs Last 24 Hrs  T(C): 36.7 (01 Dec 2023 16:01), Max: 37.1 (30 Nov 2023 23:41)  T(F): 98.1 (01 Dec 2023 16:01), Max: 98.8 (30 Nov 2023 23:41)  HR: 95 (01 Dec 2023 16:01) (76 - 101)  BP: 139/79 (01 Dec 2023 16:01) (101/61 - 140/84)  BP(mean): --  RR: 19 (01 Dec 2023 16:01) (17 - 19)  SpO2: 97% (01 Dec 2023 16:01) (97% - 99%)    Parameters below as of 01 Dec 2023 16:01  Patient On (Oxygen Delivery Method): room air      #Left Humeral Fx  -PLanned for surgery dec 12  -dispo rehab vs home  -pain control    #Chronic Afib  - had an episode of palpitations this AM with afib w/ RVR  - mostly controlled dc tele  - Increased cardizem  - is on two rate control medications- metoprolol and diltiazem  - eliquis for anticoagulation      #DM2 uncontrolled  - add 6units basal insulin  - continue sliding scale- changed to moderate  - low carb diet    #ppx  eliquis
Seen and examined in the AM    78 female who presented s/p mechanical fall, currently awaiting auth for marci    Physical: AAOx3, anxious, left arm sling. heart irregular lungs clear, abd soft nontender. two sons at bedside, discussed plan with patient and son    Vital Signs Last 24 Hrs  T(C): 36.7 (02 Dec 2023 15:22), Max: 37.1 (02 Dec 2023 07:25)  T(F): 98.1 (02 Dec 2023 15:22), Max: 98.8 (02 Dec 2023 07:25)  HR: 91 (02 Dec 2023 15:22) (74 - 106)  BP: 111/83 (02 Dec 2023 15:22) (111/83 - 163/92)  BP(mean): --  RR: 18 (02 Dec 2023 15:22) (18 - 20)  SpO2: 100% (02 Dec 2023 15:22) (97% - 100%)    Parameters below as of 02 Dec 2023 15:22  Patient On (Oxygen Delivery Method): room air          #Left Humeral Fx  -PLanned for surgery dec 12  -dispo rehab vs home  -pain control  - awaiting auth for marci    #Chronic Afib  - had an episode of palpitations this AM with afib w/ RVR  - mostly controlled dc tele  - dc tele  - Increased cardizem  - is on two rate control medications- metoprolol and diltiazem  - eliquis for anticoagulation      #DM2 uncontrolled  - increase basal insulin  - add prandial insulin  - continue sliding scale- changed to moderate  - low carb diet    #ppx  eliquis
a/p# Acute fracture left humerus # Chronic Afib w/ rvr    -feels better, HR controlled- telemetry dced. plan for SNF , awaiting [place]  -cJosew current medical mx
Seen and examined in the AM    78 female who presented s/p mechanical fall, currently awaiting auth for marci    Physical: AAOx3, anxious, left arm sling. heart irregular lungs clear, abd soft nontender. one son, Yassine at bedside, discussed plan with patient and son. Left hand swollen with intact pulses and sensation    Vital Signs Last 24 Hrs  T(C): 36.6 (03 Dec 2023 11:12), Max: 37.2 (02 Dec 2023 19:47)  T(F): 97.9 (03 Dec 2023 11:12), Max: 99 (02 Dec 2023 19:47)  HR: 70 (03 Dec 2023 11:38) (66 - 104)  BP: 145/79 (03 Dec 2023 11:38) (111/84 - 145/79)  BP(mean): --  RR: 18 (03 Dec 2023 11:12) (18 - 20)  SpO2: 96% (03 Dec 2023 11:38) (96% - 98%)    Parameters below as of 03 Dec 2023 11:12  Patient On (Oxygen Delivery Method): room air              #Left Humeral Fx  -PLanned for surgery dec 12  -dispo rehab vs home  -pain control  - awaiting auth for marci  - left arm swelling, start ibuprofen and observe, xray hand  - pulses intact, sensation intact    #Chronic Afib  - had an episode of palpitations this AM with afib w/ RVR  - mostly controlled dc tele  - dc tele  - Increased cardizem  - is on two rate control medications- metoprolol and diltiazem  - eliquis for anticoagulation      #DM2 uncontrolled  - increase basal insulin again  - increase prandial insulin  - continue sliding scale- changed to moderate  - low carb diet    #ppx  eliquis
Seen and examined in the AM    78 female who presented s/p mechanical fall, currently awaiting auth for marci    Physical: AAOx3, anxious, left arm sling. heart irregular lungs clear, abd soft nontender    Vital Signs Last 24 Hrs  T(C): 37.2 (30 Nov 2023 19:31), Max: 37.2 (30 Nov 2023 19:31)  T(F): 99 (30 Nov 2023 19:31), Max: 99 (30 Nov 2023 19:31)  HR: 91 (30 Nov 2023 19:31) (81 - 104)  BP: 130/75 (30 Nov 2023 19:31) (121/77 - 139/100)  BP(mean): --  RR: 17 (30 Nov 2023 19:31) (17 - 18)  SpO2: 99% (30 Nov 2023 19:31) (96% - 99%)    Parameters below as of 30 Nov 2023 19:31  Patient On (Oxygen Delivery Method): room air    #Left Humeral Fx  -PLanned for surgery dec 12  -dispo rehab vs home  -pain control    #Chronic Afib  - had an episode of palpitations this AM with afib w/ RVR  - replace back on tele for the day  - Increased cardizem  - is on two rate control medications- metoprolol and diltiazem  - eliquis for anticoagulation      #DM2 uncontrolled  - add 6units basal insulin  - continue sliding scale  - low carb diet    #ppx  eliquis
a/p# Acute fracture left humerus # Chronic Afib w/ rvr    -c/o pain in left shoulder. controlled with current pain meds  -afib rate controlled after re-staring home medications  -case d/w Dr. Novak - cardiologist , no further testing needed, will hold DOAC 48 hours prior to surgery  -case d/w Dr. Bauer-orthopedics-plan for surgery in 2 weeks  -case d/w CM- they are working on dc planning to snf

## 2023-12-04 NOTE — CONSULT NOTE ADULT - SUBJECTIVE AND OBJECTIVE BOX
ENDOCRINE INITIAL CONSULT NOTE:    Patient is a 78y old  Female who presents with a chief complaint of Fall (04 Dec 2023 11:12)      HPI:  77F from home ambulates independently, lives alone with no HHA, PMH of Afib?, on Eliquis, HTN, DM, HLD presenting with fall this evening. Patient states taht she was trying to get something on the floor when she got tripped up and landed hard on her left shoulder. She was too weak and in pain to be able to get up. She was on the ground for 2 hours, until she was found by her son. She has no history of falls, and is unable to explain why she is on Eliquis, and asks to defer to her PCP, Dr. Nieves (doesn't remember name).    She denies, LOC, dizziness, or feeling lightheaded during the event. No fevers, chills, NVD    In the ED, Xrays show comminuted fracture/dislocation in left shoulder, pending offical read, labs WNL (26 Nov 2023 21:55)    78y Female    Diabetes History:  Diagnosis:  Home regimen:  Home fingersticks/ CGM:  Hypoglycemia events:  Retinopathy/most recent opthalmology:  Peripheral Neuropathy:  Nephropathy:  Cardiovascular disease:  Peripheral vascular disease:  Diet:  Recent A1C   PCP  Endocrinologist:    Review of systems:  Constitutional:  Constitutional: No fever, weight loss, fatigue, low energy, generalized weakness, poor appetite  Eyes: No redness, no dryness, no pain, no tearing, no gritty or evelyn feeling, no bulging  Cardiovascular/ Respiratory: No palpitations,, no chest pain, no shortness of breath, no exercise intolerance, no cough, no leg/ ankle swelling  Gastrointestinal: No trouble swallowing, no heart burn, no abdominal pain, no bloating, no nausea, no vomiting, no constipation, no diarrhea, no frequent bowel movements  Skin: No excessive hair growth, no hair loss, no acne, no excessive sweating, no rash, no easy bruising  Neurological: No headaches, no change in vision, no dizziness/ lightheadedness, no tremors, no numbness/ tingling in feet, no pain/ burning in feet, no trouble with balance, no muscular weakness.   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  Exercise:  Tobacco:  Alcohol:  illicit drug abuse:  Health insurance status:    MEDICATIONS  (STANDING):  apixaban 5 milliGRAM(s) Oral every 12 hours  atorvastatin 20 milliGRAM(s) Oral at bedtime  diltiazem    milliGRAM(s) Oral daily  insulin glargine Injectable (LANTUS) 28 Unit(s) SubCutaneous at bedtime  insulin lispro (ADMELOG) corrective regimen sliding scale   SubCutaneous at bedtime  insulin lispro (ADMELOG) corrective regimen sliding scale   SubCutaneous three times a day before meals  insulin lispro Injectable (ADMELOG) 7 Unit(s) SubCutaneous three times a day before meals  lisinopril 10 milliGRAM(s) Oral daily  metoprolol tartrate 100 milliGRAM(s) Oral two times a day    MEDICATIONS  (PRN):  acetaminophen     Tablet .. 650 milliGRAM(s) Oral every 6 hours PRN Temp greater or equal to 38C (100.4F), Mild Pain (1 - 3)  aluminum hydroxide/magnesium hydroxide/simethicone Suspension 30 milliLiter(s) Oral every 4 hours PRN Dyspepsia  ibuprofen  Tablet. 200 milliGRAM(s) Oral every 6 hours PRN Moderate Pain (4 - 6)  melatonin 3 milliGRAM(s) Oral at bedtime PRN Insomnia  ondansetron Injectable 4 milliGRAM(s) IV Push every 8 hours PRN Nausea and/or Vomiting  oxyCODONE    IR 2.5 milliGRAM(s) Oral every 6 hours PRN Moderate Pain (4 - 6)      Physical Examination  Vital Signs Last 24 Hrs  T(C): 36.6 (04 Dec 2023 11:31), Max: 37.2 (03 Dec 2023 15:50)  T(F): 97.9 (04 Dec 2023 11:31), Max: 99 (03 Dec 2023 15:50)  HR: 65 (04 Dec 2023 11:31) (65 - 105)  BP: 123/75 (04 Dec 2023 11:31) (119/80 - 138/70)  BP(mean): --  RR: 18 (04 Dec 2023 11:31) (18 - 18)  SpO2: 100% (04 Dec 2023 11:31) (95% - 100%)    Parameters below as of 04 Dec 2023 11:31  Patient On (Oxygen Delivery Method): room air      Constitutional: No acute distress, ill- appearing, no anxious appearing, hyperkinetic, no diaphoretic  HEENT: Moist mucous membranes  Neck:  No JVD, bruits or thyromegaly, No thyroid nodules palpable, no LAD  Respiratory:  Respiratory effort normal, lungs clear to ausculation, without rales or rhonchi  Cardiovascular:  Regular heart rate, normal S1 and S2 sounds, without murmur, rub or gallop.  Gastrointestinal: Soft, non tender without hepatosplenomegaly and masses, no abdominal obesity  Extremities: Sensation intact to monofilament in feet, no cyanosis, clubbing or edema, positive pedal pulses  Neurological:  Oriented to person, place and time, No gross sensory or motor defects, visual fields intact to confrontation, normal deep tendon reflexes    Labs:                        11.1   5.77  )-----------( 192      ( 04 Dec 2023 05:40 )             34.3     12-04    139  |  106  |  21<H>  ----------------------------<  230<H>  4.0   |  29  |  0.84    Ca    9.5      04 Dec 2023 05:40  Phos  2.6     12-04  Mg     1.6     12-04            Urinalysis Basic - ( 04 Dec 2023 05:40 )    Color: x / Appearance: x / SG: x / pH: x  Gluc: 230 mg/dL / Ketone: x  / Bili: x / Urobili: x   Blood: x / Protein: x / Nitrite: x   Leuk Esterase: x / RBC: x / WBC x   Sq Epi: x / Non Sq Epi: x / Bacteria: x      CAPILLARY BLOOD GLUCOSE      POCT Blood Glucose.: 338 mg/dL (04 Dec 2023 11:44)  POCT Blood Glucose.: 244 mg/dL (04 Dec 2023 07:39)  POCT Blood Glucose.: 207 mg/dL (03 Dec 2023 21:42)  POCT Blood Glucose.: 213 mg/dL (03 Dec 2023 17:08)      Radiology and diagnostic studies:      Assessment and Plan:  78y Female   Endocrinology is consulted fro    1) Type 2 diabetes:      Recommendations:  Basal Insulin:   Glargine ( Lantus) units once daily    Nutritional Insulin:   Lispro (Admelog) units with breakfast, Hold if NPO or eating <50% of meals  Lispro ( Admelog) units with lunch, Hold if NPO or eating < 50% of meals  Lispro (Admelog) units with dinner, Hold if NPO or eating < 50% of meals    Correctional Insulin:  Normal Lispro ( Admelog) correctional scale with meals and bedtime    Oral Diabetes Medications:  Non in the hospital     ENDOCRINE INITIAL CONSULT NOTE:     78y old  Female who presents with a chief complaint of Fall (04 Dec 2023 11:12)    HPI:  77F PMH of Afib on Eliquis, HTN, T2DM, HLD presenting with fall. Patient states that she was trying to get something on the floor when she got tripped up and landed hard on her left shoulder. She was too weak and in pain to be able to get up. She was on the ground for 2 hours, until she was found by her son. Xrays show comminuted fracture/dislocation in left shoulder. Endocrinology is consulted for type 2 diabetes glycemic management    Patient seen at the bedside with son, providing hx     Diabetes history:  Patient was diagnosed with diabetes greater than 10 years ago  Home regimen: Janumet 50/500 twice daily, Jardiance 10 mg daily  Home fingersticks: Checks only fasting blood sugar, reports blood sugars are always around 120s to 140s  Hypoglycemia: Denies  Retinopathy/most recent ophthalmology: Few years ago.  Unaware whether she has retinopathy  Peripheral neuropathy: Denies  Nephropathy: Denies  Cardiovascular disease: Denies  Peripheral vascular disease: Unknown, patient has history of severe varicose vein bilaterally in the legs with venous dermatitis.  Diet: Reports eating low carbohydrate consistent diet  Recent A1c: Unaware of the definition of A1c  Endocrinologist: Dr. Xi Johns    Review of system:  Constitutional: No fever, weight loss, fatigue, low energy  Cardiovascular/respiratory: No palpitation, no chest pain, no shortness of breath, yes leg and ankle swelling with varicose veins  Gastrointestinal: No heartburn, no abdominal pain, bloating, nausea, vomiting, constipation  Neurological: No headaches, no change in vision, no dizziness/lightheadedness, no tremors, yes numbness and tingling in feet  Extremity: Left arm and shoulder pain    Past medical and surgical history:  Hypertension  Type 2 diabetes  Varicose vein  Hyperlipidemia  A-fib    Family history:  Son has type 2 diabetes    Social history:  Tobacco: Denies  Alcohol: Denies  Illicit drug abuse: Denies    Medications (Standing):  apixaban 5 milliGRAM(s) Oral every 12 hours  atorvastatin 20 milliGRAM(s) Oral at bedtime  diltiazem    milliGRAM(s) Oral daily  insulin glargine Injectable (LANTUS) 28 Unit(s) SubCutaneous at bedtime  insulin lispro (ADMELOG) corrective regimen sliding scale   SubCutaneous at bedtime  insulin lispro (ADMELOG) corrective regimen sliding scale   SubCutaneous three times a day before meals  insulin lispro Injectable (ADMELOG) 7 Unit(s) SubCutaneous three times a day before meals  lisinopril 10 milliGRAM(s) Oral daily  metoprolol tartrate 100 milliGRAM(s) Oral two times a day    Medications (PRN):  acetaminophen     Tablet .. 650 milliGRAM(s) Oral every 6 hours PRN Temp greater or equal to 38C (100.4F), Mild Pain (1 - 3)  aluminum hydroxide/magnesium hydroxide/simethicone Suspension 30 milliLiter(s) Oral every 4 hours PRN Dyspepsia  ibuprofen  Tablet. 200 milliGRAM(s) Oral every 6 hours PRN Moderate Pain (4 - 6)  melatonin 3 milliGRAM(s) Oral at bedtime PRN Insomnia  ondansetron Injectable 4 milliGRAM(s) IV Push every 8 hours PRN Nausea and/or Vomiting  oxyCODONE    IR 2.5 milliGRAM(s) Oral every 6 hours PRN Moderate Pain (4 - 6)      Physical Examination  Vital Signs Last 24 Hrs  T(C): 36.6 (04 Dec 2023 11:31), Max: 37.2 (03 Dec 2023 15:50)  T(F): 97.9 (04 Dec 2023 11:31), Max: 99 (03 Dec 2023 15:50)  HR: 65 (04 Dec 2023 11:31) (65 - 105)  BP: 123/75 (04 Dec 2023 11:31) (119/80 - 138/70)  BP(mean): --  RR: 18 (04 Dec 2023 11:31) (18 - 18)  SpO2: 100% (04 Dec 2023 11:31) (95% - 100%)    Constitutional: No acute distress, ill- appearing  HEENT: Moist mucous membranes  Neck:  No JVD, bruits or thyromegaly, No thyroid nodules palpable, no LAD  Respiratory:  Respiratory effort normal, lungs clear to ausculation, without rales or rhonchi  Cardiovascular:  Regular heart rate, normal S1 and S2 sounds, without murmur, rub or gallop.  Gastrointestinal: Soft, non tender without hepatosplenomegaly and masses, yes abdominal obesity  Extremities: Sensation intact in feet, severe varicose veins b/l with venous stasis dermatitis, +ve edema in the legs,  no cyanosis, clubbing, positive pedal pulses  Neurological:  Oriented to person, place and time    Labs:                    11.1   5.77  )-----------( 192      ( 04 Dec 2023 05:40 )             34.3     12-04  139  |  106  |  21<H>  ----------------------------<  230<H>  4.0   |  29  |  0.84  Ca    9.5      04 Dec 2023 05:40  Phos  2.6     12-04  Mg     1.6     12-04    Capillary Blood Glucose:  POCT Blood Glucose.: 338 mg/dL (04 Dec 2023 11:44)  POCT Blood Glucose.: 244 mg/dL (04 Dec 2023 07:39)  POCT Blood Glucose.: 207 mg/dL (03 Dec 2023 21:42)  POCT Blood Glucose.: 213 mg/dL (03 Dec 2023 17:08)    A1C with Estimated Average Glucose Result: 7.0 % (11.27.23 @ 05:15)    Assessment and Plan:  78 year old Female with PMH of HTN, type 2 diabetes, HLD presenting with fall.  Endocrinology is consulted for type 2 diabetes glycemic management in setting of hyperglycemia    1) Poorly controlled Type 2 diabetes:  A1c is better controlled based upon her age  Inpatient blood sugar are above goal likely due to baseline insulin resistance, lack of oral hypoglycemic medications and stress-induced hyperglycemia  Patient has fair appetite  Adjust insulin as below    Inpatient recommendations:  Basal insulin:  Lantus 28 units at bedtime    Nutritional insulin:  Lispro 7 units with meals, hold if NPO or eating less than 50% of meals    Correctional insulin:  Changed to low lispro correctional scale with meals and bedtime    Oral diabetes medication:  None in the hospital    Check the POC glucose with meals and bedtime  Counseled extensively to the patient and the son that inpatient the blood sugars can be high for a while due to stress-induced hyperglycemia, limited mobility status and and may be mixed IV dextrose solution with antibiotics.  Patient should eat the carbohydrate consistent food provided in the hospital without hesitation as she is receiving a high amount of insulin to cover those carbs.    Patient is not on maximum doses of metformin at home, therefore given A1c is 7 will not discharged on insulin, instead will increase the home oral meds regimen.  Patient will follow up with her endocrinologist Dr. Xi Diallo in 1-2  week for outpatient blood sugar optimization    Preliminary discharge recommendations (subject to change)  1) metformin 1000 mg twice daily  2) Januvia 100 mg daily  3) Jardiance 10 mg daily    Diabetes Education  Extensive education provided about diabetes, hyperglycemia, hypoglycemia, glucose self-monitoring with glucometer, glucose target ranges, adherence to diabetes medications, diet, physical activity, importance of following up with outpatient endocrinology/ opthalmology and podiatry appointments.  Explained the definition of HBA1C and target range.   Explained the complications of diabetes including stroke, renal failure and blindness  Reviewed hypoglycemic sign/symptoms and necessary precautions.   Discussed the goal fasting and postprandial BS at home  Patient verbalized understanding and agrees with the plan      Discussed endocrine plan of care with patient and primary team   Brenda Vallejo MD   Endocrinology, Diabetes and Metabolism   Available on MS. teams

## 2023-12-05 ENCOUNTER — TRANSCRIPTION ENCOUNTER (OUTPATIENT)
Age: 78
End: 2023-12-05

## 2023-12-05 VITALS
TEMPERATURE: 97 F | OXYGEN SATURATION: 99 % | SYSTOLIC BLOOD PRESSURE: 148 MMHG | HEART RATE: 72 BPM | RESPIRATION RATE: 17 BRPM | DIASTOLIC BLOOD PRESSURE: 81 MMHG

## 2023-12-05 LAB
ALBUMIN SERPL ELPH-MCNC: 2.4 G/DL — LOW (ref 3.5–5)
ALBUMIN SERPL ELPH-MCNC: 2.4 G/DL — LOW (ref 3.5–5)
ALP SERPL-CCNC: 78 U/L — SIGNIFICANT CHANGE UP (ref 40–120)
ALP SERPL-CCNC: 78 U/L — SIGNIFICANT CHANGE UP (ref 40–120)
ALT FLD-CCNC: 21 U/L DA — SIGNIFICANT CHANGE UP (ref 10–60)
ALT FLD-CCNC: 21 U/L DA — SIGNIFICANT CHANGE UP (ref 10–60)
ANION GAP SERPL CALC-SCNC: 4 MMOL/L — LOW (ref 5–17)
ANION GAP SERPL CALC-SCNC: 4 MMOL/L — LOW (ref 5–17)
AST SERPL-CCNC: 11 U/L — SIGNIFICANT CHANGE UP (ref 10–40)
AST SERPL-CCNC: 11 U/L — SIGNIFICANT CHANGE UP (ref 10–40)
BILIRUB SERPL-MCNC: 0.9 MG/DL — SIGNIFICANT CHANGE UP (ref 0.2–1.2)
BILIRUB SERPL-MCNC: 0.9 MG/DL — SIGNIFICANT CHANGE UP (ref 0.2–1.2)
BUN SERPL-MCNC: 19 MG/DL — HIGH (ref 7–18)
BUN SERPL-MCNC: 19 MG/DL — HIGH (ref 7–18)
CALCIUM SERPL-MCNC: 9.1 MG/DL — SIGNIFICANT CHANGE UP (ref 8.4–10.5)
CALCIUM SERPL-MCNC: 9.1 MG/DL — SIGNIFICANT CHANGE UP (ref 8.4–10.5)
CHLORIDE SERPL-SCNC: 104 MMOL/L — SIGNIFICANT CHANGE UP (ref 96–108)
CHLORIDE SERPL-SCNC: 104 MMOL/L — SIGNIFICANT CHANGE UP (ref 96–108)
CO2 SERPL-SCNC: 28 MMOL/L — SIGNIFICANT CHANGE UP (ref 22–31)
CO2 SERPL-SCNC: 28 MMOL/L — SIGNIFICANT CHANGE UP (ref 22–31)
CREAT SERPL-MCNC: 0.78 MG/DL — SIGNIFICANT CHANGE UP (ref 0.5–1.3)
CREAT SERPL-MCNC: 0.78 MG/DL — SIGNIFICANT CHANGE UP (ref 0.5–1.3)
EGFR: 78 ML/MIN/1.73M2 — SIGNIFICANT CHANGE UP
EGFR: 78 ML/MIN/1.73M2 — SIGNIFICANT CHANGE UP
GLUCOSE BLDC GLUCOMTR-MCNC: 233 MG/DL — HIGH (ref 70–99)
GLUCOSE BLDC GLUCOMTR-MCNC: 233 MG/DL — HIGH (ref 70–99)
GLUCOSE BLDC GLUCOMTR-MCNC: 261 MG/DL — HIGH (ref 70–99)
GLUCOSE BLDC GLUCOMTR-MCNC: 261 MG/DL — HIGH (ref 70–99)
GLUCOSE SERPL-MCNC: 293 MG/DL — HIGH (ref 70–99)
GLUCOSE SERPL-MCNC: 293 MG/DL — HIGH (ref 70–99)
HCT VFR BLD CALC: 33.9 % — LOW (ref 34.5–45)
HCT VFR BLD CALC: 33.9 % — LOW (ref 34.5–45)
HGB BLD-MCNC: 11.2 G/DL — LOW (ref 11.5–15.5)
HGB BLD-MCNC: 11.2 G/DL — LOW (ref 11.5–15.5)
MAGNESIUM SERPL-MCNC: 1.5 MG/DL — LOW (ref 1.6–2.6)
MAGNESIUM SERPL-MCNC: 1.5 MG/DL — LOW (ref 1.6–2.6)
MCHC RBC-ENTMCNC: 31.5 PG — SIGNIFICANT CHANGE UP (ref 27–34)
MCHC RBC-ENTMCNC: 31.5 PG — SIGNIFICANT CHANGE UP (ref 27–34)
MCHC RBC-ENTMCNC: 33 GM/DL — SIGNIFICANT CHANGE UP (ref 32–36)
MCHC RBC-ENTMCNC: 33 GM/DL — SIGNIFICANT CHANGE UP (ref 32–36)
MCV RBC AUTO: 95.5 FL — SIGNIFICANT CHANGE UP (ref 80–100)
MCV RBC AUTO: 95.5 FL — SIGNIFICANT CHANGE UP (ref 80–100)
NRBC # BLD: 0 /100 WBCS — SIGNIFICANT CHANGE UP (ref 0–0)
NRBC # BLD: 0 /100 WBCS — SIGNIFICANT CHANGE UP (ref 0–0)
PHOSPHATE SERPL-MCNC: 2.6 MG/DL — SIGNIFICANT CHANGE UP (ref 2.5–4.5)
PHOSPHATE SERPL-MCNC: 2.6 MG/DL — SIGNIFICANT CHANGE UP (ref 2.5–4.5)
PLATELET # BLD AUTO: 208 K/UL — SIGNIFICANT CHANGE UP (ref 150–400)
PLATELET # BLD AUTO: 208 K/UL — SIGNIFICANT CHANGE UP (ref 150–400)
POTASSIUM SERPL-MCNC: 4 MMOL/L — SIGNIFICANT CHANGE UP (ref 3.5–5.3)
POTASSIUM SERPL-MCNC: 4 MMOL/L — SIGNIFICANT CHANGE UP (ref 3.5–5.3)
POTASSIUM SERPL-SCNC: 4 MMOL/L — SIGNIFICANT CHANGE UP (ref 3.5–5.3)
POTASSIUM SERPL-SCNC: 4 MMOL/L — SIGNIFICANT CHANGE UP (ref 3.5–5.3)
PROT SERPL-MCNC: 6.4 G/DL — SIGNIFICANT CHANGE UP (ref 6–8.3)
PROT SERPL-MCNC: 6.4 G/DL — SIGNIFICANT CHANGE UP (ref 6–8.3)
RBC # BLD: 3.55 M/UL — LOW (ref 3.8–5.2)
RBC # BLD: 3.55 M/UL — LOW (ref 3.8–5.2)
RBC # FLD: 14.3 % — SIGNIFICANT CHANGE UP (ref 10.3–14.5)
RBC # FLD: 14.3 % — SIGNIFICANT CHANGE UP (ref 10.3–14.5)
SODIUM SERPL-SCNC: 136 MMOL/L — SIGNIFICANT CHANGE UP (ref 135–145)
SODIUM SERPL-SCNC: 136 MMOL/L — SIGNIFICANT CHANGE UP (ref 135–145)
WBC # BLD: 5.83 K/UL — SIGNIFICANT CHANGE UP (ref 3.8–10.5)
WBC # BLD: 5.83 K/UL — SIGNIFICANT CHANGE UP (ref 3.8–10.5)
WBC # FLD AUTO: 5.83 K/UL — SIGNIFICANT CHANGE UP (ref 3.8–10.5)
WBC # FLD AUTO: 5.83 K/UL — SIGNIFICANT CHANGE UP (ref 3.8–10.5)

## 2023-12-05 PROCEDURE — 86803 HEPATITIS C AB TEST: CPT

## 2023-12-05 PROCEDURE — 85025 COMPLETE CBC W/AUTO DIFF WBC: CPT

## 2023-12-05 PROCEDURE — T1013: CPT

## 2023-12-05 PROCEDURE — 97116 GAIT TRAINING THERAPY: CPT

## 2023-12-05 PROCEDURE — 73221 MRI JOINT UPR EXTREM W/O DYE: CPT

## 2023-12-05 PROCEDURE — 82962 GLUCOSE BLOOD TEST: CPT

## 2023-12-05 PROCEDURE — 73120 X-RAY EXAM OF HAND: CPT

## 2023-12-05 PROCEDURE — 97530 THERAPEUTIC ACTIVITIES: CPT

## 2023-12-05 PROCEDURE — 82550 ASSAY OF CK (CPK): CPT

## 2023-12-05 PROCEDURE — 83735 ASSAY OF MAGNESIUM: CPT

## 2023-12-05 PROCEDURE — 73110 X-RAY EXAM OF WRIST: CPT

## 2023-12-05 PROCEDURE — 73030 X-RAY EXAM OF SHOULDER: CPT

## 2023-12-05 PROCEDURE — 80053 COMPREHEN METABOLIC PANEL: CPT

## 2023-12-05 PROCEDURE — 97110 THERAPEUTIC EXERCISES: CPT

## 2023-12-05 PROCEDURE — 73090 X-RAY EXAM OF FOREARM: CPT

## 2023-12-05 PROCEDURE — 73060 X-RAY EXAM OF HUMERUS: CPT

## 2023-12-05 PROCEDURE — 93005 ELECTROCARDIOGRAM TRACING: CPT

## 2023-12-05 PROCEDURE — 36415 COLL VENOUS BLD VENIPUNCTURE: CPT

## 2023-12-05 PROCEDURE — 73562 X-RAY EXAM OF KNEE 3: CPT

## 2023-12-05 PROCEDURE — 84484 ASSAY OF TROPONIN QUANT: CPT

## 2023-12-05 PROCEDURE — 99285 EMERGENCY DEPT VISIT HI MDM: CPT | Mod: 25

## 2023-12-05 PROCEDURE — 82553 CREATINE MB FRACTION: CPT

## 2023-12-05 PROCEDURE — 80048 BASIC METABOLIC PNL TOTAL CA: CPT

## 2023-12-05 PROCEDURE — 97162 PT EVAL MOD COMPLEX 30 MIN: CPT

## 2023-12-05 PROCEDURE — 84100 ASSAY OF PHOSPHORUS: CPT

## 2023-12-05 PROCEDURE — 80076 HEPATIC FUNCTION PANEL: CPT

## 2023-12-05 PROCEDURE — 83036 HEMOGLOBIN GLYCOSYLATED A1C: CPT

## 2023-12-05 PROCEDURE — 85027 COMPLETE CBC AUTOMATED: CPT

## 2023-12-05 PROCEDURE — 99239 HOSP IP/OBS DSCHRG MGMT >30: CPT

## 2023-12-05 PROCEDURE — 96374 THER/PROPH/DIAG INJ IV PUSH: CPT

## 2023-12-05 RX ORDER — ACETAMINOPHEN 500 MG
2 TABLET ORAL
Qty: 0 | Refills: 0 | DISCHARGE
Start: 2023-12-05

## 2023-12-05 RX ORDER — METFORMIN HYDROCHLORIDE 850 MG/1
1 TABLET ORAL
Qty: 60 | Refills: 0
Start: 2023-12-05 | End: 2024-01-03

## 2023-12-05 RX ORDER — INSULIN GLARGINE 100 [IU]/ML
32 INJECTION, SOLUTION SUBCUTANEOUS AT BEDTIME
Refills: 0 | Status: DISCONTINUED | OUTPATIENT
Start: 2023-12-05 | End: 2023-12-05

## 2023-12-05 RX ORDER — SITAGLIPTIN 50 MG/1
1 TABLET, FILM COATED ORAL
Qty: 30 | Refills: 0
Start: 2023-12-05 | End: 2024-01-03

## 2023-12-05 RX ORDER — OXYCODONE HYDROCHLORIDE 5 MG/1
1 TABLET ORAL
Qty: 20 | Refills: 0
Start: 2023-12-05 | End: 2023-12-09

## 2023-12-05 RX ORDER — EMPAGLIFLOZIN 10 MG/1
1 TABLET, FILM COATED ORAL
Qty: 30 | Refills: 0
Start: 2023-12-05 | End: 2024-01-03

## 2023-12-05 RX ORDER — INSULIN LISPRO 100/ML
12 VIAL (ML) SUBCUTANEOUS
Refills: 0 | Status: DISCONTINUED | OUTPATIENT
Start: 2023-12-05 | End: 2023-12-05

## 2023-12-05 RX ADMIN — Medication 7 UNIT(S): at 12:21

## 2023-12-05 RX ADMIN — Medication 200 MILLIGRAM(S): at 05:47

## 2023-12-05 RX ADMIN — LISINOPRIL 10 MILLIGRAM(S): 2.5 TABLET ORAL at 05:40

## 2023-12-05 RX ADMIN — Medication 3: at 08:21

## 2023-12-05 RX ADMIN — Medication 200 MILLIGRAM(S): at 06:29

## 2023-12-05 RX ADMIN — Medication 240 MILLIGRAM(S): at 05:40

## 2023-12-05 RX ADMIN — Medication 2: at 12:22

## 2023-12-05 RX ADMIN — Medication 100 MILLIGRAM(S): at 05:40

## 2023-12-05 RX ADMIN — APIXABAN 5 MILLIGRAM(S): 2.5 TABLET, FILM COATED ORAL at 05:40

## 2023-12-05 RX ADMIN — Medication 7 UNIT(S): at 08:21

## 2023-12-05 NOTE — DISCHARGE NOTE NURSING/CASE MANAGEMENT/SOCIAL WORK - PATIENT PORTAL LINK FT
You can access the FollowMyHealth Patient Portal offered by [facility] by registering at the following website: [email or web address]. By joining [facility]’s FollowMyHealth portal, you will also be able to view your health information using other applications (apps) compatible with our system. You can access the FollowMyHealth Patient Portal offered by [facility] by registering at the following website: [email or web address]. By joining [facility]’s FollowMyHealth portal, you will also be able to view your health information using other applications (apps) compatible with our system.

## 2023-12-05 NOTE — DISCHARGE NOTE NURSING/CASE MANAGEMENT/SOCIAL WORK - NSDCPEFALRISK_GEN_ALL_CORE
For information on Fall & Injury Prevention, visit: [email or web address] OR  [email or web address] OR  https://www.cdc.gov/steadi/patient.html For information on Fall & Injury Prevention, visit: [email or web address] OR  [email or web address] OR  https://www.cdc.gov/steadi/patient.html

## 2023-12-09 ENCOUNTER — TRANSCRIPTION ENCOUNTER (OUTPATIENT)
Age: 78
End: 2023-12-09

## 2023-12-11 ENCOUNTER — OUTPATIENT (OUTPATIENT)
Dept: OUTPATIENT SERVICES | Facility: HOSPITAL | Age: 78
LOS: 1 days | End: 2023-12-11
Payer: MEDICARE

## 2023-12-11 VITALS
SYSTOLIC BLOOD PRESSURE: 146 MMHG | OXYGEN SATURATION: 100 % | HEIGHT: 60 IN | RESPIRATION RATE: 18 BRPM | TEMPERATURE: 98 F | DIASTOLIC BLOOD PRESSURE: 66 MMHG | WEIGHT: 164.91 LBS

## 2023-12-11 DIAGNOSIS — E11.9 TYPE 2 DIABETES MELLITUS WITHOUT COMPLICATIONS: ICD-10-CM

## 2023-12-11 DIAGNOSIS — I10 ESSENTIAL (PRIMARY) HYPERTENSION: ICD-10-CM

## 2023-12-11 DIAGNOSIS — S42.232D 3-PART FRACTURE OF SURGICAL NECK OF LEFT HUMERUS, SUBSEQUENT ENCOUNTER FOR FRACTURE WITH ROUTINE HEALING: ICD-10-CM

## 2023-12-11 DIAGNOSIS — M19.011 PRIMARY OSTEOARTHRITIS, RIGHT SHOULDER: ICD-10-CM

## 2023-12-11 DIAGNOSIS — I48.91 UNSPECIFIED ATRIAL FIBRILLATION: ICD-10-CM

## 2023-12-11 DIAGNOSIS — Z01.818 ENCOUNTER FOR OTHER PREPROCEDURAL EXAMINATION: ICD-10-CM

## 2023-12-11 LAB
BLD GP AB SCN SERPL QL: SIGNIFICANT CHANGE UP
BLD GP AB SCN SERPL QL: SIGNIFICANT CHANGE UP
INR BLD: 1.2 RATIO — HIGH (ref 0.85–1.18)
INR BLD: 1.2 RATIO — HIGH (ref 0.85–1.18)
PROTHROM AB SERPL-ACNC: 13.6 SEC — HIGH (ref 9.5–13)
PROTHROM AB SERPL-ACNC: 13.6 SEC — HIGH (ref 9.5–13)

## 2023-12-11 RX ORDER — TRAMADOL HYDROCHLORIDE 50 MG/1
50 TABLET ORAL ONCE
Refills: 0 | Status: DISCONTINUED | OUTPATIENT
Start: 2023-12-12 | End: 2023-12-12

## 2023-12-11 RX ORDER — POVIDONE-IODINE 5 %
1 AEROSOL (ML) TOPICAL ONCE
Refills: 0 | Status: DISCONTINUED | OUTPATIENT
Start: 2023-12-12 | End: 2023-12-26

## 2023-12-11 RX ORDER — CELECOXIB 200 MG/1
200 CAPSULE ORAL ONCE
Refills: 0 | Status: COMPLETED | OUTPATIENT
Start: 2023-12-12 | End: 2023-12-12

## 2023-12-11 RX ORDER — CHLORHEXIDINE GLUCONATE 213 G/1000ML
1 SOLUTION TOPICAL EVERY 12 HOURS
Refills: 0 | Status: DISCONTINUED | OUTPATIENT
Start: 2023-12-12 | End: 2023-12-26

## 2023-12-11 NOTE — H&P PST ADULT - PROBLEM SELECTOR PROBLEM 1
3-part fracture of surgical neck of left humerus, subsequent encounter for fracture with routine healing

## 2023-12-11 NOTE — H&P PST ADULT - PROBLEM SELECTOR PLAN 4
Pt instructed to stop taking medication:  metformin and janumet  on day of surgery  PCP instructed to stop taking medication:  jardiance on 12/11  a1c 7.0  FS  f/u BMP and Acetone Stat

## 2023-12-11 NOTE — H&P PST ADULT - ASSESSMENT
79 y/o female c/o left proximal humeral fracture s/p fall, now scheduled for Left Reverse Total Shoulder Replacement on 12/12/23 with Dr. Bauer.     Divya Brown 3.

## 2023-12-11 NOTE — H&P PST ADULT - NSICDXFAMILYHX_GEN_ALL_CORE_FT
FAMILY HISTORY:  Mother  Still living? Unknown  Family history of thrombosis, Age at diagnosis: Age Unknown

## 2023-12-11 NOTE — H&P PST ADULT - PROBLEM SELECTOR PLAN 5
Patient instructed to continue taking   ramipril, cardizem, metoprolol  on AM of Surgery with a Sip of Water

## 2023-12-11 NOTE — H&P PST ADULT - HISTORY OF PRESENT ILLNESS
77 y/o female with PMH of Atrial Fibrillation (on eliquis), DM, Obesity, Osteoarthritis, HTN, HLD, Anxiety, Hypothyroidism, Umbilical Hernia, PVD.   No PSH. Pt c/o left proximal humeral fracture s/p trip and fall at home on 11/26/23.  Pt presents to pre-surgical evaluation now scheduled for    Left Reverse Total Shoulder Replacement on 12/12/23 with Dr. Bauer       77 y/o female with PMH of Atrial Fibrillation (on eliquis), DM, Obesity, Osteoarthritis, HTN, HLD, Anxiety, Hypothyroidism, Umbilical Hernia, PVD.   No PSH. Pt c/o left proximal humeral fracture s/p trip and fall at home on 11/26/23.  Pt presents to pre-surgical evaluation now scheduled for    Left Reverse Total Shoulder Replacement on 12/12/23 with Dr. Buaer       79 y/o female, Tunisian speaking, with PMH of Atrial Fibrillation (on eliquis), DM, Obesity, Osteoarthritis, HTN, HLD, Anxiety, Hypothyroidism, Umbilical Hernia, PVD. No PSH. Pt c/o left proximal humeral fracture s/p trip and fall at home on 11/26/23.  Pt presents to pre-surgical evaluation now scheduled for  Left Reverse Total Shoulder Replacement on 12/12/23 with Dr. Bauer.        79 y/o female, Cypriot speaking, with PMH of Atrial Fibrillation (on eliquis), DM, Obesity, Osteoarthritis, HTN, HLD, Anxiety, Hypothyroidism, Umbilical Hernia, PVD. No PSH. Pt c/o left proximal humeral fracture s/p trip and fall at home on 11/26/23.  Pt presents to pre-surgical evaluation now scheduled for  Left Reverse Total Shoulder Replacement on 12/12/23 with Dr. Bauer.

## 2023-12-11 NOTE — H&P PST ADULT - NSICDXPASTMEDICALHX_GEN_ALL_CORE_FT
PAST MEDICAL HISTORY:  Anxiety     Atrial fibrillation     Diabetes     HLD (hyperlipidemia)     HTN (hypertension)     Hypothyroidism     OA (osteoarthritis)     Obesity     Peripheral venous insufficiency

## 2023-12-11 NOTE — H&P PST ADULT - PROBLEM SELECTOR PLAN 1
Pt schedule for  Left Reverse Total Shoulder Replacement on 12/12/23 with Dr. Bauer.     Labs drawn PCP 12/7/23 and PST  medical clearance in chart    Pt was  instructed to stop aspirin/ecotrin and all over the counter medication including vitamins and herbal supplements one week prior to surgery   Instructions given on the use of 4% chlorhexidine wash and Pt verbalized understanding of same   Pt Instructed to have nothing by mouth starting midnight day before surgery  Patient is to expect a phone call day before surgery between the hours of 430- 630pm giving arrival time for surgery   Written and verbal preoperative instructions given to patient with understanding verbalized.     Patient today with STOP bang score 3  Low  risk for JUNIOR

## 2023-12-12 ENCOUNTER — OUTPATIENT (OUTPATIENT)
Dept: OUTPATIENT SERVICES | Facility: HOSPITAL | Age: 78
LOS: 1 days | End: 2023-12-12
Payer: MEDICARE

## 2023-12-12 DIAGNOSIS — Z01.818 ENCOUNTER FOR OTHER PREPROCEDURAL EXAMINATION: ICD-10-CM

## 2023-12-12 DIAGNOSIS — M19.011 PRIMARY OSTEOARTHRITIS, RIGHT SHOULDER: ICD-10-CM

## 2023-12-12 LAB
GLUCOSE BLDC GLUCOMTR-MCNC: 180 MG/DL — HIGH (ref 70–99)
GLUCOSE BLDC GLUCOMTR-MCNC: 180 MG/DL — HIGH (ref 70–99)
MRSA PCR RESULT.: SIGNIFICANT CHANGE UP
MRSA PCR RESULT.: SIGNIFICANT CHANGE UP
S AUREUS DNA NOSE QL NAA+PROBE: SIGNIFICANT CHANGE UP
S AUREUS DNA NOSE QL NAA+PROBE: SIGNIFICANT CHANGE UP

## 2023-12-12 PROCEDURE — G0463: CPT

## 2023-12-12 PROCEDURE — 82962 GLUCOSE BLOOD TEST: CPT

## 2023-12-12 RX ADMIN — CHLORHEXIDINE GLUCONATE 1 APPLICATION(S): 213 SOLUTION TOPICAL at 14:44

## 2023-12-12 RX ADMIN — TRAMADOL HYDROCHLORIDE 50 MILLIGRAM(S): 50 TABLET ORAL at 14:45

## 2023-12-12 RX ADMIN — CELECOXIB 200 MILLIGRAM(S): 200 CAPSULE ORAL at 14:41

## 2023-12-12 NOTE — ASU PATIENT PROFILE, ADULT - FALL HARM RISK - RISK INTERVENTIONS
Communicate Fall Risk and Risk Factors to all staff, patient, and family/Reinforce activity limits and safety measures with patient and family/Visual Cue: Yellow wristband/Bed in lowest position, wheels locked, appropriate side rails in place/Call bell, personal items and telephone in reach/Instruct patient to call for assistance before getting out of bed or chair/Non-slip footwear when patient is out of bed/[place] to call system/Physically safe environment - no spills, clutter or unnecessary equipment/Purposeful Proactive Rounding/Room/bathroom lighting operational, light cord in reach Communicate Fall Risk and Risk Factors to all staff, patient, and family/Reinforce activity limits and safety measures with patient and family/Visual Cue: Yellow wristband/Bed in lowest position, wheels locked, appropriate side rails in place/Call bell, personal items and telephone in reach/Instruct patient to call for assistance before getting out of bed or chair/Non-slip footwear when patient is out of bed/[place] to call system/Physically safe environment - no spills, clutter or unnecessary equipment/Purposeful Proactive Rounding/Room/bathroom lighting operational, light cord in reach

## 2023-12-13 RX ORDER — EMPAGLIFLOZIN 10 MG/1
1 TABLET, FILM COATED ORAL
Refills: 0 | DISCHARGE

## 2023-12-13 RX ORDER — SITAGLIPTIN AND METFORMIN HYDROCHLORIDE 500; 50 MG/1; MG/1
1 TABLET, FILM COATED ORAL
Refills: 0 | DISCHARGE

## 2023-12-18 ENCOUNTER — TRANSCRIPTION ENCOUNTER (OUTPATIENT)
Age: 78
End: 2023-12-18

## 2023-12-18 RX ORDER — SODIUM CHLORIDE 9 MG/ML
3 INJECTION INTRAMUSCULAR; INTRAVENOUS; SUBCUTANEOUS EVERY 8 HOURS
Refills: 0 | Status: DISCONTINUED | OUTPATIENT
Start: 2023-12-19 | End: 2023-12-22

## 2023-12-19 ENCOUNTER — INPATIENT (INPATIENT)
Facility: HOSPITAL | Age: 78
LOS: 2 days | Discharge: NURSING, REHAB OR LONG-TERM CARE | DRG: 483 | End: 2023-12-22
Attending: ORTHOPAEDIC SURGERY | Admitting: ORTHOPAEDIC SURGERY
Payer: MEDICARE

## 2023-12-19 ENCOUNTER — TRANSCRIPTION ENCOUNTER (OUTPATIENT)
Age: 78
End: 2023-12-19

## 2023-12-19 VITALS
HEART RATE: 81 BPM | OXYGEN SATURATION: 98 % | WEIGHT: 164.91 LBS | SYSTOLIC BLOOD PRESSURE: 105 MMHG | TEMPERATURE: 98 F | RESPIRATION RATE: 18 BRPM | DIASTOLIC BLOOD PRESSURE: 66 MMHG | HEIGHT: 60 IN

## 2023-12-19 DIAGNOSIS — S42.232D 3-PART FRACTURE OF SURGICAL NECK OF LEFT HUMERUS, SUBSEQUENT ENCOUNTER FOR FRACTURE WITH ROUTINE HEALING: ICD-10-CM

## 2023-12-19 LAB
ANION GAP SERPL CALC-SCNC: 7 MMOL/L — SIGNIFICANT CHANGE UP (ref 5–17)
ANION GAP SERPL CALC-SCNC: 7 MMOL/L — SIGNIFICANT CHANGE UP (ref 5–17)
BLD GP AB SCN SERPL QL: SIGNIFICANT CHANGE UP
BLD GP AB SCN SERPL QL: SIGNIFICANT CHANGE UP
BUN SERPL-MCNC: 18 MG/DL — SIGNIFICANT CHANGE UP (ref 7–18)
BUN SERPL-MCNC: 18 MG/DL — SIGNIFICANT CHANGE UP (ref 7–18)
CALCIUM SERPL-MCNC: 9.3 MG/DL — SIGNIFICANT CHANGE UP (ref 8.4–10.5)
CALCIUM SERPL-MCNC: 9.3 MG/DL — SIGNIFICANT CHANGE UP (ref 8.4–10.5)
CHLORIDE SERPL-SCNC: 106 MMOL/L — SIGNIFICANT CHANGE UP (ref 96–108)
CHLORIDE SERPL-SCNC: 106 MMOL/L — SIGNIFICANT CHANGE UP (ref 96–108)
CO2 SERPL-SCNC: 26 MMOL/L — SIGNIFICANT CHANGE UP (ref 22–31)
CO2 SERPL-SCNC: 26 MMOL/L — SIGNIFICANT CHANGE UP (ref 22–31)
CREAT SERPL-MCNC: 0.84 MG/DL — SIGNIFICANT CHANGE UP (ref 0.5–1.3)
CREAT SERPL-MCNC: 0.84 MG/DL — SIGNIFICANT CHANGE UP (ref 0.5–1.3)
EGFR: 71 ML/MIN/1.73M2 — SIGNIFICANT CHANGE UP
EGFR: 71 ML/MIN/1.73M2 — SIGNIFICANT CHANGE UP
GLUCOSE BLDC GLUCOMTR-MCNC: 170 MG/DL — HIGH (ref 70–99)
GLUCOSE BLDC GLUCOMTR-MCNC: 170 MG/DL — HIGH (ref 70–99)
GLUCOSE BLDC GLUCOMTR-MCNC: 173 MG/DL — HIGH (ref 70–99)
GLUCOSE BLDC GLUCOMTR-MCNC: 173 MG/DL — HIGH (ref 70–99)
GLUCOSE BLDC GLUCOMTR-MCNC: 204 MG/DL — HIGH (ref 70–99)
GLUCOSE BLDC GLUCOMTR-MCNC: 204 MG/DL — HIGH (ref 70–99)
GLUCOSE BLDC GLUCOMTR-MCNC: 215 MG/DL — HIGH (ref 70–99)
GLUCOSE BLDC GLUCOMTR-MCNC: 215 MG/DL — HIGH (ref 70–99)
GLUCOSE BLDC GLUCOMTR-MCNC: 246 MG/DL — HIGH (ref 70–99)
GLUCOSE BLDC GLUCOMTR-MCNC: 246 MG/DL — HIGH (ref 70–99)
GLUCOSE SERPL-MCNC: 279 MG/DL — HIGH (ref 70–99)
GLUCOSE SERPL-MCNC: 279 MG/DL — HIGH (ref 70–99)
HCT VFR BLD CALC: 36.3 % — SIGNIFICANT CHANGE UP (ref 34.5–45)
HCT VFR BLD CALC: 36.3 % — SIGNIFICANT CHANGE UP (ref 34.5–45)
HGB BLD-MCNC: 11.7 G/DL — SIGNIFICANT CHANGE UP (ref 11.5–15.5)
HGB BLD-MCNC: 11.7 G/DL — SIGNIFICANT CHANGE UP (ref 11.5–15.5)
MCHC RBC-ENTMCNC: 32.2 GM/DL — SIGNIFICANT CHANGE UP (ref 32–36)
MCHC RBC-ENTMCNC: 32.2 GM/DL — SIGNIFICANT CHANGE UP (ref 32–36)
MCHC RBC-ENTMCNC: 32.2 PG — SIGNIFICANT CHANGE UP (ref 27–34)
MCHC RBC-ENTMCNC: 32.2 PG — SIGNIFICANT CHANGE UP (ref 27–34)
MCV RBC AUTO: 100 FL — SIGNIFICANT CHANGE UP (ref 80–100)
MCV RBC AUTO: 100 FL — SIGNIFICANT CHANGE UP (ref 80–100)
NRBC # BLD: 0 /100 WBCS — SIGNIFICANT CHANGE UP (ref 0–0)
NRBC # BLD: 0 /100 WBCS — SIGNIFICANT CHANGE UP (ref 0–0)
PLATELET # BLD AUTO: 231 K/UL — SIGNIFICANT CHANGE UP (ref 150–400)
PLATELET # BLD AUTO: 231 K/UL — SIGNIFICANT CHANGE UP (ref 150–400)
POTASSIUM SERPL-MCNC: 3.8 MMOL/L — SIGNIFICANT CHANGE UP (ref 3.5–5.3)
POTASSIUM SERPL-MCNC: 3.8 MMOL/L — SIGNIFICANT CHANGE UP (ref 3.5–5.3)
POTASSIUM SERPL-SCNC: 3.8 MMOL/L — SIGNIFICANT CHANGE UP (ref 3.5–5.3)
POTASSIUM SERPL-SCNC: 3.8 MMOL/L — SIGNIFICANT CHANGE UP (ref 3.5–5.3)
RBC # BLD: 3.63 M/UL — LOW (ref 3.8–5.2)
RBC # BLD: 3.63 M/UL — LOW (ref 3.8–5.2)
RBC # FLD: 15.4 % — HIGH (ref 10.3–14.5)
RBC # FLD: 15.4 % — HIGH (ref 10.3–14.5)
SODIUM SERPL-SCNC: 139 MMOL/L — SIGNIFICANT CHANGE UP (ref 135–145)
SODIUM SERPL-SCNC: 139 MMOL/L — SIGNIFICANT CHANGE UP (ref 135–145)
WBC # BLD: 9.5 K/UL — SIGNIFICANT CHANGE UP (ref 3.8–10.5)
WBC # BLD: 9.5 K/UL — SIGNIFICANT CHANGE UP (ref 3.8–10.5)
WBC # FLD AUTO: 9.5 K/UL — SIGNIFICANT CHANGE UP (ref 3.8–10.5)
WBC # FLD AUTO: 9.5 K/UL — SIGNIFICANT CHANGE UP (ref 3.8–10.5)

## 2023-12-19 PROCEDURE — 88305 TISSUE EXAM BY PATHOLOGIST: CPT | Mod: 26

## 2023-12-19 PROCEDURE — 88311 DECALCIFY TISSUE: CPT | Mod: 26

## 2023-12-19 PROCEDURE — 73020 X-RAY EXAM OF SHOULDER: CPT | Mod: 26,LT

## 2023-12-19 DEVICE — IMPLANTABLE DEVICE: Type: IMPLANTABLE DEVICE | Status: FUNCTIONAL

## 2023-12-19 DEVICE — CEMENT SIMPLEX P 40GM: Type: IMPLANTABLE DEVICE | Status: FUNCTIONAL

## 2023-12-19 DEVICE — MAXBRAID  SUT ANCHOR: Type: IMPLANTABLE DEVICE | Status: FUNCTIONAL

## 2023-12-19 RX ORDER — PANTOPRAZOLE SODIUM 20 MG/1
40 TABLET, DELAYED RELEASE ORAL
Refills: 0 | Status: DISCONTINUED | OUTPATIENT
Start: 2023-12-19 | End: 2023-12-22

## 2023-12-19 RX ORDER — OXYCODONE HYDROCHLORIDE 5 MG/1
5 TABLET ORAL EVERY 4 HOURS
Refills: 0 | Status: DISCONTINUED | OUTPATIENT
Start: 2023-12-19 | End: 2023-12-22

## 2023-12-19 RX ORDER — DILTIAZEM HCL 120 MG
180 CAPSULE, EXT RELEASE 24 HR ORAL DAILY
Refills: 0 | Status: DISCONTINUED | OUTPATIENT
Start: 2023-12-19 | End: 2023-12-22

## 2023-12-19 RX ORDER — INSULIN LISPRO 100/ML
VIAL (ML) SUBCUTANEOUS
Refills: 0 | Status: DISCONTINUED | OUTPATIENT
Start: 2023-12-19 | End: 2023-12-22

## 2023-12-19 RX ORDER — DEXTROSE 50 % IN WATER 50 %
15 SYRINGE (ML) INTRAVENOUS ONCE
Refills: 0 | Status: DISCONTINUED | OUTPATIENT
Start: 2023-12-19 | End: 2023-12-22

## 2023-12-19 RX ORDER — FENTANYL CITRATE 50 UG/ML
50 INJECTION INTRAVENOUS
Refills: 0 | Status: DISCONTINUED | OUTPATIENT
Start: 2023-12-19 | End: 2023-12-19

## 2023-12-19 RX ORDER — ONDANSETRON 8 MG/1
4 TABLET, FILM COATED ORAL EVERY 6 HOURS
Refills: 0 | Status: DISCONTINUED | OUTPATIENT
Start: 2023-12-19 | End: 2023-12-22

## 2023-12-19 RX ORDER — FENTANYL CITRATE 50 UG/ML
25 INJECTION INTRAVENOUS
Refills: 0 | Status: DISCONTINUED | OUTPATIENT
Start: 2023-12-19 | End: 2023-12-19

## 2023-12-19 RX ORDER — DEXTROSE 50 % IN WATER 50 %
25 SYRINGE (ML) INTRAVENOUS ONCE
Refills: 0 | Status: DISCONTINUED | OUTPATIENT
Start: 2023-12-19 | End: 2023-12-22

## 2023-12-19 RX ORDER — SODIUM CHLORIDE 9 MG/ML
1000 INJECTION INTRAMUSCULAR; INTRAVENOUS; SUBCUTANEOUS
Refills: 0 | Status: DISCONTINUED | OUTPATIENT
Start: 2023-12-19 | End: 2023-12-22

## 2023-12-19 RX ORDER — APIXABAN 2.5 MG/1
1 TABLET, FILM COATED ORAL
Refills: 0 | DISCHARGE

## 2023-12-19 RX ORDER — RAMIPRIL 5 MG
0 CAPSULE ORAL
Refills: 0 | DISCHARGE

## 2023-12-19 RX ORDER — METOPROLOL TARTRATE 50 MG
200 TABLET ORAL DAILY
Refills: 0 | Status: DISCONTINUED | OUTPATIENT
Start: 2023-12-19 | End: 2023-12-22

## 2023-12-19 RX ORDER — ATORVASTATIN CALCIUM 80 MG/1
1 TABLET, FILM COATED ORAL
Refills: 0 | DISCHARGE

## 2023-12-19 RX ORDER — ACETAMINOPHEN 500 MG
975 TABLET ORAL ONCE
Refills: 0 | Status: DISCONTINUED | OUTPATIENT
Start: 2023-12-19 | End: 2023-12-19

## 2023-12-19 RX ORDER — SODIUM CHLORIDE 9 MG/ML
1000 INJECTION, SOLUTION INTRAVENOUS
Refills: 0 | Status: DISCONTINUED | OUTPATIENT
Start: 2023-12-19 | End: 2023-12-22

## 2023-12-19 RX ORDER — METOPROLOL TARTRATE 50 MG
1 TABLET ORAL
Refills: 0 | DISCHARGE

## 2023-12-19 RX ORDER — ACETAMINOPHEN 500 MG
1000 TABLET ORAL EVERY 8 HOURS
Refills: 0 | Status: COMPLETED | OUTPATIENT
Start: 2023-12-19 | End: 2023-12-21

## 2023-12-19 RX ORDER — OXYCODONE HYDROCHLORIDE 5 MG/1
10 TABLET ORAL EVERY 4 HOURS
Refills: 0 | Status: DISCONTINUED | OUTPATIENT
Start: 2023-12-19 | End: 2023-12-22

## 2023-12-19 RX ORDER — DILTIAZEM HCL 120 MG
1 CAPSULE, EXT RELEASE 24 HR ORAL
Refills: 0 | DISCHARGE

## 2023-12-19 RX ORDER — APIXABAN 2.5 MG/1
5 TABLET, FILM COATED ORAL EVERY 12 HOURS
Refills: 0 | Status: DISCONTINUED | OUTPATIENT
Start: 2023-12-20 | End: 2023-12-22

## 2023-12-19 RX ORDER — GLUCAGON INJECTION, SOLUTION 0.5 MG/.1ML
1 INJECTION, SOLUTION SUBCUTANEOUS ONCE
Refills: 0 | Status: DISCONTINUED | OUTPATIENT
Start: 2023-12-19 | End: 2023-12-22

## 2023-12-19 RX ORDER — CEFAZOLIN SODIUM 1 G
2000 VIAL (EA) INJECTION EVERY 8 HOURS
Refills: 0 | Status: COMPLETED | OUTPATIENT
Start: 2023-12-20 | End: 2023-12-20

## 2023-12-19 RX ORDER — LISINOPRIL 2.5 MG/1
10 TABLET ORAL DAILY
Refills: 0 | Status: DISCONTINUED | OUTPATIENT
Start: 2023-12-19 | End: 2023-12-22

## 2023-12-19 RX ORDER — CHLORHEXIDINE GLUCONATE 213 G/1000ML
1 SOLUTION TOPICAL ONCE
Refills: 0 | Status: COMPLETED | OUTPATIENT
Start: 2023-12-19 | End: 2023-12-19

## 2023-12-19 RX ORDER — ATORVASTATIN CALCIUM 80 MG/1
20 TABLET, FILM COATED ORAL AT BEDTIME
Refills: 0 | Status: DISCONTINUED | OUTPATIENT
Start: 2023-12-19 | End: 2023-12-22

## 2023-12-19 RX ORDER — DEXTROSE 50 % IN WATER 50 %
12.5 SYRINGE (ML) INTRAVENOUS ONCE
Refills: 0 | Status: DISCONTINUED | OUTPATIENT
Start: 2023-12-19 | End: 2023-12-22

## 2023-12-19 RX ORDER — SENNA PLUS 8.6 MG/1
2 TABLET ORAL AT BEDTIME
Refills: 0 | Status: DISCONTINUED | OUTPATIENT
Start: 2023-12-19 | End: 2023-12-22

## 2023-12-19 RX ORDER — ONDANSETRON 8 MG/1
4 TABLET, FILM COATED ORAL ONCE
Refills: 0 | Status: DISCONTINUED | OUTPATIENT
Start: 2023-12-19 | End: 2023-12-19

## 2023-12-19 RX ORDER — TRAMADOL HYDROCHLORIDE 50 MG/1
50 TABLET ORAL ONCE
Refills: 0 | Status: DISCONTINUED | OUTPATIENT
Start: 2023-12-19 | End: 2023-12-19

## 2023-12-19 RX ADMIN — SODIUM CHLORIDE 100 MILLILITER(S): 9 INJECTION INTRAMUSCULAR; INTRAVENOUS; SUBCUTANEOUS at 22:56

## 2023-12-19 RX ADMIN — OXYCODONE HYDROCHLORIDE 10 MILLIGRAM(S): 5 TABLET ORAL at 23:20

## 2023-12-19 RX ADMIN — FENTANYL CITRATE 50 MICROGRAM(S): 50 INJECTION INTRAVENOUS at 21:00

## 2023-12-19 RX ADMIN — TRAMADOL HYDROCHLORIDE 50 MILLIGRAM(S): 50 TABLET ORAL at 14:22

## 2023-12-19 RX ADMIN — FENTANYL CITRATE 50 MICROGRAM(S): 50 INJECTION INTRAVENOUS at 20:45

## 2023-12-19 RX ADMIN — OXYCODONE HYDROCHLORIDE 10 MILLIGRAM(S): 5 TABLET ORAL at 22:47

## 2023-12-19 RX ADMIN — SODIUM CHLORIDE 3 MILLILITER(S): 9 INJECTION INTRAMUSCULAR; INTRAVENOUS; SUBCUTANEOUS at 22:00

## 2023-12-19 RX ADMIN — CHLORHEXIDINE GLUCONATE 1 APPLICATION(S): 213 SOLUTION TOPICAL at 14:22

## 2023-12-19 RX ADMIN — Medication 2: at 22:47

## 2023-12-19 NOTE — BRIEF OPERATIVE NOTE - NSICDXBRIEFPREOP_GEN_ALL_CORE_FT
PRE-OP DIAGNOSIS:  Closed 4-part fracture of proximal end of left humerus 19-Dec-2023 20:18:56  Osman Dang

## 2023-12-19 NOTE — ASU PATIENT PROFILE, ADULT - FALL HARM RISK - HARM RISK INTERVENTIONS
Assistance OOB with selected safe patient handling equipment/Communicate Risk of Fall with Harm to all staff/Discuss with provider need for PT consult/Monitor gait and stability/Provide patient with walking aids - walker, cane, crutches/Reinforce activity limits and safety measures with patient and family/Tailored Fall Risk Interventions/Visual Cue: Yellow wristband and red socks/Bed in lowest position, wheels locked, appropriate side rails in place/Call bell, personal items and telephone in reach/Instruct patient to call for assistance before getting out of bed or chair/Non-slip footwear when patient is out of bed/[place] to call system/Physically safe environment - no spills, clutter or unnecessary equipment/Purposeful Proactive Rounding/Room/bathroom lighting operational, light cord in reach Assistance OOB with selected safe patient handling equipment/Communicate Risk of Fall with Harm to all staff/Discuss with provider need for PT consult/Monitor gait and stability/Provide patient with walking aids - walker, cane, crutches/Reinforce activity limits and safety measures with patient and family/Tailored Fall Risk Interventions/Visual Cue: Yellow wristband and red socks/Bed in lowest position, wheels locked, appropriate side rails in place/Call bell, personal items and telephone in reach/Instruct patient to call for assistance before getting out of bed or chair/Non-slip footwear when patient is out of bed/[place] to call system/Physically safe environment - no spills, clutter or unnecessary equipment/Purposeful Proactive Rounding/Room/bathroom lighting operational, light cord in reach

## 2023-12-19 NOTE — ASU PATIENT PROFILE, ADULT - DOES PATIENT HAVE ADVANCE DIRECTIVE
will discuss with son Jason - [phone]/No will discuss with son Jason - [phone]/No Hema Castaneda)  Pediatrics  [address] [place] [address]  Phone: [phone]  Fax: [phone]  Follow Up Time: 1-3 days Hema Castaneda)  Pediatrics  [address] [place] [address]  Phone: [phone]  Fax: [phone]  Follow Up Time: 1-3 days    Taiwo Lux)  Pediatric Urology; Urology  [address] [facility], [facility] [address]  [place] [address]  Phone: [phone]  Fax: [phone]  Follow Up Time:

## 2023-12-19 NOTE — ASU PATIENT PROFILE, ADULT - LANGUAGE ASSISTANCE NEEDED
able to communicate with patient in Algerian/Yes-Patient/Caregiver accepts free interpretation services... able to communicate with patient in Mauritanian/Yes-Patient/Caregiver accepts free interpretation services...

## 2023-12-19 NOTE — ASU PATIENT PROFILE, ADULT - FALL HARM RISK - FACTORS
s/ pt. will remain, stable, minimal or no pain, no active bleeding, able to eat, drink and void freely, adult available to take pt. home fall/Other

## 2023-12-19 NOTE — ASU PREOP CHECKLIST - BMI (KG/M2)
Patient ID: Gely Ballard is a 45 y.o. male Date of Birth 1985       Chief Complaint   Patient presents with   • Follow Up Wound Care Visit     Right foot ulcer          Allergies:  Patient has no known allergies. Diagnosis:   Diagnosis ICD-10-CM Associated Orders   1. Neuropathic foot ulcer, right, with fat layer exposed ([address])  L97.512 Wound cleansing and dressings     Cast Application           Assessment  & Plan:    • F/u neuropathic ulcer of R plantar TMA site. Measuring approximately the same in size. There is granulation tissue in wound bed. No significant depth, no probe to bone. No surrounding erythema to indicate acute infection. o Silver alginate to wound bed, polymem did not remain in place well.   o Total contact cast placed, as below. 2+ DP and PT pulses on examination. Discussed if cast cracks, breaks, indents, or any noticeable discomfort office should be notified for cast to be replaced. o  Pt is at high risk given etiology and prior complication of osteomyelitis requiring amputation. Believe pt would benefit from hyperbaric oxygen therapy to assist with wound healing. Hyperbaric consultation scheduled for next week.   o F/u in one week. Instructed to call if any questions or concerns arise in meantime. Subjective:   07/06/23: Pt presents for evaluation of neuropathic ulceration of R TMA stump and placement of total contact cast. Has no complaints with cast. Has been resting and limiting weight bearing as much as possible. Notes he has f/u with Dr. Isaac Townsend next week following percutaneous achilles tendotomy for R ankle equinus contracture. Pt denies fevers, chills, malaise.          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 DOS 12-16-19 Left Knee    [employer] [facility]    Patient can be reached at [phone] with any questions. Patient would like this mailed to her home please   lbs over ideal weight or BMI > 40) ([facility])   • HTN, goal below 140/90   • H/O amputation of lesser toe, right ([facility])   • Charcot-Dhara-Tooth disease-like deformity of foot   • Anxiety and depression   • History of transmetatarsal amputation of right foot ([facility])   • Closed nondisplaced fracture of fifth metatarsal bone of left foot with routine healing   • Leukocytosis   • Equinus contracture of right ankle     Past Medical History:   Diagnosis Date   • Acid reflux    • Charcot-Dhara-Tooth disease    • COVID 12/2021   • CPAP (continuous positive airway pressure) dependence    • GERD (gastroesophageal reflux disease)    • 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[address])    • History of transfusion    • Sleep apnea      Past Surgical History:   Procedure Laterality Date   • FOOT SURGERY Bilateral     multiple surgeries   • JOINT REPLACEMENT     • KNEE SURGERY     • NASAL SEPTUM SURGERY     • OH AMPUTATION METATARSAL W/TOE SINGLE Left 12/21/2019    Procedure: 5th metatarsal partial RAY RESECTION FOOT;  Surgeon: Selin Gunter DPM;  Location:  MAIN OR;  Service: Podiatry   • OH AMPUTATION METATARSAL W/TOE SINGLE Bilateral 1/26/2023    Procedure: Left 2nd digit amputation and right foot wound skin graft application Stravix;   Surgeon: Sharon Lemos DPM;  Location: [facility] MAIN OR;  Service: Podiatry   • TOE AMPUTATION       Family History   Problem Relation Age of Onset   • Cancer Father      Social History     Socioeconomic History   • Marital status: Single     Spouse name: Not on file   • Number of children: Not on file   • Years of education: Not on file   • Highest education level: Not on file   Occupational History   • Not on file   Tobacco Use   • Smoking status: Never   • Smokeless tobacco: Never   Vaping Use   • Vaping Use: Never used   Substance and Sexual Activity   • Alcohol use: Never   • Drug use: Never   • Sexual activity: Not on file   Other Topics Concern   • Not on file   Social History Narrative   • Not on file     Social Determinants of Health     Financial Resource Strain: Not on file   Food Insecurity: Not on file   Transportation Needs: Not on file   Physical Activity: Not on file   Stress: Not on file   Social Connections: Not on file   Intimate Partner Violence: Not on file   Housing Stability: Not on file       Current Outpatient Medications:   •  Cyanocobalamin 1000 MCG SUBL, Place 1,000 mcg under the tongue daily, Disp: , Rfl:   •  FeroSul 325 (65 Fe) MG tablet, Take 1 tablet by mouth daily with breakfast, Disp: , Rfl:   •  ferrous sulfate 325 (65 Fe) mg tablet, Take 1 tablet by mouth daily with breakfast, Disp: , Rfl:   •  lisinopril (ZESTRIL) 20 mg tablet, take 1 tablet by mouth once daily as directed for blood pressure (STOP LISINOPRIL 10MG), Disp: , Rfl:   •  lisinopril-hydrochlorothiazide (PRINZIDE,ZESTORETIC) 20-25 MG per tablet, Take 1 tablet by mouth daily, Disp: , Rfl:   •  Multiple Vitamins-Minerals (MULTIVITAMIN ADULT EXTRA C) CHEW, Chew in the morning, Disp: , Rfl:   •  venlafaxine (EFFEXOR-XR) 150 mg 24 hr capsule, , Disp: , Rfl:   •  venlafaxine (EFFEXOR-XR) 75 mg 24 hr capsule, take 1 capsule by mouth every morning DO NOT CRUSH, CHEW, AND/OR DIVIDE (Patient not taking: Reported on 6/29/2023), Disp: , Rfl:   •  venlafaxine (EFFEXOR-XR) 75 mg 24 hr capsule, Take 75 mg by mouth daily (Patient not taking: Reported on 6/29/2023), Disp: , Rfl:   •  vitamin B-12 (VITAMIN B-12) 1,000 mcg tablet, Take 1,000 mcg by mouth every morning, Disp: , Rfl:     Review of Systems   Constitutional: Negative for chills and fever. Skin: Positive for wound (R TMA site). Negative for color change. Psychiatric/Behavioral: Negative for confusion. Objective:  /93   Pulse 79   Temp (!) 97.3 °F (36.3 °C)   Resp 20   Pain Score: 0-No pain     Physical Exam  Vitals reviewed. Constitutional:       Appearance: He is morbidly obese. Cardiovascular:      Rate and Rhythm: Normal rate.       Pulses:           Dorsalis pedis pulses are 2+ on the right side. Posterior tibial pulses are 2+ on the right side. Pulmonary:      Effort: Pulmonary effort is normal. No respiratory distress. Musculoskeletal:        Feet:    Feet:      Comments: Neuropathic ulcer of R plantar TMA site. Measuring approximately the same in size. There is granulation tissue in wound bed. No significant depth, no probe to bone. No surrounding erythema to indicate acute infection. Neurological:      Mental Status: He is alert. Psychiatric:         Mood and Affect: Mood normal.                Wound 09/01/22 Neuropathic Foot Right;Plantar (Active)   Wound Image   07/06/23 0908   Wound Description Granulation tissue;Pink 07/06/23 0913   Danitza-wound Assessment Callus 07/06/23 0913   Wound Length (cm) 1.7 cm 07/06/23 0913   Wound Width (cm) 1.6 cm 07/06/23 0913   Wound Depth (cm) 0.2 cm 07/06/23 0913   Wound Surface Area (cm^2) 2.72 cm^2 07/06/23 0913   Wound Volume (cm^3) 0.544 cm^3 07/06/23 0913   Calculated Wound Volume (cm^3) 0.54 cm^3 07/06/23 0913   Change in Wound Size % -22.73 07/06/23 0913   Undermining 0.4 06/29/23 0923   Undermining is depth extending from 10-4 oclock 06/29/23 0923   Drainage Amount Large 07/06/23 0913   Drainage Description Bloody 07/06/23 0913   Non-staged Wound Description Full thickness 07/06/23 0913   Treatments Cleansed 07/06/23 0913   Patient Tolerance Tolerated well 07/06/23 0913   Dressing Status Other (Comment) 07/06/23 0913                     Cast Application    Date/Time: 7/6/2023 10:15 AM    Performed by: Claude Campoverde PA-C  Authorized by: Claude Campoverde PA-C  Universal Protocol:  Consent: Verbal consent obtained. Consent given by: patient  Time out: Immediately prior to procedure a "time out" was called to verify the correct patient, procedure, equipment, support staff and site/side marked as required.   Patient understanding: patient states understanding of the procedure being performed  Patient identity confirmed: verbally with patient      Pre-procedure details:     Sensation:  Numbness  Procedure details:     Laterality:  Right    Location:  Leg    Leg:  R lower legCast type: Total contact      Supplies:  Cotton padding and fiberglass  Post-procedure details:     Pain:  Unchanged    Sensation:  Numbness    Patient tolerance of procedure: Tolerated well, no immediate complications                   Wound Instructions:  Orders Placed This Encounter   Procedures   • Wound cleansing and dressings     Right Foot wound:    Wash with mild soap and water, rinse well and pat dry       Prophase soak 5 mins. Apply Silver alginate to the wound. Cover with Abd pad and secure with Michael and tape     TCC EZ applied today by Tila Reddy PA-C    Treatment completed today         Off-loading Instructions: Total Contact Cast Instructions:   Do not get cast wet. Contact wound center if there is a foul odor or becomes uncomfortable due to feeling tight or swelling. Do not use objects down inside of cast to scratch.    Do not walk on cast without walking boot    Follow up on Tuesday     Standing Status:   Future     Standing Expiration Date:   9/4/2960   • Cast Application     This order was created via procedure documentation       Tila Reddy PA-C 32.2

## 2023-12-19 NOTE — BRIEF OPERATIVE NOTE - NSICDXBRIEFPOSTOP_GEN_ALL_CORE_FT
POST-OP DIAGNOSIS:  Closed 4-part fracture of proximal end of left humerus 19-Dec-2023 20:19:15  Osman Dang

## 2023-12-20 LAB
ANION GAP SERPL CALC-SCNC: 10 MMOL/L — SIGNIFICANT CHANGE UP (ref 5–17)
ANION GAP SERPL CALC-SCNC: 10 MMOL/L — SIGNIFICANT CHANGE UP (ref 5–17)
BUN SERPL-MCNC: 12 MG/DL — SIGNIFICANT CHANGE UP (ref 7–18)
BUN SERPL-MCNC: 12 MG/DL — SIGNIFICANT CHANGE UP (ref 7–18)
CALCIUM SERPL-MCNC: 8.7 MG/DL — SIGNIFICANT CHANGE UP (ref 8.4–10.5)
CALCIUM SERPL-MCNC: 8.7 MG/DL — SIGNIFICANT CHANGE UP (ref 8.4–10.5)
CHLORIDE SERPL-SCNC: 106 MMOL/L — SIGNIFICANT CHANGE UP (ref 96–108)
CHLORIDE SERPL-SCNC: 106 MMOL/L — SIGNIFICANT CHANGE UP (ref 96–108)
CO2 SERPL-SCNC: 24 MMOL/L — SIGNIFICANT CHANGE UP (ref 22–31)
CO2 SERPL-SCNC: 24 MMOL/L — SIGNIFICANT CHANGE UP (ref 22–31)
CREAT SERPL-MCNC: 0.74 MG/DL — SIGNIFICANT CHANGE UP (ref 0.5–1.3)
CREAT SERPL-MCNC: 0.74 MG/DL — SIGNIFICANT CHANGE UP (ref 0.5–1.3)
EGFR: 83 ML/MIN/1.73M2 — SIGNIFICANT CHANGE UP
EGFR: 83 ML/MIN/1.73M2 — SIGNIFICANT CHANGE UP
GLUCOSE BLDC GLUCOMTR-MCNC: 171 MG/DL — HIGH (ref 70–99)
GLUCOSE BLDC GLUCOMTR-MCNC: 171 MG/DL — HIGH (ref 70–99)
GLUCOSE BLDC GLUCOMTR-MCNC: 189 MG/DL — HIGH (ref 70–99)
GLUCOSE BLDC GLUCOMTR-MCNC: 189 MG/DL — HIGH (ref 70–99)
GLUCOSE BLDC GLUCOMTR-MCNC: 200 MG/DL — HIGH (ref 70–99)
GLUCOSE BLDC GLUCOMTR-MCNC: 200 MG/DL — HIGH (ref 70–99)
GLUCOSE SERPL-MCNC: 200 MG/DL — HIGH (ref 70–99)
GLUCOSE SERPL-MCNC: 200 MG/DL — HIGH (ref 70–99)
HCT VFR BLD CALC: 33.7 % — LOW (ref 34.5–45)
HCT VFR BLD CALC: 33.7 % — LOW (ref 34.5–45)
HGB BLD-MCNC: 10.7 G/DL — LOW (ref 11.5–15.5)
HGB BLD-MCNC: 10.7 G/DL — LOW (ref 11.5–15.5)
MCHC RBC-ENTMCNC: 31.8 GM/DL — LOW (ref 32–36)
MCHC RBC-ENTMCNC: 31.8 GM/DL — LOW (ref 32–36)
MCHC RBC-ENTMCNC: 32 PG — SIGNIFICANT CHANGE UP (ref 27–34)
MCHC RBC-ENTMCNC: 32 PG — SIGNIFICANT CHANGE UP (ref 27–34)
MCV RBC AUTO: 100.9 FL — HIGH (ref 80–100)
MCV RBC AUTO: 100.9 FL — HIGH (ref 80–100)
NRBC # BLD: 0 /100 WBCS — SIGNIFICANT CHANGE UP (ref 0–0)
NRBC # BLD: 0 /100 WBCS — SIGNIFICANT CHANGE UP (ref 0–0)
PLATELET # BLD AUTO: 195 K/UL — SIGNIFICANT CHANGE UP (ref 150–400)
PLATELET # BLD AUTO: 195 K/UL — SIGNIFICANT CHANGE UP (ref 150–400)
POTASSIUM SERPL-MCNC: 3.8 MMOL/L — SIGNIFICANT CHANGE UP (ref 3.5–5.3)
POTASSIUM SERPL-MCNC: 3.8 MMOL/L — SIGNIFICANT CHANGE UP (ref 3.5–5.3)
POTASSIUM SERPL-SCNC: 3.8 MMOL/L — SIGNIFICANT CHANGE UP (ref 3.5–5.3)
POTASSIUM SERPL-SCNC: 3.8 MMOL/L — SIGNIFICANT CHANGE UP (ref 3.5–5.3)
RBC # BLD: 3.34 M/UL — LOW (ref 3.8–5.2)
RBC # BLD: 3.34 M/UL — LOW (ref 3.8–5.2)
RBC # FLD: 15.2 % — HIGH (ref 10.3–14.5)
RBC # FLD: 15.2 % — HIGH (ref 10.3–14.5)
SODIUM SERPL-SCNC: 140 MMOL/L — SIGNIFICANT CHANGE UP (ref 135–145)
SODIUM SERPL-SCNC: 140 MMOL/L — SIGNIFICANT CHANGE UP (ref 135–145)
WBC # BLD: 6.7 K/UL — SIGNIFICANT CHANGE UP (ref 3.8–10.5)
WBC # BLD: 6.7 K/UL — SIGNIFICANT CHANGE UP (ref 3.8–10.5)
WBC # FLD AUTO: 6.7 K/UL — SIGNIFICANT CHANGE UP (ref 3.8–10.5)
WBC # FLD AUTO: 6.7 K/UL — SIGNIFICANT CHANGE UP (ref 3.8–10.5)

## 2023-12-20 RX ADMIN — Medication 180 MILLIGRAM(S): at 06:21

## 2023-12-20 RX ADMIN — Medication 2: at 17:00

## 2023-12-20 RX ADMIN — Medication 1000 MILLIGRAM(S): at 22:09

## 2023-12-20 RX ADMIN — SODIUM CHLORIDE 3 MILLILITER(S): 9 INJECTION INTRAMUSCULAR; INTRAVENOUS; SUBCUTANEOUS at 06:19

## 2023-12-20 RX ADMIN — PANTOPRAZOLE SODIUM 40 MILLIGRAM(S): 20 TABLET, DELAYED RELEASE ORAL at 06:21

## 2023-12-20 RX ADMIN — OXYCODONE HYDROCHLORIDE 5 MILLIGRAM(S): 5 TABLET ORAL at 06:23

## 2023-12-20 RX ADMIN — SODIUM CHLORIDE 3 MILLILITER(S): 9 INJECTION INTRAMUSCULAR; INTRAVENOUS; SUBCUTANEOUS at 13:03

## 2023-12-20 RX ADMIN — APIXABAN 5 MILLIGRAM(S): 2.5 TABLET, FILM COATED ORAL at 20:51

## 2023-12-20 RX ADMIN — OXYCODONE HYDROCHLORIDE 5 MILLIGRAM(S): 5 TABLET ORAL at 07:57

## 2023-12-20 RX ADMIN — SENNA PLUS 2 TABLET(S): 8.6 TABLET ORAL at 22:10

## 2023-12-20 RX ADMIN — SODIUM CHLORIDE 3 MILLILITER(S): 9 INJECTION INTRAMUSCULAR; INTRAVENOUS; SUBCUTANEOUS at 22:15

## 2023-12-20 RX ADMIN — SODIUM CHLORIDE 100 MILLILITER(S): 9 INJECTION INTRAMUSCULAR; INTRAVENOUS; SUBCUTANEOUS at 04:23

## 2023-12-20 RX ADMIN — Medication 100 MILLIGRAM(S): at 12:06

## 2023-12-20 RX ADMIN — ATORVASTATIN CALCIUM 20 MILLIGRAM(S): 80 TABLET, FILM COATED ORAL at 22:09

## 2023-12-20 RX ADMIN — Medication 1000 MILLIGRAM(S): at 07:58

## 2023-12-20 RX ADMIN — Medication 1000 MILLIGRAM(S): at 14:48

## 2023-12-20 RX ADMIN — Medication 100 MILLIGRAM(S): at 04:22

## 2023-12-20 RX ADMIN — Medication 1000 MILLIGRAM(S): at 16:12

## 2023-12-20 RX ADMIN — OXYCODONE HYDROCHLORIDE 5 MILLIGRAM(S): 5 TABLET ORAL at 12:03

## 2023-12-20 RX ADMIN — APIXABAN 5 MILLIGRAM(S): 2.5 TABLET, FILM COATED ORAL at 08:28

## 2023-12-20 RX ADMIN — OXYCODONE HYDROCHLORIDE 5 MILLIGRAM(S): 5 TABLET ORAL at 20:09

## 2023-12-20 RX ADMIN — LISINOPRIL 10 MILLIGRAM(S): 2.5 TABLET ORAL at 06:23

## 2023-12-20 RX ADMIN — Medication 1000 MILLIGRAM(S): at 06:21

## 2023-12-20 RX ADMIN — Medication 1: at 08:27

## 2023-12-20 RX ADMIN — Medication 200 MILLIGRAM(S): at 06:21

## 2023-12-20 RX ADMIN — Medication 1: at 11:55

## 2023-12-20 RX ADMIN — OXYCODONE HYDROCHLORIDE 5 MILLIGRAM(S): 5 TABLET ORAL at 13:03

## 2023-12-20 NOTE — PATIENT PROFILE ADULT - FUNCTIONAL ASSESSMENT - BASIC MOBILITY 6.
2-calculated by average/Not able to assess (calculate score using [facility] averaging method) 2-calculated by average/Not able to assess (calculate score using [facility] averaging method)

## 2023-12-20 NOTE — PATIENT PROFILE ADULT - FUNCTIONAL ASSESSMENT - DAILY ACTIVITY 2.
Patient states he is here today with rectal genital warts that he has had in the past but showed up again about 2 months ago. He denies any nausea or vomiting he admits to diarrhea. 3 = A little assistance

## 2023-12-20 NOTE — PATIENT PROFILE ADULT - FALL HARM RISK - HARM RISK INTERVENTIONS
Assistance with ambulation/Assistance OOB with selected safe patient handling equipment/Communicate Risk of Fall with Harm to all staff/Monitor gait and stability/Reinforce activity limits and safety measures with patient and family/Sit up slowly, dangle for a short time, stand at bedside before walking/Tailored Fall Risk Interventions/Use of alarms - bed, chair and/or voice tab/Visual Cue: Yellow wristband and red socks/Bed in lowest position, wheels locked, appropriate side rails in place/Call bell, personal items and telephone in reach/Instruct patient to call for assistance before getting out of bed or chair/Non-slip footwear when patient is out of bed/[place] to call system/Physically safe environment - no spills, clutter or unnecessary equipment/Purposeful Proactive Rounding/Room/bathroom lighting operational, light cord in reach Assistance with ambulation/Assistance OOB with selected safe patient handling equipment/Communicate Risk of Fall with Harm to all staff/Monitor gait and stability/Reinforce activity limits and safety measures with patient and family/Sit up slowly, dangle for a short time, stand at bedside before walking/Tailored Fall Risk Interventions/Use of alarms - bed, chair and/or voice tab/Visual Cue: Yellow wristband and red socks/Bed in lowest position, wheels locked, appropriate side rails in place/Call bell, personal items and telephone in reach/Instruct patient to call for assistance before getting out of bed or chair/Non-slip footwear when patient is out of bed/[place] to call system/Physically safe environment - no spills, clutter or unnecessary equipment/Purposeful Proactive Rounding/Room/bathroom lighting operational, light cord in reach

## 2023-12-20 NOTE — PROGRESS NOTE ADULT - SUBJECTIVE AND OBJECTIVE BOX
78yFemale    Diagnosis:  S/p L Reverse Total Shoulder Replacement POD# 1    Patient was seen and evaluated at bedside. Patient with no acute complaints.   Pain is controlled to the LUE.  Denies CP/SOB, dyspnea, paresthesias, N/V/D, palpitations.     Vital Signs Last 24 Hrs  T(C): 36.8 (20 Dec 2023 05:35), Max: 37.1 (19 Dec 2023 22:09)  T(F): 98.2 (20 Dec 2023 05:35), Max: 98.8 (19 Dec 2023 22:09)  HR: 87 (20 Dec 2023 05:35) (75 - 95)  BP: 143/81 (20 Dec 2023 05:35) (105/66 - 162/95)  BP(mean): 102 (20 Dec 2023 05:35) (101 - 123)  RR: 18 (20 Dec 2023 05:35) (12 - 18)  SpO2: 97% (20 Dec 2023 05:35) (95% - 100%)    Parameters below as of 20 Dec 2023 05:35  Patient On (Oxygen Delivery Method): nasal cannula  O2 Flow (L/min): 2    I&O's Detail      Physical Exam:    General: AAOx3, NAD, resting comfortably in bed.    L Shoulder:  Dressing is C/D/I. Sling intact, Skin is pink and warm. Staples intact. No erythema. SILT.  Wound with no drainage, healing well.   Upper extremity:  Compartments soft and NT in forearm B/L. 2+pulses. (+)AIN/PIN/M/R/U intact, NVI. 5/5  strength B/L.  Good capillary refill.                           10.7   6.70  )-----------( 195      ( 20 Dec 2023 06:26 )             33.7     12-20    140  |  106  |  12  ----------------------------<  200<H>  3.8   |  24  |  0.74    Ca    8.7      20 Dec 2023 06:26        Impression:  79 y/o F S/p L Reverse Total Shoulder Replacement POD# 1  Plan:  -  Pain control  -  DVT prophylaxis  -  Daily Physical Therapy: NWB to LUE  -  Discharge planning: Home  -  Incentive spirometer  -  Continue with Post-op Antibiotics x 24hrs  -  Case d/w Dr. Bauer    78yFemale    Diagnosis:  S/p L Reverse Total Shoulder Replacement POD# 1    Patient was seen and evaluated at bedside. Patient with no acute complaints.   Pain is controlled to the LUE.  Denies CP/SOB, dyspnea, paresthesias, N/V/D, palpitations.     Vital Signs Last 24 Hrs  T(C): 36.8 (20 Dec 2023 05:35), Max: 37.1 (19 Dec 2023 22:09)  T(F): 98.2 (20 Dec 2023 05:35), Max: 98.8 (19 Dec 2023 22:09)  HR: 87 (20 Dec 2023 05:35) (75 - 95)  BP: 143/81 (20 Dec 2023 05:35) (105/66 - 162/95)  BP(mean): 102 (20 Dec 2023 05:35) (101 - 123)  RR: 18 (20 Dec 2023 05:35) (12 - 18)  SpO2: 97% (20 Dec 2023 05:35) (95% - 100%)    Parameters below as of 20 Dec 2023 05:35  Patient On (Oxygen Delivery Method): nasal cannula  O2 Flow (L/min): 2    I&O's Detail      Physical Exam:    General: AAOx3, NAD, resting comfortably in bed.    L Shoulder:  Dressing is C/D/I. Sling intact, Skin is pink and warm. Staples intact. No erythema. SILT.  Wound with no drainage, healing well.   Upper extremity:  Compartments soft and NT in forearm B/L. 2+pulses. (+)AIN/PIN/M/R/U intact, NVI. 5/5  strength B/L.  Good capillary refill.                           10.7   6.70  )-----------( 195      ( 20 Dec 2023 06:26 )             33.7     12-20    140  |  106  |  12  ----------------------------<  200<H>  3.8   |  24  |  0.74    Ca    8.7      20 Dec 2023 06:26        Impression:  77 y/o F S/p L Reverse Total Shoulder Replacement POD# 1  Plan:  -  Pain control  -  DVT prophylaxis  -  Daily Physical Therapy: NWB to LUE  -  Discharge planning: Home  -  Incentive spirometer  -  Continue with Post-op Antibiotics x 24hrs  -  Case d/w Dr. Bauer

## 2023-12-21 ENCOUNTER — TRANSCRIPTION ENCOUNTER (OUTPATIENT)
Age: 78
End: 2023-12-21

## 2023-12-21 VITALS
RESPIRATION RATE: 19 BRPM | HEART RATE: 82 BPM | TEMPERATURE: 98 F | SYSTOLIC BLOOD PRESSURE: 129 MMHG | DIASTOLIC BLOOD PRESSURE: 76 MMHG | OXYGEN SATURATION: 97 %

## 2023-12-21 LAB
GLUCOSE BLDC GLUCOMTR-MCNC: 171 MG/DL — HIGH (ref 70–99)
GLUCOSE BLDC GLUCOMTR-MCNC: 171 MG/DL — HIGH (ref 70–99)
GLUCOSE BLDC GLUCOMTR-MCNC: 179 MG/DL — HIGH (ref 70–99)
GLUCOSE BLDC GLUCOMTR-MCNC: 179 MG/DL — HIGH (ref 70–99)
GLUCOSE BLDC GLUCOMTR-MCNC: 193 MG/DL — HIGH (ref 70–99)
GLUCOSE BLDC GLUCOMTR-MCNC: 193 MG/DL — HIGH (ref 70–99)
SARS-COV-2 RNA SPEC QL NAA+PROBE: SIGNIFICANT CHANGE UP
SARS-COV-2 RNA SPEC QL NAA+PROBE: SIGNIFICANT CHANGE UP

## 2023-12-21 RX ORDER — OXYCODONE AND ACETAMINOPHEN 5; 325 MG/1; MG/1
1 TABLET ORAL
Qty: 0 | Refills: 0 | DISCHARGE

## 2023-12-21 RX ORDER — SENNA PLUS 8.6 MG/1
1 TABLET ORAL
Qty: 0 | Refills: 0 | DISCHARGE

## 2023-12-21 RX ORDER — GABAPENTIN 400 MG/1
1 CAPSULE ORAL
Qty: 0 | Refills: 0 | DISCHARGE

## 2023-12-21 RX ORDER — FERROUS SULFATE 325(65) MG
1 TABLET ORAL
Qty: 0 | Refills: 0 | DISCHARGE

## 2023-12-21 RX ORDER — PANTOPRAZOLE SODIUM 20 MG/1
1 TABLET, DELAYED RELEASE ORAL
Qty: 0 | Refills: 0 | DISCHARGE

## 2023-12-21 RX ORDER — CELECOXIB 200 MG/1
1 CAPSULE ORAL
Qty: 0 | Refills: 0 | DISCHARGE

## 2023-12-21 RX ADMIN — Medication 1000 MILLIGRAM(S): at 06:16

## 2023-12-21 RX ADMIN — LISINOPRIL 10 MILLIGRAM(S): 2.5 TABLET ORAL at 06:15

## 2023-12-21 RX ADMIN — Medication 1: at 12:47

## 2023-12-21 RX ADMIN — APIXABAN 5 MILLIGRAM(S): 2.5 TABLET, FILM COATED ORAL at 20:29

## 2023-12-21 RX ADMIN — OXYCODONE HYDROCHLORIDE 5 MILLIGRAM(S): 5 TABLET ORAL at 20:29

## 2023-12-21 RX ADMIN — ATORVASTATIN CALCIUM 20 MILLIGRAM(S): 80 TABLET, FILM COATED ORAL at 21:06

## 2023-12-21 RX ADMIN — OXYCODONE HYDROCHLORIDE 5 MILLIGRAM(S): 5 TABLET ORAL at 17:00

## 2023-12-21 RX ADMIN — SODIUM CHLORIDE 3 MILLILITER(S): 9 INJECTION INTRAMUSCULAR; INTRAVENOUS; SUBCUTANEOUS at 05:37

## 2023-12-21 RX ADMIN — OXYCODONE HYDROCHLORIDE 10 MILLIGRAM(S): 5 TABLET ORAL at 06:35

## 2023-12-21 RX ADMIN — Medication 1: at 17:08

## 2023-12-21 RX ADMIN — APIXABAN 5 MILLIGRAM(S): 2.5 TABLET, FILM COATED ORAL at 09:40

## 2023-12-21 RX ADMIN — Medication 180 MILLIGRAM(S): at 06:15

## 2023-12-21 RX ADMIN — PANTOPRAZOLE SODIUM 40 MILLIGRAM(S): 20 TABLET, DELAYED RELEASE ORAL at 09:08

## 2023-12-21 RX ADMIN — SENNA PLUS 2 TABLET(S): 8.6 TABLET ORAL at 21:06

## 2023-12-21 RX ADMIN — Medication 1: at 09:08

## 2023-12-21 RX ADMIN — SODIUM CHLORIDE 3 MILLILITER(S): 9 INJECTION INTRAMUSCULAR; INTRAVENOUS; SUBCUTANEOUS at 14:08

## 2023-12-21 RX ADMIN — OXYCODONE HYDROCHLORIDE 5 MILLIGRAM(S): 5 TABLET ORAL at 05:03

## 2023-12-21 RX ADMIN — OXYCODONE HYDROCHLORIDE 5 MILLIGRAM(S): 5 TABLET ORAL at 16:06

## 2023-12-21 RX ADMIN — SODIUM CHLORIDE 3 MILLILITER(S): 9 INJECTION INTRAMUSCULAR; INTRAVENOUS; SUBCUTANEOUS at 21:00

## 2023-12-21 RX ADMIN — Medication 200 MILLIGRAM(S): at 12:46

## 2023-12-21 RX ADMIN — OXYCODONE HYDROCHLORIDE 5 MILLIGRAM(S): 5 TABLET ORAL at 02:54

## 2023-12-21 NOTE — PHYSICAL THERAPY INITIAL EVALUATION ADULT - PASSIVE RANGE OF MOTION EXAMINATION, REHAB EVAL
LUE shoulder and elbow motion not assessed, wrist and hand WFL/Right UE Passive ROM was WNL (within normal limits)/bilateral lower extremity Passive ROM was WFL (within functional limits)

## 2023-12-21 NOTE — DISCHARGE NOTE PROVIDER - NSDCCPTREATMENT_GEN_ALL_CORE_FT
PRINCIPAL PROCEDURE  Procedure: Reverse total replacement of left shoulder joint  Findings and Treatment: Pain Management- See Attached Medication Reconciliation  Weight Bearing Status: NWB to LUE with shoulder in immobilizer   > Range of motion exercises to left hand/wrist/elbow encouraged  No shoulder AROM  • No lifting post-operatively with the operative upper extremity  • No supporting of body weight with operative upper extremity (i.e., do not use arm on chair when  transitioning in sit to stand)  • Keep incision clean and dry. No soaking incision for 2 weeks, no swimming, jacuzzi, or wading for 4 weeks.   Dressing: Please keep bandage/dressing Clean, Dry, and Intact.   Staples to be removed on 01/03/24 in office   DVT prophylaxis: Continue with home dose of Eliquis   PT/Occupational Therapy are Activities of Daily Living as appropriate   Patient is to follow up with Orthopedic Surgeon, Dr. Bauer in office in TWO WEEKS at: [phone]     PRINCIPAL PROCEDURE  Procedure: Reverse total replacement of left shoulder joint  Findings and Treatment: Pain Management- See Attached Medication Reconciliation  Weight Bearing Status: NWB to LUE with shoulder in immobilizer   > Range of motion exercises to left hand/wrist/elbow encouraged  No shoulder AROM  • No lifting post-operatively with the operative upper extremity  • No supporting of body weight with operative upper extremity (i.e., do not use arm on chair when  transitioning in sit to stand)  • Keep incision clean and dry. No soaking incision for 2 weeks, no swimming, jacuzzi, or wading for 4 weeks.   Dressing: Please keep bandage/dressing Clean, Dry, and Intact.   Staples to be removed on 01/03/24 in office   DVT prophylaxis: Continue with home dose of Eliquis   PT/Occupational Therapy are Activities of Daily Living as appropriate   Patient is to follow up with Orthopedic Surgeon, Dr. Bauer in office in TWO WEEKS at: [phone]

## 2023-12-21 NOTE — PHYSICAL THERAPY INITIAL EVALUATION ADULT - ACTIVE RANGE OF MOTION EXAMINATION, REHAB EVAL
except b/l hips ~1/3 range, Lt shoulder and elbow not assessed. Wrist and hand ~4/5 range/Right UE Active ROM was WNL (within normal limits)/bilateral  lower extremity Active ROM was WFL (within functional limits)

## 2023-12-21 NOTE — DISCHARGE NOTE PROVIDER - NSDCCPCAREPLAN_GEN_ALL_CORE_FT
PRINCIPAL DISCHARGE DIAGNOSIS  Diagnosis: Other closed displaced fracture of proximal end of left humerus  Assessment and Plan of Treatment:      PRINCIPAL DISCHARGE DIAGNOSIS  Diagnosis: Other closed displaced fracture of proximal end of left humerus  Assessment and Plan of Treatment: Pain Management- See Attached Medication Reconciliation  Weight Bearing Status: NWB to LUE with shoulder in immobilizer   > Range of motion exercises to left hand/wrist/elbow encouraged  No shoulder AROM  • No lifting post-operatively with the operative upper extremity  • No supporting of body weight with operative upper extremity (i.e., do not use arm on chair when  transitioning in sit to stand)  • Keep incision clean and dry. No soaking incision for 2 weeks, no swimming, jacuzzi, or wading for 4 weeks.   Dressing: Please keep bandage/dressing Clean, Dry, and Intact.   Staples to be removed on 01/03/24 in office   DVT prophylaxis: Continue with home dose of Eliquis   PT/Occupational Therapy are Activities of Daily Living as appropriate   Patient is to follow up with Orthopedic Surgeon, Dr. Bauer in office in TWO WEEKS at: [phone]     PRINCIPAL DISCHARGE DIAGNOSIS  Diagnosis: Other closed displaced fracture of proximal end of left humerus  Assessment and Plan of Treatment: Pain Management- See Attached Medication Reconciliation  Weight Bearing Status: NWB to LUE with shoulder in immobilizer   > Range of motion exercises to left hand/wrist/elbow encouraged  No shoulder AROM  • No lifting post-operatively with the operative upper extremity  • No supporting of body weight with operative upper extremity (i.e., do not use arm on chair when  transitioning in sit to stand)  • Keep incision clean and dry. No soaking incision for 2 weeks, no swimming, jacuzzi, or wading for 4 weeks.   Dressing: Please keep bandage/dressing Clean, Dry, and Intact.   Staples to be removed on 01/03/24 in office   DVT prophylaxis: Continue with home dose of Eliquis   PT/Occupational Therapy are Activities of Daily Living as appropriate   Patient is to follow up with Orthopedic Surgeon, Dr. Bauer in office in TWO WEEKS at: [phone]

## 2023-12-21 NOTE — DISCHARGE NOTE PROVIDER - HOSPITAL COURSE
Patient is a 77 y/o F s/p Reverse left total shoulder replacement on 12/19/23. Patient with no acute post op complications. Patient received physical therapy and pain management throughout hospital stay. Patient is a 79 y/o F s/p Reverse left total shoulder replacement on 12/19/23. Patient with no acute post op complications. Patient received physical therapy and pain management throughout hospital stay.

## 2023-12-21 NOTE — PROGRESS NOTE ADULT - SUBJECTIVE AND OBJECTIVE BOX
78yFemale    Diagnosis:  S/p Total Shoulder Replacement POD#2    Patient was seen and evaluated at bedside. Patient with no acute complaints.   Pain is  well controlled.  Denies CP/SOB, dyspnea, paresthesias, N/V/D, palpitations.     Vital Signs Last 24 Hrs  T(C): 36.6 (21 Dec 2023 05:30), Max: 36.7 (20 Dec 2023 14:01)  T(F): 97.8 (21 Dec 2023 05:30), Max: 98 (20 Dec 2023 14:01)  HR: 78 (21 Dec 2023 05:30) (78 - 87)  BP: 123/78 (21 Dec 2023 05:30) (123/78 - 125/84)  BP(mean): --  RR: 18 (21 Dec 2023 05:30) (17 - 18)  SpO2: 96% (21 Dec 2023 05:30) (96% - 98%)    Parameters below as of 21 Dec 2023 05:30  Patient On (Oxygen Delivery Method): nasal cannula  O2 Flow (L/min): 2    I&O's Detail    20 Dec 2023 07:01  -  21 Dec 2023 07:00  --------------------------------------------------------  IN:  Total IN: 0 mL    OUT:    Voided (mL): 1700 mL  Total OUT: 1700 mL    Total NET: -1700 mL          Physical Exam:    General: AAOx3, NAD, resting comfortably in bed.    Left Shoulder:  Dressing is C/D/I - changed today.  Skin is pink and warm. Staples intact. No erythema. SILT.  Wound with no drainage, healing well.   Upper extremity:  Compartments soft and NT in forearm B/L. 2+pulses. NVI. 5/5  strength B/L.  Good capillary refill.                           10.7   6.70  )-----------( 195      ( 20 Dec 2023 06:26 )             33.7     12-20    140  |  106  |  12  ----------------------------<  200<H>  3.8   |  24  |  0.74    Ca    8.7      20 Dec 2023 06:26        Impression:  78yFemale S/p Left Reverse Total Shoulder Replacement POD#2  Plan:  -  Pain management  -  Dvt prophylaxis with venodynes and eliquis   -  Daily Physical Theapy: NWB  of the Left  upper extremity. ROM exercises of Left hand/wrist/elbow  -  Discharge planning: Home Vs. Rehab   -  Incentive spirometer  -  Dressing changed today   -  Case d/w DR. Bauer

## 2023-12-21 NOTE — DISCHARGE NOTE PROVIDER - NSDCMRMEDTOKEN_GEN_ALL_CORE_FT
atorvastatin 20 mg oral tablet: 1 tab(s) orally once a day (at bedtime)  DilTIAZem (Eqv-Cardizem CD) 180 mg/24 hours oral capsule, extended release: 1 cap(s) orally once a day  Eliquis 5 mg oral tablet: 1 tab(s) orally 2 times a day  Januvia 100 mg oral tablet: 1 tab(s) orally once a day  Jardiance 10 mg oral tablet: 1 tab(s) orally once a day  metFORMIN 1000 mg oral tablet: 1 tab(s) orally 2 times a day  metoprolol succinate 200 mg oral tablet, extended release: 1 tab(s) orally once a day  ramipril 2.5 mg oral tablet: orally once a day   atorvastatin 20 mg oral tablet: 1 tab(s) orally once a day (at bedtime)  CeleBREX 200 mg oral capsule: 1 cap(s) orally once a day as needed for  moderate pain  DilTIAZem (Eqv-Cardizem CD) 180 mg/24 hours oral capsule, extended release: 1 cap(s) orally once a day  Eliquis 5 mg oral tablet: 1 tab(s) orally 2 times a day  ferrous sulfate 325 mg (65 mg elemental iron) oral tablet: 1 tab(s) orally 2 times a day  gabapentin 100 mg oral tablet: 1 tab(s) orally every 8 hours as needed for  mild pain  Januvia 100 mg oral tablet: 1 tab(s) orally once a day  Jardiance 10 mg oral tablet: 1 tab(s) orally once a day  metFORMIN 1000 mg oral tablet: 1 tab(s) orally 2 times a day  metoprolol succinate 200 mg oral tablet, extended release: 1 tab(s) orally once a day  pantoprazole 40 mg oral delayed release tablet: 1 tab(s) orally once a day as needed for  heartburn  Percocet 7.5 mg-325 mg oral tablet: 1 tab(s) orally every 6 hours as needed for  severe pain  ramipril 2.5 mg oral tablet: orally once a day  senna (sennosides) 8.6 mg oral tablet: 1 tab(s) orally every 12 hours as needed for  constipation

## 2023-12-21 NOTE — DISCHARGE NOTE PROVIDER - CARE PROVIDERS DIRECT ADDRESSES
[email or web address]

## 2023-12-21 NOTE — PHYSICAL THERAPY INITIAL EVALUATION ADULT - ADDITIONAL COMMENTS
-- Above is prior to "Severely comminuted/displaced proximal humeral fracture" at end 11/2023  -- Pt is Rt hand dominant

## 2023-12-21 NOTE — PHYSICAL THERAPY INITIAL EVALUATION ADULT - MANUAL MUSCLE TESTING RESULTS, REHAB EVAL
RUE 4/5, Lt shoulder and elbow noticeable muscle contraction but no motion so 1/5, Lt wrist and hand 3-/5. 2+/5 b/l hips, 3+/5 knees and ankle at least 3/5 functionally

## 2023-12-21 NOTE — PHYSICAL THERAPY INITIAL EVALUATION ADULT - DIAGNOSIS, PT EVAL
(ICF Model) Pt. present w/deficits in Body Structures/Function (Impairments), incl: Strength, Balance, ROM, pain, eading to deficits in performing the below noted Activities (Limitations).

## 2023-12-21 NOTE — DISCHARGE NOTE NURSING/CASE MANAGEMENT/SOCIAL WORK - PATIENT PORTAL LINK FT
You can access the FollowMyHealth Patient Portal offered by [facility] by registering at the following website: [email or web address]. By joining [employer]’s FollowMyHealth portal, you will also be able to view your health information using other applications (apps) compatible with our system. You can access the FollowMyHealth Patient Portal offered by [facility] by registering at the following website: [email or web address]. By joining [employer]’s FollowMyHealth portal, you will also be able to view your health information using other applications (apps) compatible with our system.

## 2023-12-21 NOTE — PHYSICAL THERAPY INITIAL EVALUATION ADULT - GENERAL OBSERVATIONS, REHAB EVAL
Consult received, chart reviewed. Patient received supine in bed, NAD, +purewick, SCDs, NC, IV Patient agreed to EVALUATION from Physical Therapist. Son Bedside

## 2023-12-21 NOTE — PHYSICAL THERAPY INITIAL EVALUATION ADULT - GAIT DEVIATIONS NOTED, PT EVAL
decreased zheng/increased time in double stance/decreased velocity of limb motion/decreased step length/decreased stride length/decreased weight-shifting ability

## 2023-12-21 NOTE — DISCHARGE NOTE NURSING/CASE MANAGEMENT/SOCIAL WORK - NSDCPEFALRISK_GEN_ALL_CORE
For information on Fall & Injury Prevention, visit: [email or web address] OR  [email or web address] OR  https://www.cdc.gov/steadi/patient.html For information on Fall & Injury Prevention, visit: [email or web address] OR  [email or web address] OR  https://www.cdc.gov/steadi/patient.html

## 2023-12-21 NOTE — DISCHARGE NOTE PROVIDER - CARE PROVIDER_API CALL
El Bauer  Orthopaedic Surgery  [address] [place] [address], 8[address] [place] [address]  Phone: [phone]  Fax: [phone]  Follow Up Time: 2 weeks   El Bauer  Orthopaedic Surgery  [address] [place] [address], 8th [place] [address]  Phone: [phone]  Fax: [phone]  Follow Up Time: 2 weeks

## 2023-12-22 PROCEDURE — C1713: CPT

## 2023-12-22 PROCEDURE — 88305 TISSUE EXAM BY PATHOLOGIST: CPT

## 2023-12-22 PROCEDURE — 86850 RBC ANTIBODY SCREEN: CPT

## 2023-12-22 PROCEDURE — 86900 BLOOD TYPING SEROLOGIC ABO: CPT

## 2023-12-22 PROCEDURE — 88311 DECALCIFY TISSUE: CPT

## 2023-12-22 PROCEDURE — 86901 BLOOD TYPING SEROLOGIC RH(D): CPT

## 2023-12-22 PROCEDURE — C1776: CPT

## 2023-12-22 PROCEDURE — 86923 COMPATIBILITY TEST ELECTRIC: CPT

## 2023-12-22 PROCEDURE — 73020 X-RAY EXAM OF SHOULDER: CPT

## 2023-12-22 PROCEDURE — 36415 COLL VENOUS BLD VENIPUNCTURE: CPT

## 2023-12-22 PROCEDURE — 82962 GLUCOSE BLOOD TEST: CPT

## 2023-12-22 PROCEDURE — 97162 PT EVAL MOD COMPLEX 30 MIN: CPT

## 2023-12-22 PROCEDURE — 80048 BASIC METABOLIC PNL TOTAL CA: CPT

## 2023-12-22 PROCEDURE — 85027 COMPLETE CBC AUTOMATED: CPT

## 2023-12-22 PROCEDURE — 87635 SARS-COV-2 COVID-19 AMP PRB: CPT

## 2023-12-26 LAB
SURGICAL PATHOLOGY STUDY: SIGNIFICANT CHANGE UP
SURGICAL PATHOLOGY STUDY: SIGNIFICANT CHANGE UP

## 2024-01-01 NOTE — PATIENT PROFILE ADULT - FUNCTIONAL ASSESSMENT - BASIC MOBILITY 4.
pt tolerating Enalapril and Lasix.  Will follow up with Dr Worley for further management and potassium check. 3 = A little assistance

## 2024-09-04 NOTE — H&P ADULT - PROBLEM SELECTOR PROBLEM 5
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 HTN (hypertension)

## 2024-11-08 ENCOUNTER — INPATIENT (INPATIENT)
Facility: HOSPITAL | Age: 79
LOS: 9 days | Discharge: NURSING, REHAB OR LONG-TERM CARE | DRG: 66 | End: 2024-11-18
Attending: STUDENT IN AN ORGANIZED HEALTH CARE EDUCATION/TRAINING PROGRAM | Admitting: STUDENT IN AN ORGANIZED HEALTH CARE EDUCATION/TRAINING PROGRAM
Payer: MEDICARE

## 2024-11-08 VITALS
OXYGEN SATURATION: 97 % | RESPIRATION RATE: 20 BRPM | SYSTOLIC BLOOD PRESSURE: 156 MMHG | HEART RATE: 86 BPM | DIASTOLIC BLOOD PRESSURE: 83 MMHG

## 2024-11-08 DIAGNOSIS — I87.2 VENOUS INSUFFICIENCY (CHRONIC) (PERIPHERAL): ICD-10-CM

## 2024-11-08 DIAGNOSIS — E78.5 HYPERLIPIDEMIA, UNSPECIFIED: ICD-10-CM

## 2024-11-08 DIAGNOSIS — I63.511 CEREBRAL INFARCTION DUE TO UNSPECIFIED OCCLUSION OR STENOSIS OF RIGHT MIDDLE CEREBRAL ARTERY: ICD-10-CM

## 2024-11-08 DIAGNOSIS — I10 ESSENTIAL (PRIMARY) HYPERTENSION: ICD-10-CM

## 2024-11-08 DIAGNOSIS — I63.9 CEREBRAL INFARCTION, UNSPECIFIED: ICD-10-CM

## 2024-11-08 DIAGNOSIS — I48.91 UNSPECIFIED ATRIAL FIBRILLATION: ICD-10-CM

## 2024-11-08 DIAGNOSIS — Z29.9 ENCOUNTER FOR PROPHYLACTIC MEASURES, UNSPECIFIED: ICD-10-CM

## 2024-11-08 DIAGNOSIS — E03.9 HYPOTHYROIDISM, UNSPECIFIED: ICD-10-CM

## 2024-11-08 PROBLEM — E66.9 OBESITY, UNSPECIFIED: Chronic | Status: ACTIVE | Noted: 2023-12-11

## 2024-11-08 PROBLEM — E11.9 TYPE 2 DIABETES MELLITUS WITHOUT COMPLICATIONS: Chronic | Status: ACTIVE | Noted: 2023-12-11

## 2024-11-08 PROBLEM — M19.90 UNSPECIFIED OSTEOARTHRITIS, UNSPECIFIED SITE: Chronic | Status: ACTIVE | Noted: 2023-12-11

## 2024-11-08 PROBLEM — F41.9 ANXIETY DISORDER, UNSPECIFIED: Chronic | Status: ACTIVE | Noted: 2023-12-11

## 2024-11-08 LAB
ALBUMIN SERPL ELPH-MCNC: 3.3 G/DL — LOW (ref 3.5–5)
ALP SERPL-CCNC: 93 U/L — SIGNIFICANT CHANGE UP (ref 40–120)
ALT FLD-CCNC: 24 U/L DA — SIGNIFICANT CHANGE UP (ref 10–60)
ANION GAP SERPL CALC-SCNC: 6 MMOL/L — SIGNIFICANT CHANGE UP (ref 5–17)
APTT BLD: 30.5 SEC — SIGNIFICANT CHANGE UP (ref 24.5–35.6)
AST SERPL-CCNC: 21 U/L — SIGNIFICANT CHANGE UP (ref 10–40)
BASOPHILS # BLD AUTO: 0.03 K/UL — SIGNIFICANT CHANGE UP (ref 0–0.2)
BASOPHILS NFR BLD AUTO: 0.4 % — SIGNIFICANT CHANGE UP (ref 0–2)
BILIRUB SERPL-MCNC: 0.8 MG/DL — SIGNIFICANT CHANGE UP (ref 0.2–1.2)
BUN SERPL-MCNC: 22 MG/DL — HIGH (ref 7–18)
CALCIUM SERPL-MCNC: 10.1 MG/DL — SIGNIFICANT CHANGE UP (ref 8.4–10.5)
CHLORIDE SERPL-SCNC: 107 MMOL/L — SIGNIFICANT CHANGE UP (ref 96–108)
CO2 SERPL-SCNC: 28 MMOL/L — SIGNIFICANT CHANGE UP (ref 22–31)
CREAT SERPL-MCNC: 1.09 MG/DL — SIGNIFICANT CHANGE UP (ref 0.5–1.3)
EGFR: 52 ML/MIN/1.73M2 — LOW
EOSINOPHIL # BLD AUTO: 0.09 K/UL — SIGNIFICANT CHANGE UP (ref 0–0.5)
EOSINOPHIL NFR BLD AUTO: 1.1 % — SIGNIFICANT CHANGE UP (ref 0–6)
GLUCOSE BLDC GLUCOMTR-MCNC: 136 MG/DL — HIGH (ref 70–99)
GLUCOSE BLDC GLUCOMTR-MCNC: 141 MG/DL — HIGH (ref 70–99)
GLUCOSE SERPL-MCNC: 181 MG/DL — HIGH (ref 70–99)
HCT VFR BLD CALC: 43.3 % — SIGNIFICANT CHANGE UP (ref 34.5–45)
HGB BLD-MCNC: 14.6 G/DL — SIGNIFICANT CHANGE UP (ref 11.5–15.5)
IMM GRANULOCYTES NFR BLD AUTO: 0.1 % — SIGNIFICANT CHANGE UP (ref 0–0.9)
INR BLD: 1.11 RATIO — SIGNIFICANT CHANGE UP (ref 0.85–1.16)
LYMPHOCYTES # BLD AUTO: 1.66 K/UL — SIGNIFICANT CHANGE UP (ref 1–3.3)
LYMPHOCYTES # BLD AUTO: 20.6 % — SIGNIFICANT CHANGE UP (ref 13–44)
MCHC RBC-ENTMCNC: 31.9 PG — SIGNIFICANT CHANGE UP (ref 27–34)
MCHC RBC-ENTMCNC: 33.7 G/DL — SIGNIFICANT CHANGE UP (ref 32–36)
MCV RBC AUTO: 94.7 FL — SIGNIFICANT CHANGE UP (ref 80–100)
MONOCYTES # BLD AUTO: 0.73 K/UL — SIGNIFICANT CHANGE UP (ref 0–0.9)
MONOCYTES NFR BLD AUTO: 9.1 % — SIGNIFICANT CHANGE UP (ref 2–14)
NEUTROPHILS # BLD AUTO: 5.52 K/UL — SIGNIFICANT CHANGE UP (ref 1.8–7.4)
NEUTROPHILS NFR BLD AUTO: 68.7 % — SIGNIFICANT CHANGE UP (ref 43–77)
NRBC # BLD: 0 /100 WBCS — SIGNIFICANT CHANGE UP (ref 0–0)
PLATELET # BLD AUTO: 165 K/UL — SIGNIFICANT CHANGE UP (ref 150–400)
POTASSIUM SERPL-MCNC: 3.1 MMOL/L — LOW (ref 3.5–5.3)
POTASSIUM SERPL-SCNC: 3.1 MMOL/L — LOW (ref 3.5–5.3)
PROT SERPL-MCNC: 7.1 G/DL — SIGNIFICANT CHANGE UP (ref 6–8.3)
PROTHROM AB SERPL-ACNC: 13 SEC — SIGNIFICANT CHANGE UP (ref 9.9–13.4)
RBC # BLD: 4.57 M/UL — SIGNIFICANT CHANGE UP (ref 3.8–5.2)
RBC # FLD: 14 % — SIGNIFICANT CHANGE UP (ref 10.3–14.5)
SODIUM SERPL-SCNC: 141 MMOL/L — SIGNIFICANT CHANGE UP (ref 135–145)
TROPONIN I, HIGH SENSITIVITY RESULT: 10.8 NG/L — SIGNIFICANT CHANGE UP
WBC # BLD: 8.04 K/UL — SIGNIFICANT CHANGE UP (ref 3.8–10.5)
WBC # FLD AUTO: 8.04 K/UL — SIGNIFICANT CHANGE UP (ref 3.8–10.5)

## 2024-11-08 PROCEDURE — 99291 CRITICAL CARE FIRST HOUR: CPT

## 2024-11-08 PROCEDURE — 93010 ELECTROCARDIOGRAM REPORT: CPT

## 2024-11-08 PROCEDURE — 0042T: CPT | Mod: MC

## 2024-11-08 PROCEDURE — 70450 CT HEAD/BRAIN W/O DYE: CPT | Mod: 26,59,MC

## 2024-11-08 PROCEDURE — 70498 CT ANGIOGRAPHY NECK: CPT | Mod: 26,MC

## 2024-11-08 PROCEDURE — 70496 CT ANGIOGRAPHY HEAD: CPT | Mod: 26,MC

## 2024-11-08 PROCEDURE — 99223 1ST HOSP IP/OBS HIGH 75: CPT

## 2024-11-08 RX ORDER — PANTOPRAZOLE SODIUM 40 MG/1
40 TABLET, DELAYED RELEASE ORAL
Refills: 0 | Status: DISCONTINUED | OUTPATIENT
Start: 2024-11-08 | End: 2024-11-18

## 2024-11-08 RX ORDER — APIXABAN 2.5 MG/1
5 TABLET, FILM COATED ORAL EVERY 12 HOURS
Refills: 0 | Status: DISCONTINUED | OUTPATIENT
Start: 2024-11-08 | End: 2024-11-18

## 2024-11-08 RX ORDER — METOPROLOL TARTRATE 100 MG/1
50 TABLET, FILM COATED ORAL
Refills: 0 | Status: DISCONTINUED | OUTPATIENT
Start: 2024-11-08 | End: 2024-11-18

## 2024-11-08 RX ORDER — POTASSIUM CHLORIDE 600 MG/1
10 TABLET, EXTENDED RELEASE ORAL
Refills: 0 | Status: COMPLETED | OUTPATIENT
Start: 2024-11-08 | End: 2024-11-08

## 2024-11-08 RX ORDER — INFLUENZA VIRUS VACCINE 15; 15; 15; 15 UG/.5ML; UG/.5ML; UG/.5ML; UG/.5ML
0.5 SUSPENSION INTRAMUSCULAR ONCE
Refills: 0 | Status: DISCONTINUED | OUTPATIENT
Start: 2024-11-08 | End: 2024-11-18

## 2024-11-08 RX ORDER — POTASSIUM CHLORIDE 600 MG/1
40 TABLET, EXTENDED RELEASE ORAL ONCE
Refills: 0 | Status: COMPLETED | OUTPATIENT
Start: 2024-11-08 | End: 2024-11-08

## 2024-11-08 RX ORDER — FERROUS SULFATE 325(65) MG
325 TABLET ORAL
Refills: 0 | Status: DISCONTINUED | OUTPATIENT
Start: 2024-11-08 | End: 2024-11-13

## 2024-11-08 RX ORDER — DILTIAZEM HYDROCHLORIDE 240 MG/1
180 CAPSULE, COATED, EXTENDED RELEASE ORAL DAILY
Refills: 0 | Status: DISCONTINUED | OUTPATIENT
Start: 2024-11-08 | End: 2024-11-18

## 2024-11-08 RX ADMIN — Medication 80 MILLIGRAM(S): at 22:53

## 2024-11-08 RX ADMIN — POTASSIUM CHLORIDE 100 MILLIEQUIVALENT(S): 600 TABLET, EXTENDED RELEASE ORAL at 16:36

## 2024-11-08 RX ADMIN — APIXABAN 5 MILLIGRAM(S): 2.5 TABLET, FILM COATED ORAL at 17:51

## 2024-11-08 RX ADMIN — POTASSIUM CHLORIDE 100 MILLIEQUIVALENT(S): 600 TABLET, EXTENDED RELEASE ORAL at 14:00

## 2024-11-08 RX ADMIN — METOPROLOL TARTRATE 50 MILLIGRAM(S): 100 TABLET, FILM COATED ORAL at 17:51

## 2024-11-08 RX ADMIN — Medication 325 MILLIGRAM(S): at 17:51

## 2024-11-08 RX ADMIN — POTASSIUM CHLORIDE 100 MILLIEQUIVALENT(S): 600 TABLET, EXTENDED RELEASE ORAL at 14:55

## 2024-11-08 NOTE — ED ADULT NURSE NOTE - OBJECTIVE STATEMENT
BIBA as notification for slurred speech, states she fell due to leg cramp and was on the floor for 3 hrs unable to get up, denies any LOC, son helped her up and states she was normal, son noted patient have slow speech at 10Am today, and told son that she has difficulty speaking.

## 2024-11-08 NOTE — H&P ADULT - ATTENDING COMMENTS
83 yo F with PMHx of HTN, HLD, T2DM, Afib (on Eliquis, Dilt, Metop) who p/w slurred speech and an unwitnessed fall at home. Reports feeling overall tired and feeling like she cannot get her words out, also have slow responses. Found to have R caudate infarct on CT and admitted for further management.     At bedside evaluation patient feeling tired, slow, still feeling difficulty speaking but maybe some improvement. Reporting issues with her legs b/l and chronic problems with veins. No other weakness or numbness.     Exam:  NAD, lying in bed, slowed speech and movements   Anox2-3 no over FND but having slower fine motor skills and more lethargic overall   RRR  CTABL  Abdo soft NTND   Ext wwp     Labs and imaging reviewed.                         14.6   8.04  )-----------( 165      ( 08 Nov 2024 10:30 )             43.3   141  |  107  |  22[H]  ----------------------------( 181[H]     11-08 @ 10:30  3.1[L]   |  28  |  1.09    Ca: 10.1   Phos: x    Mg: x     TPro: 7.1 / Alb: 3.3[L] /  TBili 0.8 / DBili x  /  AST 21 /  ALT 24 /  AlkPhos  93    A/P:  #Afib (on AC)  #HTN, HLD   #T2DM  #R cauda Infarct     -MRIB w/wo  -f/u neuro recs   -TTE formal  -f/u A1c, lipids, TSH  -add lopressor 50 BID (can change to home dose over weekend), HOLD BP meds  -c/w dilt, Eliquis, statin (change to high dose), add ASA  -passed bedside swallow, can have diet and PO meds   -Lovenox ppx

## 2024-11-08 NOTE — H&P ADULT - PROBLEM SELECTOR PLAN 3
- History of A. Fib  - Continue home Cardizem, Metoprolol, and Eliquis  - EKG showing A. Fib  - Consider if Eliquis failure given acute ischemic CVA - History of A. Fib  - Continue home Cardizem, Metoprolol, and Eliquis  - EKG showing A. Fib  - Consider Heme/onc consult in s/o possible Eliquis failure given acute ischemic CVA

## 2024-11-08 NOTE — ED ADULT NURSE NOTE - NSFALLHARMRISKINTERV_ED_ALL_ED
Assistance OOB with selected safe patient handling equipment if applicable/Assistance with ambulation/Communicate risk of Fall with Harm to all staff, patient, and family/Monitor gait and stability/Provide visual cue: red socks, yellow wristband, yellow gown, etc/Reinforce activity limits and safety measures with patient and family/Bed in lowest position, wheels locked, appropriate side rails in place/Call bell, personal items and telephone in reach/Instruct patient to call for assistance before getting out of bed/chair/stretcher/Non-slip footwear applied when patient is off stretcher/[place] to call system/Physically safe environment - no spills, clutter or unnecessary equipment/Purposeful Proactive Rounding/Room/bathroom lighting operational, light cord in reach

## 2024-11-08 NOTE — ED PROVIDER NOTE - CLINICAL SUMMARY MEDICAL DECISION MAKING FREE TEXT BOX
PMH risk factors: AFIB  Neurologic Deficits: NIH 1   Last known Well Time: 11/7/24 - 2200  NIH Stroke Score: 1  Given History and Exam I have lower suspicion for infectious etiology, neurologic changes secondary to toxicologic ingestion, seizure, complex migraine.    Presentation concerning for possible stroke requiring workup.    Workup: CT HEAD, CTA Head & Neck  Labs: POC glucose, CBC, BMP, LFTs, Troponin, PT/INR, PTT, Type and Screen  Other Diagnostics: ECG, CXR, non-contrast head CT followed by CTA brain and neck (to r/o large vessel occlusion amenable to thrombectomy)  Interventions: Patient low on NIH scale and out of the window for tpa.    Patient out of window for TPA and contraindicated due to anticoagulation and low NIH,    Disposition: Admission to medical service for further evaluation.

## 2024-11-08 NOTE — ED ADULT NURSE NOTE - NS ED NURSE REPORT GIVEN TO FT
· On coumadin due to atrial fibrillation   · Reversal with Kcentra given  · Will resume anticoagulation in 2 weeks of stable from an outpatient perspective with Neurosurgery  · Hold ac/ap secondary to intracranial hemorrhage Yazmin CHAIREZ

## 2024-11-08 NOTE — H&P ADULT - PROBLEM SELECTOR PLAN 2
h/o HTN on  -c/w home meds of Cardizem 180mg qd and Metoprolol 50mg BID with holding parameters   -monitor BP  -adjust antihypertensive meds as appropriate h/o HTN on  -c/w home meds of Cardizem 180mg qd   -reduce home metoprolol ER 200mg to Metoprolol tartrate 50mg BID with holding parameters   -monitor BP  -adjust antihypertensive meds as appropriate

## 2024-11-08 NOTE — H&P ADULT - NSHPPHYSICALEXAM_GEN_ALL_CORE
LOS:     VITALS:   T(C): 36.6 (11-08-24 @ 16:00), Max: 36.7 (11-08-24 @ 13:52)  HR: 76 (11-08-24 @ 16:00) (74 - 86)  BP: 147/91 (11-08-24 @ 16:00) (145/72 - 158/82)  RR: 18 (11-08-24 @ 16:00) (16 - 20)  SpO2: 96% (11-08-24 @ 16:00) (96% - 98%)    GENERAL: NAD, lying in bed comfortably  HEAD:  Atraumatic, Normocephalic  EYES: EOMI, PERRLA, conjunctiva and sclera clear  ENT: Moist mucous membranes  NECK: Supple, No JVD  CHEST/LUNG: Clear to auscultation bilaterally; No rales, rhonchi, wheezing, or rubs. Unlabored respirations  HEART: Regular rate and rhythm; No murmurs, rubs, or gallops  ABDOMEN: BSx4; Soft, nontender, nondistended  EXTREMITIES:  2+ Peripheral Pulses, brisk capillary refill. No clubbing, cyanosis, or edema  NERVOUS SYSTEM:  A&Ox3, no focal deficits   SKIN: No rashes or lesions LOS:     VITALS:   T(C): 36.6 (11-08-24 @ 16:00), Max: 36.7 (11-08-24 @ 13:52)  HR: 76 (11-08-24 @ 16:00) (74 - 86)  BP: 147/91 (11-08-24 @ 16:00) (145/72 - 158/82)  RR: 18 (11-08-24 @ 16:00) (16 - 20)  SpO2: 96% (11-08-24 @ 16:00) (96% - 98%)    GENERAL: NAD, lying in bed comfortably, depressed affect  HEAD:  Atraumatic, Normocephalic  EYES:  PERRLA, conjunctiva and sclera clear  ENT: Moist mucous membranes  NECK: Supple, No JVD  CHEST/LUNG: Clear to auscultation bilaterally; No rales, rhonchi, wheezing, or rubs. Unlabored respirations  HEART: normal rate, irregularly irregular rhythm; No murmurs, rubs, or gallops  ABDOMEN: BSx4; Soft, nontender, nondistended  EXTREMITIES:  2+ Peripheral Pulses, brisk capillary refill. No clubbing, cyanosis, b/l LE 1-2+ pitting edema edema  SKIN: moderate to severe chronic venous stasis changes b/l lower legs  NERVOUS SYSTEM: General / Mental Status: AAO x 3, significant psychomotor slowing, flat to depressed affect.  No aphasia or dysarthria. +Apraxia,  Naming and repetition intact.  Cranial Nerves:.  PERRL. EOM limited leftward horizontal gaze OS and OD , No nystagmus or diplopia.  B/l V1-V3 equal and intact to light touch.  Slight L lower facial droop that corrects when bearing teeth, Symmetric palate elevation.  B/l hearing equal to finger rub. trapezius/sternoclidomastoid not tested (right shoulder injury)  Midline tongue protrusion, with no atrophy or fasciculations.  Motor: Normal bulk & tone in all four extremities.  RUE not tested due to shoulder injury. able to keep left arm elevated without drift, able to lift legs off bed against gravity with downward drift in b/l LEs L>R, b/l dorsiflec, no rigidity, spasticity, or any of the four extremities, +resting tremor in L hand noted to develop during exam, severe psychomotor slowing  Sensory: Intact to light touch in all four extremities. Romberg not tested.  Reflex: DTRs not tested.  Downgoing toes b/l. negative hoffmans b/l   Coordination: No dysmetria with b/l finger to nose but  severe psychomotor slowing .  Gait: not tested

## 2024-11-08 NOTE — H&P ADULT - PROBLEM SELECTOR PLAN 1
- Presented with an unwitnessed fall   - Hemodynamically stable and afebrile  - Code Stroke in ED  - CT Head showing right caudate acute infarct  - CTA Head and Neck showing Moderate stenosis of the right M1 segment   - EKG A. Fib  - Admit to tele  - F/U A1c and Lipid profile  - F/U Echo with Bubble study  - Start Asprin 81mg qd and Atorvastatin 80mg qd  - Permissive HTN for 23-48 hours  - Consider MRI brain per Neurology Recommendations   - PT Consulted  - Neurology Consulted Dr. Leach

## 2024-11-08 NOTE — H&P ADULT - HISTORY OF PRESENT ILLNESS
82-year-old female with a past medical history of atrial fibrillation on anticoagulation presents to the ED for a fall and slurred speech.  Patient as per son had an unwitnessed fall at home.  Son reports he helped his mother get up off the ground and they spend time together throughout the night.  Reports after leaving his mother he received a phone call where her speech was slurred and he attributed to her being tired.  Reports that today upon waking she had again slurred speech 911 was called.  Upon arrival to the ED patient asymptomatic however due to concern for stroke code activated on arrival.    In the ED:  T(F): , Max: 98.1 (13:52); HR:  (74 - 86); BP:  (145/72 - 158/82); RR:  (16 - 20); SpO2:  (96% - 98%)  WBC: 8.04, Hb.6, PLT: 165  Na: 141, K: 3.1, BUN: 22, sCr: 1.09      s/p potassium chloride    Tablet ER 40 milliEquivalent(s) Oral; potassium chloride  10 mEq/100 mL IVPB 10 milliEquivalent(s) IV Intermittent 82-year-old female with a past medical history of atrial fibrillation on anticoagulation presents to the ED with slurred speech and apraxia after a fall yesterday.  Patient had unwitnessed fall at home yesterday. Denies head strike or loss of consciousness, but reports that she was unable to get herself up off the floor.  Son came to visit and helped patient off the ground and they then spent time with each other throughout with son reporting nothing out of the ordinary with patient neurologically. Patient's son stated that after leaving his mother he received a phone call from here where her speech was slurred and he attributed it to her just being tired. However,  today upon waking she again had slurred speech so son called EMS. Upon arrival to the ED patient was asymptomatic however due to concern for possible stroke given history, code stroke activated on arrival. CT scan showed oss of gray-whitedifferentiation in the right caudate suggesting acute infarct. On my assessment patient complained of increased effort of speech, depressed mood, and anxiety about blood clots. Complained of chronic pain and limited range of motion in right shoulder but denied any increase in weakness. During interview when patient appeared to be emotional she did point out that her left hand had begun to shake. Denied fever, chills, headache, dizziness, chest pain, palpitations, shortness of breath, abdominal pain, nausea, vomiting, diarrhea, constipation, and dysuria.      In the ED:  T(F): , Max: 98.1 (13:52); HR:  (74 - 86); BP:  (145/72 - 158/82); RR:  (16 - 20); SpO2:  (96% - 98%)  WBC: 8.04, Hb.6, PLT: 165  Na: 141, K: 3.1, BUN: 22, sCr: 1.09      s/p potassium chloride    Tablet ER 40 milliEquivalent(s) Oral; potassium chloride  10 mEq/100 mL IVPB 10 milliEquivalent(s) IV Intermittent

## 2024-11-08 NOTE — ED PROVIDER NOTE - OBJECTIVE STATEMENT
82-year-old female with a past medical history of atrial fibrillation on anticoagulation presents to the ED for a fall and slurred speech.  Patient as per son had an unwitnessed fall at home.  Son reports he helped his mother get up off the ground and they spend time together throughout the night.  Reports after leaving his mother he received a phone call where her speech was slurred and he attributed to her being tired.  Reports that today upon waking she had again slurred speech 911 was called.  Upon arrival to the ED patient asymptomatic however due to concern for stroke code activated on arrival.

## 2024-11-08 NOTE — ED PROVIDER NOTE - CRITICAL CARE ATTENDING CONTRIBUTION TO CARE
Upon my evaluation, this patient had a high probability of imminent or life-threatening deterioration due to ACUTE STROKE which required my direct attention, intervention, and personal management.    I have personally provided 60 minutes of critical care time exclusive of time spent on separately billable procedures. Time includes review of laboratory data, radiology results, discussion with consultants, and monitoring for potential decompensation. Interventions were performed as documented above.

## 2024-11-08 NOTE — PATIENT PROFILE ADULT - FALL HARM RISK - HARM RISK INTERVENTIONS
Assistance with ambulation/Assistance OOB with selected safe patient handling equipment/Communicate Risk of Fall with Harm to all staff/Monitor for mental status changes/Monitor gait and stability/Reinforce activity limits and safety measures with patient and family/Tailored Fall Risk Interventions/Use of alarms - bed, chair and/or voice tab/Visual Cue: Yellow wristband and red socks/Bed in lowest position, wheels locked, appropriate side rails in place/Call bell, personal items and telephone in reach/Instruct patient to call for assistance before getting out of bed or chair/Non-slip footwear when patient is out of bed/[place] to call system/Physically safe environment - no spills, clutter or unnecessary equipment/Purposeful Proactive Rounding/Room/bathroom lighting operational, light cord in reach

## 2024-11-09 LAB
A1C WITH ESTIMATED AVERAGE GLUCOSE RESULT: 6.5 % — HIGH (ref 4–5.6)
ANION GAP SERPL CALC-SCNC: 6 MMOL/L — SIGNIFICANT CHANGE UP (ref 5–17)
APPEARANCE UR: ABNORMAL
BACTERIA # UR AUTO: ABNORMAL /HPF
BILIRUB UR-MCNC: NEGATIVE — SIGNIFICANT CHANGE UP
BUN SERPL-MCNC: 15 MG/DL — SIGNIFICANT CHANGE UP (ref 7–18)
CALCIUM SERPL-MCNC: 9.7 MG/DL — SIGNIFICANT CHANGE UP (ref 8.4–10.5)
CHLORIDE SERPL-SCNC: 107 MMOL/L — SIGNIFICANT CHANGE UP (ref 96–108)
CHOLEST SERPL-MCNC: 153 MG/DL — SIGNIFICANT CHANGE UP
CO2 SERPL-SCNC: 26 MMOL/L — SIGNIFICANT CHANGE UP (ref 22–31)
COLOR SPEC: YELLOW — SIGNIFICANT CHANGE UP
CREAT SERPL-MCNC: 0.75 MG/DL — SIGNIFICANT CHANGE UP (ref 0.5–1.3)
DIFF PNL FLD: ABNORMAL
EGFR: 81 ML/MIN/1.73M2 — SIGNIFICANT CHANGE UP
EPI CELLS # UR: SIGNIFICANT CHANGE UP
ESTIMATED AVERAGE GLUCOSE: 140 MG/DL — HIGH (ref 68–114)
GLUCOSE BLDC GLUCOMTR-MCNC: 130 MG/DL — HIGH (ref 70–99)
GLUCOSE BLDC GLUCOMTR-MCNC: 132 MG/DL — HIGH (ref 70–99)
GLUCOSE BLDC GLUCOMTR-MCNC: 149 MG/DL — HIGH (ref 70–99)
GLUCOSE BLDC GLUCOMTR-MCNC: 190 MG/DL — HIGH (ref 70–99)
GLUCOSE SERPL-MCNC: 151 MG/DL — HIGH (ref 70–99)
GLUCOSE UR QL: >=1000 MG/DL
HCT VFR BLD CALC: 44.8 % — SIGNIFICANT CHANGE UP (ref 34.5–45)
HDLC SERPL-MCNC: 48 MG/DL — LOW
HGB BLD-MCNC: 15.1 G/DL — SIGNIFICANT CHANGE UP (ref 11.5–15.5)
KETONES UR-MCNC: 15 MG/DL
LEUKOCYTE ESTERASE UR-ACNC: ABNORMAL
LIPID PNL WITH DIRECT LDL SERPL: 92 MG/DL — SIGNIFICANT CHANGE UP
MAGNESIUM SERPL-MCNC: 1.7 MG/DL — SIGNIFICANT CHANGE UP (ref 1.6–2.6)
MCHC RBC-ENTMCNC: 31.7 PG — SIGNIFICANT CHANGE UP (ref 27–34)
MCHC RBC-ENTMCNC: 33.7 G/DL — SIGNIFICANT CHANGE UP (ref 32–36)
MCV RBC AUTO: 94.1 FL — SIGNIFICANT CHANGE UP (ref 80–100)
NITRITE UR-MCNC: NEGATIVE — SIGNIFICANT CHANGE UP
NON HDL CHOLESTEROL: 105 MG/DL — SIGNIFICANT CHANGE UP
NRBC # BLD: 0 /100 WBCS — SIGNIFICANT CHANGE UP (ref 0–0)
PH UR: 5.5 — SIGNIFICANT CHANGE UP (ref 5–8)
PHOSPHATE SERPL-MCNC: 2.2 MG/DL — LOW (ref 2.5–4.5)
PLATELET # BLD AUTO: 145 K/UL — LOW (ref 150–400)
POTASSIUM SERPL-MCNC: 3.8 MMOL/L — SIGNIFICANT CHANGE UP (ref 3.5–5.3)
POTASSIUM SERPL-SCNC: 3.8 MMOL/L — SIGNIFICANT CHANGE UP (ref 3.5–5.3)
PROT UR-MCNC: ABNORMAL MG/DL
RBC # BLD: 4.76 M/UL — SIGNIFICANT CHANGE UP (ref 3.8–5.2)
RBC # FLD: 13.5 % — SIGNIFICANT CHANGE UP (ref 10.3–14.5)
RBC CASTS # UR COMP ASSIST: 8 /HPF — HIGH (ref 0–4)
SODIUM SERPL-SCNC: 139 MMOL/L — SIGNIFICANT CHANGE UP (ref 135–145)
SP GR SPEC: 1.03 — SIGNIFICANT CHANGE UP (ref 1–1.03)
TRIGL SERPL-MCNC: 67 MG/DL — SIGNIFICANT CHANGE UP
TSH SERPL-MCNC: 2.74 UU/ML — SIGNIFICANT CHANGE UP (ref 0.34–4.82)
UROBILINOGEN FLD QL: 1 MG/DL — SIGNIFICANT CHANGE UP (ref 0.2–1)
WBC # BLD: 8.01 K/UL — SIGNIFICANT CHANGE UP (ref 3.8–10.5)
WBC # FLD AUTO: 8.01 K/UL — SIGNIFICANT CHANGE UP (ref 3.8–10.5)
WBC UR QL: 85 /HPF — HIGH (ref 0–5)

## 2024-11-09 PROCEDURE — 93970 EXTREMITY STUDY: CPT | Mod: 26

## 2024-11-09 PROCEDURE — 99223 1ST HOSP IP/OBS HIGH 75: CPT

## 2024-11-09 PROCEDURE — 99233 SBSQ HOSP IP/OBS HIGH 50: CPT | Mod: GC

## 2024-11-09 PROCEDURE — 70450 CT HEAD/BRAIN W/O DYE: CPT | Mod: 26

## 2024-11-09 RX ORDER — SODIUM,POTASSIUM PHOSPHATES 278-250MG
1 POWDER IN PACKET (EA) ORAL ONCE
Refills: 0 | Status: COMPLETED | OUTPATIENT
Start: 2024-11-09 | End: 2024-11-09

## 2024-11-09 RX ADMIN — Medication 80 MILLIGRAM(S): at 22:05

## 2024-11-09 RX ADMIN — Medication 1: at 16:45

## 2024-11-09 RX ADMIN — DILTIAZEM HYDROCHLORIDE 180 MILLIGRAM(S): 240 CAPSULE, COATED, EXTENDED RELEASE ORAL at 06:19

## 2024-11-09 RX ADMIN — METOPROLOL TARTRATE 50 MILLIGRAM(S): 100 TABLET, FILM COATED ORAL at 18:23

## 2024-11-09 RX ADMIN — Medication 81 MILLIGRAM(S): at 11:38

## 2024-11-09 RX ADMIN — APIXABAN 5 MILLIGRAM(S): 2.5 TABLET, FILM COATED ORAL at 06:12

## 2024-11-09 RX ADMIN — APIXABAN 5 MILLIGRAM(S): 2.5 TABLET, FILM COATED ORAL at 18:22

## 2024-11-09 RX ADMIN — METOPROLOL TARTRATE 50 MILLIGRAM(S): 100 TABLET, FILM COATED ORAL at 06:20

## 2024-11-09 RX ADMIN — Medication 325 MILLIGRAM(S): at 06:20

## 2024-11-09 RX ADMIN — Medication 1 PACKET(S): at 08:23

## 2024-11-09 RX ADMIN — Medication 325 MILLIGRAM(S): at 18:23

## 2024-11-09 NOTE — CHART NOTE - NSCHARTNOTEFT_GEN_A_CORE
I was contacted by MIHAELA Arce around 5pm, patient showed changes in mental status, RN reported patient was difficult to arouse initially but after multiple attempts patient was back to her usual state of mind. Patient was seen and examined at bedside, son who was at bedside reports that his mother is behaving strangely. He reports that mother is moving slower than usual, and that he speech is slower. during my examination, patient was alert and oriented and able to follow commands, while her speech was slower, she was able to articulate well. Patient was sitting down eating her food, mentioned she was seeing a mirror in front of her, which son states that normally she has visual disturbances but never mentioned things that were not there.  Her vitals were stable, on physical exam pupils were reactive to light bilaterally, no sensory deficit noted, left sided weakness in upper and lower extremities noted.  Lower extremities significant for varicose veins and venous stasis w/ dry scaly skin.  I contacted Dr. Leach who's the neurologist following, agreed that given pts IVELISSE score not increasing significantly, no further imaging was required at this moment. Will continue to monitor for symptoms. I was contacted by MIHAELA Arce around 5pm, patient showed changes in mental status, RN reported patient was difficult to arouse initially but after multiple attempts patient was back to her usual state of mind. Patient was seen and examined at bedside, son who was at bedside reports that his mother is behaving strangely. He reports that mother is moving slower than usual, and that he speech is slower. during my examination, patient was alert and oriented and able to follow commands, while her speech was slower, she was able to articulate well. Patient was sitting down eating her food, mentioned she was seeing a mirror in front of her, which son states that normally she has visual disturbances but never mentioned things that were not there.  Her vitals were stable, on physical exam pupils were reactive to light bilaterally, no sensory deficit noted, left sided weakness in upper and lower extremities noted.  Lower extremities significant for varicose veins and venous stasis w/ dry scaly skin.  I contacted Dr. Leach who's the neurologist following, given patient's mental status change and lethargy a CT non-contrast was ordered. Sachi Leach will follow up results.

## 2024-11-09 NOTE — CONSULT NOTE ADULT - SUBJECTIVE AND OBJECTIVE BOX
NEUROLOGY CONSULT NOTE    NAME:  ROMI MONZON      ASSESSMENT:  78 RHF with atrial fibrillation on therapeutic anticoagulation with new dysarthria and left-sided hemiparesis, secondary to subacute right subcortical infarct, also with recent lethargy and hallucinations, concerning for toxic/metabolic encephalopathy secondary to infection vs. delirium      RECOMMENDATIONS:    1. Stroke workup  - Telemetry monitoring while inpatient  - Repeat CT Head performed today - No acute intracranial bleed identified  - MRI Brain approved to evaluate for acute intracranial abnormalities (if there are no contraindications)  - Transthoracic Echocardiogram  - Complete infectious and metabolic workup, including follow-up of urine culture, and treat as appropriate    2. Secondary stroke prevention  - Q4H Neurochecks & Vital signs  - Patient has passed a bedside swallow evaluation  - Continue home Apixaban 5mg PO BID  - Continue Aspirin EC 81mg PO Daily  - Okay to decrease Atorvastatin dosage from 80mg to 40mg PO QHS (goal LDL < 70 mg/dL)  - Treat BP if over 140/90 (goal /80) - No role for permissive hypertension at this time, Maintain home antihypertensive medications  - Diabetes management as per primary team  - PT/OT  - DVT ppx: SCDs, Apixaban          NOTE TO BE COMPLETED - PLEASE REFER TO ABOVE ONLY AND IGNORE INFORMATION BELOW    *******************************      CHIEF COMPLAINT:  Patient is a 78y old  Female who presents with a chief complaint of acute stroke (2024 10:11)      HPI:  82-year-old female with a past medical history of atrial fibrillation on anticoagulation presents to the ED with slurred speech and apraxia after a fall yesterday.  Patient had unwitnessed fall at home yesterday. Denies head strike or loss of consciousness, but reports that she was unable to get herself up off the floor.  Son came to visit and helped patient off the ground and they then spent time with each other throughout with son reporting nothing out of the ordinary with patient neurologically. Patient's son stated that after leaving his mother he received a phone call from here where her speech was slurred and he attributed it to her just being tired. However,  today upon waking she again had slurred speech so son called EMS. Upon arrival to the ED patient was asymptomatic however due to concern for possible stroke given history, code stroke activated on arrival. CT scan showed oss of gray-whitedifferentiation in the right caudate suggesting acute infarct. On my assessment patient complained of increased effort of speech, depressed mood, and anxiety about blood clots. Complained of chronic pain and limited range of motion in right shoulder but denied any increase in weakness. During interview when patient appeared to be emotional she did point out that her left hand had begun to shake. Denied fever, chills, headache, dizziness, chest pain, palpitations, shortness of breath, abdominal pain, nausea, vomiting, diarrhea, constipation, and dysuria.      In the ED:  T(F): , Max: 98.1 (13:52); HR:  (74 - 86); BP:  (145/72 - 158/82); RR:  (16 - 20); SpO2:  (96% - 98%)  WBC: 8.04, Hb.6, PLT: 165  Na: 141, K: 3.1, BUN: 22, sCr: 1.09      s/p potassium chloride    Tablet ER 40 milliEquivalent(s) Oral; potassium chloride  10 mEq/100 mL IVPB 10 milliEquivalent(s) IV Intermittent (2024 16:39)      NEURO HPI:      PAST MEDICAL & SURGICAL HISTORY:  HTN (hypertension)  Atrial fibrillation  OA (osteoarthritis)  HLD (hyperlipidemia)  Diabetes  Obesity  Anxiety  Hypothyroidism  Peripheral venous insufficiency  No significant past surgical history      MEDICATIONS:  apixaban 5 milliGRAM(s) Oral every 12 hours  aspirin enteric coated 81 milliGRAM(s) Oral daily  atorvastatin 80 milliGRAM(s) Oral at bedtime  diltiazem    milliGRAM(s) Oral daily  ferrous    sulfate 325 milliGRAM(s) Oral two times a day  influenza  Vaccine (HIGH DOSE) 0.5 milliLiter(s) IntraMuscular once  insulin lispro (ADMELOG) corrective regimen sliding scale   SubCutaneous at bedtime  insulin lispro (ADMELOG) corrective regimen sliding scale   SubCutaneous three times a day before meals  metoprolol tartrate 50 milliGRAM(s) Oral two times a day  pantoprazole    Tablet 40 milliGRAM(s) Oral before breakfast PRN      ALLERGIES:  No Known Allergies      FAMILY HISTORY:  Family history of thrombosis (Mother)          SOCIAL HISTORY:  Denies alcohol, tobacco, or illicit drug use      REVIEW OF SYSTEMS:  GENERAL: No fever, weight changes, fatigue  EYES: No eye pain or discharge  EAR/NOSE/MOUTH/THROAT: No sinus or throat pain; No difficulty hearing  NECK: No pain or stiffness  RESPIRATORY: No cough, wheezing, chills, or hemoptysis  CARDIOVASCULAR: No chest pain, palpitations, shortness of breath, or dyspnea on exertion  GASTROINTESTINAL: No abdominal pain, nausea, vomiting, hematemesis, diarrhea, or constipation  GENITOURINARY: No dysuria, frequency, hematuria, or incontinence  SKIN: No rashes or lesions  ENDOCRINE: No heat or cold intolerance  HEMATOLOGIC: No easy bruising or bleeding  PSYCHIATRIC: No depression, anxiety, or mood swings  MUSCULOSKELETAL: No joint pain or swelling  NEUROLOGICAL: As per HPI          OBJECTIVE:    Vital Signs Last 24 Hrs  T(C): 36.9 (2024 16:55), Max: 37.4 (2024 00:01)  T(F): 98.4 (2024 16:55), Max: 99.3 (2024 00:01)  HR: 90 (:55) (84 - 95)  BP: 129/97 (2024 16:55) (122/82 - 165/99)  BP(mean): --  RR: 18 (2024 16:55) (18 - 18)  SpO2: 96% (2024 16:55) (95% - 98%)    Parameters below as of 2024 16:55  Patient On (Oxygen Delivery Method): room air        General Examination:  General: No acute distress  HEENT: Atraumatic, Normocephalic  Respiratory: CTA B/l.  No crackles, rhonchi, or wheezes.  Cardiovascular: RRR.  Normal S1 & S2.  Normal b/l radial and pedal pulses.    Neurological Examination:  General / Mental Status: AAO x 3.  No aphasia or dysarthria.  Naming and repetition intact.  Cranial Nerves: VFF x 4.  PERRL.  EOMI x 2, No nystagmus or diplopia.  B/l V1-V3 equal and intact to light touch and pinprick.  Symmetric facial movement and palate elevation.  B/l hearing equal to finger rub.  5/5 strength with b/l sternocleidomastoid and trapezius.  Midline tongue protrusion, with no atrophy or fasciculations.  Motor: Normal bulk & tone in all four extremities.  5/5 strength throughout all four extremities.  No downward drift, rigidity, spasticity, or tremors in any of the four extremities.  Sensory: Intact to light touch and pinprick in all four extremities.  Negative Romberg.  Reflex: 2+ and symmetric at b/l biceps, triceps, brachioradialis, patellae, and ankles.  Downgoing toes b/l.  Coordination: No dysmetria with b/l finger-to-nose and heel raise tests.  Symmetric rapid alternating movements b/l.  Gait: Normal, narrow-based gait.  No difficulty with tiptoe, heel, and tandem gaits.        LABORATORY VALUES:                        15.1   8.01  )-----------( 145      ( 2024 06:25 )             44.8           139  |  107  |  15  ----------------------------<  151[H]  3.8   |  26  |  0.75    Ca    9.7      2024 06:25  Phos  2.2       Mg     1.7         TPro  7.1  /  Alb  3.3[L]  /  TBili  0.8  /  DBili  x   /  AST  21  /  ALT  24  /  AlkPhos  93  -- Chol 153 LDL 92 HDL 48[L] Trig 67    A1C with Estimated Average Glucose (24 @ 06:25)   A1C with Estimated Average Glucose Result: 6.5%  Estimated Average Glucose: 140 mg/dL          NEUROIMAGING:          Please contact the Neurology consult service with any neurological questions.    Stephen Leach MD   of Neurology  [facility] NEUROLOGY CONSULT NOTE    NAME:  ROMI MONZON      ASSESSMENT:  78 RHF with atrial fibrillation on therapeutic anticoagulation with new dysarthria and left-sided hemiparesis, secondary to subacute right subcortical infarct, also with recent lethargy and hallucinations, concerning for toxic/metabolic encephalopathy secondary to infection vs. delirium      RECOMMENDATIONS:    1. Stroke workup  - Telemetry monitoring while inpatient  - Repeat CT Head performed today - No acute intracranial bleed identified  - MRI Brain approved to evaluate for acute intracranial abnormalities (if there are no contraindications)  - Transthoracic Echocardiogram  - Complete infectious and metabolic workup, including follow-up of urine culture, and treat as appropriate    2. Secondary stroke prevention  - Q4H Neurochecks & Vital signs  - Patient has passed a bedside swallow evaluation  - Continue home Apixaban 5mg PO BID  - Continue Aspirin EC 81mg PO Daily  - Okay to decrease Atorvastatin dosage from 80mg to 40mg PO QHS (goal LDL < 70 mg/dL)  - Treat BP if over 140/90 (goal /80) - No role for permissive hypertension at this time, Maintain home antihypertensive medications  - Diabetes management as per primary team  - PT/OT  - DVT ppx: SCDs, Apixaban          *******************************      CHIEF COMPLAINT:  Patient is a 78y old  Female who presents with a chief complaint of acute stroke (2024 10:11)      HPI:  82-year-old female with a past medical history of atrial fibrillation on anticoagulation presents to the ED with slurred speech and apraxia after a fall yesterday.  Patient had unwitnessed fall at home yesterday. Denies head strike or loss of consciousness, but reports that she was unable to get herself up off the floor.  Son came to visit and helped patient off the ground and they then spent time with each other throughout with son reporting nothing out of the ordinary with patient neurologically. Patient's son stated that after leaving his mother he received a phone call from here where her speech was slurred and he attributed it to her just being tired. However, today upon waking she again had slurred speech so son called EMS. Upon arrival to the ED patient was asymptomatic however due to concern for possible stroke given history, code stroke activated on arrival. CT scan showed oss of gray-white differentiation in the right caudate suggesting acute infarct. On my assessment patient complained of increased effort of speech, depressed mood, and anxiety about blood clots. Complained of chronic pain and limited range of motion in right shoulder but denied any increase in weakness. During interview when patient appeared to be emotional she did point out that her left hand had begun to shake. Denied fever, chills, headache, dizziness, chest pain, palpitations, shortness of breath, abdominal pain, nausea, vomiting, diarrhea, constipation, and dysuria.  In the ED:  T(F): , Max: 98.1 (13:52); HR:  (74 - 86); BP:  (145/72 - 158/82); RR:  (16 - 20); SpO2:  (96% - 98%)  WBC: 8.04, Hb.6, PLT: 165  Na: 141, K: 3.1, BUN: 22, sCr: 1.09  s/p potassium chloride    Tablet ER 40 milliEquivalent(s) Oral; potassium chloride  10 mEq/100 mL IVPB 10 milliEquivalent(s) IV Intermittent (2024 16:39)      NEURO HPI:  82 RHF with history of atrial fibrillation, on Apixaban, presents with slurred speech following an unwitnessed fall at home.      PAST MEDICAL & SURGICAL HISTORY:  HTN (hypertension)  Atrial fibrillation  OA (osteoarthritis)  HLD (hyperlipidemia)  Diabetes  Obesity  Anxiety  Hypothyroidism  Peripheral venous insufficiency  No significant past surgical history      MEDICATIONS:  apixaban 5 milliGRAM(s) Oral every 12 hours  aspirin enteric coated 81 milliGRAM(s) Oral daily  atorvastatin 80 milliGRAM(s) Oral at bedtime  diltiazem    milliGRAM(s) Oral daily  ferrous    sulfate 325 milliGRAM(s) Oral two times a day  influenza  Vaccine (HIGH DOSE) 0.5 milliLiter(s) IntraMuscular once  insulin lispro (ADMELOG) corrective regimen sliding scale   SubCutaneous at bedtime  insulin lispro (ADMELOG) corrective regimen sliding scale   SubCutaneous three times a day before meals  metoprolol tartrate 50 milliGRAM(s) Oral two times a day  pantoprazole    Tablet 40 milliGRAM(s) Oral before breakfast PRN      ALLERGIES:  No Known Allergies      FAMILY HISTORY:  Family history of thrombosis (Mother)      SOCIAL HISTORY:  No reported alcohol, tobacco, or illicit drug use      REVIEW OF SYSTEMS:  GENERAL: No fever, weight changes, fatigue  EYES: No eye pain or discharge  EAR/NOSE/MOUTH/THROAT: No sinus or throat pain; No difficulty hearing  NECK: No pain or stiffness  RESPIRATORY: No cough, wheezing, chills, or hemoptysis  CARDIOVASCULAR: No chest pain, palpitations, shortness of breath, or dyspnea on exertion  GASTROINTESTINAL: No abdominal pain, nausea, vomiting, hematemesis, diarrhea, or constipation  GENITOURINARY: No dysuria, frequency, hematuria, or incontinence  SKIN: No rashes or lesions  ENDOCRINE: No heat or cold intolerance  HEMATOLOGIC: No easy bruising or bleeding  PSYCHIATRIC: Recent depression and anxiety as per HPI  MUSCULOSKELETAL: Right shoulder pain as per HPI  NEUROLOGICAL: As per HPI          OBJECTIVE:    Vital Signs Last 24 Hrs  T(C): 36.9 (2024 16:55), Max: 37.4 (2024 00:01)  T(F): 98.4 (2024 16:55), Max: 99.3 (2024 00:01)  HR: 90 (2024 16:55) (84 - 95)  BP: 129/97 (2024 16:55) (122/82 - 165/99)  RR: 18 (2024 16:55) (18 - 18)  SpO2: 96% (2024 16:55) (95% - 98%)  Parameters below as of 2024 16:55  Patient On (Oxygen Delivery Method): room air      General Examination:  General: No acute distress  HEENT: Atraumatic, Normocephalic  Respiratory: CTA B/l.  No crackles, rhonchi, or wheezes.  Cardiovascular: RRR.  Normal S1 & S2.  Normal b/l radial and pedal pulses.    Neurological Examination:  General / Mental Status: AAO x 1 (only to name).  Mild dysarthria present.  Mild expressive aphasia present.  Cranial Nerves: B/l blink to threat present.  PERRL.  EOMI x 2, No nystagmus or diplopia.  B/l V1-V3 equal and intact to light touch and pinprick.  Symmetric facial movement and palate elevation.  B/l hearing equal to finger rub.  At least 3/5 strength with b/l sternocleidomastoid and trapezius, limited by poor effort.  Midline tongue protrusion.  Motor: Normal bulk & tone in all four extremities.  At least 3/5 strength throughout all four extremities, all of which drift downward to bed.  No rigidity, spasticity, or tremors in any of the four extremities.  Sensory: Intact to light touch and pinprick in all four extremities.  Reflex: 1+ and symmetric at b/l biceps, triceps, brachioradialis, patellae, and ankles.  Mute toes b/l.  Coordination: No dysmetria with b/l finger-to-nose and heel raise tests.  Gait and Romberg sign testing deferred per patient preference.      NIHSS Score:    LOC - 0  LOC Questions - 1  LOC Commands - 0  Gaze Preference - 0  Visual Fields - 0  Facial Palsy - 0  Motor Arm Left - 2  Motor Arm Right - 2  Motor Leg Left - 2  Motor Leg Right - 2  Limb Ataxia - 0  Sensory - 0  Language - 1  Speech - 1  Extinction - 0    NIHSS Score Total: 11 - No IV TNK because patient presented outside the 4.5 hour time window    Modified Landy Scale: 4          LABORATORY VALUES:                        15.1   8.01  )-----------( 145      ( 2024 06:25 )             44.8           139  |  107  |  15  ----------------------------<  151[H]  3.8   |  26  |  0.75    Ca    9.7      2024 06:25  Phos  2.2       Mg     1.7         TPro  7.1  /  Alb  3.3[L]  /  TBili  0.8  /  DBili  x   /  AST  21  /  ALT  24  /  AlkPhos  93   Chol 153 LDL 92 HDL 48[L] Trig 67    A1C with Estimated Average Glucose (24 @ 06:25)   A1C with Estimated Average Glucose Result: 6.5%  Estimated Average Glucose: 140 mg/dL          NEUROIMAGING:      CT Head & CTA Head/Neck & CT Perfusion Head (24):  - Acute/Subacute right caudate nucleus infarct  - Right MCA (M1 segment) moderate stenosis  - Small b/l cerebral and cerebellar areas of hypoperfusion, total volume 9 mL      Repeat CT Head (24):  - Evolving right caudate head infarct without hemorrhagic transformation  - Mild chronic microvascular changes          Please contact the Neurology consult service with any neurological questions.    Stephen Leach MD   of Neurology  [facility]

## 2024-11-09 NOTE — PROGRESS NOTE ADULT - ASSESSMENT
78y F with PMH of HTN. chronic Afib, OA, HLD, DM, Anxiety, Hypothyroidism, and peripheral venous insufficiency presenting with slurred speech and apraxia. CT stroke protocol revealed loss of gray-whitedifferentiation in the right caudate   suggesting acute infarct. Admitted to telemetry for acute CVA.

## 2024-11-09 NOTE — PROGRESS NOTE ADULT - PROBLEM SELECTOR PLAN 1
- Presented with an unwitnessed fall   - Hemodynamically stable and afebrile  - can be likely due to Afib, ?Eliquis failure  - Code Stroke in ED  - CT Head showing right caudate acute infarct  - CTA Head and Neck showing Moderate stenosis of the right M1 segment   - EKG A. Fib  - Admit to tele  - LDL: 92, A1c: 6.5%  - b/l LE US dopplers: neg  - F/U Echo with Bubble study  - c/w Asprin 81mg qd and Atorvastatin 80mg qd  - f/u Dr. Leach neuro reccs - likely no need for MRI brain  - PT Consulted - Presented with an unwitnessed fall   - Hemodynamically stable and afebrile  - can be likely due to Afib, ?Eliquis failure  - Code Stroke in ED  - CT Head showing right caudate acute infarct  - CTA Head and Neck showing Moderate stenosis of the right M1 segment   - EKG A. Fib  - Admit to tele  - LDL: 92, A1c: 6.5%  - b/l LE US dopplers: neg  - passsed bedside dysphagia    - F/U Echo with Bubble study  - c/w Asprin 81mg qd and Atorvastatin 80mg qd  - f/u Dr. Leach neuro reccs - likely no need for MRI brain  - PT Consulted

## 2024-11-09 NOTE — PROGRESS NOTE ADULT - ATTENDING COMMENTS
83 yo F with PMHx of HTN, HLD, T2DM, Afib (on Eliquis, Dilt, Metop) who p/w slurred speech and an unwitnessed fall at home. Reports feeling overall tired and feeling like she cannot get her words out, also have slow responses. Found to have R caudate infarct on CT and admitted for further management.     At bedside evaluation patient feeling tired and still feeling slow. Son Jason at bedside answered all questions.     Exam:  NAD, lying in bed, slowed speech and movements   Anox2-3 no over FND but having slower fine motor skills and more lethargic overall   RRR  CTABL  Abdo soft NTND   Ext wwp     Labs and imaging reviewed.                         15.1   8.01  )-----------( 145      ( 2024 06:25 )             44.8   139  |  107  |  15  ----------------------------( 151[H]     11-09 @ 06:25  3.8   |  26  |  0.75    Ca: 9.7   Phos: 2.2[L]   M.7    TPro: x  / Alb: x  /  TBili x  / DBili x  /  AST x  /  ALT x  /  AlkPhos  x     A/P:  #Afib (on AC)  #HTN, HLD   #T2DM  #R cauda Infarct     -f/u neuro recs, likely does not need MRIB  -TTE formal  -f/u US b/l LE done today   -f/u A1c, lipids, TSH  -c/w lopressor 50 BID, HOLD BP meds  -c/w dilt, Eliquis, high dose statin, ASA  -passed bedside swallow, can have diet and PO meds   -Lovenox ppx.

## 2024-11-09 NOTE — PROGRESS NOTE ADULT - SUBJECTIVE AND OBJECTIVE BOX
PGY-1 Progress Note discussed with attending    INTERVAL HPI/OVERNIGHT EVENTS: No overnight events. Patient's son Jason translated in [place]. He states his mother is very slow, extremely sleepy, and has very slow speech, which is not her baseline.     MEDICATIONS  (STANDING):  apixaban 5 milliGRAM(s) Oral every 12 hours  aspirin enteric coated 81 milliGRAM(s) Oral daily  atorvastatin 80 milliGRAM(s) Oral at bedtime  diltiazem    milliGRAM(s) Oral daily  ferrous    sulfate 325 milliGRAM(s) Oral two times a day  influenza  Vaccine (HIGH DOSE) 0.5 milliLiter(s) IntraMuscular once  insulin lispro (ADMELOG) corrective regimen sliding scale   SubCutaneous three times a day before meals  insulin lispro (ADMELOG) corrective regimen sliding scale   SubCutaneous at bedtime  metoprolol tartrate 50 milliGRAM(s) Oral two times a day    MEDICATIONS  (PRN):  pantoprazole    Tablet 40 milliGRAM(s) Oral before breakfast PRN for heartburn      REVIEW OF SYSTEMS:  CONSTITUTIONAL: No fever, weight loss, or fatigue  RESPIRATORY: No cough, wheezing, chills or hemoptysis; No shortness of breath  CARDIOVASCULAR: No chest pain, palpitations, dizziness, or leg swelling  GASTROINTESTINAL: No abdominal pain. No nausea, vomiting, or hematemesis; No diarrhea or constipation. No melena or hematochezia.  GENITOURINARY: No dysuria or hematuria, urinary frequency  NEUROLOGICAL: No headaches, memory loss, loss of strength, numbness, or tremors  SKIN: No itching, burning, rashes, or lesions     Vital Signs Last 24 Hrs  T(C): 36.8 (09 Nov 2024 11:10), Max: 37.4 (09 Nov 2024 00:01)  T(F): 98.2 (09 Nov 2024 11:10), Max: 99.3 (09 Nov 2024 00:01)  HR: 89 (09 Nov 2024 11:10) (76 - 95)  BP: 122/82 (09 Nov 2024 11:10) (110/71 - 165/99)  BP(mean): --  RR: 18 (09 Nov 2024 11:10) (18 - 18)  SpO2: 98% (09 Nov 2024 11:10) (94% - 98%)    Parameters below as of 09 Nov 2024 11:10  Patient On (Oxygen Delivery Method): room air        PHYSICAL EXAMINATION:  GENERAL: NAD  HEAD:  Atraumatic, Normocephalic  EYES:  conjunctiva and sclera clear, EOMI for rightward gaze for right and left eyes  NECK: Supple  CHEST/LUNG: Clear to auscultation. No rales, rhonchi, wheezing, or rubs  HEART: Regular rate and rhythm; No murmurs, rubs, or gallops  ABDOMEN: Soft, Nontender, Nondistended; Bowel sounds present  NERVOUS SYSTEM:  Alert & Oriented X2-3, equal strength b/l, mild right facial lip droop, able to raise arm and legs b/l off the bed, significant psychomotor slowing  EXTREMITIES:  2+ Peripheral Pulses, No LE edema  SKIN: warm dry                          15.1   8.01  )-----------( 145      ( 09 Nov 2024 06:25 )             44.8     11-09    139  |  107  |  15  ----------------------------<  151[H]  3.8   |  26  |  0.75    Ca    9.7      09 Nov 2024 06:25  Phos  2.2     11-09  Mg     1.7     11-09    TPro  7.1  /  Alb  3.3[L]  /  TBili  0.8  /  DBili  x   /  AST  21  /  ALT  24  /  AlkPhos  93  11-08    LIVER FUNCTIONS - ( 08 Nov 2024 10:30 )  Alb: 3.3 g/dL / Pro: 7.1 g/dL / ALK PHOS: 93 U/L / ALT: 24 U/L DA / AST: 21 U/L / GGT: x               PT/INR - ( 08 Nov 2024 10:30 )   PT: 13.0 sec;   INR: 1.11 ratio         PTT - ( 08 Nov 2024 10:30 )  PTT:30.5 sec    CAPILLARY BLOOD GLUCOSE      RADIOLOGY & ADDITIONAL TESTS:

## 2024-11-10 LAB
ANION GAP SERPL CALC-SCNC: 7 MMOL/L — SIGNIFICANT CHANGE UP (ref 5–17)
BUN SERPL-MCNC: 18 MG/DL — SIGNIFICANT CHANGE UP (ref 7–18)
CALCIUM SERPL-MCNC: 9.6 MG/DL — SIGNIFICANT CHANGE UP (ref 8.4–10.5)
CHLORIDE SERPL-SCNC: 106 MMOL/L — SIGNIFICANT CHANGE UP (ref 96–108)
CO2 SERPL-SCNC: 26 MMOL/L — SIGNIFICANT CHANGE UP (ref 22–31)
CREAT SERPL-MCNC: 0.89 MG/DL — SIGNIFICANT CHANGE UP (ref 0.5–1.3)
EGFR: 66 ML/MIN/1.73M2 — SIGNIFICANT CHANGE UP
GLUCOSE BLDC GLUCOMTR-MCNC: 125 MG/DL — HIGH (ref 70–99)
GLUCOSE BLDC GLUCOMTR-MCNC: 149 MG/DL — HIGH (ref 70–99)
GLUCOSE BLDC GLUCOMTR-MCNC: 165 MG/DL — HIGH (ref 70–99)
GLUCOSE BLDC GLUCOMTR-MCNC: 180 MG/DL — HIGH (ref 70–99)
GLUCOSE BLDC GLUCOMTR-MCNC: 278 MG/DL — HIGH (ref 70–99)
GLUCOSE SERPL-MCNC: 179 MG/DL — HIGH (ref 70–99)
HCT VFR BLD CALC: 46.3 % — HIGH (ref 34.5–45)
HGB BLD-MCNC: 15.9 G/DL — HIGH (ref 11.5–15.5)
MAGNESIUM SERPL-MCNC: 1.8 MG/DL — SIGNIFICANT CHANGE UP (ref 1.6–2.6)
MCHC RBC-ENTMCNC: 31.9 PG — SIGNIFICANT CHANGE UP (ref 27–34)
MCHC RBC-ENTMCNC: 34.3 G/DL — SIGNIFICANT CHANGE UP (ref 32–36)
MCV RBC AUTO: 93 FL — SIGNIFICANT CHANGE UP (ref 80–100)
NRBC # BLD: 0 /100 WBCS — SIGNIFICANT CHANGE UP (ref 0–0)
PHOSPHATE SERPL-MCNC: 2.2 MG/DL — LOW (ref 2.5–4.5)
PLATELET # BLD AUTO: 150 K/UL — SIGNIFICANT CHANGE UP (ref 150–400)
POTASSIUM SERPL-MCNC: 3.6 MMOL/L — SIGNIFICANT CHANGE UP (ref 3.5–5.3)
POTASSIUM SERPL-SCNC: 3.6 MMOL/L — SIGNIFICANT CHANGE UP (ref 3.5–5.3)
RBC # BLD: 4.98 M/UL — SIGNIFICANT CHANGE UP (ref 3.8–5.2)
RBC # FLD: 13.4 % — SIGNIFICANT CHANGE UP (ref 10.3–14.5)
SODIUM SERPL-SCNC: 139 MMOL/L — SIGNIFICANT CHANGE UP (ref 135–145)
WBC # BLD: 7.88 K/UL — SIGNIFICANT CHANGE UP (ref 3.8–10.5)
WBC # FLD AUTO: 7.88 K/UL — SIGNIFICANT CHANGE UP (ref 3.8–10.5)

## 2024-11-10 PROCEDURE — 99233 SBSQ HOSP IP/OBS HIGH 50: CPT

## 2024-11-10 PROCEDURE — 70553 MRI BRAIN STEM W/O & W/DYE: CPT | Mod: 26

## 2024-11-10 RX ORDER — SODIUM,POTASSIUM PHOSPHATES 278-250MG
1 POWDER IN PACKET (EA) ORAL ONCE
Refills: 0 | Status: COMPLETED | OUTPATIENT
Start: 2024-11-10 | End: 2024-11-10

## 2024-11-10 RX ORDER — CEFTRIAXONE SODIUM 1 G
VIAL (EA) INJECTION
Refills: 0 | Status: COMPLETED | OUTPATIENT
Start: 2024-11-10 | End: 2024-11-14

## 2024-11-10 RX ORDER — CEFTRIAXONE SODIUM 1 G
1000 VIAL (EA) INJECTION ONCE
Refills: 0 | Status: COMPLETED | OUTPATIENT
Start: 2024-11-10 | End: 2024-11-10

## 2024-11-10 RX ORDER — CEFTRIAXONE SODIUM 1 G
1000 VIAL (EA) INJECTION EVERY 24 HOURS
Refills: 0 | Status: COMPLETED | OUTPATIENT
Start: 2024-11-11 | End: 2024-11-13

## 2024-11-10 RX ADMIN — METOPROLOL TARTRATE 50 MILLIGRAM(S): 100 TABLET, FILM COATED ORAL at 06:10

## 2024-11-10 RX ADMIN — Medication 325 MILLIGRAM(S): at 17:36

## 2024-11-10 RX ADMIN — DILTIAZEM HYDROCHLORIDE 180 MILLIGRAM(S): 240 CAPSULE, COATED, EXTENDED RELEASE ORAL at 06:10

## 2024-11-10 RX ADMIN — Medication 100 MILLIGRAM(S): at 13:30

## 2024-11-10 RX ADMIN — APIXABAN 5 MILLIGRAM(S): 2.5 TABLET, FILM COATED ORAL at 17:36

## 2024-11-10 RX ADMIN — APIXABAN 5 MILLIGRAM(S): 2.5 TABLET, FILM COATED ORAL at 06:10

## 2024-11-10 RX ADMIN — Medication 81 MILLIGRAM(S): at 13:30

## 2024-11-10 RX ADMIN — Medication 1: at 13:29

## 2024-11-10 RX ADMIN — METOPROLOL TARTRATE 50 MILLIGRAM(S): 100 TABLET, FILM COATED ORAL at 17:36

## 2024-11-10 RX ADMIN — Medication 40 MILLIGRAM(S): at 22:04

## 2024-11-10 RX ADMIN — Medication 325 MILLIGRAM(S): at 06:10

## 2024-11-10 RX ADMIN — Medication 1 PACKET(S): at 13:31

## 2024-11-10 NOTE — PHYSICAL THERAPY INITIAL EVALUATION ADULT - ACTIVE RANGE OF MOTION EXAMINATION, REHAB EVAL
except active L shoulder flexion and abduction; limited due to weakness/bilateral upper extremity Active ROM was WFL (within functional limits)

## 2024-11-10 NOTE — PHYSICAL THERAPY INITIAL EVALUATION ADULT - DIAGNOSIS, PT EVAL
(ICF Model) Pt. present w/deficits in Body Structures/Function (Impairments), incl: Strength, Balance, activity tolerance/endurance, leading to deficits in performing the below noted Activities (Limitations).

## 2024-11-10 NOTE — PHYSICAL THERAPY INITIAL EVALUATION ADULT - PERTINENT HX OF CURRENT PROBLEM, REHAB EVAL
Patient is an 82-year-old female with a past medical history as listed below presented to the ED with slurred speech and apraxia after a fall. CT scan showed oss of gray-white differentiation in the right caudate suggesting acute infarct. B LE doppler US- neg for DVT. Repeat CT Head performed 11/09 after patient's mental status change and lethargy - No acute intracranial bleed identified. MRI ordered and pending but PT eval still recommended by neuro.

## 2024-11-10 NOTE — PROGRESS NOTE ADULT - ASSESSMENT
81 yo F with PMHx of HTN, HLD, T2DM, Afib (on Eliquis, Dilt, Metop) who p/w slurred speech and an unwitnessed fall at home. Reports feeling overall tired and feeling like she cannot get her words out, also have slow responses. Found to have R caudate infarct on CT and admitted for further management.     At bedside evaluation patient seeming more lethargic than yesterday, follows some commands, no able to participate fully in interview. Updated Son Jason at a friend at bedside.   Exam as above.   Labs and imaging reviewed see above.                A/P:  #Afib (on AC)  #HTN, HLD   #T2DM  #R cauda Infarct   #UTI    -f/u neuro recs, added MRB w/wo still pending  -added ctx 1g daily to treat UTI unclear if sxs since not able to confirm/deny today wbc on UA 86 mod LE neg nitrite  -f/u urine culture    -TTE formal still pending   -f/u US b/l LE done -negative    -f/u A1c = 6.5, LDL 92, TSH normal 2  -c/w lopressor 50 BID, HOLD BP meds  -c/w dilt, Eliquis, high dose statin, ASA  -passed bedside swallow, can have diet and PO meds   -Lovenox ppx.

## 2024-11-10 NOTE — PHYSICAL THERAPY INITIAL EVALUATION ADULT - IMPAIRED TRANSFERS: BED/CHAIR, REHAB EVAL
[FreeTextEntry1] : HTN [de-identified] : prev cough resolved w sterodds from ENT.  pt c/o new cough.  last wk had sore throat.  still mild cough\par asthma- no symptoms- no wheezing or dyspnea\par pt had COVID- 1/2/23- \par HTN- compliant with med. home BP  120's/80.  last visit, pt BP machine matched office readings.  \par   Patient is compliant with diet. reviewed 7/28/22 24 BP machine\par He  has no CP or SOB.\par thyroid- reviewed 12/22- nl labs.  , 12/23/21- US thyroid - atrophy.  compliant w meds. no palpit. temp intolerance\par ISMAEL- CPAP.  reviewed 10/28/22  Sleep Med note  \par Being tx for GERD by ENT- has f/u 7/19/18- EGD- mild gastritis - on famotidine\par asthma- controlled for a few yrs.  better w exercise.  compliant w symbicort. no needed albuterol for mo.Sees Pulm\par excessive sweating- thru out his life.  clothes stain. - drysol didn't help\par \par   impaired balance/decreased strength

## 2024-11-10 NOTE — PHYSICAL THERAPY INITIAL EVALUATION ADULT - MODIFIED CLINICAL TEST OF SENSORY INTEGRATION IN BALANCE TEST
Modified Maries scale: 4 (moderately severe disability) : PASS stroke assessment: 10/42 (moderate stroke)

## 2024-11-10 NOTE — PHYSICAL THERAPY INITIAL EVALUATION ADULT - GENERAL OBSERVATIONS, REHAB EVAL
Patient received supine in bed, lethargic at first but awake when sons came bedside, denies pain, NAD, + Telemetry, +Premafit; BLE venous stasis w/ dry scaly skin; cooperative to PT evaluation

## 2024-11-10 NOTE — PROGRESS NOTE ADULT - SUBJECTIVE AND OBJECTIVE BOX
Patient is a 78y old  Female who presents with a chief complaint of acute stroke (09 Nov 2024 17:46)      INTERVAL HPI/OVERNIGHT EVENTS: no events noted overnight.    MEDICATIONS  (STANDING):  apixaban 5 milliGRAM(s) Oral every 12 hours  aspirin enteric coated 81 milliGRAM(s) Oral daily  atorvastatin 40 milliGRAM(s) Oral at bedtime  cefTRIAXone   IVPB      cefTRIAXone   IVPB 1000 milliGRAM(s) IV Intermittent once  diltiazem    milliGRAM(s) Oral daily  ferrous    sulfate 325 milliGRAM(s) Oral two times a day  influenza  Vaccine (HIGH DOSE) 0.5 milliLiter(s) IntraMuscular once  insulin lispro (ADMELOG) corrective regimen sliding scale   SubCutaneous three times a day before meals  insulin lispro (ADMELOG) corrective regimen sliding scale   SubCutaneous at bedtime  metoprolol tartrate 50 milliGRAM(s) Oral two times a day  potassium phosphate / sodium phosphate Powder (PHOS-NaK) 1 Packet(s) Oral once    MEDICATIONS  (PRN):  pantoprazole    Tablet 40 milliGRAM(s) Oral before breakfast PRN for heartburn      __________________________________________________  REVIEW OF SYSTEMS:    CONSTITUTIONAL: No fever,   EYES: no acute visual disturbances  NECK: No pain or stiffness  RESPIRATORY: No cough; No shortness of breath  CARDIOVASCULAR: No chest pain, no palpitations  GASTROINTESTINAL: No pain. No nausea or vomiting; No diarrhea   NEUROLOGICAL: No headache or numbness, no tremors  MUSCULOSKELETAL: No joint pain, no muscle pain  GENITOURINARY: no dysuria, no frequency, no hesitancy  PSYCHIATRY: no depression , no anxiety  ALL OTHER  ROS negative        Vital Signs Last 24 Hrs  T(C): 37.1 (10 Nov 2024 11:07), Max: 37.3 (09 Nov 2024 20:11)  T(F): 98.7 (10 Nov 2024 11:07), Max: 99.1 (09 Nov 2024 20:11)  HR: 80 (10 Nov 2024 11:07) (80 - 97)  BP: 122/85 (10 Nov 2024 11:07) (122/85 - 160/99)  BP(mean): --  RR: 18 (10 Nov 2024 11:07) (18 - 18)  SpO2: 98% (10 Nov 2024 11:07) (96% - 99%)    Parameters below as of 10 Nov 2024 11:07  Patient On (Oxygen Delivery Method): room air        ________________________________________________  PHYSICAL EXAM:  GENERAL: NAD, sleeping in bed but very lethargic, hard arousa. Did not open eyes but verbal stimuli but was able to nod/shake head to yes/no questions   HEENT: Normocephalic;  conjunctivae and sclerae clear; moist mucous membranes;   NECK : supple  CHEST/LUNG: Clear to auscultation bilaterally with good air entry   HEART: S1 S2  regular; no murmurs, gallops or rubs  ABDOMEN: Soft, Nontender, Nondistended; Bowel sounds present  EXTREMITIES: no cyanosis; no edema; no calf tenderness  SKIN: warm and dry; no rash  NERVOUS SYSTEM: No able to verbally respond to questions today but follows commands slowly, no FND    _________________________________________________  LABS:                        15.9   7.88  )-----------( 150      ( 10 Nov 2024 05:37 )             46.3     11-10    139  |  106  |  18  ----------------------------<  179[H]  3.6   |  26  |  0.89    Ca    9.6      10 Nov 2024 05:37  Phos  2.2     11-10  Mg     1.8     11-10        Urinalysis Basic - ( 10 Nov 2024 05:37 )    Color: x / Appearance: x / SG: x / pH: x  Gluc: 179 mg/dL / Ketone: x  / Bili: x / Urobili: x   Blood: x / Protein: x / Nitrite: x   Leuk Esterase: x / RBC: x / WBC x   Sq Epi: x / Non Sq Epi: x / Bacteria: x      CAPILLARY BLOOD GLUCOSE      POCT Blood Glucose.: 278 mg/dL (10 Nov 2024 10:32)  POCT Blood Glucose.: 149 mg/dL (10 Nov 2024 07:49)  POCT Blood Glucose.: 130 mg/dL (09 Nov 2024 21:10)  POCT Blood Glucose.: 190 mg/dL (09 Nov 2024 16:38)        RADIOLOGY & ADDITIONAL TESTS:    Imaging Personally Reviewed:  YES    Consultant(s) Notes Reviewed:   YES    Care Discussed with Consultants : YES     Plan of care was discussed with patient and /or primary care giver; all questions and concerns were addressed and care was aligned with patient's wishes.

## 2024-11-11 ENCOUNTER — TRANSCRIPTION ENCOUNTER (OUTPATIENT)
Age: 79
End: 2024-11-11

## 2024-11-11 ENCOUNTER — RESULT REVIEW (OUTPATIENT)
Age: 79
End: 2024-11-11

## 2024-11-11 DIAGNOSIS — N39.0 URINARY TRACT INFECTION, SITE NOT SPECIFIED: ICD-10-CM

## 2024-11-11 LAB
ANION GAP SERPL CALC-SCNC: 9 MMOL/L — SIGNIFICANT CHANGE UP (ref 5–17)
BUN SERPL-MCNC: 23 MG/DL — HIGH (ref 7–18)
CALCIUM SERPL-MCNC: 9.7 MG/DL — SIGNIFICANT CHANGE UP (ref 8.4–10.5)
CHLORIDE SERPL-SCNC: 106 MMOL/L — SIGNIFICANT CHANGE UP (ref 96–108)
CO2 SERPL-SCNC: 24 MMOL/L — SIGNIFICANT CHANGE UP (ref 22–31)
CREAT SERPL-MCNC: 0.76 MG/DL — SIGNIFICANT CHANGE UP (ref 0.5–1.3)
EGFR: 80 ML/MIN/1.73M2 — SIGNIFICANT CHANGE UP
GLUCOSE BLDC GLUCOMTR-MCNC: 149 MG/DL — HIGH (ref 70–99)
GLUCOSE BLDC GLUCOMTR-MCNC: 173 MG/DL — HIGH (ref 70–99)
GLUCOSE BLDC GLUCOMTR-MCNC: 186 MG/DL — HIGH (ref 70–99)
GLUCOSE BLDC GLUCOMTR-MCNC: 238 MG/DL — HIGH (ref 70–99)
GLUCOSE SERPL-MCNC: 178 MG/DL — HIGH (ref 70–99)
HCT VFR BLD CALC: 46.4 % — HIGH (ref 34.5–45)
HGB BLD-MCNC: 16.3 G/DL — HIGH (ref 11.5–15.5)
MAGNESIUM SERPL-MCNC: 1.9 MG/DL — SIGNIFICANT CHANGE UP (ref 1.6–2.6)
MCHC RBC-ENTMCNC: 33 PG — SIGNIFICANT CHANGE UP (ref 27–34)
MCHC RBC-ENTMCNC: 35.1 G/DL — SIGNIFICANT CHANGE UP (ref 32–36)
MCV RBC AUTO: 93.9 FL — SIGNIFICANT CHANGE UP (ref 80–100)
NRBC # BLD: 0 /100 WBCS — SIGNIFICANT CHANGE UP (ref 0–0)
PHOSPHATE SERPL-MCNC: 2.5 MG/DL — SIGNIFICANT CHANGE UP (ref 2.5–4.5)
PLATELET # BLD AUTO: 154 K/UL — SIGNIFICANT CHANGE UP (ref 150–400)
POTASSIUM SERPL-MCNC: 3.7 MMOL/L — SIGNIFICANT CHANGE UP (ref 3.5–5.3)
POTASSIUM SERPL-SCNC: 3.7 MMOL/L — SIGNIFICANT CHANGE UP (ref 3.5–5.3)
RBC # BLD: 4.94 M/UL — SIGNIFICANT CHANGE UP (ref 3.8–5.2)
RBC # FLD: 13.3 % — SIGNIFICANT CHANGE UP (ref 10.3–14.5)
SODIUM SERPL-SCNC: 139 MMOL/L — SIGNIFICANT CHANGE UP (ref 135–145)
WBC # BLD: 8.25 K/UL — SIGNIFICANT CHANGE UP (ref 3.8–10.5)
WBC # FLD AUTO: 8.25 K/UL — SIGNIFICANT CHANGE UP (ref 3.8–10.5)

## 2024-11-11 PROCEDURE — 93306 TTE W/DOPPLER COMPLETE: CPT | Mod: 26

## 2024-11-11 PROCEDURE — 99233 SBSQ HOSP IP/OBS HIGH 50: CPT

## 2024-11-11 RX ORDER — SENNOSIDES 8.6 MG
2 TABLET ORAL AT BEDTIME
Refills: 0 | Status: DISCONTINUED | OUTPATIENT
Start: 2024-11-11 | End: 2024-11-18

## 2024-11-11 RX ADMIN — Medication 1: at 18:08

## 2024-11-11 RX ADMIN — Medication 1: at 08:32

## 2024-11-11 RX ADMIN — APIXABAN 5 MILLIGRAM(S): 2.5 TABLET, FILM COATED ORAL at 18:09

## 2024-11-11 RX ADMIN — Medication 2: at 12:18

## 2024-11-11 RX ADMIN — METOPROLOL TARTRATE 50 MILLIGRAM(S): 100 TABLET, FILM COATED ORAL at 06:20

## 2024-11-11 RX ADMIN — Medication 325 MILLIGRAM(S): at 18:09

## 2024-11-11 RX ADMIN — DILTIAZEM HYDROCHLORIDE 180 MILLIGRAM(S): 240 CAPSULE, COATED, EXTENDED RELEASE ORAL at 06:20

## 2024-11-11 RX ADMIN — Medication 81 MILLIGRAM(S): at 12:53

## 2024-11-11 RX ADMIN — Medication 2 TABLET(S): at 22:30

## 2024-11-11 RX ADMIN — METOPROLOL TARTRATE 50 MILLIGRAM(S): 100 TABLET, FILM COATED ORAL at 18:09

## 2024-11-11 RX ADMIN — APIXABAN 5 MILLIGRAM(S): 2.5 TABLET, FILM COATED ORAL at 06:20

## 2024-11-11 RX ADMIN — Medication 100 MILLIGRAM(S): at 12:50

## 2024-11-11 RX ADMIN — Medication 325 MILLIGRAM(S): at 06:20

## 2024-11-11 RX ADMIN — Medication 40 MILLIGRAM(S): at 22:30

## 2024-11-11 NOTE — PROGRESS NOTE ADULT - ASSESSMENT
ASSESSMENT:    78y F with PMH of HTN. chronic Afib, OA, HLD, DM, Anxiety, Hypothyroidism, and peripheral venous insufficiency presenting with slurred speech and apraxia. CT stroke protocol revealed loss of gray-whitedifferentiation in the right caudate   suggesting acute infarct. Admitted to telemetry for acute CVA.     Impression : Subacute to acute R subcortical infarct, also w/ recent lethargy and hallucinations C/F superimposed TME 2/2 infection Vs delirium.    RECOMMENDATIONS:    1. Stroke workup  - Telemetry monitoring while inpatient  - Repeat CT Head performed today - No acute intracranial bleed identified  - MRI Brain approved to evaluate for acute intracranial abnormalities- Acute R MCA territory infarcts.   - Transthoracic Echocardiogram -pending  - Complete infectious and metabolic workup, including follow-up of urine culture, and treat as appropriate per primary team.    2. Secondary stroke prevention  - Stroke labs   HbA1C: 6.5- management per primary team.  LDL: 92 (On Atorva)  TSH: 2.74  - Q4H Neurochecks & Vital signs  - Patient has passed a bedside swallow evaluation  - Continue home Apixaban 5mg PO BID  - Continue Aspirin EC 81mg PO Daily  - Okay to decrease Atorvastatin dosage from 80mg to 40mg PO QHS (goal LDL < 70 mg/dL)  - Treat BP if over 140/90 (goal /80) - No role for permissive hypertension at this time, Maintain home antihypertensive medications  - Diabetes management as per primary team  - PT/OT - Recommended MEG(Family agreeable).  - DVT ppx: SCDs, Apixaban  - Will need outpt Neuro F/U upon D/C.     Further care per primary team.    Discussed with Neuro attending Dr. Leach     ASSESSMENT:    78y F with PMH of HTN. chronic Afib, OA, HLD, DM, Anxiety, Hypothyroidism, and peripheral venous insufficiency presenting with slurred speech and apraxia. CT stroke protocol revealed loss of gray-whitedifferentiation in the right caudate   suggesting acute infarct. Admitted to telemetry for acute CVA.     Impression : Subacute to acute R subcortical infarct, also w/ recent lethargy and hallucinations C/F superimposed TME 2/2 infection Vs delirium.    RECOMMENDATIONS:    1. Stroke workup  - Telemetry monitoring while inpatient  - Repeat CT Head performed today - No acute intracranial bleed identified  - MRI Brain approved to evaluate for acute intracranial abnormalities- Acute R MCA territory infarcts.   - Transthoracic Echocardiogram -pending  - Complete infectious and metabolic workup, including follow-up of urine culture, and treat as appropriate per primary team.  - Formal speech and swallow eval.    2. Secondary stroke prevention  - Stroke labs   HbA1C: 6.5- management per primary team.  LDL: 92 (On Atorva)  TSH: 2.74  - Q4H Neurochecks & Vital signs  - Patient has passed a bedside swallow evaluation  - Continue home Apixaban 5mg PO BID  - Continue Aspirin EC 81mg PO Daily  - Okay to decrease Atorvastatin dosage from 80mg to 40mg PO QHS (goal LDL < 70 mg/dL)  - Treat BP if over 140/90 (goal /80) - No role for permissive hypertension at this time, Maintain home antihypertensive medications  - Diabetes management as per primary team  - PT/OT - Recommended MEG(Family agreeable).  - DVT ppx: SCDs, Apixaban  - Will need outpt Neuro F/U upon D/C.     Further care per primary team.    Discussed with Neuro attending Dr. Leach     ASSESSMENT:    78y F with PMH of HTN. chronic Afib, OA, HLD, DM, Anxiety, Hypothyroidism, and peripheral venous insufficiency presenting with slurred speech and apraxia. CT stroke protocol revealed loss of gray-whitedifferentiation in the right caudate   suggesting acute infarct. Admitted to telemetry for acute CVA.     Impression : Subacute to acute R subcortical infarct, also w/ recent lethargy and hallucinations C/F superimposed TME 2/2 infection Vs delirium.    RECOMMENDATIONS:    1. Stroke workup  - Telemetry monitoring while inpatient  - Repeat CT Head performed today - No acute intracranial bleed identified  - MRI Brain approved to evaluate for acute intracranial abnormalities- Acute R MCA territory infarcts.   - Please obtain routine EEG  - Transthoracic Echocardiogram -pending  - Complete infectious and metabolic workup, including follow-up of urine culture, and treat as appropriate per primary team.  - Formal speech and swallow eval.    2. Secondary stroke prevention  - Stroke labs   HbA1C: 6.5- management per primary team.  LDL: 92 (On Atorva)  TSH: 2.74  - Q4H Neurochecks & Vital signs  - Patient has passed a bedside swallow evaluation  - Continue home Apixaban 5mg PO BID  - Continue Aspirin EC 81mg PO Daily  - Okay to decrease Atorvastatin dosage from 80mg to 40mg PO QHS (goal LDL < 70 mg/dL)  - Treat BP if over 140/90 (goal /80) - No role for permissive hypertension at this time, Maintain home antihypertensive medications  - Diabetes management as per primary team  - PT/OT - Recommended MEG(Family agreeable).  - DVT ppx: SCDs, Apixaban  - Will need outpt Neuro F/U upon D/C for stroke and parkinsonian features noted in exam which could  be 2/2 acute ischemia.     Further care per primary team.    Discussed with Neuro attending Dr. Leach

## 2024-11-11 NOTE — DISCHARGE NOTE PROVIDER - HOSPITAL COURSE
78F with PMHx HTN, AF on Eliquis, OA, HLD, DM, anxiety, hypothyroid, peripheral venous insufficiency, admitted with acute R MCA CVA resulting in dysarthria and L sided deficits, concern for subclinical seizures, CT head showing acute evolving right caudate head/basal ganglia/corona radiata infarction. MRI showed acute right MCA territory infarctions. ECHO showed LVEF 55-60%, LA dilated, negative for intracardiac shunt. Doppler was negative for DVT. For stroke, she was treated with aspirin,   Patient is known to have multiple strokes and UTI, and had seizure like activity, staring into space, started on Depakote, however given persistent waxing and waning of symptoms concern for potential nonconvulsive seizure/ subclinical seizure. Patient was sent to ICU for 24 hour monitoring EEG which showed bifrontal sharp waves but no subclinical seizures or status epilepticus. Neurology and cardiology was following during her admission. Patient was treated with.    Patient has AFib and HTN on Cardizem 180mg qd and metoprolol ER 200mg. Continue with eliquis.     Completed 5day course of Ceftriaxone for UTI.     Has h/o DM on metformin, jardiance, januvia at home. A1c 6.5. Currently on lantus 8 units, premeal 3 units, continue MARTY. Dr Wilder Downs following.       78F with PMHx HTN, AF on Eliquis, OA, HLD, DM, anxiety, hypothyroid, peripheral venous insufficiency, admitted with acute R MCA CVA resulting in dysarthria and L sided deficits, concern for subclinical seizures, CT head showing acute evolving right caudate head/basal ganglia/corona radiata infarction. MRI showed acute right MCA territory infarctions. ECHO showed LVEF 55-60%, LA dilated, negative for intracardiac shunt. Doppler was negative for DVT. For stroke, she was treated with aspirin, and atorvastatin and monitored on telemetry. Patient is known to have multiple strokes and UTI, and had seizure like activity, staring into space, started on Depakote, however given persistent waxing and waning of symptoms concern for potential nonconvulsive seizure/ subclinical seizure. Patient was sent to ICU for 24 hour monitoring EEG which showed bifrontal sharp waves but no subclinical seizures or status epilepticus. Neurology and cardiology was following during her admission.    History of AFib and HTN, continued on home Cardizem, Metoprolol, and Eliquis.     For UTI, treated with 5 day course of Ceftriaxone.     For Diabetes, discontinued on home medications metformin, jardiance, januvia and started insuline         78F with PMHx HTN, AF on Eliquis, OA, HLD, DM, anxiety, hypothyroid, peripheral venous insufficiency, admitted with acute R MCA CVA resulting in dysarthria and L sided deficits, concern for subclinical seizures, CT head showing acute evolving right caudate head/basal ganglia/corona radiata infarction. MRI showed acute right MCA territory infarctions. ECHO showed LVEF 55-60%, LA dilated, negative for intracardiac shunt. Doppler was negative for DVT. For stroke, she was treated with aspirin, and atorvastatin and monitored on telemetry. Patient is known to have multiple strokes and UTI, and had seizure like activity, staring into space, started on Depakote, however given persistent waxing and waning of symptoms concern for potential nonconvulsive seizure/ subclinical seizure. Patient was sent to ICU for 24 hour monitoring EEG which showed bifrontal sharp waves but no subclinical seizures or status epilepticus. Neurology and cardiology was following during her admission.    History of AFib and HTN, continued on home Cardizem, Metoprolol, and Eliquis.     For Acute UTI, treated with 5 day course of Ceftriaxone.     For Diabetes, discontinued on home medications metformin, jardiance, januvia and started insuline    for peripheral venous insufficiency, performed venous doppler which showed no DVT             78F with PMHx HTN, AF on Eliquis, OA, HLD, DM, anxiety, hypothyroid, peripheral venous insufficiency, admitted with acute R MCA CVA resulting in dysarthria and L sided deficits, concern for subclinical seizures, CT head showing acute evolving right caudate head/basal ganglia/corona radiata infarction. MRI showed acute right MCA territory infarctions. ECHO showed LVEF 55-60%, LA dilated, negative for intracardiac shunt. Doppler was negative for DVT. For stroke, she was treated with aspirin, and atorvastatin and monitored on telemetry. Patient was having clinical seizure like activity for which she was started on Depakote. However given persistent waxing and waning of symptoms concern for potential nonconvulsive seizure/ subclinical seizure, she was transferred to ICU for 24 hour monitoring EEG. The 24 hour EEG monitoring showed bifrontal sharp waves but no subclinical seizures or status epilepticus. Neurology and cardiology was following during her admission.    History of AFib and HTN, continued on home Cardizem, Metoprolol, and Eliquis.     For Acute UTI, treated with 5 day course of Ceftriaxone.     For Diabetes, discontinued on home medications metformin, jardiance, januvia and started insuline    for peripheral venous insufficiency, performed venous doppler which showed no DVT    Pt is stable for discharge. Pt has been advised to follow up as outpatient. Case has been discussed with the attending. This is just a summary of the case. For further information please refer to pt. chart document.             78F with PMHx HTN, AF on Eliquis, OA, HLD, DM, anxiety, hypothyroid, peripheral venous insufficiency, admitted with acute R MCA CVA resulting in dysarthria and L sided deficits, concern for subclinical seizures, CT head showing acute evolving right caudate head/basal ganglia/corona radiata infarction. MRI showed acute right MCA territory infarctions. ECHO showed LVEF 55-60%, LA dilated, negative for intracardiac shunt. Doppler was negative for DVT. For stroke, she was treated with aspirin, and atorvastatin and monitored on telemetry. Patient was having clinical seizure like activity for which she was started on Depakote. However given persistent waxing and waning of symptoms concern for potential nonconvulsive seizure/ subclinical seizure, she was transferred to ICU for 24 hour monitoring EEG. The 24 hour EEG monitoring showed bifrontal sharp waves but no subclinical seizures or status epilepticus. Neurology and cardiology was following during her admission.    History of AFib and HTN, continued on home Cardizem, Metoprolol, and Eliquis.     For Acute UTI, treated with 5 day course of Ceftriaxone.     For Diabetes, discontinued on home medications metformin, jardiance, januvia and started insulin regimen. Endocrinology was consulted.     for peripheral venous insufficiency, performed venous doppler which showed no DVT    Pt is stable for discharge. Pt has been advised to follow up as outpatient. Case has been discussed with the attending. This is just a summary of the case. For further information please refer to pt. chart document.

## 2024-11-11 NOTE — PROGRESS NOTE ADULT - ASSESSMENT
78y F with PMH of HTN. chronic Afib, OA, HLD, DM, Anxiety, Hypothyroidism, and peripheral venous insufficiency presenting with slurred speech and apraxia. CT stroke protocol revealed loss of gray-whitedifferentiation in the right caudate   suggesting acute infarct. Admitted to telemetry for acute CVA. CT and MRI shows right MCV infarct

## 2024-11-11 NOTE — PROGRESS NOTE ADULT - PROBLEM SELECTOR PLAN 4
UA: +  cultures: Ecoli prelim, f/u sensitivities   - on CTX 1g q24h (10/10 - ), can have a 7 day course duration

## 2024-11-11 NOTE — DISCHARGE NOTE PROVIDER - NSDCQMSTROKERISK_NEU_ALL_CORE
Atrial fibrillation/High blood pressure/High cholesterol/History of a stroke or TIA Atrial fibrillation/Diabetes/High blood pressure/High cholesterol/History of a stroke or TIA

## 2024-11-11 NOTE — DISCHARGE NOTE PROVIDER - NSDCMRMEDTOKEN_GEN_ALL_CORE_FT
Airway patent, TM normal bilaterally, normal appearing mouth, nose, throat, neck supple with full range of motion, no cervical adenopathy. atorvastatin 20 mg oral tablet: 1 tab(s) orally once a day (at bedtime)  ATORVASTATIN 20MG TAB:   CARTIA /24HR CAP:   CeleBREX 200 mg oral capsule: 1 cap(s) orally once a day as needed for  moderate pain  DilTIAZem (Eqv-Cardizem CD) 180 mg/24 hours oral capsule, extended release: 1 cap(s) orally once a day  Eliquis 5 mg oral tablet: 1 tab(s) orally 2 times a day  ELIQUIS 5MG TAB:   ferrous sulfate 325 mg (65 mg elemental iron) oral tablet: 1 tab(s) orally 2 times a day  gabapentin 100 mg oral tablet: 1 tab(s) orally every 8 hours as needed for  mild pain  GLIMEPIRIDE 1MG TAB:   JANUMET 50-500MG TAB:   Januvia 100 mg oral tablet: 1 tab(s) orally once a day  JANUVIA 100MG TAB:   Jardiance 10 mg oral tablet: 1 tab(s) orally once a day  JARDIANCE 10MG TAB:   metFORMIN 1000 mg oral tablet: 1 tab(s) orally 2 times a day  METFORMIN 500MG TAB:   METOPROL SUC 200MG ER TAB:   metoprolol succinate 200 mg oral tablet, extended release: 1 tab(s) orally once a day  pantoprazole 40 mg oral delayed release tablet: 1 tab(s) orally once a day as needed for  heartburn  Percocet 7.5 mg-325 mg oral tablet: 1 tab(s) orally every 6 hours as needed for  severe pain  ramipril 2.5 mg oral tablet: orally once a day  RAMIPRIL 5MG CAP:   senna (sennosides) 8.6 mg oral tablet: 1 tab(s) orally every 12 hours as needed for  constipation   atorvastatin 20 mg oral tablet: 1 tab(s) orally once a day (at bedtime)  DilTIAZem (Eqv-Cardizem CD) 180 mg/24 hours oral capsule, extended release: 1 cap(s) orally once a day  Eliquis 5 mg oral tablet: 1 tab(s) orally 2 times a day  glimepiride 1 mg oral tablet: 1 tab(s) orally once a day  Januvia 100 mg oral tablet: 1 tab(s) orally once a day  Jardiance 10 mg oral tablet: 1 tab(s) orally once a day  metFORMIN 500 mg oral tablet: 1 tab(s) orally 2 times a day  metoprolol succinate 200 mg oral tablet, extended release: 1 tab(s) orally once a day  ramipril 5 mg oral tablet: orally once a day   aspirin 81 mg oral delayed release tablet: 1 tab(s) orally once a day  atorvastatin 20 mg oral tablet: 1 tab(s) orally once a day (at bedtime)  DilTIAZem (Eqv-Cardizem CD) 180 mg/24 hours oral capsule, extended release: 1 cap(s) orally once a day  divalproex sodium 500 mg oral delayed release tablet: 1 tab(s) orally 2 times a day  Eliquis 5 mg oral tablet: 1 tab(s) orally 2 times a day  glimepiride 1 mg oral tablet: 1 tab(s) orally once a day  hydrocortisone 25 mg rectal suppository: 1 suppository(ies) rectal once  Januvia 100 mg oral tablet: 1 tab(s) orally once a day  Jardiance 10 mg oral tablet: 1 tab(s) orally once a day  metFORMIN 500 mg oral tablet: 1 tab(s) orally 2 times a day  metoprolol succinate 200 mg oral tablet, extended release: 1 tab(s) orally once a day  pantoprazole 40 mg oral delayed release tablet: 1 tab(s) orally once a day (before a meal) As needed for heartburn  ramipril 5 mg oral tablet: orally once a day  senna leaf extract oral tablet: 2 tab(s) orally once a day (at bedtime)   aspirin 81 mg oral delayed release tablet: 1 tab(s) orally once a day  atorvastatin 20 mg oral tablet: 1 tab(s) orally once a day (at bedtime)  DilTIAZem (Eqv-Cardizem CD) 180 mg/24 hours oral capsule, extended release: 1 cap(s) orally once a day  divalproex sodium 500 mg oral delayed release tablet: 1 tab(s) orally 2 times a day  Eliquis 5 mg oral tablet: 1 tab(s) orally 2 times a day  hydrocortisone 25 mg rectal suppository: 1 suppository(ies) rectal once  insulin glargine 100 units/mL subcutaneous solution: 8 unit(s) subcutaneous once a day (at bedtime)  insulin lispro 100 units/mL injectable solution: 3 unit(s) injectable 3 times a day  metoprolol succinate 200 mg oral tablet, extended release: 1 tab(s) orally once a day  pantoprazole 40 mg oral delayed release tablet: 1 tab(s) orally once a day (before a meal) As needed for heartburn  senna leaf extract oral tablet: 2 tab(s) orally once a day (at bedtime)

## 2024-11-11 NOTE — PROGRESS NOTE ADULT - ATTENDING COMMENTS
81 yo F with PMHx of HTN, HLD, T2DM, Afib (on Eliquis, Dilt, Metop) who p/w slurred speech and an unwitnessed fall at home. Reports feeling overall tired and feeling like she cannot get her words out, also have slow responses. Found to have R caudate infarct on CT and admitted for further management.     At bedside evaluation patient is more awake and alert than yesterday able to respond verbally to questions, denies any pain or new symptoms, still feeling very weak.     Exam:  NAD, lying in bed, slowed speech and movements   Anox2-3 no over FND but having slower fine motor skills and more lethargic overall   RRR  CTABL  Abdo soft NTND   Ext wwp, pulses felt, chronic severe venous stasis b/l LE   Strength and sensation intact, follows commands but developed new tremor like small movements (RUE and ÉCSARE - son has videos)     Labs and imaging reviewed.                         16.3   8.25  )-----------( 154      ( 2024 06:15 )             46.4   139  |  106  |  23[H]  ----------------------------( 178[H]     11-11 @ 06:15  3.7   |  24  |  0.76    Ca: 9.7   Phos: 2.5   M.9    TPro: x  / Alb: x  /  TBili x  / DBili x  /  AST x  /  ALT x  /  AlkPhos  x                A/P:  #Afib (on Eliquis)  #HTN, HLD   #T2DM  #R cauda Infarct   #UTI    -MRIB done showing infarct in caudate and possible BG, similar to CT findings, would explain the changes in fine motor movements and mental slowing   -per neuro possible Eliquis failure since was taking 5 BID at home and still had CVA, NP to speak with Dr. Leach f/u recs   -c/w CTX 1g daily to treat UTI unclear if sxs since not able to confirm/deny Wbc on UA 86 mod LE neg nitrite, she improved after 1 dose   -f/u urine culture    -TTE still pending   -f/u US b/l LE done -negative    -f/u A1c = 6.5, LDL 92, TSH normal 2  -c/w lopressor 50 BID, HOLD BP meds  -c/w dilt, Eliquis, high dose statin, ASA  -Lovenox ppx  -PT rec MEG, Son agreeable and prefers this

## 2024-11-11 NOTE — PROGRESS NOTE ADULT - PROBLEM SELECTOR PLAN 1
- Presented with an unwitnessed fall   - Hemodynamically stable and afebrile  - can be likely due to Afib, ?Eliquis failure  - Code Stroke in ED  - CT Head showing Acute evolving right caudate head/basal ganglia/corona radiata infarction. No evidence of hemorrhagic transformation.  - CTA Head and Neck showing Moderate stenosis of the right M1 segment   - MRI: Acute right MCA territory infarctions  - EKG A. Fib  - Admit to tele  - LDL: 92, A1c: 6.5%  - b/l LE US dopplers: neg  - passsed bedside dysphagia  - TTE: EF: 55-60%, neg intracardiac shunt    - c/w Asprin 81mg qd and Atorvastatin 80mg qd  - f/u Dr. Leach neuro reccs  - PT Consulted

## 2024-11-11 NOTE — DISCHARGE NOTE PROVIDER - ATTENDING DISCHARGE PHYSICAL EXAMINATION:
Patient seen and examined with son at bedside.     Elderly female, lying comfortably in bed  HEENT NC/AT, neck supple  Cardiac Irregular rhythm,  Lungs CTA b/l, no wheezing,  Abdomen soft, ntnd, nl bowel sounds  No LE edema noted, b/l venous stasis skin changes seen.    Vital Signs Last 24 Hrs  T(C): 36.2 (18 Nov 2024 13:55), Max: 36.4 (17 Nov 2024 21:33)  T(F): 97.2 (18 Nov 2024 13:55), Max: 97.6 (17 Nov 2024 21:33)  HR: 76 (18 Nov 2024 13:55) (76 - 115)  BP: 116/74 (18 Nov 2024 13:55) (104/71 - 146/85)  BP(mean): 93 (18 Nov 2024 12:55) (93 - 100)  RR: 18 (18 Nov 2024 13:55) (17 - 18)  SpO2: 96% (18 Nov 2024 13:55) (92% - 98%)    Parameters below as of 18 Nov 2024 13:55  Patient On (Oxygen Delivery Method): room air

## 2024-11-11 NOTE — PROGRESS NOTE ADULT - SUBJECTIVE AND OBJECTIVE BOX
Neurology Stroke Progress Note    INTERVAL HPI/OVERNIGHT EVENTS:  Patient seen and examined @ bedside. pt bilingual(Dutch and English). Pts son @ bedside, providing collateral. All questions and concerns answered. Pending TTE @ this point of time. Lives alone, no Hx of strokes, compliant with meds. Per pts son, has LUE/LLE tremors even during sleep and intermittently notices some RUE tremors.     MEDICATIONS  (STANDING):  apixaban 5 milliGRAM(s) Oral every 12 hours  aspirin enteric coated 81 milliGRAM(s) Oral daily  atorvastatin 40 milliGRAM(s) Oral at bedtime  cefTRIAXone   IVPB      cefTRIAXone   IVPB 1000 milliGRAM(s) IV Intermittent every 24 hours  diltiazem    milliGRAM(s) Oral daily  ferrous    sulfate 325 milliGRAM(s) Oral two times a day  influenza  Vaccine (HIGH DOSE) 0.5 milliLiter(s) IntraMuscular once  insulin lispro (ADMELOG) corrective regimen sliding scale   SubCutaneous three times a day before meals  insulin lispro (ADMELOG) corrective regimen sliding scale   SubCutaneous at bedtime  metoprolol tartrate 50 milliGRAM(s) Oral two times a day  senna 2 Tablet(s) Oral at bedtime    MEDICATIONS  (PRN):  pantoprazole    Tablet 40 milliGRAM(s) Oral before breakfast PRN for heartburn      Allergies  No Known Allergies      Vital Signs Last 24 Hrs  T(C): 37 (11 Nov 2024 11:07), Max: 37.4 (10 Nov 2024 20:12)  T(F): 98.6 (11 Nov 2024 11:07), Max: 99.3 (10 Nov 2024 20:12)  HR: 86 (11 Nov 2024 11:07) (85 - 94)  BP: 132/85 (11 Nov 2024 11:07) (121/89 - 154/99)  BP(mean): --  RR: 18 (11 Nov 2024 11:07) (18 - 18)  SpO2: 96% (11 Nov 2024 11:07) (93% - 98%)    Parameters below as of 11 Nov 2024 11:07  Patient On (Oxygen Delivery Method): room air        Physical exam:  General: Somnolent, easily arousable, but falls asleep easily. (per pts son @ bedside, this has been her new baseline since this hospitalization  Eyes: Anicteric sclerae, moist conjunctivae, see below for CNs  Neck: trachea midline.  Cardiovascular: Regular rate and rhythm,S1S2+.  Pulmonary: Respirations appears to be even and non labored   Extremities: B/L LE varicose veins noted, C/O pain.     Neurologic:  -Mental status: Somnolent, yet easily arousable to verbal, but falls asleep easily requiring repeated instructions/ reinforcement. oriented X 3 when alert, awake, hypophonic (trailing voice) which per pt and family has been noticing for a while(unclear chronicity). Intact naming (calls pen as pencil, cellphone as telephone- unclear paraphrasic errors Vs language issue primarily Dutch speaking), ID card, intact repetition with dysarthria +. follows some simple midline and appendicular commands, like open/close eyes, show me 2 fingers- pt able to show 2 fingers in the left hand- ? left/Right confusion. Bradykinesia- slow to respond. Answers in a piecemeal fashion. Impaired attention and concentration.       -Cranial nerves:   II: B/L BTT intact.   III, IV, VI: Extraocular movements are intact without nystagmus. Pupils equally round and reactive to light.  V:  Facial sensation V1-V3 equal and intact   VII: subtle L NLFF+.   XI: Head turning and shoulder shrug are intact.  XII: Tongue protrudes midline  Motor: Normal bulk and tone. LUE/LLE: 3-/5, briefly antigravity, pt does not participate in formal strength testing, has difficulty following multi step commands and staying awake for the exam with L sided hemichorea (L face, LUE/LLE+). RUE/RLE: atleast 4-/5(grossly)  Rapid alternating movements; STEVIE.   Sensation: Inconsistent, Intact to light touch bilaterally. No neglect or extinction on double simultaneous testing.  Coordination: No dysmetria on finger-to-nose, noted to have some kinetic/action tremors in RUE >LUE.   Reflexes: L- equivocal, R - downgoing.   Gait: STEVIE.     NIHSS; 5(L NLFF, dysarthria, LUE/LLE weakness, LOC).   mRS: 1    LABS:                        16.3   8.25  )-----------( 154      ( 11 Nov 2024 06:15 )             46.4     11-11    139  |  106  |  23[H]  ----------------------------<  178[H]  3.7   |  24  |  0.76    Ca    9.7      11 Nov 2024 06:15  Phos  2.5     11-11  Mg     1.9     11-11        Urinalysis Basic - ( 11 Nov 2024 06:15 )    Color: x / Appearance: x / SG: x / pH: x  Gluc: 178 mg/dL / Ketone: x  / Bili: x / Urobili: x   Blood: x / Protein: x / Nitrite: x   Leuk Esterase: x / RBC: x / WBC x   Sq Epi: x / Non Sq Epi: x / Bacteria: x    Culture - Urine (11.09.24 @ 15:09)    Specimen Source: Clean Catch   Culture Results:   >100,000 CFU/ml Escherichia coli        RADIOLOGY & ADDITIONAL TESTS:    CT Angio Neck Stroke Protocol w/ IV Cont (11.08.24 @ 10:27)     IMPRESSION:    CT PERFUSION:  Technical limitations: None.    Core infarction: 0 ml  Penumbra / tissue at risk for active ischemia: 9 mL. Scattered small   areas of perfusion abnormality in the bilateral cerebral hemispheres and   left cerebellum, suggesting artifact versus ischemia.    CTA NECK:  No evidence of significant stenosis or occlusion.    CTA HEAD:  Moderate stenosis of the right M1 segment.  No large vessel occlusion or vascular abnormality identified.    CT Head No Cont (11.09.24 @ 19:03) >  IMPRESSION: Acute evolving right caudate head/basal ganglia/corona   radiata infarction. No evidence of hemorrhagic transformation.     MR Head w/wo IV Cont (11.10.24 @ 12:30)     IMPRESSION: Acute right MCA territory infarctions.    FINDINGS:  No abnormal leptomeningeal or parenchymal enhancement.  There is moderate area of abnormal restricted diffusion involving the   right caudate head, right caudate body, right corona radiata, right basal   ganglia, high right frontal lobe on images 56 of series 9. Scattered   periventricular and subcortical white matter T2 /FLAIR hyperintensities   are seen without mass effect, nonspecific, likely representing moderate   chronic microvascular changes             Neurology Stroke Progress Note    INTERVAL HPI/OVERNIGHT EVENTS:  Patient seen and examined @ bedside. pt bilingual(Cymro and English). Pts son @ bedside, providing collateral. All questions and concerns answered. Pending TTE @ this point of time. Lives alone, no Hx of strokes, compliant with meds. Per pts son, has LUE/LLE tremors even during sleep and intermittently notices some RUE tremors.     MEDICATIONS  (STANDING):  apixaban 5 milliGRAM(s) Oral every 12 hours  aspirin enteric coated 81 milliGRAM(s) Oral daily  atorvastatin 40 milliGRAM(s) Oral at bedtime  cefTRIAXone   IVPB      cefTRIAXone   IVPB 1000 milliGRAM(s) IV Intermittent every 24 hours  diltiazem    milliGRAM(s) Oral daily  ferrous    sulfate 325 milliGRAM(s) Oral two times a day  influenza  Vaccine (HIGH DOSE) 0.5 milliLiter(s) IntraMuscular once  insulin lispro (ADMELOG) corrective regimen sliding scale   SubCutaneous three times a day before meals  insulin lispro (ADMELOG) corrective regimen sliding scale   SubCutaneous at bedtime  metoprolol tartrate 50 milliGRAM(s) Oral two times a day  senna 2 Tablet(s) Oral at bedtime    MEDICATIONS  (PRN):  pantoprazole    Tablet 40 milliGRAM(s) Oral before breakfast PRN for heartburn      Allergies  No Known Allergies      Vital Signs Last 24 Hrs  T(C): 37 (2024 11:07), Max: 37.4 (10 Nov 2024 20:12)  T(F): 98.6 (2024 11:07), Max: 99.3 (10 Nov 2024 20:12)  HR: 86 (2024 11:07) (85 - 94)  BP: 132/85 (2024 11:07) (121/89 - 154/99)  BP(mean): --  RR: 18 (2024 11:07) (18 - 18)  SpO2: 96% (2024 11:07) (93% - 98%)    Parameters below as of 2024 11:07  Patient On (Oxygen Delivery Method): room air        Physical exam:  General: Somnolent, easily arousable, but falls asleep easily. (per pts son @ bedside, this has been her new baseline since this hospitalization  Eyes: Anicteric sclerae, moist conjunctivae, see below for CNs  Neck: trachea midline.  Cardiovascular: Regular rate and rhythm,S1S2+.  Pulmonary: Respirations appears to be even and non labored   Extremities: B/L LE varicose veins noted, C/O pain.     Neurologic:  -Mental status: Somnolent, yet easily arousable to verbal, but falls asleep easily requiring repeated instructions/ reinforcement. oriented X 3 when alert, awake, hypophonic (trailing voice) which per pt and family has been noticing for a while(unclear chronicity). Intact naming (calls pen as pencil, cellphone as telephone- unclear paraphrasic errors Vs language issue primarily Cymro speaking), ID card, intact repetition with dysarthria +. follows some simple midline and appendicular commands, like open/close eyes, show me 2 fingers- pt able to show 2 fingers in the left hand- ? left/Right confusion. Bradykinesia- slow to respond. Answers in a piecemeal fashion. Impaired attention and concentration.     Orientation Questions :  First Name: Mayelin  Last name: I dont have a last name, My name is only Mayelin.  What is your maiden name before getting : christy (pts son acknowledges the info).  /Age, place as hospital, Mo/Year as 2024.    -Cranial nerves:   II: B/L BTT intact.   III, IV, VI: Extraocular movements are intact without nystagmus. Pupils equally round and reactive to light.  V:  Facial sensation V1-V3 equal and intact   VII: subtle L NLFF+.   XI: Head turning and shoulder shrug are intact.  XII: Tongue protrudes midline  Motor: Normal bulk and tone. LUE/LLE: 3-/5, briefly antigravity, pt does not participate in formal strength testing, has difficulty following multi step commands and staying awake for the exam with L sided hemichorea (L face, LUE/LLE+). RUE/RLE: atleast 4-/5(grossly)  Rapid alternating movements; STEVIE.   Sensation: Inconsistent, Intact to light touch bilaterally. No neglect or extinction on double simultaneous testing.  Coordination: No dysmetria on finger-to-nose, noted to have some kinetic/action tremors in RUE >LUE.   Reflexes: L- equivocal, R - downgoing.   Gait: STEVIE.     NIHSS; 5(L NLFF, dysarthria, LUE/LLE weakness, LOC).   mRS: 1    LABS:                        16.3   8.25  )-----------( 154      ( 2024 06:15 )             46.4         139  |  106  |  23[H]  ----------------------------<  178[H]  3.7   |  24  |  0.76    Ca    9.7      2024 06:15  Phos  2.5       Mg     1.9             Urinalysis Basic - ( 2024 06:15 )    Color: x / Appearance: x / SG: x / pH: x  Gluc: 178 mg/dL / Ketone: x  / Bili: x / Urobili: x   Blood: x / Protein: x / Nitrite: x   Leuk Esterase: x / RBC: x / WBC x   Sq Epi: x / Non Sq Epi: x / Bacteria: x    Culture - Urine (24 @ 15:09)    Specimen Source: Clean Catch   Culture Results:   >100,000 CFU/ml Escherichia coli        RADIOLOGY & ADDITIONAL TESTS:    CT Angio Neck Stroke Protocol w/ IV Cont (24 @ 10:27)     IMPRESSION:    CT PERFUSION:  Technical limitations: None.    Core infarction: 0 ml  Penumbra / tissue at risk for active ischemia: 9 mL. Scattered small   areas of perfusion abnormality in the bilateral cerebral hemispheres and   left cerebellum, suggesting artifact versus ischemia.    CTA NECK:  No evidence of significant stenosis or occlusion.    CTA HEAD:  Moderate stenosis of the right M1 segment.  No large vessel occlusion or vascular abnormality identified.    CT Head No Cont (24 @ 19:03) >  IMPRESSION: Acute evolving right caudate head/basal ganglia/corona   radiata infarction. No evidence of hemorrhagic transformation.     MR Head w/wo IV Cont (11.10.24 @ 12:30)     IMPRESSION: Acute right MCA territory infarctions.    FINDINGS:  No abnormal leptomeningeal or parenchymal enhancement.  There is moderate area of abnormal restricted diffusion involving the   right caudate head, right caudate body, right corona radiata, right basal   ganglia, high right frontal lobe on images 56 of series 9. Scattered   periventricular and subcortical white matter T2 /FLAIR hyperintensities   are seen without mass effect, nonspecific, likely representing moderate   chronic microvascular changes             Neurology Stroke Progress Note    INTERVAL HPI/OVERNIGHT EVENTS:  Patient seen and examined @ bedside. pt bilingual(Northern Irish and English). Pts son @ bedside, providing collateral. All questions and concerns answered. Pending TTE @ this point of time. Lives alone, no Hx of strokes, compliant with meds. Per pts son, has LUE/LLE tremors even during sleep and intermittently notices some RUE tremors.     MEDICATIONS  (STANDING):  apixaban 5 milliGRAM(s) Oral every 12 hours  aspirin enteric coated 81 milliGRAM(s) Oral daily  atorvastatin 40 milliGRAM(s) Oral at bedtime  cefTRIAXone   IVPB      cefTRIAXone   IVPB 1000 milliGRAM(s) IV Intermittent every 24 hours  diltiazem    milliGRAM(s) Oral daily  ferrous    sulfate 325 milliGRAM(s) Oral two times a day  influenza  Vaccine (HIGH DOSE) 0.5 milliLiter(s) IntraMuscular once  insulin lispro (ADMELOG) corrective regimen sliding scale   SubCutaneous three times a day before meals  insulin lispro (ADMELOG) corrective regimen sliding scale   SubCutaneous at bedtime  metoprolol tartrate 50 milliGRAM(s) Oral two times a day  senna 2 Tablet(s) Oral at bedtime    MEDICATIONS  (PRN):  pantoprazole    Tablet 40 milliGRAM(s) Oral before breakfast PRN for heartburn      Allergies  No Known Allergies      Vital Signs Last 24 Hrs  T(C): 37 (2024 11:07), Max: 37.4 (10 Nov 2024 20:12)  T(F): 98.6 (2024 11:07), Max: 99.3 (10 Nov 2024 20:12)  HR: 86 (2024 11:07) (85 - 94)  BP: 132/85 (2024 11:07) (121/89 - 154/99)  BP(mean): --  RR: 18 (2024 11:07) (18 - 18)  SpO2: 96% (2024 11:07) (93% - 98%)    Parameters below as of 2024 11:07  Patient On (Oxygen Delivery Method): room air        Physical exam:  General: Somnolent, easily arousable, but falls asleep easily. (per pts son @ bedside, this has been her new baseline since this hospitalization  Eyes: Anicteric sclerae, moist conjunctivae, see below for CNs  Neck: trachea midline.  Cardiovascular: Regular rate and rhythm,S1S2+.  Pulmonary: Respirations appears to be even and non labored   Extremities: B/L LE varicose veins noted, C/O pain.     Neurologic:  -Mental status: Somnolent, yet easily arousable to verbal, but falls asleep easily requiring repeated instructions/ reinforcement. oriented X 3 when alert, awake, hypophonic (trailing voice) which per pt and family has been noticing for a while(unclear chronicity). Intact naming (calls pen as pencil, cellphone as telephone- unclear paraphrasic errors Vs language issue primarily Northern Irish speaking), ID card, intact repetition with dysarthria +. follows some simple midline and appendicular commands, like open/close eyes, show me 2 fingers- pt able to show 2 fingers in the left hand- ? left/Right confusion. Bradykinesia- slow to respond. Answers in a piecemeal fashion. Impaired attention and concentration. Masklike expressionless face and B/L UE resting tremors.     Orientation Questions :  First Name: Mayelin  Last name: I dont have a last name, My name is only Mayelin.  What is your maiden name before getting : christy (pts son acknowledges the info).  /Age, place as hospital, Mo/Year as 2024.    -Cranial nerves:   II: B/L BTT intact.   III, IV, VI: Extraocular movements are intact without nystagmus. Pupils equally round and reactive to light.  V:  Facial sensation V1-V3 equal and intact   VII: subtle L NLFF+.   XI: Head turning and shoulder shrug are intact.  XII: Tongue protrudes midline  Motor: Normal bulk and tone. LUE/LLE: 3-/5, briefly antigravity, pt does not participate in formal strength testing, has difficulty following multi step commands and staying awake for the exam with L sided hemichorea (L face, LUE/LLE+). RUE/RLE: atleast 4-/5(grossly)  Rapid alternating movements; STEVIE.   Sensation: Inconsistent, Intact to light touch bilaterally. No neglect or extinction on double simultaneous testing.  Coordination: No dysmetria on finger-to-nose, noted to have some kinetic/action tremors in RUE >LUE.   Reflexes: L- equivocal, R - downgoing.   Gait: STEVIE.     NIHSS; 5(L NLFF, dysarthria, LUE/LLE weakness, LOC).   mRS: 1    LABS:                        16.3   8.25  )-----------( 154      ( 2024 06:15 )             46.4         139  |  106  |  23[H]  ----------------------------<  178[H]  3.7   |  24  |  0.76    Ca    9.7      2024 06:15  Phos  2.5       Mg     1.9             Urinalysis Basic - ( 2024 06:15 )    Color: x / Appearance: x / SG: x / pH: x  Gluc: 178 mg/dL / Ketone: x  / Bili: x / Urobili: x   Blood: x / Protein: x / Nitrite: x   Leuk Esterase: x / RBC: x / WBC x   Sq Epi: x / Non Sq Epi: x / Bacteria: x    Culture - Urine (24 @ 15:09)    Specimen Source: Clean Catch   Culture Results:   >100,000 CFU/ml Escherichia coli        RADIOLOGY & ADDITIONAL TESTS:    CT Angio Neck Stroke Protocol w/ IV Cont (24 @ 10:27)     IMPRESSION:    CT PERFUSION:  Technical limitations: None.    Core infarction: 0 ml  Penumbra / tissue at risk for active ischemia: 9 mL. Scattered small   areas of perfusion abnormality in the bilateral cerebral hemispheres and   left cerebellum, suggesting artifact versus ischemia.    CTA NECK:  No evidence of significant stenosis or occlusion.    CTA HEAD:  Moderate stenosis of the right M1 segment.  No large vessel occlusion or vascular abnormality identified.    CT Head No Cont (24 @ 19:03) >  IMPRESSION: Acute evolving right caudate head/basal ganglia/corona   radiata infarction. No evidence of hemorrhagic transformation.     MR Head w/wo IV Cont (11.10.24 @ 12:30)     IMPRESSION: Acute right MCA territory infarctions.    FINDINGS:  No abnormal leptomeningeal or parenchymal enhancement.  There is moderate area of abnormal restricted diffusion involving the   right caudate head, right caudate body, right corona radiata, right basal   ganglia, high right frontal lobe on images 56 of series 9. Scattered   periventricular and subcortical white matter T2 /FLAIR hyperintensities   are seen without mass effect, nonspecific, likely representing moderate   chronic microvascular changes

## 2024-11-11 NOTE — PROGRESS NOTE ADULT - SUBJECTIVE AND OBJECTIVE BOX
PGY-1 Progress Note discussed with attending    INTERVAL HPI/OVERNIGHT EVENTS:   MEDICATIONS  (STANDING):  apixaban 5 milliGRAM(s) Oral every 12 hours  aspirin enteric coated 81 milliGRAM(s) Oral daily  atorvastatin 40 milliGRAM(s) Oral at bedtime  cefTRIAXone   IVPB      cefTRIAXone   IVPB 1000 milliGRAM(s) IV Intermittent every 24 hours  diltiazem    milliGRAM(s) Oral daily  ferrous    sulfate 325 milliGRAM(s) Oral two times a day  influenza  Vaccine (HIGH DOSE) 0.5 milliLiter(s) IntraMuscular once  insulin lispro (ADMELOG) corrective regimen sliding scale   SubCutaneous three times a day before meals  insulin lispro (ADMELOG) corrective regimen sliding scale   SubCutaneous at bedtime  metoprolol tartrate 50 milliGRAM(s) Oral two times a day  senna 2 Tablet(s) Oral at bedtime    MEDICATIONS  (PRN):  pantoprazole    Tablet 40 milliGRAM(s) Oral before breakfast PRN for heartburn      REVIEW OF SYSTEMS:  CONSTITUTIONAL: No fever, weight loss, or fatigue  RESPIRATORY: No cough, wheezing, chills or hemoptysis; No shortness of breath  CARDIOVASCULAR: No chest pain, palpitations, dizziness, or leg swelling  GASTROINTESTINAL: No abdominal pain. No nausea, vomiting, or hematemesis; No diarrhea or constipation. No melena or hematochezia.  GENITOURINARY: No dysuria or hematuria, urinary frequency  NEUROLOGICAL: No headaches, memory loss, loss of strength, numbness, or tremors  SKIN: No itching, burning, rashes, or lesions     Vital Signs Last 24 Hrs  T(C): 37.1 (11 Nov 2024 07:18), Max: 37.4 (10 Nov 2024 20:12)  T(F): 98.7 (11 Nov 2024 07:18), Max: 99.3 (10 Nov 2024 20:12)  HR: 85 (11 Nov 2024 07:18) (80 - 94)  BP: 121/89 (11 Nov 2024 07:18) (121/89 - 154/99)  BP(mean): --  RR: 18 (11 Nov 2024 07:18) (18 - 18)  SpO2: 95% (11 Nov 2024 07:18) (93% - 98%)    Parameters below as of 11 Nov 2024 07:18  Patient On (Oxygen Delivery Method): room air        PHYSICAL EXAMINATION:  GENERAL: NAD, well built  HEAD:  Atraumatic, Normocephalic  EYES:  conjunctiva and sclera clear  NECK: Supple, No JVD, Normal thyroid  CHEST/LUNG: Clear to auscultation. Clear to percussion bilaterally; No rales, rhonchi, wheezing, or rubs  HEART: Regular rate and rhythm; No murmurs, rubs, or gallops  ABDOMEN: Soft, Nontender, Nondistended; Bowel sounds present  NERVOUS SYSTEM:  Alert & Oriented X3,    EXTREMITIES:  2+ Peripheral Pulses, No clubbing, cyanosis, or edema  SKIN: warm dry                          16.3   8.25  )-----------( 154      ( 11 Nov 2024 06:15 )             46.4     11-11    139  |  106  |  23[H]  ----------------------------<  178[H]  3.7   |  24  |  0.76    Ca    9.7      11 Nov 2024 06:15  Phos  2.5     11-11  Mg     1.9     11-11                CAPILLARY BLOOD GLUCOSE      RADIOLOGY & ADDITIONAL TESTS:                   PGY-1 Progress Note discussed with attending    INTERVAL HPI/OVERNIGHT EVENTS: No overnight events. Patient was eating breakfast when I saw her.     MEDICATIONS  (STANDING):  apixaban 5 milliGRAM(s) Oral every 12 hours  aspirin enteric coated 81 milliGRAM(s) Oral daily  atorvastatin 40 milliGRAM(s) Oral at bedtime  cefTRIAXone   IVPB      cefTRIAXone   IVPB 1000 milliGRAM(s) IV Intermittent every 24 hours  diltiazem    milliGRAM(s) Oral daily  ferrous    sulfate 325 milliGRAM(s) Oral two times a day  influenza  Vaccine (HIGH DOSE) 0.5 milliLiter(s) IntraMuscular once  insulin lispro (ADMELOG) corrective regimen sliding scale   SubCutaneous three times a day before meals  insulin lispro (ADMELOG) corrective regimen sliding scale   SubCutaneous at bedtime  metoprolol tartrate 50 milliGRAM(s) Oral two times a day  senna 2 Tablet(s) Oral at bedtime    MEDICATIONS  (PRN):  pantoprazole    Tablet 40 milliGRAM(s) Oral before breakfast PRN for heartburn      REVIEW OF SYSTEMS:  CONSTITUTIONAL: No fever  RESPIRATORY: No cough  CARDIOVASCULAR: No chest pain  GASTROINTESTINAL: No abdominal pain  GENITOURINARY: No dysuria   NEUROLOGICAL: No headaches  SKIN: No itching, burning, rashes, or lesions       Vital Signs Last 24 Hrs  T(C): 37.1 (11 Nov 2024 07:18), Max: 37.4 (10 Nov 2024 20:12)  T(F): 98.7 (11 Nov 2024 07:18), Max: 99.3 (10 Nov 2024 20:12)  HR: 85 (11 Nov 2024 07:18) (80 - 94)  BP: 121/89 (11 Nov 2024 07:18) (121/89 - 154/99)  BP(mean): --  RR: 18 (11 Nov 2024 07:18) (18 - 18)  SpO2: 95% (11 Nov 2024 07:18) (93% - 98%)    Parameters below as of 11 Nov 2024 07:18  Patient On (Oxygen Delivery Method): room air        PHYSICAL EXAMINATION:  GENERAL: NAD, well built  HEAD:  Atraumatic, Normocephalic  EYES:  conjunctiva and sclera clear  NECK: Supple, No JVD  CHEST/LUNG: Clear to auscultation. No rales, rhonchi, wheezing, or rubs  HEART: Regular rate and rhythm; No murmurs, rubs, or gallops  ABDOMEN: Soft, Nontender, Nondistended; Bowel sounds present  NERVOUS SYSTEM:  Alert & Oriented X2-3, tremor of left hand and right hand at rest, able to raise arm and legs b/l off the bed  EXTREMITIES:  2+ Peripheral Pulses, 1+ pitting edema  SKIN: warm dry                          16.3   8.25  )-----------( 154      ( 11 Nov 2024 06:15 )             46.4     11-11    139  |  106  |  23[H]  ----------------------------<  178[H]  3.7   |  24  |  0.76    Ca    9.7      11 Nov 2024 06:15  Phos  2.5     11-11  Mg     1.9     11-11                CAPILLARY BLOOD GLUCOSE      RADIOLOGY & ADDITIONAL TESTS:      < from: MR Head w/wo IV Cont (11.10.24 @ 12:30) >  No abnormal leptomeningeal or parenchymal enhancement.  There is moderate area of abnormal restricted diffusion involving the   right caudate head, right caudate body, right corona radiata, right basal   ganglia, high right frontal lobe on images 56 of series 9. Scattered   periventricular and subcortical white matter T2 /FLAIR hyperintensities   are seen without mass effect, nonspecific, likely representing moderate   chronic microvascular changes. Normal T2 flow-voids are seen within  the   intracranial vasculature. The lateral ventricles and cortical sulci are   age-appropriate in size and configuration. There is no mass, mass effect,   or extra-axial fluid collection. There is no susceptibility artifact to   suggest hemorrhage. Midline structures are normal.  The visualized   paranasal sinuses, mastoid air cells and orbits are unremarkable.      IMPRESSION: Acute right MCA territory infarctions.      < end of copied text >  < from: CT Head No Cont (11.09.24 @ 19:03) >  IMPRESSION: Acute evolving right caudate head/basal ganglia/corona   radiata infarction. No evidence of hemorrhagic transformation..    < end of copied text >          < from: TTE W or WO Ultrasound Enhancing Agent (11.11.24 @ 07:53) >  1. Left ventricular cavity is normal in size. Left ventricular wall thickness is mildly increased. Left ventricular systolic function is normal with an ejection fraction visually estimated at 55 to 60 %.   2. Analysis of left ventricular diastolic function and filling pressure is made challenging by the presence of atrial fibrillation.   3. Normal right ventricular cavity size and normal right ventricular systolic function.   4. Left atrium is mildly dilated.   5. The right atrium is dilated.   6. 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   7. Trileaflet aortic valve. Fibrocalcific aortic valve sclerosis without stenosis.   8. Mild aortic regurgitation.   9. Structurally normal mitral valve with normal leaflet excursion.  10. Trace mitral regurgitation.  11. Agitated saline injection was negative for intracardiac shunt.  12. Echo-free space is noted in the liver consistent with cyst, however, dedicated imaging recommended for further evaluation if clinically indicated.  13. Pulmonary artery systolic pressure could not be estimated.  14. No pericardial effusion seen.  15. No prior echocardiogram is available for comparison.    < end of copied text >

## 2024-11-11 NOTE — DISCHARGE NOTE PROVIDER - CARE PROVIDER_API CALL
Zackary Conner  Neurology  [address] [place] [address] [address]  [place] [address]  Phone: [phone]  Fax: [phone]  Follow Up Time:

## 2024-11-11 NOTE — DISCHARGE NOTE PROVIDER - NSDCCPCAREPLAN_GEN_ALL_CORE_FT
PRINCIPAL DISCHARGE DIAGNOSIS  Diagnosis: Acute ischemic right MCA stroke  Assessment and Plan of Treatment:       SECONDARY DISCHARGE DIAGNOSES  Diagnosis: Acute ischemic right MCA stroke  Assessment and Plan of Treatment:     Diagnosis: Atrial fibrillation  Assessment and Plan of Treatment:     Diagnosis: HTN (hypertension)  Assessment and Plan of Treatment:     Diagnosis: HLD (hyperlipidemia)  Assessment and Plan of Treatment:     Diagnosis: Hypothyroidism  Assessment and Plan of Treatment:     Diagnosis: Acute UTI  Assessment and Plan of Treatment:     Diagnosis: Peripheral venous insufficiency  Assessment and Plan of Treatment:      PRINCIPAL DISCHARGE DIAGNOSIS  Diagnosis: Acute ischemic right MCA stroke  Assessment and Plan of Treatment: You presented to the emergency room with a complaint of slurred speech after having a fall the day prior. Given your history of stroke, a code stroke was called in the emergency room. A CT scan of your head was done which showed a stroke. An MRI of your head was also done which confirmed multiple infarctions of your brain. You were started on Aspirin and atorvastatin which continuing eliquis which you take at home. Neurology was consulted to evaluate who agreered and modified treatment plans. EEG was done to evaluate for seizures contributing to patients worsening mental status. Neurology recommended you be transfered to ICU for continuous 24 hour EEG monitoring for any events of seizure activity. That monitoring showed no seizure activity.      SECONDARY DISCHARGE DIAGNOSES  Diagnosis: Acute ischemic right MCA stroke  Assessment and Plan of Treatment: see above    Diagnosis: Atrial fibrillation  Assessment and Plan of Treatment: For your history of afib, you were continued on home medications cardizem, metoprolol, and eliquis. EKG in the hospital showed AFib.    Diagnosis: HTN (hypertension)  Assessment and Plan of Treatment: For your HTN, you were continued on home cardizem and metoprolol. Please follow up with your primary care physician.    Diagnosis: HLD (hyperlipidemia)  Assessment and Plan of Treatment: For your HLD, you were continued on atorvastatin but dose was increased due to the stroke.    Diagnosis: Acute UTI  Assessment and Plan of Treatment: Your urine cultures were positive for bacteria for which you were treated with 5 days of ceftriaxone.    Diagnosis: Peripheral venous insufficiency  Assessment and Plan of Treatment: Because of your history of peripheral venous insufficiency, we ordered venous doppled which was negative for deep venous thrombosis.    Diagnosis: Hypothyroidism  Assessment and Plan of Treatment:      PRINCIPAL DISCHARGE DIAGNOSIS  Diagnosis: Acute ischemic right MCA stroke  Assessment and Plan of Treatment: You presented to the emergency room with a complaint of slurred speech after having a fall the day prior. Given your history of stroke, a code stroke was called in the emergency room. A CT scan of your head was done which showed a stroke. An MRI of your head was also done which confirmed multiple infarctions of your brain. You were started on Aspirin and atorvastatin which continuing eliquis which you take at home. Neurology was consulted to evaluate who agreered and modified treatment plans. EEG was done to evaluate for seizures contributing to patients worsening mental status. Neurology recommended you be transfered to ICU for continuous 24 hour EEG monitoring for any events of seizure activity. That monitoring showed no seizure activity.      SECONDARY DISCHARGE DIAGNOSES  Diagnosis: Acute ischemic right MCA stroke  Assessment and Plan of Treatment: see above    Diagnosis: HTN (hypertension)  Assessment and Plan of Treatment: You have high blood pressure. Please continue to taking your medications of cardizem and metoprolol as prescribed. Please measure your blood pressure at least once daily. Maintain a healthy diet which includes incorporating more vegetables and decreasing the amount of salt (low sodium) and sugar in your diet such as rice, bread, and pasta low sugar, low fat, low sodium diet. Exercise frequently if possible.  Please follow up with your primary care provider within one week of your discharge.      Diagnosis: Atrial fibrillation  Assessment and Plan of Treatment: For your history of afib, you were continued on home medications cardizem, metoprolol, and eliquis. EKG in the hospital showed AFib.    Diagnosis: HLD (hyperlipidemia)  Assessment and Plan of Treatment: You have hyperlipidemia. Please continue to take your cholesterol medications of atorvastatin. Maintain a healthy diet that consist of low sugar, low fat, low sodium. Decrease the amount of fried foods that you consume. Exercise frequently if possible. Please follow up with  your primary care provider within 1 week of your discharge.  You are recommended a DASH Diet that emphasizes mostly vegetables, fruits, and fat-free or low-fat dairy products. Please limit intake of sodium, sweets, beverages w/ high sugar/carbohydrate content.      Diagnosis: Hypothyroidism  Assessment and Plan of Treatment: You have a history of hypothyroidism. Please continue to with yor home medication.    Diagnosis: Acute UTI  Assessment and Plan of Treatment: Your urine cultures were positive for bacteria for which you were treated with 5 days of ceftriaxone.    Diagnosis: Peripheral venous insufficiency  Assessment and Plan of Treatment: Because of your history of peripheral venous insufficiency, we ordered venous doppler  which was negative for deep venous thrombosis.     PRINCIPAL DISCHARGE DIAGNOSIS  Diagnosis: Acute ischemic right MCA stroke  Assessment and Plan of Treatment: You presented to the emergency room with a complaint of slurred speech after having a fall the day prior. Given your history of stroke, a code stroke was called in the emergency room. A CT scan of your head was done which showed a stroke. An MRI of your head was also done which confirmed multiple infarctions of your brain. You were started on Aspirin and atorvastatin which continuing eliquis which you take at home. Neurology was consulted to evaluate who agreered and modified treatment plans. EEG was done to evaluate for seizures contributing to patients worsening mental status. For clinical seizure like activity, you were started on Depakote. Neurology recommended you be transfered to ICU for continuous 24 hour EEG monitoring for any events of seizure activity. That monitoring showed no seizure activity. You were tranferred back to floors from ICU and were stable for discharge.      SECONDARY DISCHARGE DIAGNOSES  Diagnosis: Acute ischemic right MCA stroke  Assessment and Plan of Treatment: see above    Diagnosis: Atrial fibrillation  Assessment and Plan of Treatment: For your history of afib, you were continued on home medications cardizem, metoprolol, and eliquis. EKG in the hospital showed AFib.    Diagnosis: HTN (hypertension)  Assessment and Plan of Treatment: You have high blood pressure. Please continue to taking your medications of cardizem and metoprolol as prescribed. Please measure your blood pressure at least once daily. Maintain a healthy diet which includes incorporating more vegetables and decreasing the amount of salt (low sodium) and sugar in your diet such as rice, bread, and pasta low sugar, low fat, low sodium diet. Exercise frequently if possible.  Please follow up with your primary care provider within one week of your discharge.      Diagnosis: HLD (hyperlipidemia)  Assessment and Plan of Treatment: You have hyperlipidemia. Please continue to take your cholesterol medications of atorvastatin. Maintain a healthy diet that consist of low sugar, low fat, low sodium. Decrease the amount of fried foods that you consume. Exercise frequently if possible. Please follow up with  your primary care provider within 1 week of your discharge.  You are recommended a DASH Diet that emphasizes mostly vegetables, fruits, and fat-free or low-fat dairy products. Please limit intake of sodium, sweets, beverages w/ high sugar/carbohydrate content.      Diagnosis: Acute UTI  Assessment and Plan of Treatment: Your urine cultures were positive for bacteria for which you were treated with 5 days of ceftriaxone.    Diagnosis: Peripheral venous insufficiency  Assessment and Plan of Treatment: Because of your history of peripheral venous insufficiency, we ordered venous doppler  which was negative for deep venous thrombosis.    Diagnosis: Hypothyroidism  Assessment and Plan of Treatment: You have a history of hypothyroidism. Please continue to with yor home medication.     PRINCIPAL DISCHARGE DIAGNOSIS  Diagnosis: Acute ischemic right MCA stroke  Assessment and Plan of Treatment: You presented to the emergency room with a complaint of slurred speech after having a fall the day prior. Given your history of stroke, a code stroke was called in the emergency room. A CT scan of your head was done which showed a stroke. An MRI of your head was also done which confirmed multiple infarctions of your brain. You were started on Aspirin and atorvastatin which continuing eliquis which you take at home. Neurology was consulted to evaluate who agreered and modified treatment plans. EEG was done to evaluate for seizures contributing to patients worsening mental status. For clinical seizure like activity, you were started on Depakote. Neurology recommended you be transfered to ICU for continuous 24 hour EEG monitoring for any events of seizure activity. That monitoring showed no seizure activity. You were tranferred back to floors from ICU and were stable for discharge. Follow up with your primary care physician  Pt is stable for discharge. Pt has been advised to follow up as outpatient. Case has been discussed with the attending. This is just a summary of the case. For further information please refer to pt. chart document.      SECONDARY DISCHARGE DIAGNOSES  Diagnosis: Acute ischemic right MCA stroke  Assessment and Plan of Treatment: see above    Diagnosis: Atrial fibrillation  Assessment and Plan of Treatment: For your history of afib, you were continued on home medications cardizem, metoprolol, and eliquis. EKG in the hospital showed AFib.    Diagnosis: HTN (hypertension)  Assessment and Plan of Treatment: You have high blood pressure. Please continue to taking your medications of cardizem and metoprolol as prescribed. Please measure your blood pressure at least once daily. Maintain a healthy diet which includes incorporating more vegetables and decreasing the amount of salt (low sodium) and sugar in your diet such as rice, bread, and pasta low sugar, low fat, low sodium diet. Exercise frequently if possible.  Please follow up with your primary care provider within one week of your discharge.      Diagnosis: HLD (hyperlipidemia)  Assessment and Plan of Treatment: You have hyperlipidemia. Please continue to take your cholesterol medications of atorvastatin. Maintain a healthy diet that consist of low sugar, low fat, low sodium. Decrease the amount of fried foods that you consume. Exercise frequently if possible. Please follow up with  your primary care provider within 1 week of your discharge.  You are recommended a DASH Diet that emphasizes mostly vegetables, fruits, and fat-free or low-fat dairy products. Please limit intake of sodium, sweets, beverages w/ high sugar/carbohydrate content.      Diagnosis: Acute UTI  Assessment and Plan of Treatment: Your urine cultures were positive for bacteria for which you were treated with 5 days of ceftriaxone.    Diagnosis: Peripheral venous insufficiency  Assessment and Plan of Treatment: Because of your history of peripheral venous insufficiency, we ordered venous doppler  which was negative for deep venous thrombosis.    Diagnosis: Hypothyroidism  Assessment and Plan of Treatment: You have a history of hypothyroidism. Please continue to with yor home medication.     PRINCIPAL DISCHARGE DIAGNOSIS  Diagnosis: Acute ischemic right MCA stroke  Assessment and Plan of Treatment: You presented to the emergency room with a complaint of slurred speech after having a fall the day prior. Given your history of stroke, a code stroke was called in the emergency room. A CT scan of your head was done which showed a stroke. An MRI of your head was also done which confirmed multiple infarctions of your brain. You were started on Aspirin and atorvastatin and you will be continuing eliquis which you were already taking at home. Neurology was consulted to evaluate who agreered and modified treatment plans. EEG was done to evaluate for seizures contributing to patients worsening mental status. For clinical seizure like activity, you were started on Depakote. Neurology recommended you be transfered to ICU for continuous 24 hour EEG monitoring for any events of seizure activity. That monitoring showed no seizure activity. You were tranferred back to floors from ICU and were stable for discharge. Follow up with your primary care physician  Pt is stable for discharge. Pt has been advised to follow up as outpatient. Case has been discussed with the attending. This is just a summary of the case. For further information please refer to pt. chart document.      SECONDARY DISCHARGE DIAGNOSES  Diagnosis: Acute ischemic right MCA stroke  Assessment and Plan of Treatment: see above    Diagnosis: HTN (hypertension)  Assessment and Plan of Treatment: You have high blood pressure. Please continue to taking your medications of cardizem and metoprolol as prescribed. Please measure your blood pressure at least once daily. Maintain a healthy diet which includes incorporating more vegetables and decreasing the amount of salt (low sodium) and sugar in your diet such as rice, bread, and pasta low sugar, low fat, low sodium diet. Exercise frequently if possible.  Please follow up with your primary care provider within one week of your discharge.      Diagnosis: Atrial fibrillation  Assessment and Plan of Treatment: For your history of afib, you were continued on home medications cardizem, metoprolol, and eliquis. EKG in the hospital showed AFib.    Diagnosis: HLD (hyperlipidemia)  Assessment and Plan of Treatment: You have hyperlipidemia. Please continue to take your cholesterol medications of atorvastatin. Maintain a healthy diet that consist of low sugar, low fat, low sodium. Decrease the amount of fried foods that you consume. Exercise frequently if possible. Please follow up with  your primary care provider within 1 week of your discharge.  You are recommended a DASH Diet that emphasizes mostly vegetables, fruits, and fat-free or low-fat dairy products. Please limit intake of sodium, sweets, beverages w/ high sugar/carbohydrate content.      Diagnosis: Acute UTI  Assessment and Plan of Treatment: Your urine cultures were positive for bacteria for which you were treated with 5 days of ceftriaxone.    Diagnosis: Diabetes mellitus  Assessment and Plan of Treatment: Your a1c is ***. You were seen by endocrinology in the hospital and put on lantus 8u and lispro 3u with meals, you may continue with this dosing at your rehab facility.    Diagnosis: Peripheral venous insufficiency  Assessment and Plan of Treatment: Because of your history of peripheral venous insufficiency, we ordered venous doppler  which was negative for deep venous thrombosis.     PRINCIPAL DISCHARGE DIAGNOSIS  Diagnosis: Acute ischemic right MCA stroke  Assessment and Plan of Treatment: You presented to the emergency room with a complaint of slurred speech after having a fall the day prior. Given your history of stroke, a code stroke was called in the emergency room. A CT scan of your head was done which showed a stroke. An MRI of your head was also done which confirmed multiple infarctions of your brain. You were started on Aspirin and atorvastatin and you will be continuing eliquis which you were already taking at home. Neurology was consulted to evaluate who agreered and modified treatment plans. EEG was done to evaluate for seizures contributing to patients worsening mental status. For clinical seizure like activity, you were started on Depakote. Neurology recommended you be transfered to ICU for continuous 24 hour EEG monitoring for any events of seizure activity. That monitoring showed no seizure activity. You were tranferred back to floors from ICU and were stable for discharge. Follow up with your primary care physician  Pt is stable for discharge. Pt has been advised to follow up as outpatient. Case has been discussed with the attending. This is just a summary of the case. For further information please refer to pt. chart document.      SECONDARY DISCHARGE DIAGNOSES  Diagnosis: Acute ischemic right MCA stroke  Assessment and Plan of Treatment: see above    Diagnosis: HTN (hypertension)  Assessment and Plan of Treatment: You have high blood pressure. Please only continue to taking diltiazem and metorpolol. Please do not continue to take your ramipril.   Please measure your blood pressure at least once daily. Maintain a healthy diet which includes incorporating more vegetables and decreasing the amount of salt (low sodium) and sugar in your diet such as rice, bread, and pasta low sugar, low fat, low sodium diet. Exercise frequently if possible.  Please follow up with your primary care provider within one week of your discharge.      Diagnosis: Atrial fibrillation  Assessment and Plan of Treatment: For your history of afib, you were continued on home medications cardizem, metoprolol, and eliquis. EKG in the hospital showed AFib.    Diagnosis: HLD (hyperlipidemia)  Assessment and Plan of Treatment: You have hyperlipidemia. Please continue to take your cholesterol medications of atorvastatin. Maintain a healthy diet that consist of low sugar, low fat, low sodium. Decrease the amount of fried foods that you consume. Exercise frequently if possible. Please follow up with  your primary care provider within 1 week of your discharge.  You are recommended a DASH Diet that emphasizes mostly vegetables, fruits, and fat-free or low-fat dairy products. Please limit intake of sodium, sweets, beverages w/ high sugar/carbohydrate content.      Diagnosis: Acute UTI  Assessment and Plan of Treatment: Your urine cultures were positive for bacteria for which you were treated with 5 days of ceftriaxone.    Diagnosis: Peripheral venous insufficiency  Assessment and Plan of Treatment: Because of your history of peripheral venous insufficiency, we ordered venous doppler  which was negative for deep venous thrombosis.    Diagnosis: Diabetes mellitus  Assessment and Plan of Treatment: Due to risk of hypoglycemia, we are holding your oral diabeetic medication. Please continue to take 8 untis of lantus before bed and 3 untis of lispro prior to meals.   HbA1C on admission was 6.5 This is a reflection of your blood sugar level over the last 3 months.  Please check your blood sugar levels twice daily - Morning/Afternoon, and Lunch/Bedtime the following day. Keep a record of your blood sugar readings  You must maintain a healthy diet that consists of low sugar and low fat. Your diet must consist of mostly vegetables. Please limit your intake of high sugar and high carbohydrate foods such as pasta, rice, and bread. Exercise frequently if possible. Follow up with primary care physician within one week of your discharge to further manage your diabetes and monitor your Hemoglobin A1c levels after 3 months to evaluate your diabetes control.  Please follow up with one of the endocrinologist listed  in this discharge.     PRINCIPAL DISCHARGE DIAGNOSIS  Diagnosis: Acute ischemic right MCA stroke  Assessment and Plan of Treatment: You presented to the emergency room with a complaint of slurred speech after having a fall the day prior. Given your history of stroke, a code stroke was called in the emergency room. A CT scan of your head was done which showed a stroke. An MRI of your head was also done which confirmed multiple infarctions of your brain. You were started on Aspirin and atorvastatin and you will be continuing eliquis which you were already taking at home. Neurology was consulted to evaluate who agreered and modified treatment plans. EEG was done to evaluate for seizures contributing to patients worsening mental status. For clinical seizure-like activity, you were started on Depakote. Neurology recommended you be transferred to ICU for continuous 24 hour EEG monitoring for any events of seizure activity. That monitoring showed no seizure activity. You were tranferred back to floors from ICU and were stable for discharge. Follow up with your primary care physician  Pt is stable for discharge. Pt has been advised to follow up as outpatient with neurology. Office information for Dr Leach (neurology) is given. Please continue these new medications of aspirin 81, atorvastatin and depakote as well.      SECONDARY DISCHARGE DIAGNOSES  Diagnosis: Acute ischemic right MCA stroke  Assessment and Plan of Treatment: see above    Diagnosis: HTN (hypertension)  Assessment and Plan of Treatment: You have high blood pressure. Please only continue to taking diltiazem and metoprolol. Please do NOT continue to take your ramipril. Your blood pressure in hospital was well controlled on diltiazem and metoprolol only.  Please measure your blood pressure at least once daily. Maintain a healthy diet which includes incorporating more vegetables and decreasing the amount of salt (low sodium) and sugar in your diet such as rice, bread, and pasta low sugar, low fat, low sodium diet. Exercise frequently if possible.  Please follow up with your primary care provider within one week of your discharge.      Diagnosis: Atrial fibrillation  Assessment and Plan of Treatment: For your history of afib, you were continued on home medications cardizem, metoprolol, and eliquis. EKG in the hospital showed AFib.    Diagnosis: HLD (hyperlipidemia)  Assessment and Plan of Treatment: You have hyperlipidemia. Please continue to take your cholesterol medications of atorvastatin. Maintain a healthy diet that consist of low sugar, low fat, low sodium. Decrease the amount of fried foods that you consume. Exercise frequently if possible. Please follow up with  your primary care provider within 1 week of your discharge.  You are recommended a DASH Diet that emphasizes mostly vegetables, fruits, and fat-free or low-fat dairy products. Please limit intake of sodium, sweets, beverages w/ high sugar/carbohydrate content.      Diagnosis: Acute UTI  Assessment and Plan of Treatment: Your urine cultures were positive for bacteria for which you were treated with 5 days of ceftriaxone.    Diagnosis: Diabetes mellitus  Assessment and Plan of Treatment: Due to risk of hypoglycemia, we are holding your oral diabetic medication. Please continue to take 8 untis of lantus before bed and 3 untis of lispro prior to meals while you are at rehab.  HbA1C on admission was 6.5 This is a reflection of your blood sugar level over the last 3 months.  Please check your blood sugar levels twice daily - Morning/Afternoon, and Lunch/Bedtime the following day. Keep a record of your blood sugar readings  You must maintain a healthy diet that consists of low sugar and low fat. Your diet must consist of mostly vegetables. Please limit your intake of high sugar and high carbohydrate foods such as pasta, rice, and bread. Exercise frequently if possible. Follow up with primary care physician within one week of your discharge to further manage your diabetes and monitor your Hemoglobin A1c levels after 3 months to evaluate your diabetes control.  Please follow up with one of the endocrinologist listed in this discharge for further management of diabetes medications at home when you are done with rehab    Diagnosis: Peripheral venous insufficiency  Assessment and Plan of Treatment: Because of your history of peripheral venous insufficiency, we ordered venous doppler  which was negative for deep venous thrombosis.

## 2024-11-12 LAB
-  AMOXICILLIN/CLAVULANIC ACID: SIGNIFICANT CHANGE UP
-  AMPICILLIN/SULBACTAM: SIGNIFICANT CHANGE UP
-  AMPICILLIN: SIGNIFICANT CHANGE UP
-  AZTREONAM: SIGNIFICANT CHANGE UP
-  CEFAZOLIN: SIGNIFICANT CHANGE UP
-  CEFEPIME: SIGNIFICANT CHANGE UP
-  CEFOXITIN: SIGNIFICANT CHANGE UP
-  CEFTRIAXONE: SIGNIFICANT CHANGE UP
-  CEFUROXIME: SIGNIFICANT CHANGE UP
-  CIPROFLOXACIN: SIGNIFICANT CHANGE UP
-  ERTAPENEM: SIGNIFICANT CHANGE UP
-  GENTAMICIN: SIGNIFICANT CHANGE UP
-  IMIPENEM: SIGNIFICANT CHANGE UP
-  LEVOFLOXACIN: SIGNIFICANT CHANGE UP
-  MEROPENEM: SIGNIFICANT CHANGE UP
-  NITROFURANTOIN: SIGNIFICANT CHANGE UP
-  PIPERACILLIN/TAZOBACTAM: SIGNIFICANT CHANGE UP
-  TOBRAMYCIN: SIGNIFICANT CHANGE UP
-  TRIMETHOPRIM/SULFAMETHOXAZOLE: SIGNIFICANT CHANGE UP
ANION GAP SERPL CALC-SCNC: 6 MMOL/L — SIGNIFICANT CHANGE UP (ref 5–17)
BUN SERPL-MCNC: 21 MG/DL — HIGH (ref 7–18)
CALCIUM SERPL-MCNC: 9.7 MG/DL — SIGNIFICANT CHANGE UP (ref 8.4–10.5)
CHLORIDE SERPL-SCNC: 108 MMOL/L — SIGNIFICANT CHANGE UP (ref 96–108)
CO2 SERPL-SCNC: 24 MMOL/L — SIGNIFICANT CHANGE UP (ref 22–31)
CREAT SERPL-MCNC: 0.78 MG/DL — SIGNIFICANT CHANGE UP (ref 0.5–1.3)
EGFR: 78 ML/MIN/1.73M2 — SIGNIFICANT CHANGE UP
GLUCOSE BLDC GLUCOMTR-MCNC: 146 MG/DL — HIGH (ref 70–99)
GLUCOSE BLDC GLUCOMTR-MCNC: 167 MG/DL — HIGH (ref 70–99)
GLUCOSE BLDC GLUCOMTR-MCNC: 170 MG/DL — HIGH (ref 70–99)
GLUCOSE BLDC GLUCOMTR-MCNC: 180 MG/DL — HIGH (ref 70–99)
GLUCOSE SERPL-MCNC: 190 MG/DL — HIGH (ref 70–99)
HCT VFR BLD CALC: 45.1 % — HIGH (ref 34.5–45)
HGB BLD-MCNC: 15.6 G/DL — HIGH (ref 11.5–15.5)
MAGNESIUM SERPL-MCNC: 1.9 MG/DL — SIGNIFICANT CHANGE UP (ref 1.6–2.6)
MCHC RBC-ENTMCNC: 32.6 PG — SIGNIFICANT CHANGE UP (ref 27–34)
MCHC RBC-ENTMCNC: 34.6 G/DL — SIGNIFICANT CHANGE UP (ref 32–36)
MCV RBC AUTO: 94.4 FL — SIGNIFICANT CHANGE UP (ref 80–100)
METHOD TYPE: SIGNIFICANT CHANGE UP
NRBC # BLD: 0 /100 WBCS — SIGNIFICANT CHANGE UP (ref 0–0)
PHOSPHATE SERPL-MCNC: 2 MG/DL — LOW (ref 2.5–4.5)
PLATELET # BLD AUTO: 165 K/UL — SIGNIFICANT CHANGE UP (ref 150–400)
POTASSIUM SERPL-MCNC: 3.8 MMOL/L — SIGNIFICANT CHANGE UP (ref 3.5–5.3)
POTASSIUM SERPL-SCNC: 3.8 MMOL/L — SIGNIFICANT CHANGE UP (ref 3.5–5.3)
RBC # BLD: 4.78 M/UL — SIGNIFICANT CHANGE UP (ref 3.8–5.2)
RBC # FLD: 13.2 % — SIGNIFICANT CHANGE UP (ref 10.3–14.5)
SODIUM SERPL-SCNC: 138 MMOL/L — SIGNIFICANT CHANGE UP (ref 135–145)
WBC # BLD: 7.68 K/UL — SIGNIFICANT CHANGE UP (ref 3.8–10.5)
WBC # FLD AUTO: 7.68 K/UL — SIGNIFICANT CHANGE UP (ref 3.8–10.5)

## 2024-11-12 PROCEDURE — 99233 SBSQ HOSP IP/OBS HIGH 50: CPT

## 2024-11-12 RX ORDER — SODIUM,POTASSIUM PHOSPHATES 278-250MG
1 POWDER IN PACKET (EA) ORAL ONCE
Refills: 0 | Status: COMPLETED | OUTPATIENT
Start: 2024-11-12 | End: 2024-11-12

## 2024-11-12 RX ADMIN — APIXABAN 5 MILLIGRAM(S): 2.5 TABLET, FILM COATED ORAL at 06:19

## 2024-11-12 RX ADMIN — Medication 2 TABLET(S): at 22:01

## 2024-11-12 RX ADMIN — Medication 1: at 12:23

## 2024-11-12 RX ADMIN — METOPROLOL TARTRATE 50 MILLIGRAM(S): 100 TABLET, FILM COATED ORAL at 17:17

## 2024-11-12 RX ADMIN — Medication 100 MILLIGRAM(S): at 12:22

## 2024-11-12 RX ADMIN — Medication 1 PACKET(S): at 08:30

## 2024-11-12 RX ADMIN — Medication 1: at 17:17

## 2024-11-12 RX ADMIN — Medication 325 MILLIGRAM(S): at 06:20

## 2024-11-12 RX ADMIN — DILTIAZEM HYDROCHLORIDE 180 MILLIGRAM(S): 240 CAPSULE, COATED, EXTENDED RELEASE ORAL at 06:19

## 2024-11-12 RX ADMIN — Medication 81 MILLIGRAM(S): at 12:23

## 2024-11-12 RX ADMIN — Medication 325 MILLIGRAM(S): at 17:17

## 2024-11-12 RX ADMIN — Medication 40 MILLIGRAM(S): at 22:00

## 2024-11-12 RX ADMIN — APIXABAN 5 MILLIGRAM(S): 2.5 TABLET, FILM COATED ORAL at 17:17

## 2024-11-12 RX ADMIN — Medication 1: at 08:30

## 2024-11-12 RX ADMIN — METOPROLOL TARTRATE 50 MILLIGRAM(S): 100 TABLET, FILM COATED ORAL at 06:20

## 2024-11-12 NOTE — PROGRESS NOTE ADULT - PROBLEM SELECTOR PLAN 4
UA: +  cultures: Ecoli prelim, f/u sensitivities   - on CTX 1g q24h (10/10 - ), can have a 7 day course duration UA: +  cultures: Ecoli    - on CTX 1g q24h (10/10 - ), can have a 5 day course duration

## 2024-11-12 NOTE — PROGRESS NOTE ADULT - ATTENDING COMMENTS
Patient seen at bedside, closing eyes, follows commands but minimally verbal.  On exam, patient is AOx1, NAD, closing eyes, cardiopulmonary exams unremarkable, abdomen soft, NT/ND, extremities without edema.  Labs reviewed, CBC with hgb 15.6, BMP unremarkable.  Telemetry reviewed, a-fib rhythm.    Assessment and plan:  81 yo F with PMHx of HTN, HLD, T2DM, Afib (on Eliquis, Dilt, Metop) who p/w slurred speech and an unwitnessed fall at home. Reports feeling overall tired and feeling like she cannot get her words out, also have slow responses. Found to have R MCA infarct on MRI brain.    # Acute R MCA infarction  # Chronic a-fib on Eliquis  # HTN, HLD   # T2DM, A1c 6.5% 11/2024  # UTI with UCx positive for E.coli    - Neuro recs appreciated, obtain EEG, continue aspirin with Eliquis  - continue CTX for UTI, UCx positive for pansensitive E.coli  - TTE without thrombus  - c/w lopressor 50 BID  - c/w diltiazem, Eliquis, high intensity statin  - PT recs appreciated, MEG recommended

## 2024-11-12 NOTE — PROGRESS NOTE ADULT - ASSESSMENT
78y F with PMH of HTN. chronic Afib, OA, HLD, DM, Anxiety, Hypothyroidism, and peripheral venous insufficiency presenting with slurred speech and apraxia. CT stroke protocol revealed loss of gray-whitedifferentiation in the right caudate   suggesting acute infarct. Admitted to telemetry for acute CVA. CT and MRI shows right MCV infarct. UCx: + ecoli, on CTX. f/u EEG

## 2024-11-12 NOTE — PROGRESS NOTE ADULT - SUBJECTIVE AND OBJECTIVE BOX
PGY-1 Progress Note discussed with attending    INTERVAL HPI/OVERNIGHT EVENTS:   MEDICATIONS  (STANDING):  apixaban 5 milliGRAM(s) Oral every 12 hours  aspirin enteric coated 81 milliGRAM(s) Oral daily  atorvastatin 40 milliGRAM(s) Oral at bedtime  cefTRIAXone   IVPB      cefTRIAXone   IVPB 1000 milliGRAM(s) IV Intermittent every 24 hours  diltiazem    milliGRAM(s) Oral daily  ferrous    sulfate 325 milliGRAM(s) Oral two times a day  influenza  Vaccine (HIGH DOSE) 0.5 milliLiter(s) IntraMuscular once  insulin lispro (ADMELOG) corrective regimen sliding scale   SubCutaneous three times a day before meals  insulin lispro (ADMELOG) corrective regimen sliding scale   SubCutaneous at bedtime  metoprolol tartrate 50 milliGRAM(s) Oral two times a day  senna 2 Tablet(s) Oral at bedtime    MEDICATIONS  (PRN):  pantoprazole    Tablet 40 milliGRAM(s) Oral before breakfast PRN for heartburn      REVIEW OF SYSTEMS:  CONSTITUTIONAL: No fever, weight loss, or fatigue  RESPIRATORY: No cough, wheezing, chills or hemoptysis; No shortness of breath  CARDIOVASCULAR: No chest pain, palpitations, dizziness, or leg swelling  GASTROINTESTINAL: No abdominal pain. No nausea, vomiting, or hematemesis; No diarrhea or constipation. No melena or hematochezia.  GENITOURINARY: No dysuria or hematuria, urinary frequency  NEUROLOGICAL: No headaches, memory loss, loss of strength, numbness, or tremors  SKIN: No itching, burning, rashes, or lesions     Vital Signs Last 24 Hrs  T(C): 37 (12 Nov 2024 07:42), Max: 37 (11 Nov 2024 11:07)  T(F): 98.6 (12 Nov 2024 07:42), Max: 98.6 (11 Nov 2024 11:07)  HR: 88 (12 Nov 2024 07:42) (85 - 96)  BP: 155/94 (12 Nov 2024 07:42) (132/85 - 165/99)  BP(mean): --  RR: 18 (12 Nov 2024 07:42) (18 - 18)  SpO2: 98% (12 Nov 2024 07:42) (96% - 98%)    Parameters below as of 12 Nov 2024 07:42  Patient On (Oxygen Delivery Method): room air        PHYSICAL EXAMINATION:  GENERAL: NAD, well built  HEAD:  Atraumatic, Normocephalic  EYES:  conjunctiva and sclera clear  NECK: Supple, No JVD, Normal thyroid  CHEST/LUNG: Clear to auscultation. Clear to percussion bilaterally; No rales, rhonchi, wheezing, or rubs  HEART: Regular rate and rhythm; No murmurs, rubs, or gallops  ABDOMEN: Soft, Nontender, Nondistended; Bowel sounds present  NERVOUS SYSTEM:  Alert & Oriented X3,    EXTREMITIES:  2+ Peripheral Pulses, No clubbing, cyanosis, or edema  SKIN: warm dry                          15.6   7.68  )-----------( 165      ( 12 Nov 2024 05:02 )             45.1     11-12    138  |  108  |  21[H]  ----------------------------<  190[H]  3.8   |  24  |  0.78    Ca    9.7      12 Nov 2024 05:02  Phos  2.0     11-12  Mg     1.9     11-12                CAPILLARY BLOOD GLUCOSE      RADIOLOGY & ADDITIONAL TESTS:                   PGY-1 Progress Note discussed with attending    INTERVAL HPI/OVERNIGHT EVENTS: No overnight events. Tonia [id number] Citizen of [place] used. Patient is AOx3, more awake than yesterday. Later on Patient's son came.    MEDICATIONS  (STANDING):  apixaban 5 milliGRAM(s) Oral every 12 hours  aspirin enteric coated 81 milliGRAM(s) Oral daily  atorvastatin 40 milliGRAM(s) Oral at bedtime  cefTRIAXone   IVPB      cefTRIAXone   IVPB 1000 milliGRAM(s) IV Intermittent every 24 hours  diltiazem    milliGRAM(s) Oral daily  ferrous    sulfate 325 milliGRAM(s) Oral two times a day  influenza  Vaccine (HIGH DOSE) 0.5 milliLiter(s) IntraMuscular once  insulin lispro (ADMELOG) corrective regimen sliding scale   SubCutaneous three times a day before meals  insulin lispro (ADMELOG) corrective regimen sliding scale   SubCutaneous at bedtime  metoprolol tartrate 50 milliGRAM(s) Oral two times a day  senna 2 Tablet(s) Oral at bedtime    MEDICATIONS  (PRN):  pantoprazole    Tablet 40 milliGRAM(s) Oral before breakfast PRN for heartburn      REVIEW OF SYSTEMS:  CONSTITUTIONAL: No fever  RESPIRATORY: No cough  CARDIOVASCULAR: No chest pain  GASTROINTESTINAL: No abdominal pain.   GENITOURINARY: No dysuria   NEUROLOGICAL: No headaches  SKIN: No itching    Vital Signs Last 24 Hrs  T(C): 37 (12 Nov 2024 07:42), Max: 37 (11 Nov 2024 11:07)  T(F): 98.6 (12 Nov 2024 07:42), Max: 98.6 (11 Nov 2024 11:07)  HR: 88 (12 Nov 2024 07:42) (85 - 96)  BP: 155/94 (12 Nov 2024 07:42) (132/85 - 165/99)  BP(mean): --  RR: 18 (12 Nov 2024 07:42) (18 - 18)  SpO2: 98% (12 Nov 2024 07:42) (96% - 98%)    Parameters below as of 12 Nov 2024 07:42  Patient On (Oxygen Delivery Method): room air        PHYSICAL EXAMINATION:  GENERAL: NAD, well built  HEAD:  Atraumatic, Normocephalic  EYES:  conjunctiva and sclera clear  NECK: Supple, No JVD  CHEST/LUNG: Clear to auscultation. No rales, rhonchi, wheezing, or rubs  HEART: Regular rate and rhythm; No murmurs, rubs, or gallops  ABDOMEN: Soft, Nontender, Nondistended; Bowel sounds present  NERVOUS SYSTEM:  Alert & Oriented X3, able to raise arm b/l off the bed, hand  equal b/l, pt not cooperative with b/l legs exam  EXTREMITIES:  2+ Peripheral Pulses, 1+ pitting edema  SKIN: warm dry                          15.6   7.68  )-----------( 165      ( 12 Nov 2024 05:02 )             45.1     11-12    138  |  108  |  21[H]  ----------------------------<  190[H]  3.8   |  24  |  0.78    Ca    9.7      12 Nov 2024 05:02  Phos  2.0     11-12  Mg     1.9     11-12                CAPILLARY BLOOD GLUCOSE      RADIOLOGY & ADDITIONAL TESTS:                   PGY-1 Progress Note discussed with attending    INTERVAL HPI/OVERNIGHT EVENTS: No overnight events. Tonia [id number] [place] used. Patient is AOx3, more awake than yesterday. Later on Patient's son came and helped translate for physical exam.    MEDICATIONS  (STANDING):  apixaban 5 milliGRAM(s) Oral every 12 hours  aspirin enteric coated 81 milliGRAM(s) Oral daily  atorvastatin 40 milliGRAM(s) Oral at bedtime  cefTRIAXone   IVPB      cefTRIAXone   IVPB 1000 milliGRAM(s) IV Intermittent every 24 hours  diltiazem    milliGRAM(s) Oral daily  ferrous    sulfate 325 milliGRAM(s) Oral two times a day  influenza  Vaccine (HIGH DOSE) 0.5 milliLiter(s) IntraMuscular once  insulin lispro (ADMELOG) corrective regimen sliding scale   SubCutaneous three times a day before meals  insulin lispro (ADMELOG) corrective regimen sliding scale   SubCutaneous at bedtime  metoprolol tartrate 50 milliGRAM(s) Oral two times a day  senna 2 Tablet(s) Oral at bedtime    MEDICATIONS  (PRN):  pantoprazole    Tablet 40 milliGRAM(s) Oral before breakfast PRN for heartburn      REVIEW OF SYSTEMS:  CONSTITUTIONAL: No fever  RESPIRATORY: No cough  CARDIOVASCULAR: No chest pain  GASTROINTESTINAL: No abdominal pain.   GENITOURINARY: No dysuria   NEUROLOGICAL: No headaches  SKIN: No itching    Vital Signs Last 24 Hrs  T(C): 37 (12 Nov 2024 07:42), Max: 37 (11 Nov 2024 11:07)  T(F): 98.6 (12 Nov 2024 07:42), Max: 98.6 (11 Nov 2024 11:07)  HR: 88 (12 Nov 2024 07:42) (85 - 96)  BP: 155/94 (12 Nov 2024 07:42) (132/85 - 165/99)  BP(mean): --  RR: 18 (12 Nov 2024 07:42) (18 - 18)  SpO2: 98% (12 Nov 2024 07:42) (96% - 98%)    Parameters below as of 12 Nov 2024 07:42  Patient On (Oxygen Delivery Method): room air        PHYSICAL EXAMINATION:  GENERAL: NAD, well built  HEAD:  Atraumatic, Normocephalic  EYES:  conjunctiva and sclera clear  NECK: Supple, No JVD  CHEST/LUNG: Clear to auscultation. No rales, rhonchi, wheezing, or rubs  HEART: Regular rate and rhythm; No murmurs, rubs, or gallops  ABDOMEN: Soft, Nontender, Nondistended; Bowel sounds present  NERVOUS SYSTEM:  Alert & Oriented X3, able to raise arm b/l off the bed, hand  equal b/l  EXTREMITIES:  2+ Peripheral Pulses, 1+ pitting edema  SKIN: warm dry                          15.6   7.68  )-----------( 165      ( 12 Nov 2024 05:02 )             45.1     11-12    138  |  108  |  21[H]  ----------------------------<  190[H]  3.8   |  24  |  0.78    Ca    9.7      12 Nov 2024 05:02  Phos  2.0     11-12  Mg     1.9     11-12                CAPILLARY BLOOD GLUCOSE      RADIOLOGY & ADDITIONAL TESTS:                   PGY-1 Progress Note discussed with attending    INTERVAL HPI/OVERNIGHT EVENTS: No overnight events. Tonia [id number] [place] used. Patient is AOx3, more awake than yesterday. Later on Patient's son came and helped translate for physical exam.    MEDICATIONS  (STANDING):  apixaban 5 milliGRAM(s) Oral every 12 hours  aspirin enteric coated 81 milliGRAM(s) Oral daily  atorvastatin 40 milliGRAM(s) Oral at bedtime  cefTRIAXone   IVPB      cefTRIAXone   IVPB 1000 milliGRAM(s) IV Intermittent every 24 hours  diltiazem    milliGRAM(s) Oral daily  ferrous    sulfate 325 milliGRAM(s) Oral two times a day  influenza  Vaccine (HIGH DOSE) 0.5 milliLiter(s) IntraMuscular once  insulin lispro (ADMELOG) corrective regimen sliding scale   SubCutaneous three times a day before meals  insulin lispro (ADMELOG) corrective regimen sliding scale   SubCutaneous at bedtime  metoprolol tartrate 50 milliGRAM(s) Oral two times a day  senna 2 Tablet(s) Oral at bedtime    MEDICATIONS  (PRN):  pantoprazole    Tablet 40 milliGRAM(s) Oral before breakfast PRN for heartburn      REVIEW OF SYSTEMS:  CONSTITUTIONAL: No fever  RESPIRATORY: No cough  CARDIOVASCULAR: No chest pain  GASTROINTESTINAL: No abdominal pain.   GENITOURINARY: No dysuria   NEUROLOGICAL: No headaches  SKIN: No itching    Vital Signs Last 24 Hrs  T(C): 37 (12 Nov 2024 07:42), Max: 37 (11 Nov 2024 11:07)  T(F): 98.6 (12 Nov 2024 07:42), Max: 98.6 (11 Nov 2024 11:07)  HR: 88 (12 Nov 2024 07:42) (85 - 96)  BP: 155/94 (12 Nov 2024 07:42) (132/85 - 165/99)  BP(mean): --  RR: 18 (12 Nov 2024 07:42) (18 - 18)  SpO2: 98% (12 Nov 2024 07:42) (96% - 98%)    Parameters below as of 12 Nov 2024 07:42  Patient On (Oxygen Delivery Method): room air        PHYSICAL EXAMINATION:  GENERAL: NAD, well built  HEAD:  Atraumatic, Normocephalic  EYES:  conjunctiva and sclera clear  NECK: Supple, No JVD  CHEST/LUNG: Clear to auscultation. No rales, rhonchi, wheezing, or rubs  HEART: Regular rate and rhythm; No murmurs, rubs, or gallops  ABDOMEN: Soft, Nontender, Nondistended; Bowel sounds present  NERVOUS SYSTEM:  Alert & Oriented X3, able to raise arm b/l off the bed, hand  equal b/l  EXTREMITIES:  2+ Peripheral Pulses, 1+pitting edema, varicose veins and venous stasis w/ dry scaly skin on b/l LE  SKIN: warm dry                          15.6   7.68  )-----------( 165      ( 12 Nov 2024 05:02 )             45.1     11-12    138  |  108  |  21[H]  ----------------------------<  190[H]  3.8   |  24  |  0.78    Ca    9.7      12 Nov 2024 05:02  Phos  2.0     11-12  Mg     1.9     11-12                CAPILLARY BLOOD GLUCOSE      RADIOLOGY & ADDITIONAL TESTS:

## 2024-11-13 LAB
-  AMPICILLIN: SIGNIFICANT CHANGE UP
-  CIPROFLOXACIN: SIGNIFICANT CHANGE UP
-  LEVOFLOXACIN: SIGNIFICANT CHANGE UP
-  NITROFURANTOIN: SIGNIFICANT CHANGE UP
-  TETRACYCLINE: SIGNIFICANT CHANGE UP
-  VANCOMYCIN: SIGNIFICANT CHANGE UP
ANION GAP SERPL CALC-SCNC: 9 MMOL/L — SIGNIFICANT CHANGE UP (ref 5–17)
BASOPHILS # BLD AUTO: 0.04 K/UL — SIGNIFICANT CHANGE UP (ref 0–0.2)
BASOPHILS NFR BLD AUTO: 0.5 % — SIGNIFICANT CHANGE UP (ref 0–2)
BUN SERPL-MCNC: 18 MG/DL — SIGNIFICANT CHANGE UP (ref 7–18)
CALCIUM SERPL-MCNC: 9.7 MG/DL — SIGNIFICANT CHANGE UP (ref 8.4–10.5)
CHLORIDE SERPL-SCNC: 104 MMOL/L — SIGNIFICANT CHANGE UP (ref 96–108)
CO2 SERPL-SCNC: 24 MMOL/L — SIGNIFICANT CHANGE UP (ref 22–31)
CREAT SERPL-MCNC: 0.63 MG/DL — SIGNIFICANT CHANGE UP (ref 0.5–1.3)
CULTURE RESULTS: ABNORMAL
EGFR: 91 ML/MIN/1.73M2 — SIGNIFICANT CHANGE UP
EOSINOPHIL # BLD AUTO: 0.07 K/UL — SIGNIFICANT CHANGE UP (ref 0–0.5)
EOSINOPHIL NFR BLD AUTO: 0.9 % — SIGNIFICANT CHANGE UP (ref 0–6)
GLUCOSE BLDC GLUCOMTR-MCNC: 173 MG/DL — HIGH (ref 70–99)
GLUCOSE BLDC GLUCOMTR-MCNC: 179 MG/DL — HIGH (ref 70–99)
GLUCOSE BLDC GLUCOMTR-MCNC: 223 MG/DL — HIGH (ref 70–99)
GLUCOSE BLDC GLUCOMTR-MCNC: 231 MG/DL — HIGH (ref 70–99)
GLUCOSE SERPL-MCNC: 196 MG/DL — HIGH (ref 70–99)
HCT VFR BLD CALC: 46.5 % — HIGH (ref 34.5–45)
HGB BLD-MCNC: 16.2 G/DL — HIGH (ref 11.5–15.5)
IMM GRANULOCYTES NFR BLD AUTO: 0.4 % — SIGNIFICANT CHANGE UP (ref 0–0.9)
LYMPHOCYTES # BLD AUTO: 1.61 K/UL — SIGNIFICANT CHANGE UP (ref 1–3.3)
LYMPHOCYTES # BLD AUTO: 19.7 % — SIGNIFICANT CHANGE UP (ref 13–44)
MAGNESIUM SERPL-MCNC: 1.9 MG/DL — SIGNIFICANT CHANGE UP (ref 1.6–2.6)
MCHC RBC-ENTMCNC: 32.6 PG — SIGNIFICANT CHANGE UP (ref 27–34)
MCHC RBC-ENTMCNC: 34.8 G/DL — SIGNIFICANT CHANGE UP (ref 32–36)
MCV RBC AUTO: 93.6 FL — SIGNIFICANT CHANGE UP (ref 80–100)
METHOD TYPE: SIGNIFICANT CHANGE UP
MONOCYTES # BLD AUTO: 1.23 K/UL — HIGH (ref 0–0.9)
MONOCYTES NFR BLD AUTO: 15 % — HIGH (ref 2–14)
NEUTROPHILS # BLD AUTO: 5.2 K/UL — SIGNIFICANT CHANGE UP (ref 1.8–7.4)
NEUTROPHILS NFR BLD AUTO: 63.5 % — SIGNIFICANT CHANGE UP (ref 43–77)
NRBC # BLD: 0 /100 WBCS — SIGNIFICANT CHANGE UP (ref 0–0)
ORGANISM # SPEC MICROSCOPIC CNT: ABNORMAL
PHOSPHATE SERPL-MCNC: 2.2 MG/DL — LOW (ref 2.5–4.5)
PLATELET # BLD AUTO: 150 K/UL — SIGNIFICANT CHANGE UP (ref 150–400)
POTASSIUM SERPL-MCNC: 3.6 MMOL/L — SIGNIFICANT CHANGE UP (ref 3.5–5.3)
POTASSIUM SERPL-SCNC: 3.6 MMOL/L — SIGNIFICANT CHANGE UP (ref 3.5–5.3)
RBC # BLD: 4.97 M/UL — SIGNIFICANT CHANGE UP (ref 3.8–5.2)
RBC # FLD: 13.1 % — SIGNIFICANT CHANGE UP (ref 10.3–14.5)
SODIUM SERPL-SCNC: 137 MMOL/L — SIGNIFICANT CHANGE UP (ref 135–145)
SPECIMEN SOURCE: SIGNIFICANT CHANGE UP
WBC # BLD: 8.18 K/UL — SIGNIFICANT CHANGE UP (ref 3.8–10.5)
WBC # FLD AUTO: 8.18 K/UL — SIGNIFICANT CHANGE UP (ref 3.8–10.5)

## 2024-11-13 PROCEDURE — 95816 EEG AWAKE AND DROWSY: CPT | Mod: 26

## 2024-11-13 PROCEDURE — 99232 SBSQ HOSP IP/OBS MODERATE 35: CPT

## 2024-11-13 RX ORDER — GLIMEPIRIDE 1 MG/1
0 TABLET ORAL
Qty: 0 | Refills: 0 | DISCHARGE

## 2024-11-13 RX ORDER — DILTIAZEM HYDROCHLORIDE 240 MG/1
0 CAPSULE, COATED, EXTENDED RELEASE ORAL
Qty: 0 | Refills: 0 | DISCHARGE

## 2024-11-13 RX ORDER — RAMIPRIL 10 MG/1
0 CAPSULE ORAL
Qty: 0 | Refills: 0 | DISCHARGE

## 2024-11-13 RX ORDER — 0.9 % SODIUM CHLORIDE 0.9 %
1000 INTRAVENOUS SOLUTION INTRAVENOUS
Refills: 0 | Status: DISCONTINUED | OUTPATIENT
Start: 2024-11-13 | End: 2024-11-15

## 2024-11-13 RX ORDER — SITAGLIPTIN AND METFORMIN HYDROCHLORIDE 1000; 50 MG/1; MG/1
0 TABLET, FILM COATED ORAL
Qty: 0 | Refills: 0 | DISCHARGE

## 2024-11-13 RX ORDER — METOPROLOL TARTRATE 100 MG/1
0 TABLET, FILM COATED ORAL
Qty: 0 | Refills: 0 | DISCHARGE

## 2024-11-13 RX ORDER — CEFTRIAXONE SODIUM 1 G
1000 VIAL (EA) INJECTION ONCE
Refills: 0 | Status: DISCONTINUED | OUTPATIENT
Start: 2024-11-14 | End: 2024-11-14

## 2024-11-13 RX ORDER — EMPAGLIFLOZIN 25 MG/1
0 TABLET, FILM COATED ORAL
Qty: 0 | Refills: 0 | DISCHARGE

## 2024-11-13 RX ORDER — SODIUM,POTASSIUM PHOSPHATES 278-250MG
1 POWDER IN PACKET (EA) ORAL ONCE
Refills: 0 | Status: COMPLETED | OUTPATIENT
Start: 2024-11-13 | End: 2024-11-13

## 2024-11-13 RX ORDER — APIXABAN 2.5 MG/1
0 TABLET, FILM COATED ORAL
Qty: 0 | Refills: 0 | DISCHARGE

## 2024-11-13 RX ORDER — SITAGLIPTIN 50 MG/1
0 TABLET ORAL
Qty: 0 | Refills: 0 | DISCHARGE

## 2024-11-13 RX ADMIN — Medication 81 MILLIGRAM(S): at 11:54

## 2024-11-13 RX ADMIN — Medication 100 MILLIGRAM(S): at 11:54

## 2024-11-13 RX ADMIN — PANTOPRAZOLE SODIUM 40 MILLIGRAM(S): 40 TABLET, DELAYED RELEASE ORAL at 05:58

## 2024-11-13 RX ADMIN — Medication 1 PACKET(S): at 07:56

## 2024-11-13 RX ADMIN — APIXABAN 5 MILLIGRAM(S): 2.5 TABLET, FILM COATED ORAL at 17:07

## 2024-11-13 RX ADMIN — Medication 40 MILLIGRAM(S): at 21:56

## 2024-11-13 RX ADMIN — Medication 325 MILLIGRAM(S): at 05:58

## 2024-11-13 RX ADMIN — Medication 2: at 17:04

## 2024-11-13 RX ADMIN — Medication 1: at 07:54

## 2024-11-13 RX ADMIN — APIXABAN 5 MILLIGRAM(S): 2.5 TABLET, FILM COATED ORAL at 05:58

## 2024-11-13 RX ADMIN — Medication 60 MILLILITER(S): at 12:12

## 2024-11-13 RX ADMIN — Medication 2: at 11:54

## 2024-11-13 RX ADMIN — DILTIAZEM HYDROCHLORIDE 180 MILLIGRAM(S): 240 CAPSULE, COATED, EXTENDED RELEASE ORAL at 05:58

## 2024-11-13 RX ADMIN — METOPROLOL TARTRATE 50 MILLIGRAM(S): 100 TABLET, FILM COATED ORAL at 17:07

## 2024-11-13 RX ADMIN — METOPROLOL TARTRATE 50 MILLIGRAM(S): 100 TABLET, FILM COATED ORAL at 06:01

## 2024-11-13 NOTE — PROGRESS NOTE ADULT - SUBJECTIVE AND OBJECTIVE BOX
PGY-1 Progress Note discussed with attending    INTERVAL HPI/OVERNIGHT EVENTS: No overnight events. Patient's son and DIL helped translated in [place] citizen. Patient is AOx3. Lethargic while examining pt    MEDICATIONS  (STANDING):  apixaban 5 milliGRAM(s) Oral every 12 hours  aspirin enteric coated 81 milliGRAM(s) Oral daily  atorvastatin 40 milliGRAM(s) Oral at bedtime  diltiazem    milliGRAM(s) Oral daily  influenza  Vaccine (HIGH DOSE) 0.5 milliLiter(s) IntraMuscular once  insulin lispro (ADMELOG) corrective regimen sliding scale   SubCutaneous at bedtime  insulin lispro (ADMELOG) corrective regimen sliding scale   SubCutaneous three times a day before meals  lactated ringers. 1000 milliLiter(s) (60 mL/Hr) IV Continuous <Continuous>  metoprolol tartrate 50 milliGRAM(s) Oral two times a day  senna 2 Tablet(s) Oral at bedtime    MEDICATIONS  (PRN):  pantoprazole    Tablet 40 milliGRAM(s) Oral before breakfast PRN for heartburn      REVIEW OF SYSTEMS:  unable to access due to pt lethargy and non uncooperative     Vital Signs Last 24 Hrs  T(C): 37.1 (13 Nov 2024 15:59), Max: 37.1 (13 Nov 2024 07:23)  T(F): 98.8 (13 Nov 2024 15:59), Max: 98.8 (13 Nov 2024 11:10)  HR: 92 (13 Nov 2024 15:59) (60 - 103)  BP: 124/94 (13 Nov 2024 15:59) (123/78 - 140/99)  BP(mean): 97 (12 Nov 2024 19:30) (97 - 97)  RR: 18 (13 Nov 2024 15:59) (18 - 18)  SpO2: 96% (13 Nov 2024 15:59) (95% - 98%)    Parameters below as of 13 Nov 2024 15:59  Patient On (Oxygen Delivery Method): room air        PHYSICAL EXAMINATION:  GENERAL: NAD  HEAD:  Atraumatic, Normocephalic  EYES:  conjunctiva and sclera clear  NECK: Supple  CHEST/LUNG: Clear to auscultation.  HEART: Irregular rate and rhythm; No murmurs, rubs, or gallops  ABDOMEN: Soft, Nontender, Nondistended; Bowel sounds present  NERVOUS SYSTEM:  Alert & Oriented X3, able to raise upper extremities and keep in air with no drift, pt is otherwise uncooperative with neuro exam  EXTREMITIES:  2+ Peripheral Pulses, varicose veins and venous stasis w/ dry scaly skin on b/l LE  SKIN: warm dry                          16.2   8.18  )-----------( 150      ( 13 Nov 2024 05:10 )             46.5     11-13    137  |  104  |  18  ----------------------------<  196[H]  3.6   |  24  |  0.63    Ca    9.7      13 Nov 2024 05:10  Phos  2.2     11-13  Mg     1.9     11-13                CAPILLARY BLOOD GLUCOSE      RADIOLOGY & ADDITIONAL TESTS:

## 2024-11-13 NOTE — PROGRESS NOTE ADULT - ATTENDING COMMENTS
Patient seen at bedside, opens eyes to verbal stimuli, states she does not have pain, she is in the hospital.  On exam, patient is AOx1, NAD, closing eyes, cardiopulmonary exams unremarkable, abdomen soft, NT/ND, extremities without edema. Follows simple commands.  Labs reviewed, CBC with hgb 16.2, BMP unremarkable.  Telemetry reviewed, a-fib rhythm.    Assessment and plan:  81 yo F with PMHx of HTN, HLD, T2DM, Afib (on Eliquis, Dilt, Metop) who p/w slurred speech and an unwitnessed fall at home. Reports feeling overall tired and feeling like she cannot get her words out, also have slow responses. Found to have R MCA infarct on MRI brain.    # Acute R MCA infarction  # Chronic a-fib on Eliquis  # HTN, HLD   # T2DM, A1c 6.5% 11/2024  # UTI with UCx positive for E.coli    - Neuro recs appreciated, obtain EEG, continue aspirin with Eliquis  - continue CTX for UTI, UCx positive for pansensitive E.coli, anticipate 5-day course  - TTE without thrombus or shunt  - c/w lopressor 50 BID  - c/w diltiazem, Eliquis, high intensity statin  - PT recs appreciated, MEG recommended. Patient seen at bedside, opens eyes to verbal stimuli, states she does not have pain, she is in the hospital.  On exam, patient is AOx1, NAD, closing eyes, cardiopulmonary exams unremarkable, abdomen soft, NT/ND, extremities without edema. Follows simple commands.  Labs reviewed, CBC with hgb 16.2, BMP unremarkable.  Telemetry reviewed, a-fib rhythm.    Assessment and plan:  83 yo F with PMHx of HTN, HLD, T2DM, Afib (on Eliquis, Dilt, Metop) who p/w slurred speech and an unwitnessed fall at home. Reports feeling overall tired and feeling like she cannot get her words out, also have slow responses. Found to have R MCA infarct on MRI brain.    # Acute R MCA infarction  # Chronic a-fib on Eliquis  # HTN, HLD   # T2DM, A1c 6.5% 11/2024  # UTI with UCx positive for E.coli    - Neuro recs appreciated, obtain EEG, continue aspirin with Eliquis  - continue CTX for UTI, UCx positive for pansensitive E.coli, anticipate 5-day course  - the lethargy is most likely from stroke + delirium (especially potential hypoactive delirium) unless EEG shows etiologies  - TTE without thrombus or shunt  - c/w lopressor 50 BID  - c/w diltiazem, Eliquis, high intensity statin  - PT recs appreciated, MEG recommended.

## 2024-11-13 NOTE — EEG REPORT - NS EEG TEXT BOX
Mayelin Hall [id number]     Study Date: 11-13-24  Duration: 30 min  --------------------------------------------------------------------------------------------------  History:  CC/ HPI Patient is a 78y old  Female who presents with a chief complaint of acute stroke (13 Nov 2024 13:07)    MEDICATIONS  (STANDING):  apixaban 5 milliGRAM(s) Oral every 12 hours  aspirin enteric coated 81 milliGRAM(s) Oral daily  atorvastatin 40 milliGRAM(s) Oral at bedtime  diltiazem    milliGRAM(s) Oral daily  influenza  Vaccine (HIGH DOSE) 0.5 milliLiter(s) IntraMuscular once  insulin lispro (ADMELOG) corrective regimen sliding scale   SubCutaneous three times a day before meals  insulin lispro (ADMELOG) corrective regimen sliding scale   SubCutaneous at bedtime  lactated ringers. 1000 milliLiter(s) (60 mL/Hr) IV Continuous <Continuous>  metoprolol tartrate 50 milliGRAM(s) Oral two times a day  senna 2 Tablet(s) Oral at bedtime    --------------------------------------------------------------------------------------------------  Study Interpretation:    [[[Abbreviation Key:  PDR=alpha rhythm/posterior dominant rhythm. A-P=anterior posterior.  Amplitude: ‘very low’:<20; ‘low’:20-49; ‘medium’:; ‘high’:>150uV.  Persistence for periodic/rhythmic patterns (% of epoch) ‘rare’:<1%; ‘occasional’:1-10%; ‘frequent’:10-50%; ‘abundant’:50-90%; ‘continuous’:>90%.  Persistence for sporadic discharges: ‘rare’:<1/hr; ‘occasional’:1/min-1/hr; ‘frequent’:>1/min; ‘abundant’:>1/10 sec.  RPP=rhythmic and periodic patterns; GRDA=generalized rhythmic delta activity; FIRDA=frontal intermittent GRDA; LRDA=lateralized rhythmic delta activity; TIRDA=temporal intermittent rhythmic delta activity;  LPD=PLED=lateralized periodic discharges; GPD=generalized periodic discharges; BIPDs =bilateral independent periodic discharges; Mf=multifocal; SIRPDs=stimulus induced rhythmic, periodic, or ictal appearing discharges; BIRDs=brief potentially ictal rhythmic discharges >4 Hz, lasting .5-10s; PFA (paroxysmal bursts >13 Hz or =8 Hz <10s).  Modifiers: +F=with fast component; +S=with spike component; +R=with rhythmic component.  S-B=burst suppression pattern.  Max=maximal. N1-drowsy; N2-stage II sleep; N3-slow wave sleep. SSS/BETS=small sharp spikes/benign epileptiform transients of sleep. HV=hyperventilation; PS=photic stimulation]]]    Daily EEG Visual Analysis    FINDINGS:      Background:  Continuity: Continuous  Symmetry: Asymmetric  Posterior dominant rhythm (PDR): 8.5 Hz, less well formed on the right, reactive to eye closure. Low-amplitude frontal beta in wakefulness.  Voltage: Normal  Anterior-Posterior Gradient: Present  Other background findings: Occasional diffuse polymorphic delta and theta slowing  Breach: Absent    Background Slowing:  Generalized slowing: As above  Focal slowing: Continuous right hemispheric focal polymorphic delta and theta slowing    State Changes:   Drowsiness is characterized by slowing of the background activity.  Stage 2 sleep is characterized by symmetric K complexes.    Interictal Findings:  Frequent left frontally predominant (Fp1/F3) sharp waves, often in bursts at 2 Hz, fluctuating.  Occasional right frontally predominant (Fp2/F4) sharp waves.  The above sharp waves at times have triphasic morphology, and occasionally are bifrontally predominant.    Electrographic and Electroclinical seizures:  None    Other Clinical Events:  None    Activation Procedures:   Hyperventilation is not performed.    Photic stimulation is performed and does not elicit any abnormalities.      Artifacts:  Intermittent myogenic and movement artifacts are present.    Single-lead EKG: Irregularly irregular rhythm    EEG Classification / Summary:  Abnormal routine EEG in the awake, drowsy, and asleep states.   -Frequent left and occasional right frontally predominant sharp waves, occasionally bifrontally predominant, at times with triphasic morphology  -Continuous right hemispheric focal slowing  -Mild diffuse slowing  -No seizures.    Clinical Impression:  -Findings are suspected to be generalized sharp waves with triphasic morphology that are asymmetric (better formed on the left) due to structural lesion. Risk of focal-onset seizures from the left > right frontal regions cannot be entirely excluded.  -Generalized sharp waves with triphasic morphology can be seen in toxic-metabolic encephalopathies and indicate some risk of generalized seizures.  -Right hemispheric focal cerebral dysfunction can be structural or functional in etiology.   -Mild diffuse cerebral dysfunction is nonspecific in etiology. Excess drowsiness may be contributing.  -No seizures.  -Single-lead EKG: Irregularly irregular rhythm.          -------------------------------------------------------------------------------------------------------    Adilene Kaur MD  Attending Physician, [facility]    -------------------------------------------------------------------------------------------------------    To reach EEG technologist:  Please use the pager number for the appropriate hospital or contact the .  At [facility] - Pager #: [phone]    To reach EEG-reading physician:  EEG Reading Room Phone #: [phone]  Epilepsy Answering Service after 5PM and before 8:30AM: Phone #: [phone]

## 2024-11-13 NOTE — PROGRESS NOTE ADULT - ASSESSMENT
78y F with PMH of HTN. chronic Afib, OA, HLD, DM, Anxiety, Hypothyroidism, and peripheral venous insufficiency presenting with slurred speech and apraxia. CT stroke protocol revealed loss of gray-whitedifferentiation in the right caudate   suggesting acute infarct. Admitted to telemetry for acute CVA. CT and MRI shows right MCV infarct. UCx: + ecoli, on CTX for 5 days total. f/u EEG

## 2024-11-13 NOTE — PROGRESS NOTE ADULT - PROBLEM SELECTOR PLAN 1
- Presented with an unwitnessed fall   - Hemodynamically stable and afebrile  - due to ?Eliquis failure  - Code Stroke in ED  - CT Head showing Acute evolving right caudate head/basal ganglia/corona radiata infarction. No evidence of hemorrhagic transformation.  - CTA Head and Neck showing Moderate stenosis of the right M1 segment   - MRI: Acute right MCA territory infarctions  - EKG A. Fib  - Admit to tele  - LDL: 92, A1c: 6.5%  - b/l LE US dopplers: neg  - passsed bedside dysphagia  - TTE: EF: 55-60%, neg intracardiac shunt    - pt lethargy can be due to CVA and delirium?  - c/w Asprin 81mg qd and Atorvastatin 80mg qd  - f/u EEG  - Dr. Leach neuro following  - PT Consulted - rec: MEG

## 2024-11-14 LAB
ALBUMIN SERPL ELPH-MCNC: 2.6 G/DL — LOW (ref 3.5–5)
ALP SERPL-CCNC: 92 U/L — SIGNIFICANT CHANGE UP (ref 40–120)
ALT FLD-CCNC: 14 U/L DA — SIGNIFICANT CHANGE UP (ref 10–60)
ANION GAP SERPL CALC-SCNC: 8 MMOL/L — SIGNIFICANT CHANGE UP (ref 5–17)
AST SERPL-CCNC: 15 U/L — SIGNIFICANT CHANGE UP (ref 10–40)
BASOPHILS # BLD AUTO: 0.02 K/UL — SIGNIFICANT CHANGE UP (ref 0–0.2)
BASOPHILS NFR BLD AUTO: 0.3 % — SIGNIFICANT CHANGE UP (ref 0–2)
BILIRUB SERPL-MCNC: 1 MG/DL — SIGNIFICANT CHANGE UP (ref 0.2–1.2)
BUN SERPL-MCNC: 18 MG/DL — SIGNIFICANT CHANGE UP (ref 7–18)
CALCIUM SERPL-MCNC: 10.1 MG/DL — SIGNIFICANT CHANGE UP (ref 8.4–10.5)
CHLORIDE SERPL-SCNC: 107 MMOL/L — SIGNIFICANT CHANGE UP (ref 96–108)
CO2 SERPL-SCNC: 24 MMOL/L — SIGNIFICANT CHANGE UP (ref 22–31)
CREAT SERPL-MCNC: 0.66 MG/DL — SIGNIFICANT CHANGE UP (ref 0.5–1.3)
EGFR: 90 ML/MIN/1.73M2 — SIGNIFICANT CHANGE UP
EOSINOPHIL # BLD AUTO: 0.09 K/UL — SIGNIFICANT CHANGE UP (ref 0–0.5)
EOSINOPHIL NFR BLD AUTO: 1.1 % — SIGNIFICANT CHANGE UP (ref 0–6)
GLUCOSE BLDC GLUCOMTR-MCNC: 191 MG/DL — HIGH (ref 70–99)
GLUCOSE BLDC GLUCOMTR-MCNC: 198 MG/DL — HIGH (ref 70–99)
GLUCOSE BLDC GLUCOMTR-MCNC: 257 MG/DL — HIGH (ref 70–99)
GLUCOSE BLDC GLUCOMTR-MCNC: 279 MG/DL — HIGH (ref 70–99)
GLUCOSE SERPL-MCNC: 214 MG/DL — HIGH (ref 70–99)
HCT VFR BLD CALC: 46.7 % — HIGH (ref 34.5–45)
HGB BLD-MCNC: 16.2 G/DL — HIGH (ref 11.5–15.5)
IMM GRANULOCYTES NFR BLD AUTO: 0.1 % — SIGNIFICANT CHANGE UP (ref 0–0.9)
LYMPHOCYTES # BLD AUTO: 1.62 K/UL — SIGNIFICANT CHANGE UP (ref 1–3.3)
LYMPHOCYTES # BLD AUTO: 20.5 % — SIGNIFICANT CHANGE UP (ref 13–44)
MAGNESIUM SERPL-MCNC: 1.9 MG/DL — SIGNIFICANT CHANGE UP (ref 1.6–2.6)
MCHC RBC-ENTMCNC: 32.1 PG — SIGNIFICANT CHANGE UP (ref 27–34)
MCHC RBC-ENTMCNC: 34.7 G/DL — SIGNIFICANT CHANGE UP (ref 32–36)
MCV RBC AUTO: 92.7 FL — SIGNIFICANT CHANGE UP (ref 80–100)
MONOCYTES # BLD AUTO: 1.05 K/UL — HIGH (ref 0–0.9)
MONOCYTES NFR BLD AUTO: 13.3 % — SIGNIFICANT CHANGE UP (ref 2–14)
NEUTROPHILS # BLD AUTO: 5.12 K/UL — SIGNIFICANT CHANGE UP (ref 1.8–7.4)
NEUTROPHILS NFR BLD AUTO: 64.7 % — SIGNIFICANT CHANGE UP (ref 43–77)
NRBC # BLD: 0 /100 WBCS — SIGNIFICANT CHANGE UP (ref 0–0)
PHOSPHATE SERPL-MCNC: 2 MG/DL — LOW (ref 2.5–4.5)
PLATELET # BLD AUTO: 156 K/UL — SIGNIFICANT CHANGE UP (ref 150–400)
POTASSIUM SERPL-MCNC: 3.8 MMOL/L — SIGNIFICANT CHANGE UP (ref 3.5–5.3)
POTASSIUM SERPL-SCNC: 3.8 MMOL/L — SIGNIFICANT CHANGE UP (ref 3.5–5.3)
PROT SERPL-MCNC: 6.9 G/DL — SIGNIFICANT CHANGE UP (ref 6–8.3)
RBC # BLD: 5.04 M/UL — SIGNIFICANT CHANGE UP (ref 3.8–5.2)
RBC # FLD: 13.2 % — SIGNIFICANT CHANGE UP (ref 10.3–14.5)
SODIUM SERPL-SCNC: 139 MMOL/L — SIGNIFICANT CHANGE UP (ref 135–145)
WBC # BLD: 7.91 K/UL — SIGNIFICANT CHANGE UP (ref 3.8–10.5)
WBC # FLD AUTO: 7.91 K/UL — SIGNIFICANT CHANGE UP (ref 3.8–10.5)

## 2024-11-14 PROCEDURE — 99233 SBSQ HOSP IP/OBS HIGH 50: CPT

## 2024-11-14 RX ORDER — DIVALPROEX SODIUM 500 MG
500 TABLET, DELAYED RELEASE (ENTERIC COATED) ORAL
Refills: 0 | Status: DISCONTINUED | OUTPATIENT
Start: 2024-11-14 | End: 2024-11-18

## 2024-11-14 RX ORDER — VALPROIC ACID 250 MG/5ML
1000 SOLUTION ORAL ONCE
Refills: 0 | Status: COMPLETED | OUTPATIENT
Start: 2024-11-14 | End: 2024-11-14

## 2024-11-14 RX ORDER — CEFTRIAXONE SODIUM 1 G
1000 VIAL (EA) INJECTION ONCE
Refills: 0 | Status: COMPLETED | OUTPATIENT
Start: 2024-11-14 | End: 2024-11-14

## 2024-11-14 RX ORDER — SODIUM,POTASSIUM PHOSPHATES 278-250MG
1 POWDER IN PACKET (EA) ORAL ONCE
Refills: 0 | Status: COMPLETED | OUTPATIENT
Start: 2024-11-14 | End: 2024-11-14

## 2024-11-14 RX ADMIN — APIXABAN 5 MILLIGRAM(S): 2.5 TABLET, FILM COATED ORAL at 17:02

## 2024-11-14 RX ADMIN — Medication 2 TABLET(S): at 21:14

## 2024-11-14 RX ADMIN — Medication 1 PACKET(S): at 07:36

## 2024-11-14 RX ADMIN — APIXABAN 5 MILLIGRAM(S): 2.5 TABLET, FILM COATED ORAL at 05:55

## 2024-11-14 RX ADMIN — DILTIAZEM HYDROCHLORIDE 180 MILLIGRAM(S): 240 CAPSULE, COATED, EXTENDED RELEASE ORAL at 05:55

## 2024-11-14 RX ADMIN — VALPROIC ACID 110 MILLIGRAM(S): 250 SOLUTION ORAL at 18:12

## 2024-11-14 RX ADMIN — Medication 40 MILLIGRAM(S): at 21:14

## 2024-11-14 RX ADMIN — Medication 3: at 11:44

## 2024-11-14 RX ADMIN — METOPROLOL TARTRATE 50 MILLIGRAM(S): 100 TABLET, FILM COATED ORAL at 17:02

## 2024-11-14 RX ADMIN — METOPROLOL TARTRATE 50 MILLIGRAM(S): 100 TABLET, FILM COATED ORAL at 05:56

## 2024-11-14 RX ADMIN — Medication 1: at 17:02

## 2024-11-14 RX ADMIN — Medication 1: at 07:30

## 2024-11-14 RX ADMIN — Medication 100 MILLIGRAM(S): at 11:44

## 2024-11-14 RX ADMIN — Medication 1: at 21:25

## 2024-11-14 NOTE — PROGRESS NOTE ADULT - SUBJECTIVE AND OBJECTIVE BOX
NEUROLOGY FOLLOW-UP NOTE    NAME:  Mayelin Hall      ASSESSMENT:  78 RHF with atrial fibrillation on therapeutic anticoagulation with new dysarthria and left-sided hemiparesis, secondary to acute right subcortical infarct, also with recent lethargy and hallucinations, concerning for subclinical seizures aggravated by urinary tract infection      RECOMMENDATIONS:    1. Stroke/Seizure workup  - Telemetry monitoring while inpatient  - Given waxing and waning mental status, would recommend transfer to ICU for continuous EEG for at least 24 hours to screen for subclinical seizures and guide antiepileptic medication dosing  - Continue infectious and metabolic workup and treat as appropriate       - Caution with using medications that could lower seizure threshold, such as Fluoroquinolones or Cefepime    2. Secondary stroke prevention / Seizure prophylaxis  - Q4H Neurochecks & Vital signs  - Patient has passed a bedside swallow evaluation  - Continue home Apixaban 5mg PO BID  - Continue Aspirin EC 81mg PO Daily  - Continue Atorvastatin 40mg PO QHS (goal LDL < 70 mg/dL)  - Treat BP if over 140/90 (goal /80) - No role for permissive hypertension at this time, Maintain home antihypertensive medications  - Continue Divalproex DR 500mg PO TID for seizure prophylaxis       - Check trough Valproic acid level on the morning of 24 to guide further dosing  - Diabetes management as per primary team and Endocrinology consult       - PT/OT  - DVT ppx: SCDs, Apixaban          NOTE TO BE COMPLETED - PLEASE REFER TO ABOVE ONLY AND IGNORE INFORMATION BELOW    ******************************    HPI:  82-year-old female with a past medical history of atrial fibrillation on anticoagulation presents to the ED with slurred speech and apraxia after a fall yesterday.  Patient had unwitnessed fall at home yesterday. Denies head strike or loss of consciousness, but reports that she was unable to get herself up off the floor.  Son came to visit and helped patient off the ground and they then spent time with each other throughout with son reporting nothing out of the ordinary with patient neurologically. Patient's son stated that after leaving his mother he received a phone call from here where her speech was slurred and he attributed it to her just being tired. However,  today upon waking she again had slurred speech so son called EMS. Upon arrival to the ED patient was asymptomatic however due to concern for possible stroke given history, code stroke activated on arrival. CT scan showed oss of gray-whitedifferentiation in the right caudate suggesting acute infarct. On my assessment patient complained of increased effort of speech, depressed mood, and anxiety about blood clots. Complained of chronic pain and limited range of motion in right shoulder but denied any increase in weakness. During interview when patient appeared to be emotional she did point out that her left hand had begun to shake. Denied fever, chills, headache, dizziness, chest pain, palpitations, shortness of breath, abdominal pain, nausea, vomiting, diarrhea, constipation, and dysuria.      In the ED:  T(F): , Max: 98.1 (13:52); HR:  (74 - 86); BP:  (145/72 - 158/82); RR:  (16 - 20); SpO2:  (96% - 98%)  WBC: 8.04, Hb.6, PLT: 165  Na: 141, K: 3.1, BUN: 22, sCr: 1.09      s/p potassium chloride    Tablet ER 40 milliEquivalent(s) Oral; potassium chloride  10 mEq/100 mL IVPB 10 milliEquivalent(s) IV Intermittent (2024 16:39)      NEURO HPI:      INTERVAL HISTORY:      MEDICATIONS:  apixaban 5 milliGRAM(s) Oral every 12 hours  aspirin enteric coated 81 milliGRAM(s) Oral daily  atorvastatin 40 milliGRAM(s) Oral at bedtime  diltiazem    milliGRAM(s) Oral daily  divalproex  milliGRAM(s) Oral two times a day  influenza  Vaccine (HIGH DOSE) 0.5 milliLiter(s) IntraMuscular once  insulin lispro (ADMELOG) corrective regimen sliding scale   SubCutaneous three times a day before meals  insulin lispro (ADMELOG) corrective regimen sliding scale   SubCutaneous at bedtime  lactated ringers. 1000 milliLiter(s) IV Continuous <Continuous>  metoprolol tartrate 50 milliGRAM(s) Oral two times a day  pantoprazole    Tablet 40 milliGRAM(s) Oral before breakfast PRN  senna 2 Tablet(s) Oral at bedtime      ALLERGIES:  No Known Allergies      REVIEW OF SYSTEMS:  Fourteen systems reviewed and negative except as in HPI / Interval History.          OBJECTIVE:  Vital Signs Last 24 Hrs  T(C): 36.7 (2024 19:19), Max: 37.7 (2024 00:25)  T(F): 98.1 (2024 19:19), Max: 99.9 (2024 00:25)  HR: 90 (2024 19:19) (81 - 102)  BP: 141/83 (2024 19:19) (129/89 - 162/77)  RR: 19 (2024 19:19) (18 - 19)  SpO2: 97% (:19) (95% - 99%)  Parameters below as of :19  Patient On (Oxygen Delivery Method): room air      General Examination:  General: No acute distress  HEENT: Atraumatic, Normocephalic  Respiratory: CTA B/l.  No crackles, rhonchi, or wheezes.  Cardiovascular: RRR.  Normal S1 & S2.  Normal b/l radial and pedal pulses.    Neurological Examination:  General / Mental Status: AAO x 3.  No aphasia or dysarthria.  Naming and repetition intact.  Cranial Nerves: VFF x 4.  PERRL.  EOMI x 2, No nystagmus or diplopia.  B/l V1-V3 equal and intact to light touch and pinprick.  Symmetric facial movement and palate elevation.  B/l hearing equal to finger rub.  5/5 strength with b/l sternocleidomastoid and trapezius.  Midline tongue protrusion, with no atrophy or fasciculations.  Motor: Normal bulk & tone in all four extremities.  5/5 strength throughout all four extremities.  No downward drift, rigidity, spasticity, or tremors in any of the four extremities.  Sensory: Intact to light touch and pinprick in all four extremities.  Negative Romberg.  Reflex: 2+ and symmetric at b/l biceps, triceps, brachioradialis, patellae, and ankles.  Downgoing toes b/l.  Coordination: No dysmetria with b/l finger-to-nose and heel raise tests.  Symmetric rapid alternating movements b/l.  Gait: Normal, narrow-based gait.  No difficulty with tiptoe, heel, and tandem gaits.          LABORATORY VALUES:                          16.2   7.91  )-----------( 156      ( 2024 05:40 )             46.7       11-14    139  |  107  |  18  ----------------------------<  214[H]  3.8   |  24  |  0.66    Ca    10.1      2024 05:40  Phos  2.0       Mg     1.9         TPro  6.9  /  Alb  2.6[L]  /  TBili  1.0  /  DBili  x   /  AST  15  /  ALT  14  /  AlkPhos  92   Chol 153 LDL -- HDL 48[L] Trig 67    Glucose Trend  24 @ 16:36   -  -- -- 198[H]  24 @ 11:27   -  -- -- 279[H]  24 @ 07:26   -  -- -- 191[H]  24 @ 05:40   -  214[H] -- --  24 @ 21:23   -  -- -- 179[H]  24 @ 16:37   -  -- -- 223[H]  24 @ 11:34   -  -- -- 231[H]  24 @ 07:45   -  -- -- 173[H]  24 @ 05:10   -  196[H] -- --  24 @ 21:15   -  -- -- 146[H]                    NEUROIMAGING:          Please contact the Neurology consult service with any neurological questions.      Stephen Leach MD   of Neurology  [facility] at [facility]         NEUROLOGY FOLLOW-UP NOTE    NAME:  Mayelin Hall      ASSESSMENT:  78 RHF with atrial fibrillation on therapeutic anticoagulation with new dysarthria and left-sided hemiparesis, secondary to acute right subcortical infarct, also with recent lethargy and hallucinations, concerning for subclinical seizures aggravated by urinary tract infection      RECOMMENDATIONS:    1. Stroke/Seizure workup  - Telemetry monitoring while inpatient  - Given waxing and waning mental status, would recommend transfer to ICU for continuous EEG for at least 24 hours to screen for subclinical seizures and guide antiepileptic medication dosing  - Continue infectious and metabolic workup and treat as appropriate       - Caution with using medications that could lower seizure threshold, such as Fluoroquinolones or Cefepime    2. Secondary stroke prevention / Seizure prophylaxis  - Q4H Neurochecks & Vital signs  - Patient has passed a bedside swallow evaluation  - Continue home Apixaban 5mg PO BID  - Continue Aspirin EC 81mg PO Daily  - Continue Atorvastatin 40mg PO QHS (goal LDL < 70 mg/dL)  - Treat BP if over 140/90 (goal /80) - No role for permissive hypertension at this time, Maintain home antihypertensive medications  - Continue Divalproex DR 500mg PO TID for seizure prophylaxis       - Check trough Valproic acid level on the morning of 24 to guide further dosing  - Diabetes management as per primary team and Endocrinology consult       - PT/OT  - DVT ppx: SCDs, Apixaban          ******************************    HPI:  82-year-old female with a past medical history of atrial fibrillation on anticoagulation presents to the ED with slurred speech and apraxia after a fall yesterday.  Patient had unwitnessed fall at home yesterday. Denies head strike or loss of consciousness, but reports that she was unable to get herself up off the floor.  Son came to visit and helped patient off the ground and they then spent time with each other throughout with son reporting nothing out of the ordinary with patient neurologically. Patient's son stated that after leaving his mother he received a phone call from here where her speech was slurred and he attributed it to her just being tired. However, today upon waking she again had slurred speech so son called EMS. Upon arrival to the ED patient was asymptomatic however due to concern for possible stroke given history, code stroke activated on arrival. CT scan showed oss of gray-white differentiation in the right caudate suggesting acute infarct. On my assessment patient complained of increased effort of speech, depressed mood, and anxiety about blood clots. Complained of chronic pain and limited range of motion in right shoulder but denied any increase in weakness. During interview when patient appeared to be emotional she did point out that her left hand had begun to shake. Denied fever, chills, headache, dizziness, chest pain, palpitations, shortness of breath, abdominal pain, nausea, vomiting, diarrhea, constipation, and dysuria.  In the ED:  T(F): , Max: 98.1 (13:52); HR:  (74 - 86); BP:  (145/72 - 158/82); RR:  (16 - 20); SpO2:  (96% - 98%)  WBC: 8.04, Hb.6, PLT: 165  Na: 141, K: 3.1, BUN: 22, sCr: 1.09  s/p potassium chloride    Tablet ER 40 milliEquivalent(s) Oral; potassium chloride  10 mEq/100 mL IVPB 10 milliEquivalent(s) IV Intermittent (2024 16:39)      NEURO HPI:  82 RHF with history of atrial fibrillation, on Apixaban, presents with slurred speech following an unwitnessed fall at home.      INTERVAL HISTORY:  The patient has been experiencing lethargy and intermittent visual hallucinations as per her son at bedside.      MEDICATIONS:  apixaban 5 milliGRAM(s) Oral every 12 hours  aspirin enteric coated 81 milliGRAM(s) Oral daily  atorvastatin 40 milliGRAM(s) Oral at bedtime  diltiazem    milliGRAM(s) Oral daily  divalproex  milliGRAM(s) Oral two times a day  influenza  Vaccine (HIGH DOSE) 0.5 milliLiter(s) IntraMuscular once  insulin lispro (ADMELOG) corrective regimen sliding scale   SubCutaneous three times a day before meals  insulin lispro (ADMELOG) corrective regimen sliding scale   SubCutaneous at bedtime  lactated ringers. 1000 milliLiter(s) IV Continuous <Continuous>  metoprolol tartrate 50 milliGRAM(s) Oral two times a day  pantoprazole    Tablet 40 milliGRAM(s) Oral before breakfast PRN  senna 2 Tablet(s) Oral at bedtime      ALLERGIES:  No Known Allergies      REVIEW OF SYSTEMS:  Fourteen systems reviewed and negative except as in HPI / Interval History.          OBJECTIVE:  Vital Signs Last 24 Hrs  T(C): 36.7 (:19), Max: 37.7 (2024 00:25)  T(F): 98.1 (:19), Max: 99.9 (2024 00:25)  HR: 90 (:19) (81 - 102)  BP: 141/83 (:19) (129/89 - 162/77)  RR: 19 (:19) (18 - 19)  SpO2: 97% (:) (95% - 99%)  Parameters below as of :  Patient On (Oxygen Delivery Method): room air      General Examination:  General: No acute distress  HEENT: Atraumatic, Normocephalic  Respiratory: CTA B/l.  No crackles, rhonchi, or wheezes.  Cardiovascular: RRR.  Normal S1 & S2.  Normal b/l radial and pedal pulses.    Neurological Examination:  General / Mental Status: Lethargic, but awakens to voice.  AAO x 1 (only to name).  Mild dysarthria present.  Mild expressive aphasia present.  Cranial Nerves: B/l blink to threat present.  PERRL.  EOMI x 2, No nystagmus or diplopia.  B/l V1-V3 equal and intact to light touch and pinprick.  Symmetric facial movement and palate elevation.  B/l hearing equal to finger rub.  At least 3/5 strength with b/l sternocleidomastoid and trapezius, limited by poor effort.  Midline tongue protrusion.  Motor: Normal bulk & tone in all four extremities.  At least 3/5 strength throughout all four extremities, all of which drift downward after passive elevation.  Intermittent b/l hand tremors present at rest.  No rigidity or spasticity present in any of the four extremities.  Sensory: Intact to light touch and pinprick in all four extremities.  Reflex: 1+ and symmetric at b/l biceps, triceps, brachioradialis, patellae, and ankles.  Mute toes b/l.  Coordination: No dysmetria with b/l finger-to-nose and heel raise tests.  Gait and Romberg sign testing deferred per patient preference.      NIHSS Score:    LOC - 1  LOC Questions - 1  LOC Commands - 0  Gaze Preference - 0  Visual Fields - 0  Facial Palsy - 0  Motor Arm Left - 1  Motor Arm Right - 1  Motor Leg Left - 1  Motor Leg Right - 1  Limb Ataxia - 0  Sensory - 0  Language - 1  Speech - 1  Extinction - 0    NIHSS Score Total: 8 - No IV TNK because patient presented outside the 4.5 hour time window    Modified Bryan Scale: 4          LABORATORY VALUES:                          16.2   7.91  )-----------( 156      ( 2024 05:40 )             46.7           139  |  107  |  18  ----------------------------<  214[H]  3.8   |  24  |  0.66    Ca    10.1      2024 05:40  Phos  2.0       Mg     1.9         TPro  6.9  /  Alb  2.6[L]  /  TBili  1.0  /  DBili  x   /  AST  15  /  ALT  14  /  AlkPhos  92   Chol 153 LDL 92 HDL 48[L] Trig 67    A1C with Estimated Average Glucose (24 @ 06:25)   A1C with Estimated Average Glucose Result: 6.5%  Estimated Average Glucose: 140 mg/dL    Glucose Trend  24 @ 16:36   -  -- -- 198[H]  24 @ 11:27   -  -- -- 279[H]  24 @ 07:26   -  -- -- 191[H]  24 @ 05:40   -  214[H] -- --  24 @ 21:23   -  -- -- 179[H]  24 @ 16:37   -  -- -- 223[H]  24 @ 11:34   -  -- -- 231[H]  24 @ 07:45   -  -- -- 173[H]  24 @ 05:10   -  196[H] -- --  24 @ 21:15   -  -- -- 146[H]          NEUROIMAGING:      CT Head & CTA Head/Neck & CT Perfusion Head (24):  - Acute/Subacute right caudate nucleus infarct  - Right MCA (M1 segment) moderate stenosis  - Small b/l cerebral and cerebellar areas of hypoperfusion, total volume 9 mL      Repeat CT Head (24):  - Evolving right caudate head infarct without hemorrhagic transformation  - Mild chronic microvascular changes      MRI Brain w/wo Gadolinium (11/10/24):  - Acute right MCA territory infarctions affecting right caudate head and body, right corona radiata, right basal ganglia, and high right frontal lobe  - Moderate chronic microvascular changes  - No abnormal enhancement detected      TTE (24):  - LVEF 55-60%  - Dilated right atrium, Mildly dilated left atrium  - Mild aortic regurgitation  - Trace regurgitation  - No cardiac thrombus or intracardiac shunt      Routine EEG (24):  - Frequent left and occasional right frontally predominanet sharp waves  - Continuous right hemispheric focal slowing  - Mild generalized background slowing          Please contact the Neurology consult service with any neurological questions.      Stephen Leach MD   of Neurology  [facility] [employer] [facility] at [facility]

## 2024-11-14 NOTE — PROGRESS NOTE ADULT - ATTENDING COMMENTS
Patient seen at bedside, seen sitting in chair, drinking water on her own.  On exam, patient is AOx1, NAD, closing eyes, cardiopulmonary exams unremarkable, abdomen soft, NT/ND, extremities without edema.   Labs reviewed, CBC and BMP without significant change.    Assessment and plan:  81 yo F with PMHx of HTN, HLD, T2DM, Afib (on Eliquis, Dilt, Metop) who p/w slurred speech and an unwitnessed fall at home. Reports feeling overall tired and feeling like she cannot get her words out, also have slow responses. Found to have R MCA infarct on MRI brain.    # Acute R MCA infarction  # Chronic a-fib on Eliquis  # HTN, HLD   # T2DM, A1c 6.5% 11/2024  # UTI with UCx positive for E.coli    - Neuro recs appreciated, EEG obtained. Now neurology Dr. Leach considers trial of Depakote for potential nonconvulsive seizure contributing to her worsening mental status, awaiting formal note with recs. Discussed the plan with son at length  - continue CTX for UTI, UCx positive for pansensitive E.coli, anticipate 5-day course (-11/14)  - TTE without thrombus or shunt  - c/w lopressor 50 BID  - c/w diltiazem, Eliquis, high intensity statin  - PT recs appreciated, MEG recommended, accepted to [facility]

## 2024-11-14 NOTE — DIETITIAN INITIAL EVALUATION ADULT - OTHER INFO
Pt lives home with family support, HHA help PTA, alert, verbally responsive, A+O x 2 per chart, son at bedside providing all information; Reported good appetite, denied GI distress, chewing or swallowing problem; Unknown food allergy, no specific food choices; 100% intake reported today, % intake at time per Flowsheet; h/o DM, finger stick obbnr=557 to 279, LevK6B=0.5; Pending discharge planning to skilled nursing facility for rehab per team  Pt lives home with family support, HHA help PTA, alert, verbally responsive, confused at times per chart, son at bedside providing all information; Reported good appetite, denied GI distress, chewing or swallowing problem; Unknown food allergy, no specific food choices, contact # given for menu selection as family requested; 100% intake reported today, % intake at time per Flowsheet; h/o DM, finger stick [id number]=399 to 279, PssV5W=7.5; Pending discharge planning to skilled nursing facility for rehab per team

## 2024-11-14 NOTE — PROGRESS NOTE ADULT - ASSESSMENT
78y F with PMH of HTN. chronic Afib, OA, HLD, DM, Anxiety, Hypothyroidism, and peripheral venous insufficiency presenting with slurred speech and apraxia. CT stroke protocol revealed loss of gray-whitedifferentiation in the right caudate   suggesting acute infarct. Admitted to telemetry for acute CVA. CT and MRI shows right MCV infarct. UCx: + ecoli, on CTX for 5 days total. EEG obtained, concern for potential nonconvulsive seizure contributing to her worsening mental status, awaiting formal note from neuro, started on Depakote trial

## 2024-11-14 NOTE — DIETITIAN INITIAL EVALUATION ADULT - NAME AND PHONE
RD business card/Food&Nutrition # given for contact as needed; Pt also to be followed by dietitian at skilled nursing facility when medically ready to be discharged

## 2024-11-14 NOTE — DIETITIAN INITIAL EVALUATION ADULT - PERTINENT LABORATORY DATA
11-14    139  |  107  |  18  ----------------------------<  214[H]  3.8   |  24  |  0.66    Ca    10.1      14 Nov 2024 05:40  Phos  2.0     11-14  Mg     1.9     11-14    TPro  6.9  /  Alb  2.6[L]  /  TBili  1.0  /  DBili  x   /  AST  15  /  ALT  14  /  AlkPhos  92  11-14  POCT Blood Glucose.: 279 mg/dL (11-14-24 @ 11:27)  A1C with Estimated Average Glucose Result: 6.5 % (11-09-24 @ 06:25)  A1C with Estimated Average Glucose Result: 7.0 % (11-27-23 @ 05:15)

## 2024-11-14 NOTE — DIETITIAN INITIAL EVALUATION ADULT - PERTINENT MEDS FT
MEDICATIONS  (STANDING):  apixaban 5 milliGRAM(s) Oral every 12 hours  aspirin enteric coated 81 milliGRAM(s) Oral daily  atorvastatin 40 milliGRAM(s) Oral at bedtime  diltiazem    milliGRAM(s) Oral daily  divalproex  milliGRAM(s) Oral two times a day  influenza  Vaccine (HIGH DOSE) 0.5 milliLiter(s) IntraMuscular once  insulin lispro (ADMELOG) corrective regimen sliding scale   SubCutaneous three times a day before meals  insulin lispro (ADMELOG) corrective regimen sliding scale   SubCutaneous at bedtime  lactated ringers. 1000 milliLiter(s) (60 mL/Hr) IV Continuous <Continuous>  metoprolol tartrate 50 milliGRAM(s) Oral two times a day  senna 2 Tablet(s) Oral at bedtime    MEDICATIONS  (PRN):  pantoprazole    Tablet 40 milliGRAM(s) Oral before breakfast PRN for heartburn

## 2024-11-14 NOTE — DIETITIAN INITIAL EVALUATION ADULT - FEEDING ASSISTANCE
Provide food choices within diet Rx as available/updated; Assist by Diet Technician for food preferences/daily menu selection

## 2024-11-14 NOTE — CONSULT NOTE ADULT - SUBJECTIVE AND OBJECTIVE BOX
PATIENT SEEN AND EXAMINED BY EDDY SILVER M.D. ON :- 24  DATE OF SERVICE:             Interim events noted,Labs ,Radiological studies and Cardiology tests reviewed .  24    Patient is a 78y old  Female who presents with a chief complaint of acute stroke (2024 18:37)      HPI:  82-year-old female with a past medical history of atrial fibrillation on anticoagulation presents to the ED with slurred speech and apraxia after a fall yesterday.  Patient had unwitnessed fall at home yesterday. Denies head strike or loss of consciousness, but reports that she was unable to get herself up off the floor.  Son came to visit and helped patient off the ground and they then spent time with each other throughout with son reporting nothing out of the ordinary with patient neurologically. Patient's son stated that after leaving his mother he received a phone call from here where her speech was slurred and he attributed it to her just being tired. However,  today upon waking she again had slurred speech so son called EMS. Upon arrival to the ED patient was asymptomatic however due to concern for possible stroke given history, code stroke activated on arrival. CT scan showed oss of gray-whitedifferentiation in the right caudate suggesting acute infarct. On my assessment patient complained of increased effort of speech, depressed mood, and anxiety about blood clots. Complained of chronic pain and limited range of motion in right shoulder but denied any increase in weakness. During interview when patient appeared to be emotional she did point out that her left hand had begun to shake. Denied fever, chills, headache, dizziness, chest pain, palpitations, shortness of breath, abdominal pain, nausea, vomiting, diarrhea, constipation, and dysuria.      In the ED:  T(F): , Max: 98.1 (13:52); HR:  (74 - 86); BP:  (145/72 - 158/82); RR:  (16 - 20); SpO2:  (96% - 98%)  WBC: 8.04, Hb.6, PLT: 165  Na: 141, K: 3.1, BUN: 22, sCr: 1.09      s/p potassium chloride    Tablet ER 40 milliEquivalent(s) Oral; potassium chloride  10 mEq/100 mL IVPB 10 milliEquivalent(s) IV Intermittent (2024 16:39)      PAST MEDICAL & SURGICAL HISTORY:  HTN (hypertension)      Atrial fibrillation      OA (osteoarthritis)      HLD (hyperlipidemia)      Diabetes      Obesity      Anxiety      Hypothyroidism      Peripheral venous insufficiency      No significant past surgical history          PREVIOUS DIAGNOSTIC TESTING:      ECHO  FINDINGS:    STRESS  FINDINGS:    CATHETERIZATION  FINDINGS:    MEDICATIONS  (STANDING):  apixaban 5 milliGRAM(s) Oral every 12 hours  aspirin enteric coated 81 milliGRAM(s) Oral daily  atorvastatin 40 milliGRAM(s) Oral at bedtime  diltiazem    milliGRAM(s) Oral daily  divalproex  milliGRAM(s) Oral two times a day  influenza  Vaccine (HIGH DOSE) 0.5 milliLiter(s) IntraMuscular once  insulin lispro (ADMELOG) corrective regimen sliding scale   SubCutaneous three times a day before meals  insulin lispro (ADMELOG) corrective regimen sliding scale   SubCutaneous at bedtime  lactated ringers. 1000 milliLiter(s) (60 mL/Hr) IV Continuous <Continuous>  metoprolol tartrate 50 milliGRAM(s) Oral two times a day  senna 2 Tablet(s) Oral at bedtime    MEDICATIONS  (PRN):  pantoprazole    Tablet 40 milliGRAM(s) Oral before breakfast PRN for heartburn      FAMILY HISTORY:  Family history of thrombosis (Mother)        SOCIAL HISTORY:    CIGARETTES:    ALCOHOL:    REVIEW OF SYSTEMS:  CONSTITUTIONAL: No fever, weight loss, or fatigue  EYES: No eye pain, visual disturbances, or discharge  ENMT:  No difficulty hearing, tinnitus, vertigo; No sinus or throat pain  NECK: No pain or stiffness  RESPIRATORY: No cough, wheezing, chills or hemoptysis; No shortness of breath  CARDIOVASCULAR: No chest pain, palpitations, dizziness, or leg swelling  GASTROINTESTINAL: No abdominal or epigastric pain. No nausea, vomiting, or hematemesis; No diarrhea or constipation. No melena or hematochezia.  GENITOURINARY: No dysuria, frequency, hematuria, or incontinence  NEUROLOGICAL: No headaches, memory loss, loss of strength, numbness, or tremors  SKIN: No itching, burning, rashes, or lesions   LYMPH NODES: No enlarged glands  ENDOCRINE: No heat or cold intolerance; No hair loss  MUSCULOSKELETAL: No joint pain or swelling; No muscle, back, or extremity pain  PSYCHIATRIC: No depression, anxiety, mood swings, or difficulty sleeping  HEME/LYMPH: No easy bruising, or bleeding gums  ALLERY AND IMMUNOLOGIC: No hives or eczema    Vital Signs Last 24 Hrs  T(C): 36.7 (2024 19:19), Max: 37.7 (2024 00:25)  T(F): 98.1 (:19), Max: 99.9 (2024 00:25)  HR: 90 (:19) (81 - 102)  BP: 141/83 (:19) (129/89 - 148/97)  BP(mean): --  RR: 19 (:19) (18 - 19)  SpO2: 97% (:19) (95% - 99%)    Parameters below as of :19  Patient On (Oxygen Delivery Method): room air          PHYSICAL EXAM:  GENERAL: NAD, well-groomed, well-developed  HEAD:  Atraumatic, Normocephalic  EYES: EOMI, PERRLA, conjunctiva and sclera clear  ENMT: No tonsillar erythema, exudates, or enlargement; Moist mucous membranes, Good dentition, No lesions  NECK: Supple, No JVD, Normal thyroid  NERVOUS SYSTEM:  Alert & Oriented X3, Good concentration; Motor Strength 5/5 B/L upper and lower extremities; DTRs 2+ intact and symmetric  CHEST/LUNG: Clear to percussion bilaterally; No rales, rhonchi, wheezing, or rubs  HEART: Regular rate and rhythm; No murmurs, rubs, or gallops  ABDOMEN: Soft, Nontender, Nondistended; Bowel sounds present  EXTREMITIES:  2+ Peripheral Pulses, No clubbing, cyanosis, or edema  LYMPH: No lymphadenopathy noted  SKIN: No rashes or lesions      INTERPRETATION OF TELEMETRY:    [facility]    [facility]     LABS:                        16.2   7.91  )-----------( 156      ( 2024 05:40 )             46.7     11-14    139  |  107  |  18  ----------------------------<  214[H]  3.8   |  24  |  0.66    Ca    10.1      2024 05:40  Phos  2.0     11-14  Mg     1.9     11-14    TPro  6.9  /  Alb  2.6[L]  /  TBili  1.0  /  DBili  x   /  AST  15  /  ALT  14  /  AlkPhos  92  11-14          Urinalysis Basic - ( 2024 05:40 )    Color: x / Appearance: x / SG: x / pH: x  Gluc: 214 mg/dL / Ketone: x  / Bili: x / Urobili: x   Blood: x / Protein: x / Nitrite: x   Leuk Esterase: x / RBC: x / WBC x   Sq Epi: x / Non Sq Epi: x / Bacteria: x      Lipid Panel:   I&O's Summary    2024 07:01  -  2024 07:00  --------------------------------------------------------  IN: 450 mL / OUT: 1600 mL / NET: -1150 mL    2024 07:01  -  2024 23:04  --------------------------------------------------------  IN: 430 mL / OUT: 0 mL / NET: 430 mL        RADIOLOGY & ADDITIONAL STUDIES:    < from: TTE W or WO Ultrasound Enhancing Agent (24 @ 07:53) >  CONCLUSIONS:      1. Left ventricular cavity is normal in size. Left ventricular wall thickness is mildly increased. Left ventricular systolic function is normal with an ejection fraction visually estimated at 55 to 60 %.   2. Analysis of left ventricular diastolic function and filling pressure is made challenging by the presence of atrial fibrillation.   3. Normal right ventricular cavity size and normal right ventricular systolic function.   4. Left atrium is mildly dilated.   5. The right atrium is dilated.   6. 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   7. Trileaflet aortic valve. Fibrocalcific aortic valve sclerosis without stenosis.   8. Mild aortic regurgitation.   9. Structurally normal mitral valve with normal leaflet excursion.  10. Trace mitral regurgitation.  11. Agitated saline injection was negative for intracardiac shunt.  12. Echo-free space is noted in the liver consistent with cyst, however, dedicated imaging recommended for further evaluation if clinically indicated.  13. Pulmonary artery systolic pressure could not be estimated.  14. No pericardial effusion seen.  15. No prior echocardiogram is available for comparison.    < end of copied text >

## 2024-11-14 NOTE — DIETITIAN INITIAL EVALUATION ADULT - NSFNSGIIOFT_GEN_A_CORE
Ht=5' ( per EMR data)  Adjusted VDH=890 lb   Wt history: 167.2 lb 12/2/23-->171 lb 11/8/24; 167.1 lb 11/9/24; 166.2 lb 11/14/24

## 2024-11-14 NOTE — CONSULT NOTE ADULT - ASSESSMENT
78y F with PMH of HTN. chronic Afib, OA, HLD, DM, Anxiety, Hypothyroidism, and peripheral venous insufficiency presenting with slurred speech and apraxia. CT stroke protocol revealed loss of gray-whitedifferentiation in the right caudate   suggesting acute infarct. Admitted to telemetry for acute CVA. CT and MRI shows right MCV infarct. UCx: + ecoli, on CTX for 5 days total. EEG obtained, concern for potential nonconvulsive seizure contributing to her worsening mental status, awaiting formal note from neuro, started on Depakote trial             # Acute ischemic right MCA stroke.   Acute evolving right caudate head/basal ganglia/corona radiata infarction. No evidence of hemorrhagic transformation.  - CTA Head and Neck showing Moderate stenosis of the right M1 segment   - MRI: Acute right MCA territory infarctions  - TTE: EF: 55-60%, neg intracardiac shun  - neuro f/u  # Atrial fibrillation.   eliquis and BB  # HLD (hyperlipidemia).   - statin

## 2024-11-14 NOTE — PROGRESS NOTE ADULT - PROBLEM SELECTOR PLAN 1
- Presented with an unwitnessed fall   - Hemodynamically stable and afebrile  - due to ?Eliquis failure  - Code Stroke in ED  - CT Head showing Acute evolving right caudate head/basal ganglia/corona radiata infarction. No evidence of hemorrhagic transformation.  - CTA Head and Neck showing Moderate stenosis of the right M1 segment   - MRI: Acute right MCA territory infarctions  - EKG A. Fib  - Admit to tele  - LDL: 92, A1c: 6.5%  - b/l LE US dopplers: neg  - passsed bedside dysphagia  - TTE: EF: 55-60%, neg intracardiac shunt    - pt lethargy can be due to CVA and delirium?  - c/w Asprin 81mg qd and Atorvastatin 80mg qd  - EEG completed, concern for potential nonconvulsive seizure contributing to her worsening mental status, started a  trial of Depakote, f/u formal note with recs from neuro  - Dr. Leach neuro following  - PT Consulted - rec: MEG - Presented with an unwitnessed fall   - Hemodynamically stable and afebrile  - due to ?Eliquis failure  - Code Stroke in ED  - CT Head showing Acute evolving right caudate head/basal ganglia/corona radiata infarction. No evidence of hemorrhagic transformation.  - CTA Head and Neck showing Moderate stenosis of the right M1 segment   - MRI: Acute right MCA territory infarctions  - EKG A. Fib  - Admit to tele  - LDL: 92, A1c: 6.5%  - b/l LE US dopplers: neg  - passsed bedside dysphagia  - TTE: EF: 55-60%, neg intracardiac shunt    - pt lethargy can be due to CVA and delirium?  - c/w Asprin 81mg qd and Atorvastatin 80mg qd  - EEG completed, concern for potential nonconvulsive seizure contributing to her worsening mental status, started a  trial of Depakote (Valproate 1000mg IV load then PO depakote 500mg bid) -  f/u formal note with recs from neuro  - Dr. Leach neuro following  - PT Consulted - rec: MEG

## 2024-11-14 NOTE — DIETITIAN INITIAL EVALUATION ADULT - PROBLEM SELECTOR PLAN 2
h/o HTN on  -c/w home meds of Cardizem 180mg qd   -reduce home metoprolol ER 200mg to Metoprolol tartrate 50mg BID with holding parameters   -monitor BP  -adjust antihypertensive meds as appropriate

## 2024-11-14 NOTE — CONSULT NOTE ADULT - TIME BILLING
Occupational Therapy  Patient treatment attempted this AM.  Patient off unit and will attempt at a later time.                                                  Cornell Heimlich COTA/ARELY [id number]
- Review of records, telemetry, vital signs and daily labs.   - General and cardiovascular physical examination.  - Generation of cardiovascular treatment plan and completion of note .  - Coordination of care.      Patient was seen and examined by me on  11.14.24interim events noted,labs and radiology studies reviewed.  Vic Novak MD,FACC.  [id number] [id number].  [phone]
I counseled the patient, family at bedside, and the primary team about the testing indicated to complete the stroke workup, as well as the medications to maintain for secondary stroke prevention.

## 2024-11-14 NOTE — CHART NOTE - NSCHARTNOTEFT_GEN_A_CORE
As per discussion with neuro Dr. Leach, pt would benefit from 24 hrs EEG monitoring. Pt is known to have multiple strokes and UTI, has been started on Depakote, however given persistent waxing and waning of symptoms concern for potential nonconvulsive seizure/ subclinical seizure remains.   As the EEG tech is not available overnight and pt is currently stable, will inform the team to pursue the ICU consult early in AM for transfer to ICU for the 24 hrs EEG monitoring. As per discussion with neuro Dr. Leach, pt would benefit from 24 hrs EEG monitoring. Pt is known to have multiple strokes and UTI, has been started on Depakote, however given persistent waxing and waning of symptoms concern for potential nonconvulsive seizure/ subclinical seizure remains.   As the EEG tech is not available overnight and pt is currently stable, will inform the team to pursue the ICU consult early in AM for transfer to ICU for the 24 hrs EEG monitoring.    Will consult Endo Dr. Hdz, given hx of DM, hypoglycemia and concern for polypharmacy as outpt for further recs. As per discussion with neuro Dr. Leach, pt would benefit from 24 hrs EEG monitoring. Pt is known to have multiple strokes and UTI, has been started on Depakote, however given persistent waxing and waning of symptoms concern for potential nonconvulsive seizure/ subclinical seizure remains.   As the EEG tech is not available overnight and pt is currently stable, will inform the team to pursue the ICU consult early in AM for transfer to ICU for the 24 hrs EEG monitoring.     Endo Dr. Hdz consulted. given hx of DM, hypoglycemia and concern for polypharmacy as outpt for further recs.

## 2024-11-14 NOTE — PROGRESS NOTE ADULT - SUBJECTIVE AND OBJECTIVE BOX
PGY-1 Progress Note discussed with attending    INTERVAL HPI/OVERNIGHT EVENTS:   MEDICATIONS  (STANDING):  apixaban 5 milliGRAM(s) Oral every 12 hours  aspirin enteric coated 81 milliGRAM(s) Oral daily  atorvastatin 40 milliGRAM(s) Oral at bedtime  diltiazem    milliGRAM(s) Oral daily  divalproex  milliGRAM(s) Oral two times a day  influenza  Vaccine (HIGH DOSE) 0.5 milliLiter(s) IntraMuscular once  insulin lispro (ADMELOG) corrective regimen sliding scale   SubCutaneous three times a day before meals  insulin lispro (ADMELOG) corrective regimen sliding scale   SubCutaneous at bedtime  lactated ringers. 1000 milliLiter(s) (60 mL/Hr) IV Continuous <Continuous>  metoprolol tartrate 50 milliGRAM(s) Oral two times a day  senna 2 Tablet(s) Oral at bedtime    MEDICATIONS  (PRN):  pantoprazole    Tablet 40 milliGRAM(s) Oral before breakfast PRN for heartburn      REVIEW OF SYSTEMS:  CONSTITUTIONAL: No fever, weight loss, or fatigue  RESPIRATORY: No cough, wheezing, chills or hemoptysis; No shortness of breath  CARDIOVASCULAR: No chest pain, palpitations, dizziness, or leg swelling  GASTROINTESTINAL: No abdominal pain. No nausea, vomiting, or hematemesis; No diarrhea or constipation. No melena or hematochezia.  GENITOURINARY: No dysuria or hematuria, urinary frequency  NEUROLOGICAL: No headaches, memory loss, loss of strength, numbness, or tremors  SKIN: No itching, burning, rashes, or lesions     Vital Signs Last 24 Hrs  T(C): 36.7 (14 Nov 2024 11:06), Max: 37.7 (14 Nov 2024 00:25)  T(F): 98 (14 Nov 2024 11:06), Max: 99.9 (14 Nov 2024 00:25)  HR: 95 (14 Nov 2024 11:45) (81 - 102)  BP: 129/89 (14 Nov 2024 11:45) (124/94 - 162/77)  BP(mean): --  RR: 18 (14 Nov 2024 11:06) (18 - 19)  SpO2: 99% (14 Nov 2024 11:45) (95% - 99%)    Parameters below as of 14 Nov 2024 11:45  Patient On (Oxygen Delivery Method): room air        PHYSICAL EXAMINATION:  GENERAL: NAD, well built  HEAD:  Atraumatic, Normocephalic  EYES:  conjunctiva and sclera clear  NECK: Supple, No JVD, Normal thyroid  CHEST/LUNG: Clear to auscultation. Clear to percussion bilaterally; No rales, rhonchi, wheezing, or rubs  HEART: Regular rate and rhythm; No murmurs, rubs, or gallops  ABDOMEN: Soft, Nontender, Nondistended; Bowel sounds present  NERVOUS SYSTEM:  Alert & Oriented X3,    EXTREMITIES:  2+ Peripheral Pulses, No clubbing, cyanosis, or edema  SKIN: warm dry                          16.2   7.91  )-----------( 156      ( 14 Nov 2024 05:40 )             46.7     11-14    139  |  107  |  18  ----------------------------<  214[H]  3.8   |  24  |  0.66    Ca    10.1      14 Nov 2024 05:40  Phos  2.0     11-14  Mg     1.9     11-14    TPro  6.9  /  Alb  2.6[L]  /  TBili  1.0  /  DBili  x   /  AST  15  /  ALT  14  /  AlkPhos  92  11-14    LIVER FUNCTIONS - ( 14 Nov 2024 05:40 )  Alb: 2.6 g/dL / Pro: 6.9 g/dL / ALK PHOS: 92 U/L / ALT: 14 U/L DA / AST: 15 U/L / GGT: x                   CAPILLARY BLOOD GLUCOSE      RADIOLOGY & ADDITIONAL TESTS:                   PGY-1 Progress Note discussed with attending    INTERVAL HPI/OVERNIGHT EVENTS: No overnight events. Patient reports feeling fatigued. Denies blurry vision, double vision, and pain with EOM    MEDICATIONS  (STANDING):  apixaban 5 milliGRAM(s) Oral every 12 hours  aspirin enteric coated 81 milliGRAM(s) Oral daily  atorvastatin 40 milliGRAM(s) Oral at bedtime  diltiazem    milliGRAM(s) Oral daily  divalproex  milliGRAM(s) Oral two times a day  influenza  Vaccine (HIGH DOSE) 0.5 milliLiter(s) IntraMuscular once  insulin lispro (ADMELOG) corrective regimen sliding scale   SubCutaneous three times a day before meals  insulin lispro (ADMELOG) corrective regimen sliding scale   SubCutaneous at bedtime  lactated ringers. 1000 milliLiter(s) (60 mL/Hr) IV Continuous <Continuous>  metoprolol tartrate 50 milliGRAM(s) Oral two times a day  senna 2 Tablet(s) Oral at bedtime    MEDICATIONS  (PRN):  pantoprazole    Tablet 40 milliGRAM(s) Oral before breakfast PRN for heartburn      REVIEW OF SYSTEMS:  CONSTITUTIONAL: No fever  RESPIRATORY: No coughNo shortness of breath  CARDIOVASCULAR: No chest pain  GASTROINTESTINAL: No abdominal pain  GENITOURINARY: No dysuria  NEUROLOGICAL: No headaches  SKIN: No itching, burning, rashes, or lesions     Vital Signs Last 24 Hrs  T(C): 36.7 (14 Nov 2024 11:06), Max: 37.7 (14 Nov 2024 00:25)  T(F): 98 (14 Nov 2024 11:06), Max: 99.9 (14 Nov 2024 00:25)  HR: 95 (14 Nov 2024 11:45) (81 - 102)  BP: 129/89 (14 Nov 2024 11:45) (124/94 - 162/77)  BP(mean): --  RR: 18 (14 Nov 2024 11:06) (18 - 19)  SpO2: 99% (14 Nov 2024 11:45) (95% - 99%)    Parameters below as of 14 Nov 2024 11:45  Patient On (Oxygen Delivery Method): room air        PHYSICAL EXAMINATION:  GENERAL: NAD, well built  HEAD:  Atraumatic, Normocephalic  EYES:  conjunctiva and sclera clear, PERRLA  NECK: Supple, No JVD  CHEST/LUNG: Clear to auscultation. No rales, rhonchi, wheezing, or rubs  HEART: Regular rate and rhythm; No murmurs, rubs, or gallops  ABDOMEN: Soft, Nontender, Nondistended; Bowel sounds present  NERVOUS SYSTEM:  Alert & Oriented X3, able to raise upper extremities and keep in air with no drift, pt able to raise right foot off bed, able to perform hand  with equal hand   EXTREMITIES:  2+ Peripheral Pulses, varicose veins and venous stasis w/ dry scaly skin on b/l LE  SKIN: warm dry                            16.2   7.91  )-----------( 156      ( 14 Nov 2024 05:40 )             46.7     11-14    139  |  107  |  18  ----------------------------<  214[H]  3.8   |  24  |  0.66    Ca    10.1      14 Nov 2024 05:40  Phos  2.0     11-14  Mg     1.9     11-14    TPro  6.9  /  Alb  2.6[L]  /  TBili  1.0  /  DBili  x   /  AST  15  /  ALT  14  /  AlkPhos  92  11-14    LIVER FUNCTIONS - ( 14 Nov 2024 05:40 )  Alb: 2.6 g/dL / Pro: 6.9 g/dL / ALK PHOS: 92 U/L / ALT: 14 U/L DA / AST: 15 U/L / GGT: x                   CAPILLARY BLOOD GLUCOSE      RADIOLOGY & ADDITIONAL TESTS:                   PGY-1 Progress Note discussed with attending    INTERVAL HPI/OVERNIGHT EVENTS: No overnight events. Patient reports feeling fatigued. Denies blurry vision, double vision, and pain with EOM    MEDICATIONS  (STANDING):  apixaban 5 milliGRAM(s) Oral every 12 hours  aspirin enteric coated 81 milliGRAM(s) Oral daily  atorvastatin 40 milliGRAM(s) Oral at bedtime  diltiazem    milliGRAM(s) Oral daily  divalproex  milliGRAM(s) Oral two times a day  influenza  Vaccine (HIGH DOSE) 0.5 milliLiter(s) IntraMuscular once  insulin lispro (ADMELOG) corrective regimen sliding scale   SubCutaneous three times a day before meals  insulin lispro (ADMELOG) corrective regimen sliding scale   SubCutaneous at bedtime  lactated ringers. 1000 milliLiter(s) (60 mL/Hr) IV Continuous <Continuous>  metoprolol tartrate 50 milliGRAM(s) Oral two times a day  senna 2 Tablet(s) Oral at bedtime    MEDICATIONS  (PRN):  pantoprazole    Tablet 40 milliGRAM(s) Oral before breakfast PRN for heartburn      REVIEW OF SYSTEMS:  CONSTITUTIONAL: No fever  RESPIRATORY: No coughNo shortness of breath  CARDIOVASCULAR: No chest pain  GASTROINTESTINAL: No abdominal pain  GENITOURINARY: No dysuria  NEUROLOGICAL: No headaches  SKIN: No itching, burning, rashes, or lesions     Vital Signs Last 24 Hrs  T(C): 36.7 (14 Nov 2024 11:06), Max: 37.7 (14 Nov 2024 00:25)  T(F): 98 (14 Nov 2024 11:06), Max: 99.9 (14 Nov 2024 00:25)  HR: 95 (14 Nov 2024 11:45) (81 - 102)  BP: 129/89 (14 Nov 2024 11:45) (124/94 - 162/77)  BP(mean): --  RR: 18 (14 Nov 2024 11:06) (18 - 19)  SpO2: 99% (14 Nov 2024 11:45) (95% - 99%)    Parameters below as of 14 Nov 2024 11:45  Patient On (Oxygen Delivery Method): room air        PHYSICAL EXAMINATION:  GENERAL: NAD,  HEAD:  Atraumatic, Normocephalic  EYES:  conjunctiva and sclera clear, PERRLA  NECK: Supple, No JVD  CHEST/LUNG: Clear to auscultation. No rales, rhonchi, wheezing, or rubs  HEART: Regular rate and rhythm; No murmurs, rubs, or gallops  ABDOMEN: Soft, Nontender, Nondistended; Bowel sounds present  NERVOUS SYSTEM:  Alert & Oriented X3, able to raise upper extremities and keep in air with no drift, pt able to raise right foot off bed, able to perform hand  with equal hand   EXTREMITIES:  2+ Peripheral Pulses, varicose veins and venous stasis w/ dry scaly skin on b/l LE  SKIN: warm dry                            16.2   7.91  )-----------( 156      ( 14 Nov 2024 05:40 )             46.7     11-14    139  |  107  |  18  ----------------------------<  214[H]  3.8   |  24  |  0.66    Ca    10.1      14 Nov 2024 05:40  Phos  2.0     11-14  Mg     1.9     11-14    TPro  6.9  /  Alb  2.6[L]  /  TBili  1.0  /  DBili  x   /  AST  15  /  ALT  14  /  AlkPhos  92  11-14    LIVER FUNCTIONS - ( 14 Nov 2024 05:40 )  Alb: 2.6 g/dL / Pro: 6.9 g/dL / ALK PHOS: 92 U/L / ALT: 14 U/L DA / AST: 15 U/L / GGT: x                   CAPILLARY BLOOD GLUCOSE      RADIOLOGY & ADDITIONAL TESTS:

## 2024-11-14 NOTE — DIETITIAN INITIAL EVALUATION ADULT - PROBLEM SELECTOR PLAN 3
- History of A. Fib  - Continue home Cardizem, Metoprolol, and Eliquis  - EKG showing A. Fib  - Consider Heme/onc consult in s/o possible Eliquis failure given acute ischemic CVA

## 2024-11-15 DIAGNOSIS — E11.65 TYPE 2 DIABETES MELLITUS WITH HYPERGLYCEMIA: ICD-10-CM

## 2024-11-15 DIAGNOSIS — E11.9 TYPE 2 DIABETES MELLITUS WITHOUT COMPLICATIONS: ICD-10-CM

## 2024-11-15 LAB
ALBUMIN SERPL ELPH-MCNC: 2.2 G/DL — LOW (ref 3.5–5)
ALBUMIN SERPL ELPH-MCNC: 2.3 G/DL — LOW (ref 3.5–5)
ALP SERPL-CCNC: 85 U/L — SIGNIFICANT CHANGE UP (ref 40–120)
ALP SERPL-CCNC: 87 U/L — SIGNIFICANT CHANGE UP (ref 40–120)
ALT FLD-CCNC: 13 U/L DA — SIGNIFICANT CHANGE UP (ref 10–60)
ALT FLD-CCNC: 14 U/L DA — SIGNIFICANT CHANGE UP (ref 10–60)
ANION GAP SERPL CALC-SCNC: 5 MMOL/L — SIGNIFICANT CHANGE UP (ref 5–17)
ANION GAP SERPL CALC-SCNC: 6 MMOL/L — SIGNIFICANT CHANGE UP (ref 5–17)
AST SERPL-CCNC: 13 U/L — SIGNIFICANT CHANGE UP (ref 10–40)
AST SERPL-CCNC: 14 U/L — SIGNIFICANT CHANGE UP (ref 10–40)
BASOPHILS # BLD AUTO: 0.03 K/UL — SIGNIFICANT CHANGE UP (ref 0–0.2)
BASOPHILS # BLD AUTO: 0.04 K/UL — SIGNIFICANT CHANGE UP (ref 0–0.2)
BASOPHILS NFR BLD AUTO: 0.4 % — SIGNIFICANT CHANGE UP (ref 0–2)
BASOPHILS NFR BLD AUTO: 0.6 % — SIGNIFICANT CHANGE UP (ref 0–2)
BILIRUB SERPL-MCNC: 0.6 MG/DL — SIGNIFICANT CHANGE UP (ref 0.2–1.2)
BILIRUB SERPL-MCNC: 0.6 MG/DL — SIGNIFICANT CHANGE UP (ref 0.2–1.2)
BUN SERPL-MCNC: 19 MG/DL — HIGH (ref 7–18)
BUN SERPL-MCNC: 21 MG/DL — HIGH (ref 7–18)
CALCIUM SERPL-MCNC: 10.2 MG/DL — SIGNIFICANT CHANGE UP (ref 8.4–10.5)
CALCIUM SERPL-MCNC: 9.9 MG/DL — SIGNIFICANT CHANGE UP (ref 8.4–10.5)
CHLORIDE SERPL-SCNC: 105 MMOL/L — SIGNIFICANT CHANGE UP (ref 96–108)
CHLORIDE SERPL-SCNC: 107 MMOL/L — SIGNIFICANT CHANGE UP (ref 96–108)
CO2 SERPL-SCNC: 26 MMOL/L — SIGNIFICANT CHANGE UP (ref 22–31)
CO2 SERPL-SCNC: 28 MMOL/L — SIGNIFICANT CHANGE UP (ref 22–31)
CREAT SERPL-MCNC: 0.7 MG/DL — SIGNIFICANT CHANGE UP (ref 0.5–1.3)
CREAT SERPL-MCNC: 0.72 MG/DL — SIGNIFICANT CHANGE UP (ref 0.5–1.3)
EGFR: 86 ML/MIN/1.73M2 — SIGNIFICANT CHANGE UP
EGFR: 88 ML/MIN/1.73M2 — SIGNIFICANT CHANGE UP
EOSINOPHIL # BLD AUTO: 0.12 K/UL — SIGNIFICANT CHANGE UP (ref 0–0.5)
EOSINOPHIL # BLD AUTO: 0.18 K/UL — SIGNIFICANT CHANGE UP (ref 0–0.5)
EOSINOPHIL NFR BLD AUTO: 1.6 % — SIGNIFICANT CHANGE UP (ref 0–6)
EOSINOPHIL NFR BLD AUTO: 2.5 % — SIGNIFICANT CHANGE UP (ref 0–6)
GLUCOSE BLDC GLUCOMTR-MCNC: 184 MG/DL — HIGH (ref 70–99)
GLUCOSE BLDC GLUCOMTR-MCNC: 226 MG/DL — HIGH (ref 70–99)
GLUCOSE BLDC GLUCOMTR-MCNC: 236 MG/DL — HIGH (ref 70–99)
GLUCOSE BLDC GLUCOMTR-MCNC: 242 MG/DL — HIGH (ref 70–99)
GLUCOSE SERPL-MCNC: 227 MG/DL — HIGH (ref 70–99)
GLUCOSE SERPL-MCNC: 277 MG/DL — HIGH (ref 70–99)
HCT VFR BLD CALC: 44.3 % — SIGNIFICANT CHANGE UP (ref 34.5–45)
HCT VFR BLD CALC: 44.4 % — SIGNIFICANT CHANGE UP (ref 34.5–45)
HGB BLD-MCNC: 15.2 G/DL — SIGNIFICANT CHANGE UP (ref 11.5–15.5)
HGB BLD-MCNC: 15.3 G/DL — SIGNIFICANT CHANGE UP (ref 11.5–15.5)
IMM GRANULOCYTES NFR BLD AUTO: 0.3 % — SIGNIFICANT CHANGE UP (ref 0–0.9)
IMM GRANULOCYTES NFR BLD AUTO: 0.4 % — SIGNIFICANT CHANGE UP (ref 0–0.9)
LYMPHOCYTES # BLD AUTO: 1.57 K/UL — SIGNIFICANT CHANGE UP (ref 1–3.3)
LYMPHOCYTES # BLD AUTO: 2 K/UL — SIGNIFICANT CHANGE UP (ref 1–3.3)
LYMPHOCYTES # BLD AUTO: 20.8 % — SIGNIFICANT CHANGE UP (ref 13–44)
LYMPHOCYTES # BLD AUTO: 27.9 % — SIGNIFICANT CHANGE UP (ref 13–44)
MAGNESIUM SERPL-MCNC: 1.8 MG/DL — SIGNIFICANT CHANGE UP (ref 1.6–2.6)
MAGNESIUM SERPL-MCNC: 2 MG/DL — SIGNIFICANT CHANGE UP (ref 1.6–2.6)
MCHC RBC-ENTMCNC: 32.5 PG — SIGNIFICANT CHANGE UP (ref 27–34)
MCHC RBC-ENTMCNC: 32.7 PG — SIGNIFICANT CHANGE UP (ref 27–34)
MCHC RBC-ENTMCNC: 34.2 G/DL — SIGNIFICANT CHANGE UP (ref 32–36)
MCHC RBC-ENTMCNC: 34.5 G/DL — SIGNIFICANT CHANGE UP (ref 32–36)
MCV RBC AUTO: 94.7 FL — SIGNIFICANT CHANGE UP (ref 80–100)
MCV RBC AUTO: 94.9 FL — SIGNIFICANT CHANGE UP (ref 80–100)
MONOCYTES # BLD AUTO: 0.69 K/UL — SIGNIFICANT CHANGE UP (ref 0–0.9)
MONOCYTES # BLD AUTO: 0.82 K/UL — SIGNIFICANT CHANGE UP (ref 0–0.9)
MONOCYTES NFR BLD AUTO: 11.5 % — SIGNIFICANT CHANGE UP (ref 2–14)
MONOCYTES NFR BLD AUTO: 9.1 % — SIGNIFICANT CHANGE UP (ref 2–14)
NEUTROPHILS # BLD AUTO: 4.1 K/UL — SIGNIFICANT CHANGE UP (ref 1.8–7.4)
NEUTROPHILS # BLD AUTO: 5.11 K/UL — SIGNIFICANT CHANGE UP (ref 1.8–7.4)
NEUTROPHILS NFR BLD AUTO: 57.2 % — SIGNIFICANT CHANGE UP (ref 43–77)
NEUTROPHILS NFR BLD AUTO: 67.7 % — SIGNIFICANT CHANGE UP (ref 43–77)
NRBC # BLD: 0 /100 WBCS — SIGNIFICANT CHANGE UP (ref 0–0)
NRBC # BLD: 0 /100 WBCS — SIGNIFICANT CHANGE UP (ref 0–0)
PHOSPHATE SERPL-MCNC: 1.9 MG/DL — LOW (ref 2.5–4.5)
PHOSPHATE SERPL-MCNC: 2.3 MG/DL — LOW (ref 2.5–4.5)
PLATELET # BLD AUTO: 171 K/UL — SIGNIFICANT CHANGE UP (ref 150–400)
PLATELET # BLD AUTO: 179 K/UL — SIGNIFICANT CHANGE UP (ref 150–400)
POTASSIUM SERPL-MCNC: 3.7 MMOL/L — SIGNIFICANT CHANGE UP (ref 3.5–5.3)
POTASSIUM SERPL-MCNC: 3.8 MMOL/L — SIGNIFICANT CHANGE UP (ref 3.5–5.3)
POTASSIUM SERPL-SCNC: 3.7 MMOL/L — SIGNIFICANT CHANGE UP (ref 3.5–5.3)
POTASSIUM SERPL-SCNC: 3.8 MMOL/L — SIGNIFICANT CHANGE UP (ref 3.5–5.3)
PROT SERPL-MCNC: 6.5 G/DL — SIGNIFICANT CHANGE UP (ref 6–8.3)
PROT SERPL-MCNC: 6.5 G/DL — SIGNIFICANT CHANGE UP (ref 6–8.3)
RBC # BLD: 4.68 M/UL — SIGNIFICANT CHANGE UP (ref 3.8–5.2)
RBC # BLD: 4.68 M/UL — SIGNIFICANT CHANGE UP (ref 3.8–5.2)
RBC # FLD: 12.9 % — SIGNIFICANT CHANGE UP (ref 10.3–14.5)
RBC # FLD: 12.9 % — SIGNIFICANT CHANGE UP (ref 10.3–14.5)
SODIUM SERPL-SCNC: 138 MMOL/L — SIGNIFICANT CHANGE UP (ref 135–145)
SODIUM SERPL-SCNC: 139 MMOL/L — SIGNIFICANT CHANGE UP (ref 135–145)
WBC # BLD: 7.16 K/UL — SIGNIFICANT CHANGE UP (ref 3.8–10.5)
WBC # BLD: 7.55 K/UL — SIGNIFICANT CHANGE UP (ref 3.8–10.5)
WBC # FLD AUTO: 7.16 K/UL — SIGNIFICANT CHANGE UP (ref 3.8–10.5)
WBC # FLD AUTO: 7.55 K/UL — SIGNIFICANT CHANGE UP (ref 3.8–10.5)

## 2024-11-15 PROCEDURE — 95720 EEG PHY/QHP EA INCR W/VEEG: CPT

## 2024-11-15 PROCEDURE — 99222 1ST HOSP IP/OBS MODERATE 55: CPT

## 2024-11-15 PROCEDURE — 99233 SBSQ HOSP IP/OBS HIGH 50: CPT | Mod: GC

## 2024-11-15 RX ORDER — CHLORHEXIDINE GLUCONATE 1.2 MG/ML
1 RINSE ORAL
Refills: 0 | Status: DISCONTINUED | OUTPATIENT
Start: 2024-11-15 | End: 2024-11-16

## 2024-11-15 RX ORDER — INSULIN GLARGINE 100 [IU]/ML
8 INJECTION, SOLUTION SUBCUTANEOUS AT BEDTIME
Refills: 0 | Status: DISCONTINUED | OUTPATIENT
Start: 2024-11-15 | End: 2024-11-18

## 2024-11-15 RX ORDER — INSULIN GLARGINE 100 [IU]/ML
3 INJECTION, SOLUTION SUBCUTANEOUS AT BEDTIME
Refills: 0 | Status: DISCONTINUED | OUTPATIENT
Start: 2024-11-15 | End: 2024-11-15

## 2024-11-15 RX ADMIN — Medication 1: at 16:35

## 2024-11-15 RX ADMIN — PANTOPRAZOLE SODIUM 40 MILLIGRAM(S): 40 TABLET, DELAYED RELEASE ORAL at 05:09

## 2024-11-15 RX ADMIN — Medication 500 MILLIGRAM(S): at 05:07

## 2024-11-15 RX ADMIN — Medication 2 TABLET(S): at 21:54

## 2024-11-15 RX ADMIN — Medication 40 MILLIGRAM(S): at 21:54

## 2024-11-15 RX ADMIN — Medication 81 MILLIGRAM(S): at 12:11

## 2024-11-15 RX ADMIN — APIXABAN 5 MILLIGRAM(S): 2.5 TABLET, FILM COATED ORAL at 17:34

## 2024-11-15 RX ADMIN — Medication 2: at 08:42

## 2024-11-15 RX ADMIN — Medication 2 UNIT(S): at 16:35

## 2024-11-15 RX ADMIN — METOPROLOL TARTRATE 50 MILLIGRAM(S): 100 TABLET, FILM COATED ORAL at 17:33

## 2024-11-15 RX ADMIN — DILTIAZEM HYDROCHLORIDE 180 MILLIGRAM(S): 240 CAPSULE, COATED, EXTENDED RELEASE ORAL at 05:07

## 2024-11-15 RX ADMIN — Medication 2: at 12:11

## 2024-11-15 RX ADMIN — CHLORHEXIDINE GLUCONATE 1 APPLICATION(S): 1.2 RINSE ORAL at 14:00

## 2024-11-15 RX ADMIN — Medication 500 MILLIGRAM(S): at 17:34

## 2024-11-15 RX ADMIN — INSULIN GLARGINE 8 UNIT(S): 100 INJECTION, SOLUTION SUBCUTANEOUS at 21:54

## 2024-11-15 RX ADMIN — METOPROLOL TARTRATE 50 MILLIGRAM(S): 100 TABLET, FILM COATED ORAL at 05:07

## 2024-11-15 RX ADMIN — APIXABAN 5 MILLIGRAM(S): 2.5 TABLET, FILM COATED ORAL at 05:07

## 2024-11-15 NOTE — PROGRESS NOTE ADULT - ASSESSMENT
78y F with PMH of HTN. chronic Afib, OA, HLD, DM, Anxiety, Hypothyroidism, and peripheral venous insufficiency presenting with slurred speech and apraxia. CT stroke protocol revealed loss of gray-whitedifferentiation in the right caudate   suggesting acute infarct. Admitted to telemetry for acute CVA. CT and MRI shows right MCV infarct. UCx: + ecoli, on CTX for 5 days total. EEG obtained, as per neuro, Given waxing and waning mental status, would recommend transfer to ICU for continuous EEG for at least 24 hours to screen for subclinical seizures and guide antiepileptic medication dosing, on depakote. f/u endo for discharge recs.

## 2024-11-15 NOTE — CHART NOTE - NSCHARTNOTEFT_GEN_A_CORE
EEG preliminary read (not final) on the initial recording hour(s) = x 1     No seizures recorded.  Right hemispheric slowing noted.  Moderate slowing noted/ encephalopathy.  Final report to follow tomorrow morning after completion of study.    [facility] EEG Reading Room Ph#: [phone]  Epilepsy Answering Service after 5PM and before 8:30AM: Ph#: [phone]

## 2024-11-15 NOTE — PROGRESS NOTE ADULT - SUBJECTIVE AND OBJECTIVE BOX
PGY-1 Progress Note discussed with attending    INTERVAL HPI/OVERNIGHT EVENTS: No overnight events. Patient seen at bedside, reports feeling fatigued    MEDICATIONS  (STANDING):  apixaban 5 milliGRAM(s) Oral every 12 hours  aspirin enteric coated 81 milliGRAM(s) Oral daily  atorvastatin 40 milliGRAM(s) Oral at bedtime  chlorhexidine 2% Cloths 1 Application(s) Topical <User Schedule>  diltiazem    milliGRAM(s) Oral daily  divalproex  milliGRAM(s) Oral two times a day  influenza  Vaccine (HIGH DOSE) 0.5 milliLiter(s) IntraMuscular once  insulin glargine Injectable (LANTUS) 8 Unit(s) SubCutaneous at bedtime  insulin lispro (ADMELOG) corrective regimen sliding scale   SubCutaneous three times a day before meals  insulin lispro (ADMELOG) corrective regimen sliding scale   SubCutaneous at bedtime  insulin lispro Injectable (ADMELOG) 2 Unit(s) SubCutaneous three times a day before meals  metoprolol tartrate 50 milliGRAM(s) Oral two times a day  senna 2 Tablet(s) Oral at bedtime    MEDICATIONS  (PRN):  pantoprazole    Tablet 40 milliGRAM(s) Oral before breakfast PRN for heartburn      REVIEW OF SYSTEMS:  CONSTITUTIONAL: + fatigue  RESPIRATORY:  No shortness of breath  CARDIOVASCULAR: No chest pain  GASTROINTESTINAL: No abdominal pain  GENITOURINARY: No dysuria  NEUROLOGICAL: No headaches  SKIN: No itching, burning, rashes, or lesions     Vital Signs Last 24 Hrs  T(C): 36.4 (15 Nov 2024 14:00), Max: 36.9 (14 Nov 2024 16:14)  T(F): 97.5 (15 Nov 2024 14:00), Max: 98.5 (14 Nov 2024 16:14)  HR: 73 (15 Nov 2024 14:00) (62 - 101)  BP: 127/78 (15 Nov 2024 14:00) (118/76 - 141/83)  BP(mean): 87 (15 Nov 2024 14:00) (87 - 87)  RR: 31 (15 Nov 2024 14:00) (17 - 31)  SpO2: 95% (15 Nov 2024 14:00) (94% - 98%)    Parameters below as of 15 Nov 2024 14:00  Patient On (Oxygen Delivery Method): room air        PHYSICAL EXAMINATION:  GENERAL: NAD,   HEAD:  Atraumatic, Normocephalic  EYES:  conjunctiva and sclera clear  NECK: Supple,  CHEST/LUNG: Clear to auscultation. No rales, rhonchi, wheezing, or rubs  HEART: Regular rate and rhythm; No murmurs, rubs, or gallops  ABDOMEN: Soft, Nontender, Nondistended; Bowel sounds present  NERVOUS SYSTEM:  Alert & Oriented X3, equal hand  b/l, able to raise b/l UE with no drift, 3/5 strength b/l UE, able to raise right foot off bed  EXTREMITIES:  2+ Peripheral Pulses  SKIN: warm dry                          15.2   7.55  )-----------( 179      ( 15 Nov 2024 12:10 )             44.4     11-15    139  |  107  |  21[H]  ----------------------------<  277[H]  3.8   |  26  |  0.72    Ca    9.9      15 Nov 2024 12:10  Phos  2.3     11-15  Mg     1.8     11-15    TPro  6.5  /  Alb  2.3[L]  /  TBili  0.6  /  DBili  x   /  AST  13  /  ALT  13  /  AlkPhos  85  11-15    LIVER FUNCTIONS - ( 15 Nov 2024 12:10 )  Alb: 2.3 g/dL / Pro: 6.5 g/dL / ALK PHOS: 85 U/L / ALT: 13 U/L DA / AST: 13 U/L / GGT: x                   CAPILLARY BLOOD GLUCOSE      RADIOLOGY & ADDITIONAL TESTS:                   Please use 600mg motrin (or advil or ibuprofen) every 6 hours as needed for pain/discomfort/swelling.   Start Clindamycin 150 mg four times a day for 7 days.  Follow up with the [facility] dental clinic tomorrow at your scheduled appointment, contact information [phone]..  Please return to the ER for severe pain, fevers, severe bleeding, increased swelling or any other concerns.   Eat a soft diet and chew on the opposite side.  Brush your teeth several times a day.

## 2024-11-15 NOTE — PROGRESS NOTE ADULT - ATTENDING COMMENTS
Patient seen at bedside, lying calmly in bed. Son also present and assisting with [place] translation.   On exam, patient is lying in bed, eyes open, answers questions softly, cardiopulmonary exams unremarkable, abdomen soft, NT/ND, extremities without edema.   Labs reviewed, CBC and BMP without significant change. Phos 1.9    Assessment and plan:  83 yo F with PMHx of HTN, HLD, T2DM, Afib (on Eliquis, Dilt, Metop) who p/w slurred speech and an unwitnessed fall at home. Reports feeling overall tired and feeling like she cannot get her words out, also have slow responses. Found to have R MCA infarct on MRI brain.    # Acute R MCA infarction  # Chronic a-fib on Eliquis  # HTN, HLD   # T2DM, A1c 6.5% 11/2024  # UTI with UCx positive for E.coli    - Neuro recs appreciated, EEG obtained. Per neurology Dr. Leach, started on trial of Depakote for potential nonconvulsive seizure contributing to her worsening mental status, now planned for 24hr EEG, will need transfer to ICU  - s/p CTX for UTI, UCx positive for pansensitive E.coli, completed 5-day course   - TTE without thrombus or shunt  - c/w lopressor 50 BID  - c/w diltiazem, Eliquis, high intensity statin  - PT recs appreciated, MEG recommended, accepted to [facility]  - endo consulted for management of outpt DM meds  -dvt ppx eliquis

## 2024-11-15 NOTE — CONSULT NOTE ADULT - ASSESSMENT
Assessment:   #78 F with HTN, AF o neliquis, OA, HLD, DM, anxiety, hypothyroid, peripheral venous insufficiency, admitted with acute R MCA CVA resulting in dysarthria and L sided deficits, concern for subclinical seizures, ICU consulted for 24 hr EEG.      Plan:     *****NEURO*****  #CVA   - Presented with an unwitnessed fall   - Hemodynamically stable and afebrile  - Code Stroke in ED  - CT Head showing Acute evolving right caudate head/basal ganglia/corona radiata infarction. No evidence of hemorrhagic transformation.  - CTA Head and Neck showing Moderate stenosis of the right M1 segment   - MRI: Acute right MCA territory infarctions  - EKG A. Fib  - LDL: 92, A1c: 6.5%  - b/l LE US dopplers: neg  - passsed bedside dysphagia  - TTE: EF: 55-60%, neg intracardiac shunt  - c/w Asprin 81mg qd and Atorvastatin 80mg qd  - Dr. Leach neuro following  - PT Consulted - rec: MEG.    #seizures?  -lethargy, suspicion for nonconvulsive seizure  - spot EEG completed, concern for potential nonconvulsive seizure contributing to her worsening mental status, started a  trial of Depakote (Valproate 1000mg IV load then PO depakote 500mg bid) -  f/u formal note with recs from neuro  -cont anticonvulsants as per neuro  -24 hr EEG    *****CARDIO*****  #AFib  - EKG showing A. Fib  - Continue home Cardizem, Metoprolol, and Eliquis    #HTN  -h/o HTN on home Cardizem 180mg qd and metoprolol ER 200mg  -c/w home meds of Cardizem 180mg qd   -c/w Metoprolol tartrate 50mg BID with holding parameters   -monitor BP  -adjust antihypertensive meds as appropriate.    #PVD  -dopplers eng for DVT    *****PULM*****  No issues  On RA    -    *****GI*****  No issues    -    *****RENAL*****  #UTI  -U/a +  -Cx showing E. coli  -finished 5 day course of CTX    -    *****INFECTIOUS*****  #UTI  0-as above    -     *****HEMO*****  No issues    -    *****ENDO*****  #DM  -home metformin, jardiance, januvia  -currently low dose correctional scale insulin only  -sugars suboptimally controlled, fasting glucose 228, going to up 270s post prandial  -start lantus 3 units, premeal 2 units, continue MARTY    #hypothyroid  -not on synthroid  -TSH 2.74      #HLD  -cont statin    -    *****MSK/SKIN*****  #xerosis  #stasis derm  -moisturize lower extremities  -    *****PPX*****  -PPI  -eliquis      *****Dispo/Ethics*****  accepted to ICU for 24 hour EEG Assessment:   78F with HTN, AF on Eliquis, OA, HLD, DM, anxiety, hypothyroid, peripheral venous insufficiency, admitted with acute R MCA CVA resulting in dysarthria and L sided deficits, concern for subclinical seizures, ICU consulted for 24 hr EEG.      Plan:     *****NEURO*****  #CVA   - Presented with an unwitnessed fall   - Hemodynamically stable and afebrile  - Code Stroke in ED  - CT Head showing Acute evolving right caudate head/basal ganglia/corona radiata infarction. No evidence of hemorrhagic transformation.  - CTA Head and Neck showing Moderate stenosis of the right M1 segment   - MRI: Acute right MCA territory infarctions  - EKG A. Fib  - LDL: 92, A1c: 6.5%  - b/l LE US dopplers: neg  - passsed bedside dysphagia  - TTE: EF: 55-60%, neg intracardiac shunt  - c/w Asprin 81mg qd and Atorvastatin 80mg qd  - Dr. Leach neuro following  - PT Consulted - rec: MEG.    #seizures?  -lethargy, suspicion for nonconvulsive seizure  - spot EEG completed, concern for potential nonconvulsive seizure contributing to her worsening mental status, started a  trial of Depakote (Valproate 1000mg IV load then PO depakote 500mg bid) -  f/u formal note with recs from neuro  -cont anticonvulsants as per neuro  -24 hr EEG    *****CARDIO*****  #AFib  - EKG showing A. Fib  - Continue home Cardizem, Metoprolol, and Eliquis    #HTN  -h/o HTN on home Cardizem 180mg qd and metoprolol ER 200mg  -c/w home meds of Cardizem 180mg qd   -c/w Metoprolol tartrate 50mg BID with holding parameters   -monitor BP  -adjust antihypertensive meds as appropriate.    #PVD  -dopplers eng for DVT    *****PULM*****  No issues  On RA    -    *****GI*****  No issues    -    *****RENAL*****  #UTI  -U/a +  -Cx showing E. coli  -finished 5 day course of CTX    -    *****INFECTIOUS*****  #UTI  0-as above    -     *****HEMO*****  No issues    -    *****ENDO*****  #DM  -home metformin, jardiance, januvia  -currently low dose correctional scale insulin only  -sugars suboptimally controlled, fasting glucose 228, going to up 270s post prandial  -start lantus 3 units, premeal 2 units, continue MARTY    #hypothyroid  -not on synthroid  -TSH 2.74      #HLD  -cont statin    -    *****MSK/SKIN*****  #xerosis  #stasis derm  -moisturize lower extremities  -    *****PPX*****  -PPI  -eliquis      *****Dispo/Ethics*****  accepted to ICU for 24 hour EEG

## 2024-11-15 NOTE — PROGRESS NOTE ADULT - SUBJECTIVE AND OBJECTIVE BOX
PATIENT SEEN AND EXAMINED BY EDDY SILVER M.D. ON :- 11/15/24  DATE OF SERVICE:    11/15/24         Interim events noted,Labs ,Radiological studies and Cardiology tests reviewed .    Patient is a 78y old  Female who presents with a chief complaint of acute stroke (15 Nov 2024 15:03)      HPI:  82-year-old female with a past medical history of atrial fibrillation on anticoagulation presents to the ED with slurred speech and apraxia after a fall yesterday.  Patient had unwitnessed fall at home yesterday. Denies head strike or loss of consciousness, but reports that she was unable to get herself up off the floor.  Son came to visit and helped patient off the ground and they then spent time with each other throughout with son reporting nothing out of the ordinary with patient neurologically. Patient's son stated that after leaving his mother he received a phone call from here where her speech was slurred and he attributed it to her just being tired. However,  today upon waking she again had slurred speech so son called EMS. Upon arrival to the ED patient was asymptomatic however due to concern for possible stroke given history, code stroke activated on arrival. CT scan showed oss of gray-whitedifferentiation in the right caudate suggesting acute infarct. On my assessment patient complained of increased effort of speech, depressed mood, and anxiety about blood clots. Complained of chronic pain and limited range of motion in right shoulder but denied any increase in weakness. During interview when patient appeared to be emotional she did point out that her left hand had begun to shake. Denied fever, chills, headache, dizziness, chest pain, palpitations, shortness of breath, abdominal pain, nausea, vomiting, diarrhea, constipation, and dysuria.      In the ED:  T(F): , Max: 98.1 (13:52); HR:  (74 - 86); BP:  (145/72 - 158/82); RR:  (16 - 20); SpO2:  (96% - 98%)  WBC: 8.04, Hb.6, PLT: 165  Na: 141, K: 3.1, BUN: 22, sCr: 1.09      s/p potassium chloride    Tablet ER 40 milliEquivalent(s) Oral; potassium chloride  10 mEq/100 mL IVPB 10 milliEquivalent(s) IV Intermittent (2024 16:39)      PAST MEDICAL & SURGICAL HISTORY:  HTN (hypertension)      Atrial fibrillation      OA (osteoarthritis)      HLD (hyperlipidemia)      Diabetes      Obesity      Anxiety      Hypothyroidism      Peripheral venous insufficiency      No significant past surgical history          PREVIOUS DIAGNOSTIC TESTING:      ECHO  FINDINGS:    STRESS  FINDINGS:    CATHETERIZATION  FINDINGS:    MEDICATIONS  (STANDING):  apixaban 5 milliGRAM(s) Oral every 12 hours  aspirin enteric coated 81 milliGRAM(s) Oral daily  atorvastatin 40 milliGRAM(s) Oral at bedtime  chlorhexidine 2% Cloths 1 Application(s) Topical <User Schedule>  diltiazem    milliGRAM(s) Oral daily  divalproex  milliGRAM(s) Oral two times a day  influenza  Vaccine (HIGH DOSE) 0.5 milliLiter(s) IntraMuscular once  insulin glargine Injectable (LANTUS) 8 Unit(s) SubCutaneous at bedtime  insulin lispro (ADMELOG) corrective regimen sliding scale   SubCutaneous at bedtime  insulin lispro (ADMELOG) corrective regimen sliding scale   SubCutaneous three times a day before meals  insulin lispro Injectable (ADMELOG) 2 Unit(s) SubCutaneous three times a day before meals  metoprolol tartrate 50 milliGRAM(s) Oral two times a day  senna 2 Tablet(s) Oral at bedtime    MEDICATIONS  (PRN):  pantoprazole    Tablet 40 milliGRAM(s) Oral before breakfast PRN for heartburn      FAMILY HISTORY:  Family history of thrombosis (Mother)        SOCIAL HISTORY:    CIGARETTES:    ALCOHOL:    REVIEW OF SYSTEMS:  CONSTITUTIONAL: No fever, weight loss, or fatigue  EYES: No eye pain, visual disturbances, or discharge  ENMT:  No difficulty hearing, tinnitus, vertigo; No sinus or throat pain  NECK: No pain or stiffness  RESPIRATORY: No cough, wheezing, chills or hemoptysis; No shortness of breath  CARDIOVASCULAR: No chest pain, palpitations, dizziness, or leg swelling  GASTROINTESTINAL: No abdominal or epigastric pain. No nausea, vomiting, or hematemesis; No diarrhea or constipation. No melena or hematochezia.  GENITOURINARY: No dysuria, frequency, hematuria, or incontinence  NEUROLOGICAL: No headaches, memory loss, loss of strength, numbness, or tremors  SKIN: No itching, burning, rashes, or lesions   LYMPH NODES: No enlarged glands  ENDOCRINE: No heat or cold intolerance; No hair loss  MUSCULOSKELETAL: No joint pain or swelling; No muscle, back, or extremity pain  PSYCHIATRIC: No depression, anxiety, mood swings, or difficulty sleeping  HEME/LYMPH: No easy bruising, or bleeding gums  ALLERY AND IMMUNOLOGIC: No hives or eczema    Vital Signs Last 24 Hrs  T(C): 36.6 (15 Nov 2024 20:00), Max: 36.6 (15 Nov 2024 20:00)  T(F): 97.8 (15 Nov 2024 20:00), Max: 97.8 (15 Nov 2024 20:00)  HR: 81 (15 Nov 2024 20:) (62 - 97)  BP: 148/83 (15 Nov 2024 20:) (118/76 - 148/83)  BP(mean): 100 (15 Nov 2024 20:) (85 - 108)  RR: 20 (15 Nov 2024 20:) (15 - 33)  SpO2: 98% (15 Nov 2024 20:) (94% - 98%)    Parameters below as of 15 Nov 2024 20:00  Patient On (Oxygen Delivery Method): room air          PHYSICAL EXAM:  GENERAL: NAD, well-groomed, well-developed  HEAD:  Atraumatic, Normocephalic  EYES: EOMI, PERRLA, conjunctiva and sclera clear  ENMT: No tonsillar erythema, exudates, or enlargement; Moist mucous membranes, Good dentition, No lesions  NECK: Supple, No JVD, Normal thyroid  NERVOUS SYSTEM:  Alert & Oriented X3, Good concentration; Motor Strength 5/5 B/L upper and lower extremities; DTRs 2+ intact and symmetric  CHEST/LUNG: Clear to percussion bilaterally; No rales, rhonchi, wheezing, or rubs  HEART: Regular rate and rhythm; No murmurs, rubs, or gallops  ABDOMEN: Soft, Nontender, Nondistended; Bowel sounds present  EXTREMITIES:  2+ Peripheral Pulses, No clubbing, cyanosis, or edema  LYMPH: No lymphadenopathy noted  SKIN: No rashes or lesions      INTERPRETATION OF TELEMETRY:    [facility]     LABS:                        15.2   7.55  )-----------( 179      ( 15 Nov 2024 12:10 )             44.4     11-15    139  |  107  |  21[H]  ----------------------------<  277[H]  3.8   |  26  |  0.72    Ca    9.9      15 Nov 2024 12:10  Phos  2.3     -15  Mg     1.8     11-15    TPro  6.5  /  Alb  2.3[L]  /  TBili  0.6  /  DBili  x   /  AST  13  /  ALT  13  /  AlkPhos  85  11-15          Urinalysis Basic - ( 15 Nov 2024 12:10 )    Color: x / Appearance: x / SG: x / pH: x  Gluc: 277 mg/dL / Ketone: x  / Bili: x / Urobili: x   Blood: x / Protein: x / Nitrite: x   Leuk Esterase: x / RBC: x / WBC x   Sq Epi: x / Non Sq Epi: x / Bacteria: x      Lipid Panel:   I&O's Summary    2024 07:01  -  15 Nov 2024 07:00  --------------------------------------------------------  IN: 430 mL / OUT: 800 mL / NET: -370 mL    15 Nov 2024 07:01  -  15 Nov 2024 22:53  --------------------------------------------------------  IN: 0 mL / OUT: 550 mL / NET: -550 mL        RADIOLOGY & ADDITIONAL STUDIES:    < from: TTE W or WO Ultrasound Enhancing Agent (24 @ 07:53) >  CONCLUSIONS:      1. Left ventricular cavity is normal in size. Left ventricular wall thickness is mildly increased. Left ventricular systolic function is normal with an ejection fraction visually estimated at 55 to 60 %.   2. Analysis of left ventricular diastolic function and filling pressure is made challenging by the presence of atrial fibrillation.   3. Normal right ventricular cavity size and normal right ventricular systolic function.   4. Left atrium is mildly dilated.   5. The right atrium is dilated.   6. 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   7. Trileaflet aortic valve. Fibrocalcific aortic valve sclerosis without stenosis.   8. Mild aortic regurgitation.   9. Structurally normal mitral valve with normal leaflet excursion.  10. Trace mitral regurgitation.  11. Agitated saline injection was negative for intracardiac shunt.  12. Echo-free space is noted in the liver consistent with cyst, however, dedicated imaging recommended for further evaluation if clinically indicated.  13. Pulmonary artery systolic pressure could not be estimated.  14. No pericardial effusion seen.  15. No prior echocardiogram is available for comparison.    < end of copied text >

## 2024-11-15 NOTE — PROGRESS NOTE ADULT - PROBLEM SELECTOR PLAN 1
- Presented with an unwitnessed fall   - Hemodynamically stable and afebrile  - due to ?Eliquis failure  - Code Stroke in ED  - CT Head showing Acute evolving right caudate head/basal ganglia/corona radiata infarction. No evidence of hemorrhagic transformation.  - CTA Head and Neck showing Moderate stenosis of the right M1 segment   - MRI: Acute right MCA territory infarctions  - EKG A. Fib  - Admit to tele  - LDL: 92, A1c: 6.5%  - b/l LE US dopplers: neg  - passsed bedside dysphagia  - TTE: EF: 55-60%, neg intracardiac shunt    - c/w Asprin 81mg qd and Atorvastatin 80mg qd  - EEG completed, Given waxing and waning mental status, would recommend transfer to ICU for continuous EEG for at least 24 hours to screen for subclinical seizures and guide antiepileptic medication dosing, on PO depakote 500mg bid  - Dr. Leach neuro following  - PT Consulted - rec: MEG

## 2024-11-15 NOTE — CONSULT NOTE ADULT - SUBJECTIVE AND OBJECTIVE BOX
Patient is a 78y old  Female who presents with a chief complaint of acute stroke (15 Nov 2024 09:55)      HPI:  82-year-old female with a past medical history of atrial fibrillation on anticoagulation presents to the ED with slurred speech and apraxia after a fall yesterday.  Patient had unwitnessed fall at home yesterday. Denies head strike or loss of consciousness, but reports that she was unable to get herself up off the floor.  Son came to visit and helped patient off the ground and they then spent time with each other throughout with son reporting nothing out of the ordinary with patient neurologically. Patient's son stated that after leaving his mother he received a phone call from here where her speech was slurred and he attributed it to her just being tired. However,  today upon waking she again had slurred speech so son called EMS. Upon arrival to the ED patient was asymptomatic however due to concern for possible stroke given history, code stroke activated on arrival. CT scan showed oss of gray-whitedifferentiation in the right caudate suggesting acute infarct. On my assessment patient complained of increased effort of speech, depressed mood, and anxiety about blood clots. Complained of chronic pain and limited range of motion in right shoulder but denied any increase in weakness. During interview when patient appeared to be emotional she did point out that her left hand had begun to shake. Denied fever, chills, headache, dizziness, chest pain, palpitations, shortness of breath, abdominal pain, nausea, vomiting, diarrhea, constipation, and dysuria.  Endocrine consulted for dm management. Pt states her fsg is well controlled and admits to hypos on and off. On januvia/ metformin and glimiperide       In the ED:  T(F): , Max: 98.1 (13:52); HR:  (74 - 86); BP:  (145/72 - 158/82); RR:  (16 - 20); SpO2:  (96% - 98%)  WBC: 8.04, Hb.6, PLT: 165  Na: 141, K: 3.1, BUN: 22, sCr: 1.09      s/p potassium chloride    Tablet ER 40 milliEquivalent(s) Oral; potassium chloride  10 mEq/100 mL IVPB 10 milliEquivalent(s) IV Intermittent (2024 16:39)      PAST MEDICAL & SURGICAL HISTORY:  HTN (hypertension)      Atrial fibrillation      OA (osteoarthritis)      HLD (hyperlipidemia)      Diabetes      Obesity      Anxiety      Hypothyroidism      Peripheral venous insufficiency      No significant past surgical history             MEDICATIONS  (STANDING):  apixaban 5 milliGRAM(s) Oral every 12 hours  aspirin enteric coated 81 milliGRAM(s) Oral daily  atorvastatin 40 milliGRAM(s) Oral at bedtime  chlorhexidine 2% Cloths 1 Application(s) Topical <User Schedule>  diltiazem    milliGRAM(s) Oral daily  divalproex  milliGRAM(s) Oral two times a day  influenza  Vaccine (HIGH DOSE) 0.5 milliLiter(s) IntraMuscular once  insulin glargine Injectable (LANTUS) 8 Unit(s) SubCutaneous at bedtime  insulin lispro (ADMELOG) corrective regimen sliding scale   SubCutaneous three times a day before meals  insulin lispro (ADMELOG) corrective regimen sliding scale   SubCutaneous at bedtime  insulin lispro Injectable (ADMELOG) 2 Unit(s) SubCutaneous three times a day before meals  metoprolol tartrate 50 milliGRAM(s) Oral two times a day  senna 2 Tablet(s) Oral at bedtime    MEDICATIONS  (PRN):  pantoprazole    Tablet 40 milliGRAM(s) Oral before breakfast PRN for heartburn      FAMILY HISTORY:  Family history of thrombosis (Mother)        SOCIAL HISTORY:      REVIEW OF SYSTEMS:  CONSTITUTIONAL: No fever, weight loss, or fatigue  EYES: No eye pain, visual disturbances, or discharge  ENT:  No difficulty hearing, tinnitus, vertigo; No sinus or throat pain  NECK: No pain or stiffness  RESPIRATORY: No cough, wheezing, chills or hemoptysis; No Shortness of Breath  CARDIOVASCULAR: No chest pain, palpitations, passing out, dizziness, or leg swelling  GASTROINTESTINAL: No abdominal or epigastric pain. No nausea, vomiting, or hematemesis; No diarrhea or constipation. No melena or hematochezia.  GENITOURINARY: No dysuria, frequency, hematuria, or incontinence  NEUROLOGICAL: No headaches, memory loss, loss of strength, numbness, or tremors  SKIN: No itching, burning, rashes, or lesions   LYMPH Nodes: No enlarged glands  ENDOCRINE: No heat or cold intolerance; No hair loss  MUSCULOSKELETAL: No joint pain or swelling; No muscle, back, or extremity pain  PSYCHIATRIC: No depression, anxiety, mood swings, or difficulty sleeping  HEME/LYMPH: No easy bruising, or bleeding gums  ALLERGY AND IMMUNOLOGIC: No hives or eczema	        Vital Signs Last 24 Hrs  T(C): 36.5 (15 Nov 2024 11:01), Max: 36.9 (2024 16:14)  T(F): 97.7 (15 Nov 2024 11:01), Max: 98.5 (2024 16:14)  HR: 62 (15 Nov 2024 11:) (62 - 101)  BP: 118/76 (15 Nov 2024 11:) (118/76 - 141/83)  BP(mean): --  RR: 18 (15 Nov 2024 11:) (17 - 19)  SpO2: 97% (15 Nov 2024 11:) (94% - 98%)    Parameters below as of 15 Nov 2024 11:  Patient On (Oxygen Delivery Method): room air          Constitutional:    HEENT: nad    Neck:  No JVD, bruits or thyromegaly    Respiratory:  Clear without rales or rhonchi    Cardiovascular:  RR without murmur, rub or gallop.    Gastrointestinal: Soft without hepatosplenomegaly.    Extremities: without cyanosis, clubbing or edema.    Neurological:  Oriented   x  3    . No gross sensory or motor defects.        LABS:                        15.2   7.55  )-----------( 179      ( 15 Nov 2024 12:10 )             44.4     -15    139  |  107  |  21[H]  ----------------------------<  277[H]  3.8   |  26  |  0.72    Ca    9.9      15 Nov 2024 12:10  Phos  2.3     11-15  Mg     1.8     -15    TPro  6.5  /  Alb  2.3[L]  /  TBili  0.6  /  DBili  x   /  AST  13  /  ALT  13  /  AlkPhos  85  11-15          Urinalysis Basic - ( 15 Nov 2024 12:10 )    Color: x / Appearance: x / SG: x / pH: x  Gluc: 277 mg/dL / Ketone: x  / Bili: x / Urobili: x   Blood: x / Protein: x / Nitrite: x   Leuk Esterase: x / RBC: x / WBC x   Sq Epi: x / Non Sq Epi: x / Bacteria: x      CAPILLARY BLOOD GLUCOSE      POCT Blood Glucose.: 242 mg/dL (15 Nov 2024 11:20)  POCT Blood Glucose.: 226 mg/dL (15 Nov 2024 08:00)  POCT Blood Glucose.: 257 mg/dL (2024 21:25)  POCT Blood Glucose.: 198 mg/dL (2024 16:36)      RADIOLOGY & ADDITIONAL STUDIES:

## 2024-11-15 NOTE — CONSULT NOTE ADULT - SUBJECTIVE AND OBJECTIVE BOX
Patient is a 78y old  Female who presents with a chief complaint of acute stroke (15 Nov 2024 09:55)      HPI:  78-year-old female with a past medical history of atrial fibrillation on anticoagulation presents to the ED with slurred speech and apraxia after a fall yesterday.  Patient had unwitnessed fall at home yesterday. Denies head strike or loss of consciousness, but reports that she was unable to get herself up off the floor.  Son came to visit and helped patient off the ground and they then spent time with each other throughout with son reporting nothing out of the ordinary with patient neurologically. Patient's son stated that after leaving his mother he received a phone call from here where her speech was slurred and he attributed it to her just being tired. However,  today upon waking she again had slurred speech so son called EMS. Upon arrival to the ED patient was asymptomatic however due to concern for possible stroke given history, code stroke activated on arrival. CT scan showed oss of gray-whitedifferentiation in the right caudate suggesting acute infarct. On my assessment patient complained of increased effort of speech, depressed mood, and anxiety about blood clots. Complained of chronic pain and limited range of motion in right shoulder but denied any increase in weakness. During interview when patient appeared to be emotional she did point out that her left hand had begun to shake. Denied fever, chills, headache, dizziness, chest pain, palpitations, shortness of breath, abdominal pain, nausea, vomiting, diarrhea, constipation, and dysuria.      In the ED:  T(F): , Max: 98.1 (13:52); HR:  (74 - 86); BP:  (145/72 - 158/82); RR:  (16 - 20); SpO2:  (96% - 98%)  WBC: 8.04, Hb.6, PLT: 165  Na: 141, K: 3.1, BUN: 22, sCr: 1.09      s/p potassium chloride    Tablet ER 40 milliEquivalent(s) Oral; potassium chloride  10 mEq/100 mL IVPB 10 milliEquivalent(s) IV Intermittent (2024 16:39)      Foudn to have acute R MCA CVA on MRI brain. She had dysarthria, L sided hemiparesis 2/2 acute R subcortical infarct -- also with recent lethargy and hallucinations which are concerning for subclinical seizures that may be aggravated by UTI. Neuro Dr. Leach consulted. She was started on depakote. TTE was neg for thrombus/shunt.   Spot EEG completed, concern for potential nonconvulsive seizure contributing to her worsening mental status, started a  trial of Depakote (Valproate 1000mg IV load then PO depakote 500mg bid)  Neuro recommended 24 hr EEG. ICU consulted for 24 hr EEG.     On eval pt endorsed feeling sad and scared that she had a stroke, feels that she used to be independent and now everybody has to do everythign for her including put her on the toilet and she feels demoralized. Otherwise Denies dysuria, nausea, vomiting, diarrhea, abdominal pain, SOB, chest pain, ADAM, palpitations, dizziness, headache, cough, wheezing, joint pain or swelling, fever, chills. Continues to complain of left sided leg weakness.       PAST MEDICAL & SURGICAL HISTORY:  HTN (hypertension)      Atrial fibrillation      OA (osteoarthritis)      HLD (hyperlipidemia)      Diabetes      Obesity      Anxiety      Hypothyroidism      Peripheral venous insufficiency      No significant past surgical history          SOCIAL HX:   Smoking                         ETOH                            Other    FAMILY HISTORY:  Family history of thrombosis (Mother)    :  No known cardiovascular family history     ROS:  See HPI     Allergies    No Known Allergies    Intolerances          PHYSICAL EXAM    ICU Vital Signs Last 24 Hrs  T(C): 36.4 (15 Nov 2024 08:12), Max: 36.9 (2024 16:14)  T(F): 97.5 (15 Nov 2024 08:12), Max: 98.5 (2024 16:14)  HR: 81 (15 Nov 2024 08:12) (81 - 101)  BP: 127/92 (15 Nov 2024 08:12) (127/92 - 141/83)  BP(mean): --  ABP: --  ABP(mean): --  RR: 17 (15 Nov 2024 08:12) (17 - 19)  SpO2: 97% (15 Nov 2024 08:12) (94% - 99%)    O2 Parameters below as of 15 Nov 2024 08:12  Patient On (Oxygen Delivery Method): room air            General: Not in distress, sad affect  HEENT:  EOMI, no scleral icterus              Lymphatic system: No cervical LAD  Lungs: CTA b/l  Cardiovascular: RRR, no peripheral edema  Gastrointestinal: Soft, Positive BS, nontender  Musculoskeletal: No clubbing.  Moves all extremities.    Skin: Warm, stasis derm of b/l LE with xerosis up to shins  Neurological: AOx4, weak hand  b/l, but L >R, difficulty lifting L leg  : no suprapubic tenderness      24 @ 07:01  -  11-15-24 @ 07:00  --------------------------------------------------------  IN:    Oral Fluid: 430 mL  Total IN: 430 mL    OUT:    Voided (mL): 800 mL  Total OUT: 800 mL    Total NET: -370 mL          LABS:                          15.3   7.16  )-----------( 171      ( 15 Nov 2024 06:33 )             44.3                                               -15    138  |  105  |  19[H]  ----------------------------<  227[H]  3.7   |  28  |  0.70    Ca    10.2      15 Nov 2024 06:33  Phos  1.9     -15  Mg     2.0     11-15    TPro  6.5  /  Alb  2.2[L]  /  TBili  0.6  /  DBili  x   /  AST  14  /  ALT  14  /  AlkPhos  87  11-15                                             Urinalysis Basic - ( 15 Nov 2024 06:33 )    Color: x / Appearance: x / SG: x / pH: x  Gluc: 227 mg/dL / Ketone: x  / Bili: x / Urobili: x   Blood: x / Protein: x / Nitrite: x   Leuk Esterase: x / RBC: x / WBC x   Sq Epi: x / Non Sq Epi: x / Bacteria: x                                                  LIVER FUNCTIONS - ( 15 Nov 2024 06:33 )  Alb: 2.2 g/dL / Pro: 6.5 g/dL / ALK PHOS: 87 U/L / ALT: 14 U/L DA / AST: 14 U/L / GGT: x                                                                                                                                       CXR:    ECHO:    MEDICATIONS  (STANDING):  apixaban 5 milliGRAM(s) Oral every 12 hours  aspirin enteric coated 81 milliGRAM(s) Oral daily  atorvastatin 40 milliGRAM(s) Oral at bedtime  diltiazem    milliGRAM(s) Oral daily  divalproex  milliGRAM(s) Oral two times a day  influenza  Vaccine (HIGH DOSE) 0.5 milliLiter(s) IntraMuscular once  insulin lispro (ADMELOG) corrective regimen sliding scale   SubCutaneous three times a day before meals  insulin lispro (ADMELOG) corrective regimen sliding scale   SubCutaneous at bedtime  lactated ringers. 1000 milliLiter(s) (60 mL/Hr) IV Continuous <Continuous>  metoprolol tartrate 50 milliGRAM(s) Oral two times a day  senna 2 Tablet(s) Oral at bedtime    MEDICATIONS  (PRN):  pantoprazole    Tablet 40 milliGRAM(s) Oral before breakfast PRN for heartburn

## 2024-11-15 NOTE — CONSULT NOTE ADULT - ATTENDING COMMENTS
79yo woman w/ PMH HTN, Afib on Eliquis, DM2, hypothyroidism admitted to medicine for acute R MCA CVA resulting in left sided deficits w/ dysarthria; hospital course c/b waxing and waning mental status/encephalopathy for which neurology service suggested continuous vEEG monitoring to r/o subclinical seizures. Transferred to ICU for vEEG.     ASSESSMENT:  #Acute metabolic encephalopathy  #R/O subclinical seizures  #Acute right MCA CVA  #Afib  #HTN  #DM  #HLD    Plan:  - transfer to ICU  - apply continuous vEEG x 24hrs per neurology recommendations  - AED dosing per neurology  - f/u additional neuro recommendations  - seizure precautions  - aspiration precautions  - continue rate control for Afib  - home antihypertensives  - continue statin, ASA  - FSG monitoring and basal/bolus/ISS coverage to ensure glycemic control  - bowel regimen  - cont Eliquis for Afib  - replete electrolytes as appropriate  - DVT PPx covered by Eliquis  - full code status 79yo woman w/ PMH HTN, Afib on Eliquis, DM2, hypothyroidism admitted to medicine for acute R MCA CVA resulting in left sided deficits w/ dysarthria; hospital course c/b waxing and waning mental status/encephalopathy for which neurology service suggested continuous vEEG monitoring to r/o subclinical seizures. Transferred to ICU for vEEG.     ASSESSMENT:  #Acute metabolic encephalopathy  #R/O subclinical seizures  #Acute right MCA CVA  #Afib  #HTN  #DM  #HLD    Plan:  - transfer to ICU  - apply continuous vEEG x 24hrs per neurology recommendations  - AED dosing per neurology  - f/u additional neuro recommendations  - seizure precautions  - aspiration precautions  - continue rate control for Afib  - home antihypertensives  - continue statin, ASA  - FSG monitoring and basal/bolus/ISS coverage to ensure glycemic control  - bowel regimen  - cont Eliquis for Afib  - replete electrolytes as appropriate  - carb consistent oral diet  - DVT PPx covered by Eliquis  - full code status

## 2024-11-15 NOTE — CONSULT NOTE ADULT - ASSESSMENT
82-year-old female with a past medical history of atrial fibrillation on anticoagulation presents to the ED with slurred speech and apraxia after a fall yesterday.  Found to have CVA  Endocrine consulted for dm management. Pt states her fsg is well controlled and admits to hypos on and off. On januvia/ metformin and glimiperide

## 2024-11-16 LAB
ALBUMIN SERPL ELPH-MCNC: 2.3 G/DL — LOW (ref 3.5–5)
ALP SERPL-CCNC: 93 U/L — SIGNIFICANT CHANGE UP (ref 40–120)
ALT FLD-CCNC: 13 U/L DA — SIGNIFICANT CHANGE UP (ref 10–60)
ANION GAP SERPL CALC-SCNC: 7 MMOL/L — SIGNIFICANT CHANGE UP (ref 5–17)
AST SERPL-CCNC: 12 U/L — SIGNIFICANT CHANGE UP (ref 10–40)
BASOPHILS # BLD AUTO: 0.03 K/UL — SIGNIFICANT CHANGE UP (ref 0–0.2)
BASOPHILS NFR BLD AUTO: 0.4 % — SIGNIFICANT CHANGE UP (ref 0–2)
BILIRUB SERPL-MCNC: 0.7 MG/DL — SIGNIFICANT CHANGE UP (ref 0.2–1.2)
BUN SERPL-MCNC: 17 MG/DL — SIGNIFICANT CHANGE UP (ref 7–18)
CALCIUM SERPL-MCNC: 10 MG/DL — SIGNIFICANT CHANGE UP (ref 8.4–10.5)
CHLORIDE SERPL-SCNC: 107 MMOL/L — SIGNIFICANT CHANGE UP (ref 96–108)
CO2 SERPL-SCNC: 25 MMOL/L — SIGNIFICANT CHANGE UP (ref 22–31)
CREAT SERPL-MCNC: 0.6 MG/DL — SIGNIFICANT CHANGE UP (ref 0.5–1.3)
EGFR: 92 ML/MIN/1.73M2 — SIGNIFICANT CHANGE UP
EOSINOPHIL # BLD AUTO: 0.14 K/UL — SIGNIFICANT CHANGE UP (ref 0–0.5)
EOSINOPHIL NFR BLD AUTO: 1.9 % — SIGNIFICANT CHANGE UP (ref 0–6)
GLUCOSE BLDC GLUCOMTR-MCNC: 147 MG/DL — HIGH (ref 70–99)
GLUCOSE BLDC GLUCOMTR-MCNC: 198 MG/DL — HIGH (ref 70–99)
GLUCOSE BLDC GLUCOMTR-MCNC: 210 MG/DL — HIGH (ref 70–99)
GLUCOSE BLDC GLUCOMTR-MCNC: 223 MG/DL — HIGH (ref 70–99)
GLUCOSE SERPL-MCNC: 236 MG/DL — HIGH (ref 70–99)
HCT VFR BLD CALC: 45.5 % — HIGH (ref 34.5–45)
HGB BLD-MCNC: 15.6 G/DL — HIGH (ref 11.5–15.5)
IMM GRANULOCYTES NFR BLD AUTO: 0.3 % — SIGNIFICANT CHANGE UP (ref 0–0.9)
LDH SERPL L TO P-CCNC: 172 U/L — SIGNIFICANT CHANGE UP (ref 120–225)
LYMPHOCYTES # BLD AUTO: 1.42 K/UL — SIGNIFICANT CHANGE UP (ref 1–3.3)
LYMPHOCYTES # BLD AUTO: 19.6 % — SIGNIFICANT CHANGE UP (ref 13–44)
MAGNESIUM SERPL-MCNC: 1.7 MG/DL — SIGNIFICANT CHANGE UP (ref 1.6–2.6)
MCHC RBC-ENTMCNC: 32.3 PG — SIGNIFICANT CHANGE UP (ref 27–34)
MCHC RBC-ENTMCNC: 34.3 G/DL — SIGNIFICANT CHANGE UP (ref 32–36)
MCV RBC AUTO: 94.2 FL — SIGNIFICANT CHANGE UP (ref 80–100)
MONOCYTES # BLD AUTO: 0.68 K/UL — SIGNIFICANT CHANGE UP (ref 0–0.9)
MONOCYTES NFR BLD AUTO: 9.4 % — SIGNIFICANT CHANGE UP (ref 2–14)
MRSA PCR RESULT.: SIGNIFICANT CHANGE UP
NEUTROPHILS # BLD AUTO: 4.95 K/UL — SIGNIFICANT CHANGE UP (ref 1.8–7.4)
NEUTROPHILS NFR BLD AUTO: 68.4 % — SIGNIFICANT CHANGE UP (ref 43–77)
NRBC # BLD: 0 /100 WBCS — SIGNIFICANT CHANGE UP (ref 0–0)
PHOSPHATE SERPL-MCNC: 1.8 MG/DL — LOW (ref 2.5–4.5)
PLATELET # BLD AUTO: 193 K/UL — SIGNIFICANT CHANGE UP (ref 150–400)
POTASSIUM SERPL-MCNC: 3.6 MMOL/L — SIGNIFICANT CHANGE UP (ref 3.5–5.3)
POTASSIUM SERPL-SCNC: 3.6 MMOL/L — SIGNIFICANT CHANGE UP (ref 3.5–5.3)
PROT SERPL-MCNC: 6.9 G/DL — SIGNIFICANT CHANGE UP (ref 6–8.3)
RBC # BLD: 4.83 M/UL — SIGNIFICANT CHANGE UP (ref 3.8–5.2)
RBC # FLD: 12.8 % — SIGNIFICANT CHANGE UP (ref 10.3–14.5)
S AUREUS DNA NOSE QL NAA+PROBE: SIGNIFICANT CHANGE UP
SODIUM SERPL-SCNC: 139 MMOL/L — SIGNIFICANT CHANGE UP (ref 135–145)
VALPROATE SERPL-MCNC: 80 UG/ML — SIGNIFICANT CHANGE UP (ref 50–100)
WBC # BLD: 7.24 K/UL — SIGNIFICANT CHANGE UP (ref 3.8–10.5)
WBC # FLD AUTO: 7.24 K/UL — SIGNIFICANT CHANGE UP (ref 3.8–10.5)

## 2024-11-16 PROCEDURE — 99291 CRITICAL CARE FIRST HOUR: CPT

## 2024-11-16 PROCEDURE — 99233 SBSQ HOSP IP/OBS HIGH 50: CPT | Mod: GC

## 2024-11-16 RX ORDER — SODIUM,POTASSIUM PHOSPHATES 278-250MG
1 POWDER IN PACKET (EA) ORAL ONCE
Refills: 0 | Status: COMPLETED | OUTPATIENT
Start: 2024-11-16 | End: 2024-11-16

## 2024-11-16 RX ADMIN — Medication 2 TABLET(S): at 21:49

## 2024-11-16 RX ADMIN — METOPROLOL TARTRATE 50 MILLIGRAM(S): 100 TABLET, FILM COATED ORAL at 17:59

## 2024-11-16 RX ADMIN — METOPROLOL TARTRATE 50 MILLIGRAM(S): 100 TABLET, FILM COATED ORAL at 05:20

## 2024-11-16 RX ADMIN — DILTIAZEM HYDROCHLORIDE 180 MILLIGRAM(S): 240 CAPSULE, COATED, EXTENDED RELEASE ORAL at 05:20

## 2024-11-16 RX ADMIN — Medication 2: at 15:35

## 2024-11-16 RX ADMIN — Medication 40 MILLIGRAM(S): at 21:49

## 2024-11-16 RX ADMIN — Medication 1 PACKET(S): at 05:20

## 2024-11-16 RX ADMIN — Medication 500 MILLIGRAM(S): at 17:59

## 2024-11-16 RX ADMIN — Medication 2 UNIT(S): at 07:21

## 2024-11-16 RX ADMIN — Medication 1: at 12:38

## 2024-11-16 RX ADMIN — APIXABAN 5 MILLIGRAM(S): 2.5 TABLET, FILM COATED ORAL at 05:20

## 2024-11-16 RX ADMIN — APIXABAN 5 MILLIGRAM(S): 2.5 TABLET, FILM COATED ORAL at 17:59

## 2024-11-16 RX ADMIN — Medication 2: at 07:20

## 2024-11-16 RX ADMIN — Medication 3 UNIT(S): at 12:38

## 2024-11-16 RX ADMIN — CHLORHEXIDINE GLUCONATE 1 APPLICATION(S): 1.2 RINSE ORAL at 05:21

## 2024-11-16 RX ADMIN — Medication 500 MILLIGRAM(S): at 05:21

## 2024-11-16 RX ADMIN — Medication 81 MILLIGRAM(S): at 12:43

## 2024-11-16 RX ADMIN — INSULIN GLARGINE 8 UNIT(S): 100 INJECTION, SOLUTION SUBCUTANEOUS at 21:50

## 2024-11-16 RX ADMIN — Medication 3 UNIT(S): at 15:36

## 2024-11-16 NOTE — PROGRESS NOTE ADULT - ASSESSMENT
Assessment:   78F with HTN, AF on Eliquis, OA, HLD, DM, anxiety, hypothyroid, peripheral venous insufficiency, admitted with acute R MCA CVA resulting in dysarthria and L sided deficits, concern for subclinical seizures, ICU consulted for 24 hr EEG.      Plan:     *****NEURO*****  #CVA   - Presented with an unwitnessed fall   - Hemodynamically stable and afebrile  - Code Stroke in ED  - CT Head showing Acute evolving right caudate head/basal ganglia/corona radiata infarction. No evidence of hemorrhagic transformation.  - CTA Head and Neck showing Moderate stenosis of the right M1 segment   - MRI: Acute right MCA territory infarctions  - EKG A. Fib  - LDL: 92, A1c: 6.5%  - b/l LE US dopplers: neg  - passsed bedside dysphagia  - TTE: EF: 55-60%, neg intracardiac shunt  - c/w Asprin 81mg qd and Atorvastatin 80mg qd  - Dr. Leach neuro following  - PT Consulted - rec: MEG.    #seizures?  -lethargy, suspicion for nonconvulsive seizure  - spot EEG completed, concern for potential nonconvulsive seizure contributing to her worsening mental status, started a  trial of Depakote (Valproate 1000mg IV load then PO depakote 500mg bid) -  f/u formal note with recs from neuro  -cont anticonvulsants as per neuro  -24 hr EEG    *****CARDIO*****  #AFib  - EKG showing A. Fib  - Continue home Cardizem, Metoprolol, and Eliquis    #HTN  -h/o HTN on home Cardizem 180mg qd and metoprolol ER 200mg  -c/w home meds of Cardizem 180mg qd   -c/w Metoprolol tartrate 50mg BID with holding parameters   -monitor BP  -adjust antihypertensive meds as appropriate.    #PVD  -dopplers eng for DVT    *****PULM*****  No issues  On RA    -    *****GI*****  No issues    -    *****RENAL*****  #UTI  -U/a +  -Cx showing E. coli  -finished 5 day course of CTX    -    *****INFECTIOUS*****  #UTI  0-as above    -     *****HEMO*****  No issues    -    *****ENDO*****  #DM  -home metformin, jardiance, januvia  -currently low dose correctional scale insulin only  -sugars suboptimally controlled, fasting glucose 228, going to up 270s post prandial  -start lantus 3 units, premeal 2 units, continue MARTY    #hypothyroid  -not on synthroid  -TSH 2.74      #HLD  -cont statin    -    *****MSK/SKIN*****  #xerosis  #stasis derm  -moisturize lower extremities  -    *****PPX*****  -PPI  -eliquis      *****Dispo/Ethics*****  accepted to ICU for 24 hour EEG

## 2024-11-16 NOTE — PROGRESS NOTE ADULT - ATTENDING COMMENTS
79yo woman w/ PMH HTN, Afib on Eliquis, DM2, hypothyroidism admitted to medicine for acute R MCA CVA resulting in left sided deficits w/ dysarthria; hospital course c/b waxing and waning mental status/encephalopathy for which neurology service suggested continuous vEEG monitoring to r/o subclinical seizures. Transferred to ICU for vEEG.     ASSESSMENT:  #Acute metabolic encephalopathy  #R/O subclinical seizures  #Acute right MCA CVA  #Afib  #HTN  #DM  #HLD    Plan:  - vEEG per neuro recs; f/u when to discontinue study anticipated today  - AED dosing per neurology  - f/u additional neuro recommendations  - seizure precautions  - aspiration precautions  - continue rate control for Afib  - home antihypertensives  - continue statin, ASA  - FSG monitoring and basal/bolus/ISS coverage to ensure glycemic control  - bowel regimen  - cont Eliquis for Afib  - replete electrolytes as appropriate  - IVF as necessary for hydration as suspect pt with insufficient oral intake thus far  - carb consistent oral diet  - DVT PPx covered by Eliquis  - full code status    Transfer to medicine

## 2024-11-16 NOTE — PROGRESS NOTE ADULT - SUBJECTIVE AND OBJECTIVE BOX
NEUROLOGY FOLLOW-UP NOTE    NAME:  ROMI MONZON      ASSESSMENT:  78 RHF with atrial fibrillation on therapeutic anticoagulation with new dysarthria and left-sided hemiparesis, secondary to acute right subcortical infarct, also with recent lethargy and hallucinations, concerning for subclinical seizures aggravated by urinary tract infection      RECOMMENDATIONS:    1. Stroke/Seizure workup  - Telemetry monitoring while inpatient  - Continuous EEG shows bifrontal sharp waves but no subclinical seizures or status epilepticus - study has been discontinued  - Continue infectious and metabolic workup and treat as appropriate    2. Secondary stroke prevention / Seizure prophylaxis  - Q4H Neurochecks & Vital signs  - Patient has passed a bedside swallow evaluation  - Continue home Apixaban 5mg PO BID  - Continue Aspirin EC 81mg PO Daily  - Continue Atorvastatin 40mg PO QHS (goal LDL < 70 mg/dL)  - Treat BP if over 140/90 (goal /80) - No role for permissive hypertension at this time, Maintain home antihypertensive medications  - Continue Divalproex DR 500mg PO TID for seizure prophylaxis - No dosage change needed given normal trough Valproic acid level today  - Diabetes management as per primary team and Endocrinology consult       - PT/OT  - DVT ppx: SCDs, Apixaban          NOTE TO BE COMPLETED - PLEASE REFER TO ABOVE ONLY AND IGNORE INFORMATION BELOW    ******************************    HPI:  82-year-old female with a past medical history of atrial fibrillation on anticoagulation presents to the ED with slurred speech and apraxia after a fall yesterday.  Patient had unwitnessed fall at home yesterday. Denies head strike or loss of consciousness, but reports that she was unable to get herself up off the floor.  Son came to visit and helped patient off the ground and they then spent time with each other throughout with son reporting nothing out of the ordinary with patient neurologically. Patient's son stated that after leaving his mother he received a phone call from here where her speech was slurred and he attributed it to her just being tired. However,  today upon waking she again had slurred speech so son called EMS. Upon arrival to the ED patient was asymptomatic however due to concern for possible stroke given history, code stroke activated on arrival. CT scan showed oss of gray-whitedifferentiation in the right caudate suggesting acute infarct. On my assessment patient complained of increased effort of speech, depressed mood, and anxiety about blood clots. Complained of chronic pain and limited range of motion in right shoulder but denied any increase in weakness. During interview when patient appeared to be emotional she did point out that her left hand had begun to shake. Denied fever, chills, headache, dizziness, chest pain, palpitations, shortness of breath, abdominal pain, nausea, vomiting, diarrhea, constipation, and dysuria.      In the ED:  T(F): , Max: 98.1 (13:52); HR:  (74 - 86); BP:  (145/72 - 158/82); RR:  (16 - 20); SpO2:  (96% - 98%)  WBC: 8.04, Hb.6, PLT: 165  Na: 141, K: 3.1, BUN: 22, sCr: 1.09      s/p potassium chloride    Tablet ER 40 milliEquivalent(s) Oral; potassium chloride  10 mEq/100 mL IVPB 10 milliEquivalent(s) IV Intermittent (2024 16:39)      NEURO HPI:      INTERVAL HISTORY:      MEDICATIONS:  apixaban 5 milliGRAM(s) Oral every 12 hours  aspirin enteric coated 81 milliGRAM(s) Oral daily  atorvastatin 40 milliGRAM(s) Oral at bedtime  chlorhexidine 2% Cloths 1 Application(s) Topical <User Schedule>  diltiazem    milliGRAM(s) Oral daily  divalproex  milliGRAM(s) Oral two times a day  influenza  Vaccine (HIGH DOSE) 0.5 milliLiter(s) IntraMuscular once  insulin glargine Injectable (LANTUS) 8 Unit(s) SubCutaneous at bedtime  insulin lispro (ADMELOG) corrective regimen sliding scale   SubCutaneous three times a day before meals  insulin lispro (ADMELOG) corrective regimen sliding scale   SubCutaneous at bedtime  insulin lispro Injectable (ADMELOG) 3 Unit(s) SubCutaneous three times a day before meals  metoprolol tartrate 50 milliGRAM(s) Oral two times a day  pantoprazole    Tablet 40 milliGRAM(s) Oral before breakfast PRN  senna 2 Tablet(s) Oral at bedtime      ALLERGIES:  No Known Allergies      REVIEW OF SYSTEMS:  Fourteen systems reviewed and negative except as in HPI / Interval History.          OBJECTIVE:  Vital Signs Last 24 Hrs  T(C): 36.8 (2024 15:22), Max: 36.8 (2024 15:22)  T(F): 98.2 (2024 15:22), Max: 98.2 (2024 15:22)  HR: 98 (2024 18:00) (72 - 117)  BP: 125/91 (2024 18:00) (121/65 - 159/91)  BP(mean): 99 (2024 18:00) (83 - 117)  RR: 31 (2024 18:00) (20 - 35)  SpO2: 97% (2024 18:00) (93% - 99%)    Parameters below as of 2024 04:00  Patient On (Oxygen Delivery Method): room air        General Examination:  General: No acute distress  HEENT: Atraumatic, Normocephalic  Respiratory: CTA B/l.  No crackles, rhonchi, or wheezes.  Cardiovascular: RRR.  Normal S1 & S2.  Normal b/l radial and pedal pulses.    Neurological Examination:  General / Mental Status: AAO x 3.  No aphasia or dysarthria.  Naming and repetition intact.  Cranial Nerves: VFF x 4.  PERRL.  EOMI x 2, No nystagmus or diplopia.  B/l V1-V3 equal and intact to light touch and pinprick.  Symmetric facial movement and palate elevation.  B/l hearing equal to finger rub.  5/5 strength with b/l sternocleidomastoid and trapezius.  Midline tongue protrusion, with no atrophy or fasciculations.  Motor: Normal bulk & tone in all four extremities.  5/5 strength throughout all four extremities.  No downward drift, rigidity, spasticity, or tremors in any of the four extremities.  Sensory: Intact to light touch and pinprick in all four extremities.  Negative Romberg.  Reflex: 2+ and symmetric at b/l biceps, triceps, brachioradialis, patellae, and ankles.  Downgoing toes b/l.  Coordination: No dysmetria with b/l finger-to-nose and heel raise tests.  Symmetric rapid alternating movements b/l.  Gait: Normal, narrow-based gait.  No difficulty with tiptoe, heel, and tandem gaits.        LABORATORY VALUES:                          15.6   7.24  )-----------( 193      ( 2024 03:26 )             45.5       -    139  |  107  |  17  ----------------------------<  236[H]  3.6   |  25  |  0.60    Ca    10.0      2024 03:26  Phos  1.8       Mg     1.7         TPro  6.9  /  Alb  2.3[L]  /  TBili  0.7  /  DBili  x   /  AST  12  /  ALT  13  /  AlkPhos  93        LIVER FUNCTIONS - ( 2024 03:26 )  Alb: 2.3 g/dL / Pro: 6.9 g/dL / ALK PHOS: 93 U/L / ALT: 13 U/L DA / AST: 12 U/L / GGT: x              Chol 153 LDL -- HDL 48[L] Trig 67    Glucose Trend  24 @ 15:27   -  -- -- 210[H]  24 @ 11:41   -  -- -- 198[H]  24 @ 07:03   -  -- -- 223[H]  24 @ 03:26   -  236[H] -- --  11-15-24 @ 21:53   -  -- -- 236[H]  11-15-24 @ 16:19   -  -- -- 184[H]  11-15-24 @ 12:10   -  277[H] -- --  11-15-24 @ 11:20   -  -- -- 242[H]  11-15-24 @ 08:00   -  -- -- 226[H]  11-15-24 @ 06:33   -  227[H] -- --                    NEUROIMAGING:          Please contact the Neurology consult service with any neurological questions.      Stephen Leach MD   of Neurology  [facility] [employer] [facility]

## 2024-11-16 NOTE — PROGRESS NOTE ADULT - ASSESSMENT
Assessment:   78F with HTN, AF on Eliquis, OA, HLD, DM, anxiety, hypothyroid, peripheral venous insufficiency, admitted with acute R MCA CVA resulting in dysarthria and L sided deficits, concern for subclinical seizures, ICU consulted for 24 hr EEG.      Plan:     *****NEURO*****  #CVA   - Presented with an unwitnessed fall   - Hemodynamically stable and afebrile  - Code Stroke in ED  - CT Head showing Acute evolving right caudate head/basal ganglia/corona radiata infarction. No evidence of hemorrhagic transformation.  - CTA Head and Neck showing Moderate stenosis of the right M1 segment   - MRI: Acute right MCA territory infarctions  - EKG A. Fib  - LDL: 92, A1c: 6.5%  - b/l LE US dopplers: neg  - passsed bedside dysphagia  - TTE: EF: 55-60%, neg intracardiac shunt  - c/w Asprin 81mg qd and Atorvastatin 80mg qd  - Dr. Leach neuro following  - PT Consulted - rec: MEG.    #seizures?  -lethargy, suspicion for nonconvulsive seizure  - spot EEG completed, concern for potential nonconvulsive seizure contributing to her worsening mental status, started a  trial of Depakote (Valproate 1000mg IV load then PO depakote 500mg bid) -  f/u formal note with recs from neuro  -cont anticonvulsants as per neuro  -24 hr EEG    *****CARDIO*****  #AFib  - EKG showing A. Fib  - Continue home Cardizem, Metoprolol, and Eliquis    #HTN  -h/o HTN on home Cardizem 180mg qd and metoprolol ER 200mg  -c/w home meds of Cardizem 180mg qd   -c/w Metoprolol tartrate 50mg BID with holding parameters   -monitor BP  -adjust antihypertensive meds as appropriate.    #PVD  -dopplers eng for DVT    *****PULM*****  No issues  On RA    -    *****GI*****  No issues    -    *****RENAL*****  #UTI  -U/a +  -Cx showing E. coli  -finished 5 day course of CTX    -    *****INFECTIOUS*****  #UTI  0-as above    -     *****HEMO*****  No issues    -    *****ENDO*****  #DM  -home metformin, jardiance, januvia  -currently low dose correctional scale insulin only  -sugars suboptimally controlled, fasting glucose 228, going to up 270s post prandial  -start lantus 3 units, premeal 2 units, continue MARTY    #hypothyroid  -not on synthroid  -TSH 2.74      #HLD  -cont statin    -    *****MSK/SKIN*****  #xerosis  #stasis derm  -moisturize lower extremities  -    *****PPX*****  -PPI  -eliquis      *****Dispo/Ethics*****  accepted to ICU for 24 hour EEG Assessment:   78F with HTN, AF on Eliquis, OA, HLD, DM, anxiety, hypothyroid, peripheral venous insufficiency, admitted with acute R MCA CVA resulting in dysarthria and L sided deficits, concern for subclinical seizures, ICU consulted for 24 hr EEG.      Plan:     *****NEURO*****  #CVA  - Code Stroke in ED  - CT Head showing Acute evolving right caudate head/basal ganglia/corona radiata infarction. No evidence of hemorrhagic transformation.  - CTA Head and Neck showing Moderate stenosis of the right M1 segment   - MRI: Acute right MCA territory infarctions  - EKG A. Fib  - LDL: 92, A1c: 6.5%  - b/l LE US dopplers: neg  - c/w Asprin 81mg qd and Atorvastatin 80mg qd  - Dr. Leach neuro following  - PT Consulted - rec: MEG.    #seizures?  - lethargy, suspicion for nonconvulsive seizure  - spot EEG completed, concern for potential nonconvulsive seizure contributing to her worsening mental status, started a trial of Depakote (Valproate 1000mg IV load then PO depakote 500mg bid) -  f/u formal note with recs from neuro  - cont anticonvulsants as per neuro  - 24 hr EEG w/ no epileptiform discharges, will d/c per neuro  - cont depakote per neuro recs  - appreciate neuro recs    *****CARDIO*****  #AFib  - EKG showing A. Fib  - TTE: EF: 55-60%, neg intracardiac shunt  - Continue home Cardizem, Metoprolol, and Eliquis    #HTN  -h/o HTN on home Cardizem 180mg qd and metoprolol ER 200mg  -c/w home meds of Cardizem 180mg qd   -c/w Metoprolol tartrate 50mg BID with holding parameters   -monitor BP  -adjust antihypertensive meds as appropriate.    #PVD  -dopplers eng for DVT    *****PULM*****  No issues  On RA    -    *****GI*****  No issues    -    *****RENAL*****  #UTI  -U/a +  -Cx showing E. coli  -finished 5 day course of CTX    -    *****INFECTIOUS*****  #UTI  0-as above    -     *****HEMO*****  No issues    -    *****ENDO*****  #DM  -home metformin, jardiance, januvia  -currently low dose correctional scale insulin only  -sugars suboptimally controlled, fasting glucose 228, going to up 270s post prandial  -start lantus 8 units, premeal 3 units, continue MARTY    #hypothyroid  -not on synthroid  -TSH 2.74      #HLD  -cont statin    -    *****MSK/SKIN*****  #xerosis  #stasis derm  -moisturize lower extremities  -    *****PPX*****  -PPI  -eliquis      *****Dispo/Ethics*****  accepted to ICU for 24 hour EEG

## 2024-11-16 NOTE — PROGRESS NOTE ADULT - SUBJECTIVE AND OBJECTIVE BOX
INTERVAL HPI/OVERNIGHT EVENTS:     PRESSORS: [ ] YES [ ] NO  WHICH:    ANTIBIOTICS:                  DATE STARTED:  ANTIBIOTICS:                  DATE STARTED:  ANTIBIOTICS:                  DATE STARTED:    Antimicrobial:    Cardiovascular:  diltiazem    milliGRAM(s) Oral daily  metoprolol tartrate 50 milliGRAM(s) Oral two times a day    Pulmonary:    Hematalogic:  apixaban 5 milliGRAM(s) Oral every 12 hours  aspirin enteric coated 81 milliGRAM(s) Oral daily    Other:  atorvastatin 40 milliGRAM(s) Oral at bedtime  chlorhexidine 2% Cloths 1 Application(s) Topical <User Schedule>  divalproex  milliGRAM(s) Oral two times a day  influenza  Vaccine (HIGH DOSE) 0.5 milliLiter(s) IntraMuscular once  insulin glargine Injectable (LANTUS) 8 Unit(s) SubCutaneous at bedtime  insulin lispro (ADMELOG) corrective regimen sliding scale   SubCutaneous three times a day before meals  insulin lispro (ADMELOG) corrective regimen sliding scale   SubCutaneous at bedtime  insulin lispro Injectable (ADMELOG) 2 Unit(s) SubCutaneous three times a day before meals  pantoprazole    Tablet 40 milliGRAM(s) Oral before breakfast PRN  senna 2 Tablet(s) Oral at bedtime      Drug Dosing Weight  Height (cm): 152.4 (19 Dec 2023 14:40)  Weight (kg): 78 (08 Nov 2024 10:39)  BMI (kg/m2): 33.6 (08 Nov 2024 10:39)  BSA (m2): 1.75 (08 Nov 2024 10:39)    CENTRAL LINE: [ ] YES [ ] NO  LOCATION:   DATE INSERTED:  REMOVE: [ ] YES [ ] NO  EXPLAIN:    PERDOMO: [ ] YES [ ] NO    DATE INSERTED:  REMOVE:  [ ] YES [ ] NO  EXPLAIN:    A-LINE:  [ ] YES [ ] NO  LOCATION:   DATE INSERTED:  REMOVE:  [ ] YES [ ] NO  EXPLAIN:    PMH/Social Hx/Fam Hx -reviewed admission note, no change since admission  PAST MEDICAL & SURGICAL HISTORY:  HTN (hypertension)      Atrial fibrillation      OA (osteoarthritis)      HLD (hyperlipidemia)      Diabetes      Obesity      Anxiety      Hypothyroidism      Peripheral venous insufficiency      No significant past surgical history        Heart faliure: acute [ ] chronic [ ] acute or chronic [ ] diastolic [ ] systolic [ ] combied systolic and diastolic[ ]  JIMENEZ: ATN[ ] renal medullary necrosis [ ] CKD I [ ]CKDII [ ]CKD III [ ]CKD IV [ ]CKD V [ ]Other pathological lesions [ ]  Abdominal Nutrition Status: malnutrition [ ] cachexia [ ] morbid obesity/BMI=40 [ ] Supplement ordered [___________]     T(C): 36.5 (11-16-24 @ 00:00), Max: 36.6 (11-15-24 @ 20:00)  HR: 105 (11-16-24 @ 03:00)  BP: 155/81 (11-16-24 @ 03:00)  BP(mean): 101 (11-16-24 @ 03:00)  ABP: --  ABP(mean): --  RR: 21 (11-16-24 @ 03:00)  SpO2: 95% (11-16-24 @ 03:00)  Wt(kg): --          11-14 @ 07:01  -  11-15 @ 07:00  --------------------------------------------------------  IN: 430 mL / OUT: 800 mL / NET: -370 mL            PHYSICAL EXAM:    GEN: Healthy appearing, well-developed, NAD.  HEENT:  -Head: NC/AT;  -Eyes: No discharge or redness;  -Ears: External ears are normal.  -Nose: Normal nares.  -Mouth and throat: MMM. Normal gums, mucosa, palate,. Good dentition.  NEURO: Ambulating with no limitations. No focal deficits.  CV: RRR, no m/r/g.  LUNGS: CTAB, no w/r/c.  ABD: Soft, NT/ND, NBS, no masses or organomegaly.  SKIN: Warm, well perfused. No skin rashes or abnormal lesions.  MSK: Normal gait. No deformities.  EXT: No clubbing, cyanosis, or edema.      LABS:  CBC Full  -  ( 16 Nov 2024 03:26 )  WBC Count : 7.24 K/uL  RBC Count : 4.83 M/uL  Hemoglobin : 15.6 g/dL  Hematocrit : 45.5 %  Platelet Count - Automated : 193 K/uL  Mean Cell Volume : 94.2 fl  Mean Cell Hemoglobin : 32.3 pg  Mean Cell Hemoglobin Concentration : 34.3 g/dL  Auto Neutrophil # : 4.95 K/uL  Auto Lymphocyte # : 1.42 K/uL  Auto Monocyte # : 0.68 K/uL  Auto Eosinophil # : 0.14 K/uL  Auto Basophil # : 0.03 K/uL  Auto Neutrophil % : 68.4 %  Auto Lymphocyte % : 19.6 %  Auto Monocyte % : 9.4 %  Auto Eosinophil % : 1.9 %  Auto Basophil % : 0.4 %    11-16    139  |  107  |  17  ----------------------------<  236[H]  3.6   |  25  |  0.60    Ca    10.0      16 Nov 2024 03:26  Phos  1.8     11-16  Mg     1.7     11-16    TPro  6.9  /  Alb  2.3[L]  /  TBili  0.7  /  DBili  x   /  AST  12  /  ALT  13  /  AlkPhos  93  11-16      Urinalysis Basic - ( 16 Nov 2024 03:26 )    Color: x / Appearance: x / SG: x / pH: x  Gluc: 236 mg/dL / Ketone: x  / Bili: x / Urobili: x   Blood: x / Protein: x / Nitrite: x   Leuk Esterase: x / RBC: x / WBC x   Sq Epi: x / Non Sq Epi: x / Bacteria: x          RADIOLOGY & ADDITIONAL STUDIES REVIEWED:      [ ]GOALS OF CARE DISCUSSION WITH PATIENT/FAMILY/PROXY:    CRITICAL CARE TIME SPENT: 35 minutes

## 2024-11-16 NOTE — EEG REPORT - NS EEG TEXT BOX
ROMI MONZON [id number]     Study Date: 11/15/24 15:05 - 11/16/24 08:00  Duration: 16 hr 37 min  --------------------------------------------------------------------------------------------------  History:  CC/ HPI Patient is a 78y old  Female who presents with a chief complaint of acute stroke (16 Nov 2024 07:40)    MEDICATIONS  (STANDING):  apixaban 5 milliGRAM(s) Oral every 12 hours  aspirin enteric coated 81 milliGRAM(s) Oral daily  atorvastatin 40 milliGRAM(s) Oral at bedtime  chlorhexidine 2% Cloths 1 Application(s) Topical <User Schedule>  diltiazem    milliGRAM(s) Oral daily  divalproex  milliGRAM(s) Oral two times a day  influenza  Vaccine (HIGH DOSE) 0.5 milliLiter(s) IntraMuscular once  insulin glargine Injectable (LANTUS) 8 Unit(s) SubCutaneous at bedtime  insulin lispro (ADMELOG) corrective regimen sliding scale   SubCutaneous three times a day before meals  insulin lispro (ADMELOG) corrective regimen sliding scale   SubCutaneous at bedtime  insulin lispro Injectable (ADMELOG) 3 Unit(s) SubCutaneous three times a day before meals  metoprolol tartrate 50 milliGRAM(s) Oral two times a day  senna 2 Tablet(s) Oral at bedtime    --------------------------------------------------------------------------------------------------  Study Interpretation:    [[[Abbreviation Key:  PDR=alpha rhythm/posterior dominant rhythm. A-P=anterior posterior.  Amplitude: ‘very low’:<20; ‘low’:20-49; ‘medium’:; ‘high’:>150uV.  Persistence for periodic/rhythmic patterns (% of epoch) ‘rare’:<1%; ‘occasional’:1-10%; ‘frequent’:10-50%; ‘abundant’:50-90%; ‘continuous’:>90%.  Persistence for sporadic discharges: ‘rare’:<1/hr; ‘occasional’:1/min-1/hr; ‘frequent’:>1/min; ‘abundant’:>1/10 sec.  RPP=rhythmic and periodic patterns; GRDA=generalized rhythmic delta activity; FIRDA=frontal intermittent GRDA; LRDA=lateralized rhythmic delta activity; TIRDA=temporal intermittent rhythmic delta activity;  LPD=PLED=lateralized periodic discharges; GPD=generalized periodic discharges; BIPDs =bilateral independent periodic discharges; Mf=multifocal; SIRPDs=stimulus induced rhythmic, periodic, or ictal appearing discharges; BIRDs=brief potentially ictal rhythmic discharges >4 Hz, lasting .5-10s; PFA (paroxysmal bursts >13 Hz or =8 Hz <10s).  Modifiers: +F=with fast component; +S=with spike component; +R=with rhythmic component.  S-B=burst suppression pattern.  Max=maximal. N1-drowsy; N2-stage II sleep; N3-slow wave sleep. SSS/BETS=small sharp spikes/benign epileptiform transients of sleep. HV=hyperventilation; PS=photic stimulation]]]    Daily EEG Visual Analysis    FINDINGS:      Background:  Continuity: Continuous  Symmetry: Asymmetric  Posterior dominant rhythm (PDR): 7.5-8 Hz, less well formed on the right, reactive to eye closure. Low-amplitude frontal beta in wakefulness.  Voltage: Normal  Anterior-Posterior Gradient: Present  Other background findings: Frequent diffuse polymorphic delta and theta slowing  Breach: Absent    Background Slowing:  Generalized slowing: As above  Focal slowing: Abundant right hemispheric focal polymorphic delta slowing    State Changes:   Drowsiness is characterized by slowing of the background activity.  Rare rudimentary K complex.    Interictal Findings:  Frequent bilateral frontal sharp waves (Fp1, Fp2 maximal), often independent and more prevalent on the left, at times with broad field and at times triphasic morphology, occasionally in a burst or semi-periodic run at 1-1.5 Hz.    Electrographic and Electroclinical seizures:  None    Other Clinical Events:  None    Activation Procedures:   Hyperventilation is not performed.    Photic stimulation is not performed.    Artifacts:  Intermittent myogenic and movement artifacts are present.    Single-lead EKG:    EEG Classification / Summary:  Abnormal EEG in the awake, drowsy, and asleep states.   -Frequent bilateral frontal sharp waves  -Right hemispheric focal slowing  -Mild-moderate diffuse slowing  -No seizures.    Clinical Impression:  -Risk of focal-onset seizures from the bilateral frontal regions vs. risk of generalized seizures which can be seen in toxic-metabolic encephalopathies, with shifting left > right predominance due to structural lesion.  -Right hemispheric focal cerebral dysfunction, likely structural in etiology.  -Mild-moderate diffuse cerebral dysfunction is nonspecific in etiology.   -No seizures          -------------------------------------------------------------------------------------------------------    Adilene Kaur MD  Attending Physician, [facility]    -------------------------------------------------------------------------------------------------------    To reach EEG technologist:  Please use the pager number for the appropriate hospital or contact the .  At [facility] - Pager #: [phone]    To reach EEG-reading physician:  EEG Reading Room Phone #: [phone]  Epilepsy Answering Service after 5PM and before 8:30AM: Phone #: [phone]     ROMI MONZON [id number]     Study Date: 11/15/24 15:05 - 11/16/24 14:09  Duration: 22 hr 44 min  --------------------------------------------------------------------------------------------------  History:  CC/ HPI Patient is a 78y old  Female who presents with a chief complaint of acute stroke (16 Nov 2024 07:40)    MEDICATIONS  (STANDING):  apixaban 5 milliGRAM(s) Oral every 12 hours  aspirin enteric coated 81 milliGRAM(s) Oral daily  atorvastatin 40 milliGRAM(s) Oral at bedtime  chlorhexidine 2% Cloths 1 Application(s) Topical <User Schedule>  diltiazem    milliGRAM(s) Oral daily  divalproex  milliGRAM(s) Oral two times a day  influenza  Vaccine (HIGH DOSE) 0.5 milliLiter(s) IntraMuscular once  insulin glargine Injectable (LANTUS) 8 Unit(s) SubCutaneous at bedtime  insulin lispro (ADMELOG) corrective regimen sliding scale   SubCutaneous three times a day before meals  insulin lispro (ADMELOG) corrective regimen sliding scale   SubCutaneous at bedtime  insulin lispro Injectable (ADMELOG) 3 Unit(s) SubCutaneous three times a day before meals  metoprolol tartrate 50 milliGRAM(s) Oral two times a day  senna 2 Tablet(s) Oral at bedtime    --------------------------------------------------------------------------------------------------  Study Interpretation:    [[[Abbreviation Key:  PDR=alpha rhythm/posterior dominant rhythm. A-P=anterior posterior.  Amplitude: ‘very low’:<20; ‘low’:20-49; ‘medium’:; ‘high’:>150uV.  Persistence for periodic/rhythmic patterns (% of epoch) ‘rare’:<1%; ‘occasional’:1-10%; ‘frequent’:10-50%; ‘abundant’:50-90%; ‘continuous’:>90%.  Persistence for sporadic discharges: ‘rare’:<1/hr; ‘occasional’:1/min-1/hr; ‘frequent’:>1/min; ‘abundant’:>1/10 sec.  RPP=rhythmic and periodic patterns; GRDA=generalized rhythmic delta activity; FIRDA=frontal intermittent GRDA; LRDA=lateralized rhythmic delta activity; TIRDA=temporal intermittent rhythmic delta activity;  LPD=PLED=lateralized periodic discharges; GPD=generalized periodic discharges; BIPDs =bilateral independent periodic discharges; Mf=multifocal; SIRPDs=stimulus induced rhythmic, periodic, or ictal appearing discharges; BIRDs=brief potentially ictal rhythmic discharges >4 Hz, lasting .5-10s; PFA (paroxysmal bursts >13 Hz or =8 Hz <10s).  Modifiers: +F=with fast component; +S=with spike component; +R=with rhythmic component.  S-B=burst suppression pattern.  Max=maximal. N1-drowsy; N2-stage II sleep; N3-slow wave sleep. SSS/BETS=small sharp spikes/benign epileptiform transients of sleep. HV=hyperventilation; PS=photic stimulation]]]    Daily EEG Visual Analysis    FINDINGS:      Background:  Continuity: Continuous  Symmetry: Asymmetric  Posterior dominant rhythm (PDR): 7.5-8 Hz, less well formed on the right, reactive to eye closure. Low-amplitude frontal beta in wakefulness.  Voltage: Normal  Anterior-Posterior Gradient: Present  Other background findings: Frequent diffuse polymorphic delta and theta slowing  Breach: Absent    Background Slowing:  Generalized slowing: As above  Focal slowing: Abundant right hemispheric focal polymorphic delta slowing    State Changes:   Drowsiness is characterized by slowing of the background activity.  Rudimentary stage 2 sleep is characterized by rudimentary symmetric K complexes and sleep spindles.    Interictal Findings:  Frequent bilateral frontal sharp waves (Fp1, Fp2 maximal), often independent and more prevalent on the left, at times with broad field and at times triphasic morphology, occasionally in a burst or semi-periodic/periodic run at 1-1.5 Hz.    Electrographic and Electroclinical seizures:  None    Other Clinical Events:  11/16/24 14:06: EEG technologist reports L hand shaking On video, patient is sitting/reclining in bed, with both hands resting on abdomen, with intermittent irregular L hand shaking/at times jerky movement. Movements are not clearly time-locked to ongoing bifrontal sharp waves at 0.5-1.5 Hz.    Activation Procedures:   Hyperventilation is not performed.    Photic stimulation is not performed.    Artifacts:  Intermittent myogenic and movement artifacts are present.    Single-lead EKG: Irregularly irregular rhythm    EEG Classification / Summary:  Abnormal EEG in the awake, drowsy, and asleep states.   -Frequent bilateral frontal sharp waves, at times periodic at 1-1.5 Hz  -Right hemispheric focal slowing  -Mild-moderate diffuse slowing  -No seizures.  -Event of irregular L hand shaking/at times jerky movement, with movements not clearly time-locked to ongoing bifrontal sharp waves.    Clinical Impression:  -Risk of focal-onset seizures from the bilateral frontal regions vs. risk of generalized seizures which can be seen in toxic-metabolic encephalopathies, with shifting left > right predominance due to structural lesion.  -Right hemispheric focal cerebral dysfunction, likely structural in etiology.  -Mild-moderate diffuse cerebral dysfunction is nonspecific in etiology.   -No seizures  -Event of irregular L hand shaking is less likely to be epileptic in etiology. Should seizures remain a clinical concern, continued EEG monitoring can be considered.          -------------------------------------------------------------------------------------------------------    Adilene Kaur MD  Attending Physician, [facility]    -------------------------------------------------------------------------------------------------------    To reach EEG technologist:  Please use the pager number for the appropriate hospital or contact the .  At [facility] - Pager #: [phone]    To reach EEG-reading physician:  EEG Reading Room Phone #: [phone]  Epilepsy Answering Service after 5PM and before 8:30AM: Phone #: [phone]

## 2024-11-16 NOTE — PROGRESS NOTE ADULT - SUBJECTIVE AND OBJECTIVE BOX
PATIENT SEEN AND EXAMINED BY EDDY SILVER M.D. ON :- 24  DATE OF SERVICE:   24          Interim events noted,Labs ,Radiological studies and Cardiology tests reviewed .    Patient is a 78y old  Female who presents with a chief complaint of acute stroke (2024 16:49)      HPI:  82-year-old female with a past medical history of atrial fibrillation on anticoagulation presents to the ED with slurred speech and apraxia after a fall yesterday.  Patient had unwitnessed fall at home yesterday. Denies head strike or loss of consciousness, but reports that she was unable to get herself up off the floor.  Son came to visit and helped patient off the ground and they then spent time with each other throughout with son reporting nothing out of the ordinary with patient neurologically. Patient's son stated that after leaving his mother he received a phone call from here where her speech was slurred and he attributed it to her just being tired. However,  today upon waking she again had slurred speech so son called EMS. Upon arrival to the ED patient was asymptomatic however due to concern for possible stroke given history, code stroke activated on arrival. CT scan showed oss of gray-whitedifferentiation in the right caudate suggesting acute infarct. On my assessment patient complained of increased effort of speech, depressed mood, and anxiety about blood clots. Complained of chronic pain and limited range of motion in right shoulder but denied any increase in weakness. During interview when patient appeared to be emotional she did point out that her left hand had begun to shake. Denied fever, chills, headache, dizziness, chest pain, palpitations, shortness of breath, abdominal pain, nausea, vomiting, diarrhea, constipation, and dysuria.      In the ED:  T(F): , Max: 98.1 (13:52); HR:  (74 - 86); BP:  (145/72 - 158/82); RR:  (16 - 20); SpO2:  (96% - 98%)  WBC: 8.04, Hb.6, PLT: 165  Na: 141, K: 3.1, BUN: 22, sCr: 1.09      s/p potassium chloride    Tablet ER 40 milliEquivalent(s) Oral; potassium chloride  10 mEq/100 mL IVPB 10 milliEquivalent(s) IV Intermittent (2024 16:39)      PAST MEDICAL & SURGICAL HISTORY:  HTN (hypertension)      Atrial fibrillation      OA (osteoarthritis)      HLD (hyperlipidemia)      Diabetes      Obesity      Anxiety      Hypothyroidism      Peripheral venous insufficiency      No significant past surgical history          PREVIOUS DIAGNOSTIC TESTING:      ECHO  FINDINGS:    STRESS  FINDINGS:    CATHETERIZATION  FINDINGS:    MEDICATIONS  (STANDING):  apixaban 5 milliGRAM(s) Oral every 12 hours  aspirin enteric coated 81 milliGRAM(s) Oral daily  atorvastatin 40 milliGRAM(s) Oral at bedtime  diltiazem    milliGRAM(s) Oral daily  divalproex  milliGRAM(s) Oral two times a day  influenza  Vaccine (HIGH DOSE) 0.5 milliLiter(s) IntraMuscular once  insulin glargine Injectable (LANTUS) 8 Unit(s) SubCutaneous at bedtime  insulin lispro (ADMELOG) corrective regimen sliding scale   SubCutaneous three times a day before meals  insulin lispro (ADMELOG) corrective regimen sliding scale   SubCutaneous at bedtime  insulin lispro Injectable (ADMELOG) 3 Unit(s) SubCutaneous three times a day before meals  metoprolol tartrate 50 milliGRAM(s) Oral two times a day  senna 2 Tablet(s) Oral at bedtime    MEDICATIONS  (PRN):  pantoprazole    Tablet 40 milliGRAM(s) Oral before breakfast PRN for heartburn      FAMILY HISTORY:  Family history of thrombosis (Mother)        SOCIAL HISTORY:    CIGARETTES:    ALCOHOL:    REVIEW OF SYSTEMS:  CONSTITUTIONAL: No fever, weight loss, or fatigue  EYES: No eye pain, visual disturbances, or discharge  ENMT:  No difficulty hearing, tinnitus, vertigo; No sinus or throat pain  NECK: No pain or stiffness  RESPIRATORY: No cough, wheezing, chills or hemoptysis; No shortness of breath  CARDIOVASCULAR: No chest pain, palpitations, dizziness, or leg swelling  GASTROINTESTINAL: No abdominal or epigastric pain. No nausea, vomiting, or hematemesis; No diarrhea or constipation. No melena or hematochezia.  GENITOURINARY: No dysuria, frequency, hematuria, or incontinence  NEUROLOGICAL: No headaches, memory loss, loss of strength, numbness, or tremors  SKIN: No itching, burning, rashes, or lesions   LYMPH NODES: No enlarged glands  ENDOCRINE: No heat or cold intolerance; No hair loss  MUSCULOSKELETAL: No joint pain or swelling; No muscle, back, or extremity pain  PSYCHIATRIC: No depression, anxiety, mood swings, or difficulty sleeping  HEME/LYMPH: No easy bruising, or bleeding gums  ALLERY AND IMMUNOLOGIC: No hives or eczema    Vital Signs Last 24 Hrs  T(C): 36.6 (2024 20:00), Max: 36.8 (2024 15:22)  T(F): 97.9 (2024 20:00), Max: 98.2 (2024 15:22)  HR: 89 (2024 22:00) (72 - 117)  BP: 133/91 (2024 22:00) (118/80 - 159/91)  BP(mean): 104 (2024 22:00) (74 - 117)  RR: 29 (2024 22:00) (21 - 35)  SpO2: 95% (2024 22:00) (93% - 99%)    Parameters below as of 2024 04:00  Patient On (Oxygen Delivery Method): room air          PHYSICAL EXAM:  GENERAL: NAD, well-groomed, well-developed  HEAD:  Atraumatic, Normocephalic  EYES: EOMI, PERRLA, conjunctiva and sclera clear  ENMT: No tonsillar erythema, exudates, or enlargement; Moist mucous membranes, Good dentition, No lesions  NECK: Supple, No JVD, Normal thyroid  NERVOUS SYSTEM:  Alert & Oriented X3, Good concentration; Motor Strength 5/5 B/L upper and lower extremities; DTRs 2+ intact and symmetric  CHEST/LUNG: Clear to percussion bilaterally; No rales, rhonchi, wheezing, or rubs  HEART: Regular rate and rhythm; No murmurs, rubs, or gallops  ABDOMEN: Soft, Nontender, Nondistended; Bowel sounds present  EXTREMITIES:  2+ Peripheral Pulses, No clubbing, cyanosis, or edema  LYMPH: No lymphadenopathy noted  SKIN: No rashes or lesions      INTERPRETATION OF TELEMETRY:    ECG[facility]    [facility]     LABS:                        15.6   7.24  )-----------( 193      ( 2024 03:26 )             45.5     11-16    139  |  107  |  17  ----------------------------<  236[H]  3.6   |  25  |  0.60    Ca    10.0      2024 03:26  Phos  1.8       Mg     1.7         TPro  6.9  /  Alb  2.3[L]  /  TBili  0.7  /  DBili  x   /  AST  12  /  ALT  13  /  AlkPhos  93            Urinalysis Basic - ( 2024 03:26 )    Color: x / Appearance: x / SG: x / pH: x  Gluc: 236 mg/dL / Ketone: x  / Bili: x / Urobili: x   Blood: x / Protein: x / Nitrite: x   Leuk Esterase: x / RBC: x / WBC x   Sq Epi: x / Non Sq Epi: x / Bacteria: x      Lipid Panel:   I&O's Summary    15 Nov 2024 07:01  -  2024 07:00  --------------------------------------------------------  IN: 0 mL / OUT: 950 mL / NET: -950 mL    2024 07:01  -  2024 22:47  --------------------------------------------------------  IN: 0 mL / OUT: 600 mL / NET: -600 mL        RADIOLOGY & ADDITIONAL STUDIES:    < from: TTE W or WO Ultrasound Enhancing Agent (.24 @ 07:53) >     CONCLUSIONS:      1. Left ventricular cavity is normal in size. Left ventricular wall thickness is mildly increased. Left ventricular systolic function is normal with an ejection fraction visually estimated at 55 to 60 %.   2. Analysis of left ventricular diastolic function and filling pressure is made challenging by the presence of atrial fibrillation.   3. Normal right ventricular cavity size and normal right ventricular systolic function.   4. Left atrium is mildly dilated.   5. The right atrium is dilated.   6. 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   7. Trileaflet aortic valve. Fibrocalcific aortic valve sclerosis without stenosis.   8. Mild aortic regurgitation.   9. Structurally normal mitral valve with normal leaflet excursion.  10. Trace mitral regurgitation.  11. Agitated saline injection was negative for intracardiac shunt.  12. Echo-free space is noted in the liver consistent with cyst, however, dedicated imaging recommended for further evaluation if clinically indicated.  13. Pulmonary artery systolic pressure could not be estimated.  14. No pericardial effusion seen.  15. No prior echocardiogram is available for comparison.    < end of copied text >

## 2024-11-16 NOTE — PROGRESS NOTE ADULT - SUBJECTIVE AND OBJECTIVE BOX
78-year-old female with a past medical history of atrial fibrillation on anticoagulation presents to the ED with slurred speech and apraxia after a fall yesterday.  Patient had unwitnessed fall at home yesterday. Denies head strike or loss of consciousness, but reports that she was unable to get herself up off the floor.  Son came to visit and helped patient off the ground and they then spent time with each other throughout with son reporting nothing out of the ordinary with patient neurologically. Patient's son stated that after leaving his mother he received a phone call from here where her speech was slurred and he attributed it to her just being tired. However,  today upon waking she again had slurred speech so son called EMS. Upon arrival to the ED patient was asymptomatic however due to concern for possible stroke given history, code stroke activated on arrival. CT scan showed oss of gray-whitedifferentiation in the right caudate suggesting acute infarct. On my assessment patient complained of increased effort of speech, depressed mood, and anxiety about blood clots. Complained of chronic pain and limited range of motion in right shoulder but denied any increase in weakness. During interview when patient appeared to be emotional she did point out that her left hand had begun to shake. Denied fever, chills, headache, dizziness, chest pain, palpitations, shortness of breath, abdominal pain, nausea, vomiting, diarrhea, constipation, and dysuria.   78-year-old female with a past medical history of atrial fibrillation on anticoagulation presents to the ED with slurred speech and apraxia after a fall yesterday.  Patient had unwitnessed fall at home yesterday. Denies head strike or loss of consciousness, but reports that she was unable to get herself up off the floor.  Son came to visit and helped patient off the ground and they then spent time with each other throughout with son reporting nothing out of the ordinary with patient neurologically. Patient's son stated that after leaving his mother he received a phone call from here where her speech was slurred and he attributed it to her just being tired. However,  today upon waking she again had slurred speech so son called EMS. Upon arrival to the ED patient was asymptomatic however due to concern for possible stroke given history, code stroke activated on arrival. CT scan showed oss of gray-whitedifferentiation in the right caudate suggesting acute infarct. On my assessment patient complained of increased effort of speech, depressed mood, and anxiety about blood clots. Complained of chronic pain and limited range of motion in right shoulder but denied any increase in weakness. During interview when patient appeared to be emotional she did point out that her left hand had begun to shake. Denied fever, chills, headache, dizziness, chest pain, palpitations, shortness of breath, abdominal pain, nausea, vomiting, diarrhea, constipation, and dysuria.    Allergies    No Known Allergies    Intolerances    ICU Vital Signs Last 24 Hrs  T(C): 36.4 (16 Nov 2024 04:00), Max: 36.6 (15 Nov 2024 20:00)  T(F): 97.5 (16 Nov 2024 04:00), Max: 97.8 (15 Nov 2024 20:00)  HR: 79 (16 Nov 2024 08:00) (73 - 117)  BP: 140/87 (16 Nov 2024 08:00) (127/78 - 159/91)  BP(mean): 102 (16 Nov 2024 08:00) (85 - 117)  ABP: --  ABP(mean): --  RR: 30 (16 Nov 2024 08:00) (15 - 35)  SpO2: 95% (16 Nov 2024 08:00) (93% - 99%)    O2 Parameters below as of 16 Nov 2024 04:00  Patient On (Oxygen Delivery Method): room air    I&O's Summary    15 Nov 2024 07:01  -  16 Nov 2024 07:00  --------------------------------------------------------  IN: 0 mL / OUT: 950 mL / NET: -950 mL    MEDICATIONS  (STANDING):  apixaban 5 milliGRAM(s) Oral every 12 hours  aspirin enteric coated 81 milliGRAM(s) Oral daily  atorvastatin 40 milliGRAM(s) Oral at bedtime  chlorhexidine 2% Cloths 1 Application(s) Topical <User Schedule>  diltiazem    milliGRAM(s) Oral daily  divalproex  milliGRAM(s) Oral two times a day  influenza  Vaccine (HIGH DOSE) 0.5 milliLiter(s) IntraMuscular once  insulin glargine Injectable (LANTUS) 8 Unit(s) SubCutaneous at bedtime  insulin lispro (ADMELOG) corrective regimen sliding scale   SubCutaneous three times a day before meals  insulin lispro (ADMELOG) corrective regimen sliding scale   SubCutaneous at bedtime  insulin lispro Injectable (ADMELOG) 3 Unit(s) SubCutaneous three times a day before meals  metoprolol tartrate 50 milliGRAM(s) Oral two times a day  senna 2 Tablet(s) Oral at bedtime    MEDICATIONS  (PRN):  pantoprazole    Tablet 40 milliGRAM(s) Oral before breakfast PRN for heartburn                          15.6   7.24  )-----------( 193      ( 16 Nov 2024 03:26 )             45.5   11-16    139  |  107  |  17  ----------------------------<  236[H]  3.6   |  25  |  0.60    Ca    10.0      16 Nov 2024 03:26  Phos  1.8     11-16  Mg     1.7     11-16    TPro  6.9  /  Alb  2.3[L]  /  TBili  0.7  /  DBili  x   /  AST  12  /  ALT  13  /  AlkPhos  93  11-16    PHYSICAL EXAMINATION:  GENERAL: NAD,  Neuro: A&OX3, midline gaze, PERRL, face symetrical, BL UE at least AG, BL LE 2/5, participation dependent likely   CHEST/LUNG: Clear to auscultation. No rales, rhonchi, wheezing, or rubs  HEART: irregularly irregular, +s1s2, no murmur   ABDOMEN: Soft, Nontender, Nondistended; Bowel sounds present  EXTREMITIES:  2+ Peripheral Pulses  SKIN: warm dry

## 2024-11-16 NOTE — CHART NOTE - NSCHARTNOTEFT_GEN_A_CORE
78F with HTN, AF on Eliquis, OA, HLD, DM, anxiety, hypothyroid, peripheral venous insufficiency, admitted with acute R MCA CVA resulting in dysarthria and L sided deficits, concern for subclinical seizures, CT head showing acute evolving right caudate head/basal ganglia/corona radiata infarction. MRI showing acute right MCA territory infarctions. ECHO showing LVEF 55-60%, LA dilated, negative for intracardiac shunt. Doppler negative for DVT. Pt is known to have multiple strokes and UTI, and had seizure like activity, staring into space, started on Depakote, however given persistent waxing and waning of symptoms concern for potential nonconvulsive seizure/ subclinical seizure. ICU consulted for 24 hr EEG. 24 hr EEG w/ no epileptiform discharges. Dr. Leach neuro following. Continue with Depakote 500mg  as per neuro recs. Continue with aspirin and statin for CVA.     Patient has AFib and HTN on Cardizem 180mg qd and metoprolol ER 200mg. Continue with eliquis.     Completed 5day course of Ceftriaxone for UTI.     Has h/o DM on metformin, jardiance, januvia at home. A1c 6.5. Currently on lantus 8 units, premeal 3 units, continue MARTY. Himanshu, Dr Hdz following.    Patient is stable for downgrade to floor under care of Dr. Eagle for further management , covering NP  was informed. Family notified.    Things to follow up:  -Neuro recs  -PT rec MEG 78F with HTN, AF on Eliquis, OA, HLD, DM, anxiety, hypothyroid, peripheral venous insufficiency, admitted with acute R MCA CVA resulting in dysarthria and L sided deficits, concern for subclinical seizures, CT head showing acute evolving right caudate head/basal ganglia/corona radiata infarction. MRI showing acute right MCA territory infarctions. ECHO showing LVEF 55-60%, LA dilated, negative for intracardiac shunt. Doppler negative for DVT. Pt is known to have multiple strokes and UTI, and had seizure like activity, staring into space, started on Depakote, however given persistent waxing and waning of symptoms concern for potential nonconvulsive seizure/ subclinical seizure. ICU consulted for 24 hr EEG. 24 hr EEG w/ no epileptiform discharges. Dr. Leach neuro following. Continue with Depakote 500mg  as per neuro recs. Continue with aspirin and statin for CVA.     Patient has AFib and HTN on Cardizem 180mg qd and metoprolol ER 200mg. Continue with eliquis.     Completed 5day course of Ceftriaxone for UTI.     Has h/o DM on metformin, jardiance, januvia at home. A1c 6.5. Currently on lantus 8 units, premeal 3 units, continue MARTY. Hiamnshu, Dr Hdz following.    Patient is stable for downgrade to floor under care of Dr. Eagle for further management , covering NP Danni was informed. Family notified.    Things to follow up:  -Neuro recs  -PT rec MEG

## 2024-11-16 NOTE — PROGRESS NOTE ADULT - ASSESSMENT
Assessment:   78F with HTN, AF on Eliquis, OA, HLD, DM, anxiety, hypothyroid, peripheral venous insufficiency, admitted with acute R MCA CVA resulting in dysarthria and L sided deficits, concern for subclinical seizures, ICU consulted for 24 hr EEG.      Plan:     *****NEURO*****  #CVA  - Code Stroke in ED  - CT Head showing Acute evolving right caudate head/basal ganglia/corona radiata infarction. No evidence of hemorrhagic transformation.  - CTA Head and Neck showing Moderate stenosis of the right M1 segment   - MRI: Acute right MCA territory infarctions  - EKG A. Fib  - LDL: 92, A1c: 6.5%  - b/l LE US dopplers: neg  - c/w Asprin 81mg qd and Atorvastatin 80mg qd  - Dr. Leach neuro following  - PT Consulted - rec: MEG.    #seizures?  - lethargy, suspicion for nonconvulsive seizure  - spot EEG completed, concern for potential nonconvulsive seizure contributing to her worsening mental status, started a trial of Depakote (Valproate 1000mg IV load then PO depakote 500mg bid) -  f/u formal note with recs from neuro  - cont anticonvulsants as per neuro  - 24 hr EEG w/ no epileptiform discharges, will d/c per neuro  - cont depakote per neuro recs  - appreciate neuro recs    *****CARDIO*****  #AFib  - EKG showing A. Fib  - TTE: EF: 55-60%, neg intracardiac shunt  - Continue home Cardizem, Metoprolol, and Eliquis    #HTN  -h/o HTN on home Cardizem 180mg qd and metoprolol ER 200mg  -c/w home meds of Cardizem 180mg qd   -c/w Metoprolol tartrate 50mg BID with holding parameters   -monitor BP  -adjust antihypertensive meds as appropriate.    #PVD  -dopplers eng for DVT    *****PULM*****  No issues  On RA    -    *****GI*****  No issues    -    *****RENAL*****  #UTI  -U/a +  -Cx showing E. coli  -finished 5 day course of CTX    -    *****INFECTIOUS*****  #UTI  0-as above    -     *****HEMO*****  No issues    -    *****ENDO*****  #DM  -home metformin, jardiance, januvia  -currently low dose correctional scale insulin only  -sugars suboptimally controlled, fasting glucose 228, going to up 270s post prandial  -start lantus 8 units, premeal 3 units, continue MARTY    #hypothyroid  -not on synthroid  -TSH 2.74      #HLD  -cont statin    -    *****MSK/SKIN*****  #xerosis  #stasis derm  -moisturize lower extremities  -    *****PPX*****  -PPI  -eliquis      *****Dispo/Ethics*****  accepted to ICU for 24 hour EEG

## 2024-11-17 LAB
ALBUMIN SERPL ELPH-MCNC: 2.3 G/DL — LOW (ref 3.5–5)
ALP SERPL-CCNC: 90 U/L — SIGNIFICANT CHANGE UP (ref 40–120)
ALT FLD-CCNC: 13 U/L DA — SIGNIFICANT CHANGE UP (ref 10–60)
ANION GAP SERPL CALC-SCNC: 4 MMOL/L — LOW (ref 5–17)
AST SERPL-CCNC: 11 U/L — SIGNIFICANT CHANGE UP (ref 10–40)
BASOPHILS # BLD AUTO: 0.02 K/UL — SIGNIFICANT CHANGE UP (ref 0–0.2)
BASOPHILS NFR BLD AUTO: 0.3 % — SIGNIFICANT CHANGE UP (ref 0–2)
BILIRUB SERPL-MCNC: 0.8 MG/DL — SIGNIFICANT CHANGE UP (ref 0.2–1.2)
BUN SERPL-MCNC: 21 MG/DL — HIGH (ref 7–18)
CALCIUM SERPL-MCNC: 10.1 MG/DL — SIGNIFICANT CHANGE UP (ref 8.4–10.5)
CHLORIDE SERPL-SCNC: 106 MMOL/L — SIGNIFICANT CHANGE UP (ref 96–108)
CO2 SERPL-SCNC: 30 MMOL/L — SIGNIFICANT CHANGE UP (ref 22–31)
CREAT SERPL-MCNC: 0.76 MG/DL — SIGNIFICANT CHANGE UP (ref 0.5–1.3)
EGFR: 80 ML/MIN/1.73M2 — SIGNIFICANT CHANGE UP
EOSINOPHIL # BLD AUTO: 0.07 K/UL — SIGNIFICANT CHANGE UP (ref 0–0.5)
EOSINOPHIL NFR BLD AUTO: 1 % — SIGNIFICANT CHANGE UP (ref 0–6)
GLUCOSE BLDC GLUCOMTR-MCNC: 144 MG/DL — HIGH (ref 70–99)
GLUCOSE BLDC GLUCOMTR-MCNC: 176 MG/DL — HIGH (ref 70–99)
GLUCOSE BLDC GLUCOMTR-MCNC: 192 MG/DL — HIGH (ref 70–99)
GLUCOSE BLDC GLUCOMTR-MCNC: 240 MG/DL — HIGH (ref 70–99)
GLUCOSE SERPL-MCNC: 207 MG/DL — HIGH (ref 70–99)
HCT VFR BLD CALC: 46.1 % — HIGH (ref 34.5–45)
HGB BLD-MCNC: 15.5 G/DL — SIGNIFICANT CHANGE UP (ref 11.5–15.5)
IMM GRANULOCYTES NFR BLD AUTO: 0.3 % — SIGNIFICANT CHANGE UP (ref 0–0.9)
LYMPHOCYTES # BLD AUTO: 1.4 K/UL — SIGNIFICANT CHANGE UP (ref 1–3.3)
LYMPHOCYTES # BLD AUTO: 20.5 % — SIGNIFICANT CHANGE UP (ref 13–44)
MAGNESIUM SERPL-MCNC: 1.9 MG/DL — SIGNIFICANT CHANGE UP (ref 1.6–2.6)
MCHC RBC-ENTMCNC: 31.7 PG — SIGNIFICANT CHANGE UP (ref 27–34)
MCHC RBC-ENTMCNC: 33.6 G/DL — SIGNIFICANT CHANGE UP (ref 32–36)
MCV RBC AUTO: 94.3 FL — SIGNIFICANT CHANGE UP (ref 80–100)
MONOCYTES # BLD AUTO: 0.64 K/UL — SIGNIFICANT CHANGE UP (ref 0–0.9)
MONOCYTES NFR BLD AUTO: 9.4 % — SIGNIFICANT CHANGE UP (ref 2–14)
NEUTROPHILS # BLD AUTO: 4.68 K/UL — SIGNIFICANT CHANGE UP (ref 1.8–7.4)
NEUTROPHILS NFR BLD AUTO: 68.5 % — SIGNIFICANT CHANGE UP (ref 43–77)
NRBC # BLD: 0 /100 WBCS — SIGNIFICANT CHANGE UP (ref 0–0)
PHOSPHATE SERPL-MCNC: 2.6 MG/DL — SIGNIFICANT CHANGE UP (ref 2.5–4.5)
PLATELET # BLD AUTO: 213 K/UL — SIGNIFICANT CHANGE UP (ref 150–400)
POTASSIUM SERPL-MCNC: 4 MMOL/L — SIGNIFICANT CHANGE UP (ref 3.5–5.3)
POTASSIUM SERPL-SCNC: 4 MMOL/L — SIGNIFICANT CHANGE UP (ref 3.5–5.3)
PROT SERPL-MCNC: 6.7 G/DL — SIGNIFICANT CHANGE UP (ref 6–8.3)
RBC # BLD: 4.89 M/UL — SIGNIFICANT CHANGE UP (ref 3.8–5.2)
RBC # FLD: 12.9 % — SIGNIFICANT CHANGE UP (ref 10.3–14.5)
SODIUM SERPL-SCNC: 140 MMOL/L — SIGNIFICANT CHANGE UP (ref 135–145)
WBC # BLD: 6.83 K/UL — SIGNIFICANT CHANGE UP (ref 3.8–10.5)
WBC # FLD AUTO: 6.83 K/UL — SIGNIFICANT CHANGE UP (ref 3.8–10.5)

## 2024-11-17 PROCEDURE — 99232 SBSQ HOSP IP/OBS MODERATE 35: CPT

## 2024-11-17 PROCEDURE — 99233 SBSQ HOSP IP/OBS HIGH 50: CPT

## 2024-11-17 RX ADMIN — INSULIN GLARGINE 8 UNIT(S): 100 INJECTION, SOLUTION SUBCUTANEOUS at 21:43

## 2024-11-17 RX ADMIN — Medication 3 UNIT(S): at 08:39

## 2024-11-17 RX ADMIN — Medication 1: at 11:58

## 2024-11-17 RX ADMIN — Medication 2: at 17:45

## 2024-11-17 RX ADMIN — Medication 3 UNIT(S): at 17:48

## 2024-11-17 RX ADMIN — APIXABAN 5 MILLIGRAM(S): 2.5 TABLET, FILM COATED ORAL at 17:52

## 2024-11-17 RX ADMIN — METOPROLOL TARTRATE 50 MILLIGRAM(S): 100 TABLET, FILM COATED ORAL at 06:21

## 2024-11-17 RX ADMIN — Medication 40 MILLIGRAM(S): at 21:43

## 2024-11-17 RX ADMIN — Medication 3 UNIT(S): at 11:58

## 2024-11-17 RX ADMIN — Medication 500 MILLIGRAM(S): at 17:48

## 2024-11-17 RX ADMIN — Medication 81 MILLIGRAM(S): at 11:57

## 2024-11-17 RX ADMIN — Medication 1: at 08:39

## 2024-11-17 RX ADMIN — Medication 2 TABLET(S): at 21:43

## 2024-11-17 RX ADMIN — APIXABAN 5 MILLIGRAM(S): 2.5 TABLET, FILM COATED ORAL at 06:22

## 2024-11-17 RX ADMIN — DILTIAZEM HYDROCHLORIDE 180 MILLIGRAM(S): 240 CAPSULE, COATED, EXTENDED RELEASE ORAL at 06:21

## 2024-11-17 RX ADMIN — Medication 500 MILLIGRAM(S): at 06:23

## 2024-11-17 NOTE — PROGRESS NOTE ADULT - SUBJECTIVE AND OBJECTIVE BOX
Interval Events:  pt in nad    Allergies    No Known Allergies    Intolerances      Endocrine/Metabolic Medications:  atorvastatin 40 milliGRAM(s) Oral at bedtime  insulin glargine Injectable (LANTUS) 8 Unit(s) SubCutaneous at bedtime  insulin lispro (ADMELOG) corrective regimen sliding scale   SubCutaneous three times a day before meals  insulin lispro (ADMELOG) corrective regimen sliding scale   SubCutaneous at bedtime  insulin lispro Injectable (ADMELOG) 3 Unit(s) SubCutaneous three times a day before meals      Vital Signs Last 24 Hrs  T(C): 36.8 (17 Nov 2024 05:44), Max: 36.8 (16 Nov 2024 15:22)  T(F): 98.2 (17 Nov 2024 05:44), Max: 98.2 (16 Nov 2024 15:22)  HR: 91 (17 Nov 2024 05:44) (72 - 106)  BP: 150/92 (17 Nov 2024 05:44) (118/80 - 150/92)  BP(mean): 104 (16 Nov 2024 22:00) (74 - 115)  RR: 17 (17 Nov 2024 05:44) (17 - 33)  SpO2: 97% (17 Nov 2024 05:44) (95% - 97%)    Parameters below as of 17 Nov 2024 05:44  Patient On (Oxygen Delivery Method): room air          PHYSICAL EXAM  All physical exam findings normal, except those marked:  General:	Alert, active, cooperative, NAD, well hydrated  .		[] Abnormal:  Neck		Normal: supple, no cervical adenopathy, no palpable thyroid  .		[] Abnormal:  Cardiovascular	Normal: regular rate, normal S1, S2, no murmurs  .		[] Abnormal:  Respiratory	Normal: no chest wall deformity, normal respiratory pattern, CTA B/L  .		[] Abnormal:  Abdominal	Normal: soft, ND, NT, bowel sounds present, no masses, no organomegaly  .		[] Abnormal:  		Normal normal genitalia, testes descended, circumcised/uncircumcised  .		Freddie stage:			Breast freddie:  .		Menstrual history:  .		[] Abnormal:  Extremities	Normal: FROM x4  .		[] Abnormal:  Skin		Normal: intact and not indurated, no rash, no acanthosis nigricans  .		[] Abnormal:  Neurologic	Normal: grossly intact  .		[] Abnormal:    LABS                        15.5   6.83  )-----------( 213      ( 17 Nov 2024 05:05 )             46.1                               140    |  106    |  21                  Calcium: 10.1  / iCa: x      (11-17 @ 05:05)    ----------------------------<  207       Magnesium: 1.9                              4.0     |  30     |  0.76             Phosphorous: 2.6      TPro  6.7    /  Alb  2.3    /  TBili  0.8    /  DBili  x      /  AST  11     /  ALT  13     /  AlkPhos  90     17 Nov 2024 05:05    CAPILLARY BLOOD GLUCOSE      POCT Blood Glucose.: 192 mg/dL (17 Nov 2024 08:07)  POCT Blood Glucose.: 147 mg/dL (16 Nov 2024 21:40)  POCT Blood Glucose.: 210 mg/dL (16 Nov 2024 15:27)  POCT Blood Glucose.: 198 mg/dL (16 Nov 2024 11:41)        Assesment/plan    82-year-old female with a past medical history of atrial fibrillation on anticoagulation presents to the ED with slurred speech and apraxia after a fall yesterday.  Found to have CVA  Endocrine consulted for dm management. Pt states her fsg is well controlled and admits to hypos on and off. On januvia/ metformin and glimiperide          Problem/Recommendation - 1:  ·  Problem: Uncontrolled diabetes mellitus with hyperglycemia.   ·  Recommendation: pt with multiple oral agents as out pt  tightly controlled with a1c- 6.5 and occ hypoglycemia  now with hyperglycemia  inc lantus to 10 units  admelog 3 ac tid  fsg ac and hs  nutrition eval  adjust meds upon d/c  d/w prim team and son at bedside.     Problem/Recommendation - 2:  ·  Problem: Acute ischemic right MCA stroke.   ·  Recommendation: tx per neuro  s/p EEG monitoring.

## 2024-11-17 NOTE — PROGRESS NOTE ADULT - SUBJECTIVE AND OBJECTIVE BOX
NEUROLOGY FOLLOW-UP NOTE    NAME:  ROMI MONZON      ASSESSMENT:  78 RHF with atrial fibrillation on therapeutic anticoagulation with new dysarthria and left-sided hemiparesis, secondary to acute right subcortical infarct, also with recent lethargy and hallucinations, concerning for subclinical seizures aggravated by urinary tract infection      RECOMMENDATIONS:    1. Stroke/Seizure workup  - Telemetry monitoring while inpatient  - Continuous EEG showed bifrontal sharp waves but no subclinical seizures or status epilepticus - study has been discontinued  - Continue infectious and metabolic workup and treat as appropriate    2. Secondary stroke prevention / Seizure prophylaxis  - Q4H Neurochecks & Vital signs  - Patient has passed a bedside swallow evaluation  - Continue home Apixaban 5mg PO BID  - Continue Aspirin EC 81mg PO Daily  - Continue Atorvastatin 40mg PO QHS (goal LDL < 70 mg/dL)  - Treat BP if over 140/90 (goal /80) - No role for permissive hypertension at this time, Maintain home antihypertensive medications  - Continue Divalproex DR 500mg PO TID for seizure prophylaxis  - Diabetes management as per primary team and Endocrinology consult       - PT/OT  - DVT ppx: SCDs, Apixaban          NOTE TO BE COMPLETED - PLEASE REFER TO ABOVE ONLY AND IGNORE INFORMATION BELOW    ******************************    HPI:  82-year-old female with a past medical history of atrial fibrillation on anticoagulation presents to the ED with slurred speech and apraxia after a fall yesterday.  Patient had unwitnessed fall at home yesterday. Denies head strike or loss of consciousness, but reports that she was unable to get herself up off the floor.  Son came to visit and helped patient off the ground and they then spent time with each other throughout with son reporting nothing out of the ordinary with patient neurologically. Patient's son stated that after leaving his mother he received a phone call from here where her speech was slurred and he attributed it to her just being tired. However,  today upon waking she again had slurred speech so son called EMS. Upon arrival to the ED patient was asymptomatic however due to concern for possible stroke given history, code stroke activated on arrival. CT scan showed oss of gray-whitedifferentiation in the right caudate suggesting acute infarct. On my assessment patient complained of increased effort of speech, depressed mood, and anxiety about blood clots. Complained of chronic pain and limited range of motion in right shoulder but denied any increase in weakness. During interview when patient appeared to be emotional she did point out that her left hand had begun to shake. Denied fever, chills, headache, dizziness, chest pain, palpitations, shortness of breath, abdominal pain, nausea, vomiting, diarrhea, constipation, and dysuria.      In the ED:  T(F): , Max: 98.1 (13:52); HR:  (74 - 86); BP:  (145/72 - 158/82); RR:  (16 - 20); SpO2:  (96% - 98%)  WBC: 8.04, Hb.6, PLT: 165  Na: 141, K: 3.1, BUN: 22, sCr: 1.09      s/p potassium chloride    Tablet ER 40 milliEquivalent(s) Oral; potassium chloride  10 mEq/100 mL IVPB 10 milliEquivalent(s) IV Intermittent (2024 16:39)      NEURO HPI:      INTERVAL HISTORY:      MEDICATIONS:  apixaban 5 milliGRAM(s) Oral every 12 hours  aspirin enteric coated 81 milliGRAM(s) Oral daily  atorvastatin 40 milliGRAM(s) Oral at bedtime  diltiazem    milliGRAM(s) Oral daily  divalproex  milliGRAM(s) Oral two times a day  influenza  Vaccine (HIGH DOSE) 0.5 milliLiter(s) IntraMuscular once  insulin glargine Injectable (LANTUS) 8 Unit(s) SubCutaneous at bedtime  insulin lispro (ADMELOG) corrective regimen sliding scale   SubCutaneous three times a day before meals  insulin lispro (ADMELOG) corrective regimen sliding scale   SubCutaneous at bedtime  insulin lispro Injectable (ADMELOG) 3 Unit(s) SubCutaneous three times a day before meals  metoprolol tartrate 50 milliGRAM(s) Oral two times a day  pantoprazole    Tablet 40 milliGRAM(s) Oral before breakfast PRN  senna 2 Tablet(s) Oral at bedtime      ALLERGIES:  No Known Allergies      REVIEW OF SYSTEMS:  Fourteen systems reviewed and negative except as in HPI / Interval History.          OBJECTIVE:  Vital Signs Last 24 Hrs  T(C): 36.4 (2024 21:33), Max: 36.8 (2024 05:44)  T(F): 97.6 (2024 21:33), Max: 98.2 (2024 05:44)  HR: 85 (2024 21:33) (71 - 115)  BP: 146/85 (2024 21:33) (104/71 - 150/92)  BP(mean): --  RR: 17 (2024 21:33) (17 - 17)  SpO2: 98% (2024 21:33) (97% - 98%)    Parameters below as of 2024 21:33  Patient On (Oxygen Delivery Method): room air        General Examination:  General: No acute distress  HEENT: Atraumatic, Normocephalic  Respiratory: CTA B/l.  No crackles, rhonchi, or wheezes.  Cardiovascular: RRR.  Normal S1 & S2.  Normal b/l radial and pedal pulses.    Neurological Examination:  General / Mental Status: AAO x 3.  No aphasia or dysarthria.  Naming and repetition intact.  Cranial Nerves: VFF x 4.  PERRL.  EOMI x 2, No nystagmus or diplopia.  B/l V1-V3 equal and intact to light touch and pinprick.  Symmetric facial movement and palate elevation.  B/l hearing equal to finger rub.  5/5 strength with b/l sternocleidomastoid and trapezius.  Midline tongue protrusion, with no atrophy or fasciculations.  Motor: Normal bulk & tone in all four extremities.  5/5 strength throughout all four extremities.  No downward drift, rigidity, spasticity, or tremors in any of the four extremities.  Sensory: Intact to light touch and pinprick in all four extremities.  Negative Romberg.  Reflex: 2+ and symmetric at b/l biceps, triceps, brachioradialis, patellae, and ankles.  Downgoing toes b/l.  Coordination: No dysmetria with b/l finger-to-nose and heel raise tests.  Symmetric rapid alternating movements b/l.  Gait: Normal, narrow-based gait.  No difficulty with tiptoe, heel, and tandem gaits.        LABORATORY VALUES:                          15.5   6.83  )-----------( 213      ( 2024 05:05 )             46.1       11-    140  |  106  |  21[H]  ----------------------------<  207[H]  4.0   |  30  |  0.76    Ca    10.1      2024 05:05  Phos  2.6       Mg     1.9         TPro  6.7  /  Alb  2.3[L]  /  TBili  0.8  /  DBili  x   /  AST  11  /  ALT  13  /  AlkPhos  90        LIVER FUNCTIONS - ( 2024 05:05 )  Alb: 2.3 g/dL / Pro: 6.7 g/dL / ALK PHOS: 90 U/L / ALT: 13 U/L DA / AST: 11 U/L / GGT: x              Chol 153 LDL -- HDL 48[L] Trig 67    Glucose Trend  24 @ 21:00   -  -- -- 144[H]  24 @ 16:59   -  -- -- 240[H]  24 @ 11:39   -  -- -- 176[H]  24 @ 08:07   -  -- -- 192[H]  24 @ 05:05   -  207[H] -- --  24 @ 21:40   -  -- -- 147[H]  24 @ 15:27   -  -- -- 210[H]  24 @ 11:41   -  -- -- 198[H]  24 @ 07:03   -  -- -- 223[H]  24 @ 03:26   -  236[H] -- --                    NEUROIMAGING:          Please contact the Neurology consult service with any neurological questions.      Stephen Leach MD   of Neurology  [facility] [employer] [facility] at [facility]

## 2024-11-17 NOTE — PROGRESS NOTE ADULT - ASSESSMENT
77yo woman w/ PMH HTN, Afib on Eliquis, DM2, hypothyroidism admitted to medicine for acute R MCA CVA resulting in left sided deficits w/ dysarthria; hospital course c/b waxing and waning mental status/encephalopathy for which neurology service suggested continuous vEEG monitoring to r/o subclinical seizures. Initially transferred to ICU for vEEG, now downgraded back to medicine, pending further stabilization for MEG.    #Acute metabolic encephalopathy  #R/O subclinical seizures  #Acute right MCA CVA  #Afib  #HTN  #DM2  #HLD  #Hypothyroidism    - s/p vEEG per neuro recs, shows bifrontal sharp waves but no status epilepticus  - keppra dosing per neuro, no adjustment needed per last level  - f/u additional neuro recommendations  - seizure precautions  - aspiration precautions  - c/w rate control for Afib, diltiazem, lopressor  - continue statin, ASA  - endo following, currently on lantus 10, lispro 3, however if not having adequate po will need to decrease  - bowel regimen  - cont Eliquis for Afib  - replete electrolytes as appropriate  - IVF as necessary for hydration as suspect pt with insufficient oral intake thus far  - carb consistent diet, assist with feeding  - DVT PPx - Eliquis  - full code status

## 2024-11-17 NOTE — PROGRESS NOTE ADULT - SUBJECTIVE AND OBJECTIVE BOX
PATIENT SEEN AND EXAMINED BY EDDY SILVER M.D. ON :- 24  DATE OF SERVICE:     24        Interim events noted,Labs ,Radiological studies and Cardiology tests reviewed .    Patient is a 78y old  Female who presents with a chief complaint of acute stroke (2024 15:44)      HPI:  82-year-old female with a past medical history of atrial fibrillation on anticoagulation presents to the ED with slurred speech and apraxia after a fall yesterday.  Patient had unwitnessed fall at home yesterday. Denies head strike or loss of consciousness, but reports that she was unable to get herself up off the floor.  Son came to visit and helped patient off the ground and they then spent time with each other throughout with son reporting nothing out of the ordinary with patient neurologically. Patient's son stated that after leaving his mother he received a phone call from here where her speech was slurred and he attributed it to her just being tired. However,  today upon waking she again had slurred speech so son called EMS. Upon arrival to the ED patient was asymptomatic however due to concern for possible stroke given history, code stroke activated on arrival. CT scan showed oss of gray-whitedifferentiation in the right caudate suggesting acute infarct. On my assessment patient complained of increased effort of speech, depressed mood, and anxiety about blood clots. Complained of chronic pain and limited range of motion in right shoulder but denied any increase in weakness. During interview when patient appeared to be emotional she did point out that her left hand had begun to shake. Denied fever, chills, headache, dizziness, chest pain, palpitations, shortness of breath, abdominal pain, nausea, vomiting, diarrhea, constipation, and dysuria.      In the ED:  T(F): , Max: 98.1 (13:52); HR:  (74 - 86); BP:  (145/72 - 158/82); RR:  (16 - 20); SpO2:  (96% - 98%)  WBC: 8.04, Hb.6, PLT: 165  Na: 141, K: 3.1, BUN: 22, sCr: 1.09      s/p potassium chloride    Tablet ER 40 milliEquivalent(s) Oral; potassium chloride  10 mEq/100 mL IVPB 10 milliEquivalent(s) IV Intermittent (2024 16:39)      PAST MEDICAL & SURGICAL HISTORY:  HTN (hypertension)      Atrial fibrillation      OA (osteoarthritis)      HLD (hyperlipidemia)      Diabetes      Obesity      Anxiety      Hypothyroidism      Peripheral venous insufficiency      No significant past surgical history          PREVIOUS DIAGNOSTIC TESTING:      ECHO  FINDINGS:    STRESS  FINDINGS:    CATHETERIZATION  FINDINGS:    MEDICATIONS  (STANDING):  apixaban 5 milliGRAM(s) Oral every 12 hours  aspirin enteric coated 81 milliGRAM(s) Oral daily  atorvastatin 40 milliGRAM(s) Oral at bedtime  diltiazem    milliGRAM(s) Oral daily  divalproex  milliGRAM(s) Oral two times a day  influenza  Vaccine (HIGH DOSE) 0.5 milliLiter(s) IntraMuscular once  insulin glargine Injectable (LANTUS) 8 Unit(s) SubCutaneous at bedtime  insulin lispro (ADMELOG) corrective regimen sliding scale   SubCutaneous three times a day before meals  insulin lispro (ADMELOG) corrective regimen sliding scale   SubCutaneous at bedtime  insulin lispro Injectable (ADMELOG) 3 Unit(s) SubCutaneous three times a day before meals  metoprolol tartrate 50 milliGRAM(s) Oral two times a day  senna 2 Tablet(s) Oral at bedtime    MEDICATIONS  (PRN):  pantoprazole    Tablet 40 milliGRAM(s) Oral before breakfast PRN for heartburn      FAMILY HISTORY:  Family history of thrombosis (Mother)        SOCIAL HISTORY:    CIGARETTES:    ALCOHOL:    REVIEW OF SYSTEMS:  CONSTITUTIONAL: No fever, weight loss, or fatigue  EYES: No eye pain, visual disturbances, or discharge  ENMT:  No difficulty hearing, tinnitus, vertigo; No sinus or throat pain  NECK: No pain or stiffness  RESPIRATORY: No cough, wheezing, chills or hemoptysis; No shortness of breath  CARDIOVASCULAR: No chest pain, palpitations, dizziness, or leg swelling  GASTROINTESTINAL: No abdominal or epigastric pain. No nausea, vomiting, or hematemesis; No diarrhea or constipation. No melena or hematochezia.  GENITOURINARY: No dysuria, frequency, hematuria, or incontinence  NEUROLOGICAL: No headaches, memory loss, loss of strength, numbness, or tremors  SKIN: No itching, burning, rashes, or lesions   LYMPH NODES: No enlarged glands  ENDOCRINE: No heat or cold intolerance; No hair loss  MUSCULOSKELETAL: No joint pain or swelling; No muscle, back, or extremity pain  PSYCHIATRIC: No depression, anxiety, mood swings, or difficulty sleeping  HEME/LYMPH: No easy bruising, or bleeding gums  ALLERY AND IMMUNOLOGIC: No hives or eczema    Vital Signs Last 24 Hrs  T(C): 36.4 (:33), Max: 36.8 (2024 05:44)  T(F): 97.6 (:33), Max: 98.2 (2024 05:44)  HR: 85 (:) (71 - 115)  BP: 146/85 (:) (104/71 - 150/92)  BP(mean): 104 (2024 22:00) (104 - 104)  RR: 17 (:) (17 - 29)  SpO2: 98% (:) (95% - 98%)    Parameters below as of :33  Patient On (Oxygen Delivery Method): room air          PHYSICAL EXAM:  GENERAL: NAD, well-groomed, well-developed  HEAD:  Atraumatic, Normocephalic  EYES: EOMI, PERRLA, conjunctiva and sclera clear  ENMT: No tonsillar erythema, exudates, or enlargement; Moist mucous membranes, Good dentition, No lesions  NECK: Supple, No JVD, Normal thyroid  NERVOUS SYSTEM:  Alert & Oriented X3, Good concentration; Motor Strength 5/5 B/L upper and lower extremities; DTRs 2+ intact and symmetric  CHEST/LUNG: Clear to percussion bilaterally; No rales, rhonchi, wheezing, or rubs  HEART: Regular rate and rhythm; No murmurs, rubs, or gallops  ABDOMEN: Soft, Nontender, Nondistended; Bowel sounds present  EXTREMITIES:  2+ Peripheral Pulses, No clubbing, cyanosis, or edema  LYMPH: No lymphadenopathy noted  SKIN: No rashes or lesions      INTERPRETATION OF TELEMETRY:    ECG:    [place] [facility]     LABS:                        15.5   6.83  )-----------( 213      ( 2024 05:05 )             46.1     11-    140  |  106  |  21[H]  ----------------------------<  207[H]  4.0   |  30  |  0.76    Ca    10.1      2024 05:05  Phos  2.6     11-  Mg     1.9         TPro  6.7  /  Alb  2.3[L]  /  TBili  0.8  /  DBili  x   /  AST  11  /  ALT  13  /  AlkPhos  90  -          Urinalysis Basic - ( 2024 05:05 )    Color: x / Appearance: x / SG: x / pH: x  Gluc: 207 mg/dL / Ketone: x  / Bili: x / Urobili: x   Blood: x / Protein: x / Nitrite: x   Leuk Esterase: x / RBC: x / WBC x   Sq Epi: x / Non Sq Epi: x / Bacteria: x      Lipid Panel:   I&O's Summary    2024 07:01  -  2024 07:00  --------------------------------------------------------  IN: 0 mL / OUT: 600 mL / NET: -600 mL        RADIOLOGY & ADDITIONAL STUDIES:    < from: TTE W or WO Ultrasound Enhancing Agent (.24 @ 07:53) >     CONCLUSIONS:      1. Left ventricular cavity is normal in size. Left ventricular wall thickness is mildly increased. Left ventricular systolic function is normal with an ejection fraction visually estimated at 55 to 60 %.   2. Analysis of left ventricular diastolic function and filling pressure is made challenging by the presence of atrial fibrillation.   3. Normal right ventricular cavity size and normal right ventricular systolic function.   4. Left atrium is mildly dilated.   5. The right atrium is dilated.   6. 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   7. Trileaflet aortic valve. Fibrocalcific aortic valve sclerosis without stenosis.   8. Mild aortic regurgitation.   9. Structurally normal mitral valve with normal leaflet excursion.  10. Trace mitral regurgitation.  11. Agitated saline injection was negative for intracardiac shunt.  12. Echo-free space is noted in the liver consistent with cyst, however, dedicated imaging recommended for further evaluation if clinically indicated.  13. Pulmonary artery systolic pressure could not be estimated.  14. No pericardial effusion seen.  15. No prior echocardiogram is available for comparison.    < end of copied text >

## 2024-11-17 NOTE — PROGRESS NOTE ADULT - SUBJECTIVE AND OBJECTIVE BOX
Patient seen and examined at bedside this morning. Her son also present. He notes earlier on she woke up a bit to eat. However, she was sleeping deeply upon exam, and reacting to painful stimuli.   Labs, vitals reviewed as below. PE - elderly female, asleep soundly, no respiratory distress, lungs grossly clear, irregular rhythm, abd soft ntnd, no LE edema    Vital Signs Last 24 Hrs  T(C): 36.1 (17 Nov 2024 15:04), Max: 36.8 (17 Nov 2024 05:44)  T(F): 97 (17 Nov 2024 15:04), Max: 98.2 (17 Nov 2024 05:44)  HR: 81 (17 Nov 2024 15:04) (71 - 98)  BP: 115/80 (17 Nov 2024 15:04) (115/80 - 150/92)  BP(mean): 104 (16 Nov 2024 22:00) (74 - 115)  RR: 17 (17 Nov 2024 15:04) (17 - 31)  SpO2: 98% (17 Nov 2024 15:04) (95% - 98%)    Parameters below as of 17 Nov 2024 15:04  Patient On (Oxygen Delivery Method): room air                            15.5   6.83  )-----------( 213      ( 17 Nov 2024 05:05 )             46.1     11-17    140  |  106  |  21[H]  ----------------------------<  207[H]  4.0   |  30  |  0.76    Ca    10.1      17 Nov 2024 05:05  Phos  2.6     11-17  Mg     1.9     11-17    TPro  6.7  /  Alb  2.3[L]  /  TBili  0.8  /  DBili  x   /  AST  11  /  ALT  13  /  AlkPhos  90  11-17

## 2024-11-17 NOTE — PROGRESS NOTE ADULT - TIME BILLING
- Review of records, telemetry, vital signs and daily labs.   - General and cardiovascular physical examination.  - Generation of cardiovascular treatment plan and completion of note .  - Coordination of care.      Patient was seen and examined by me on  11/17/24,interim events noted,labs and radiology studies reviewed.  Vic Novak MD,FACC.  [id number] [id number].  [phone]
Time spent includes direct patient care  (interview and examination of patient), discussion with other providers, support staff and/or patient's family members, review of medical records, ordering diagnostic tests and analyzing results, and documentation.
- Review of records, telemetry, vital signs and daily labs.   - General and cardiovascular physical examination.  - Generation of cardiovascular treatment plan and completion of note .  - Coordination of care.      Patient was seen and examined by me on  11.15.24interim events noted,labs and radiology studies reviewed.  Vic Novak MD,FACC.  [id number] [id number].  [phone]
- Review of records, telemetry, vital signs and daily labs.   - General and cardiovascular physical examination.  - Generation of cardiovascular treatment plan and completion of note .  - Coordination of care.      Patient was seen and examined by me on  11/16/24,interim events noted,labs and radiology studies reviewed.  Vic Novak MD,FACC.  [id number] [id number].  [phone]
I counseled the patient and primary team about the appropriate medications indicated for secondary stroke prevention and seizure prophylaxis.
Time spent includes direct patient care  (interview and examination of patient), discussion with other providers, support staff and/or patient's family members, review of medical records, ordering diagnostic tests and analyzing results, and documentation.
- Review of hospital course, labs, vitals, medical records  - Bedside exam and interview  - Discussed plan of care with primary team ACP and housestaff, son at [facility]  - Documenting the encounter
Time spent includes direct patient care  (interview and examination of patient), discussion with other providers, support staff and/or patient's family members, review of medical records, ordering diagnostic tests and analyzing results, and documentation.
- Review of hospital course, labs, vitals, medical records  - Bedside exam and interview  - Discussed plan of care with primary team ACP and housestaff, son at bedside  - Documenting the encounter

## 2024-11-18 ENCOUNTER — TRANSCRIPTION ENCOUNTER (OUTPATIENT)
Age: 79
End: 2024-11-18

## 2024-11-18 VITALS
TEMPERATURE: 97 F | RESPIRATION RATE: 18 BRPM | DIASTOLIC BLOOD PRESSURE: 74 MMHG | OXYGEN SATURATION: 96 % | SYSTOLIC BLOOD PRESSURE: 116 MMHG | HEART RATE: 76 BPM

## 2024-11-18 LAB
ALBUMIN SERPL ELPH-MCNC: 2.2 G/DL — LOW (ref 3.5–5)
ALP SERPL-CCNC: 86 U/L — SIGNIFICANT CHANGE UP (ref 40–120)
ALT FLD-CCNC: 12 U/L DA — SIGNIFICANT CHANGE UP (ref 10–60)
ANION GAP SERPL CALC-SCNC: 4 MMOL/L — LOW (ref 5–17)
AST SERPL-CCNC: 12 U/L — SIGNIFICANT CHANGE UP (ref 10–40)
BASOPHILS # BLD AUTO: 0.03 K/UL — SIGNIFICANT CHANGE UP (ref 0–0.2)
BASOPHILS NFR BLD AUTO: 0.6 % — SIGNIFICANT CHANGE UP (ref 0–2)
BILIRUB SERPL-MCNC: 0.7 MG/DL — SIGNIFICANT CHANGE UP (ref 0.2–1.2)
BUN SERPL-MCNC: 23 MG/DL — HIGH (ref 7–18)
CALCIUM SERPL-MCNC: 10 MG/DL — SIGNIFICANT CHANGE UP (ref 8.4–10.5)
CHLORIDE SERPL-SCNC: 105 MMOL/L — SIGNIFICANT CHANGE UP (ref 96–108)
CO2 SERPL-SCNC: 30 MMOL/L — SIGNIFICANT CHANGE UP (ref 22–31)
CREAT SERPL-MCNC: 0.72 MG/DL — SIGNIFICANT CHANGE UP (ref 0.5–1.3)
EGFR: 86 ML/MIN/1.73M2 — SIGNIFICANT CHANGE UP
EOSINOPHIL # BLD AUTO: 0.1 K/UL — SIGNIFICANT CHANGE UP (ref 0–0.5)
EOSINOPHIL NFR BLD AUTO: 1.9 % — SIGNIFICANT CHANGE UP (ref 0–6)
GLUCOSE BLDC GLUCOMTR-MCNC: 161 MG/DL — HIGH (ref 70–99)
GLUCOSE BLDC GLUCOMTR-MCNC: 230 MG/DL — HIGH (ref 70–99)
GLUCOSE SERPL-MCNC: 158 MG/DL — HIGH (ref 70–99)
HCT VFR BLD CALC: 46.7 % — HIGH (ref 34.5–45)
HGB BLD-MCNC: 15.8 G/DL — HIGH (ref 11.5–15.5)
IMM GRANULOCYTES NFR BLD AUTO: 0.2 % — SIGNIFICANT CHANGE UP (ref 0–0.9)
LYMPHOCYTES # BLD AUTO: 1.94 K/UL — SIGNIFICANT CHANGE UP (ref 1–3.3)
LYMPHOCYTES # BLD AUTO: 36.3 % — SIGNIFICANT CHANGE UP (ref 13–44)
MAGNESIUM SERPL-MCNC: 1.9 MG/DL — SIGNIFICANT CHANGE UP (ref 1.6–2.6)
MCHC RBC-ENTMCNC: 31.6 PG — SIGNIFICANT CHANGE UP (ref 27–34)
MCHC RBC-ENTMCNC: 33.8 G/DL — SIGNIFICANT CHANGE UP (ref 32–36)
MCV RBC AUTO: 93.4 FL — SIGNIFICANT CHANGE UP (ref 80–100)
MONOCYTES # BLD AUTO: 0.48 K/UL — SIGNIFICANT CHANGE UP (ref 0–0.9)
MONOCYTES NFR BLD AUTO: 9 % — SIGNIFICANT CHANGE UP (ref 2–14)
NEUTROPHILS # BLD AUTO: 2.79 K/UL — SIGNIFICANT CHANGE UP (ref 1.8–7.4)
NEUTROPHILS NFR BLD AUTO: 52 % — SIGNIFICANT CHANGE UP (ref 43–77)
NRBC # BLD: 0 /100 WBCS — SIGNIFICANT CHANGE UP (ref 0–0)
PHOSPHATE SERPL-MCNC: 2.3 MG/DL — LOW (ref 2.5–4.5)
PLATELET # BLD AUTO: 193 K/UL — SIGNIFICANT CHANGE UP (ref 150–400)
POTASSIUM SERPL-MCNC: 3.7 MMOL/L — SIGNIFICANT CHANGE UP (ref 3.5–5.3)
POTASSIUM SERPL-SCNC: 3.7 MMOL/L — SIGNIFICANT CHANGE UP (ref 3.5–5.3)
PROT SERPL-MCNC: 6.4 G/DL — SIGNIFICANT CHANGE UP (ref 6–8.3)
RBC # BLD: 5 M/UL — SIGNIFICANT CHANGE UP (ref 3.8–5.2)
RBC # FLD: 12.7 % — SIGNIFICANT CHANGE UP (ref 10.3–14.5)
SODIUM SERPL-SCNC: 139 MMOL/L — SIGNIFICANT CHANGE UP (ref 135–145)
WBC # BLD: 5.35 K/UL — SIGNIFICANT CHANGE UP (ref 3.8–10.5)
WBC # FLD AUTO: 5.35 K/UL — SIGNIFICANT CHANGE UP (ref 3.8–10.5)

## 2024-11-18 PROCEDURE — 95957 EEG DIGITAL ANALYSIS: CPT

## 2024-11-18 PROCEDURE — 85025 COMPLETE CBC W/AUTO DIFF WBC: CPT

## 2024-11-18 PROCEDURE — 84443 ASSAY THYROID STIM HORMONE: CPT

## 2024-11-18 PROCEDURE — 87086 URINE CULTURE/COLONY COUNT: CPT

## 2024-11-18 PROCEDURE — 93306 TTE W/DOPPLER COMPLETE: CPT

## 2024-11-18 PROCEDURE — 70498 CT ANGIOGRAPHY NECK: CPT | Mod: MC

## 2024-11-18 PROCEDURE — 70496 CT ANGIOGRAPHY HEAD: CPT | Mod: MC

## 2024-11-18 PROCEDURE — 83735 ASSAY OF MAGNESIUM: CPT

## 2024-11-18 PROCEDURE — 87077 CULTURE AEROBIC IDENTIFY: CPT

## 2024-11-18 PROCEDURE — 85027 COMPLETE CBC AUTOMATED: CPT

## 2024-11-18 PROCEDURE — 83615 LACTATE (LD) (LDH) ENZYME: CPT

## 2024-11-18 PROCEDURE — 80164 ASSAY DIPROPYLACETIC ACD TOT: CPT

## 2024-11-18 PROCEDURE — 82962 GLUCOSE BLOOD TEST: CPT

## 2024-11-18 PROCEDURE — 87641 MR-STAPH DNA AMP PROBE: CPT

## 2024-11-18 PROCEDURE — 0042T: CPT | Mod: MC

## 2024-11-18 PROCEDURE — 36415 COLL VENOUS BLD VENIPUNCTURE: CPT

## 2024-11-18 PROCEDURE — 97530 THERAPEUTIC ACTIVITIES: CPT

## 2024-11-18 PROCEDURE — 80061 LIPID PANEL: CPT

## 2024-11-18 PROCEDURE — 93970 EXTREMITY STUDY: CPT

## 2024-11-18 PROCEDURE — 99239 HOSP IP/OBS DSCHRG MGMT >30: CPT | Mod: GC

## 2024-11-18 PROCEDURE — 93005 ELECTROCARDIOGRAM TRACING: CPT

## 2024-11-18 PROCEDURE — A9585: CPT

## 2024-11-18 PROCEDURE — 87640 STAPH A DNA AMP PROBE: CPT

## 2024-11-18 PROCEDURE — 85610 PROTHROMBIN TIME: CPT

## 2024-11-18 PROCEDURE — 84484 ASSAY OF TROPONIN QUANT: CPT

## 2024-11-18 PROCEDURE — 97110 THERAPEUTIC EXERCISES: CPT

## 2024-11-18 PROCEDURE — 84100 ASSAY OF PHOSPHORUS: CPT

## 2024-11-18 PROCEDURE — 70553 MRI BRAIN STEM W/O & W/DYE: CPT | Mod: MC

## 2024-11-18 PROCEDURE — 85730 THROMBOPLASTIN TIME PARTIAL: CPT

## 2024-11-18 PROCEDURE — 95710 EEG W/O VID EA 12-26HR CONT: CPT

## 2024-11-18 PROCEDURE — 97162 PT EVAL MOD COMPLEX 30 MIN: CPT

## 2024-11-18 PROCEDURE — 70450 CT HEAD/BRAIN W/O DYE: CPT | Mod: MC

## 2024-11-18 PROCEDURE — 80048 BASIC METABOLIC PNL TOTAL CA: CPT

## 2024-11-18 PROCEDURE — 95816 EEG AWAKE AND DROWSY: CPT

## 2024-11-18 PROCEDURE — 83036 HEMOGLOBIN GLYCOSYLATED A1C: CPT

## 2024-11-18 PROCEDURE — 80053 COMPREHEN METABOLIC PANEL: CPT

## 2024-11-18 PROCEDURE — 81001 URINALYSIS AUTO W/SCOPE: CPT

## 2024-11-18 PROCEDURE — 99291 CRITICAL CARE FIRST HOUR: CPT | Mod: 25

## 2024-11-18 PROCEDURE — 87186 SC STD MICRODIL/AGAR DIL: CPT

## 2024-11-18 RX ORDER — RAMIPRIL 10 MG/1
0 CAPSULE ORAL
Refills: 0 | DISCHARGE

## 2024-11-18 RX ORDER — GLIMEPIRIDE 1 MG/1
1 TABLET ORAL
Refills: 0 | DISCHARGE

## 2024-11-18 RX ORDER — HYDROCORTISONE BUTYRATE 0.1 %
1 CREAM (GRAM) TOPICAL
Qty: 0 | Refills: 0 | DISCHARGE
Start: 2024-11-18

## 2024-11-18 RX ORDER — PANTOPRAZOLE SODIUM 40 MG/1
1 TABLET, DELAYED RELEASE ORAL
Qty: 0 | Refills: 0 | DISCHARGE
Start: 2024-11-18

## 2024-11-18 RX ORDER — DIVALPROEX SODIUM 500 MG
1 TABLET, DELAYED RELEASE (ENTERIC COATED) ORAL
Qty: 0 | Refills: 0 | DISCHARGE
Start: 2024-11-18

## 2024-11-18 RX ORDER — SENNOSIDES 8.6 MG
2 TABLET ORAL
Qty: 0 | Refills: 0 | DISCHARGE
Start: 2024-11-18

## 2024-11-18 RX ORDER — HYDROCORTISONE BUTYRATE 0.1 %
1 CREAM (GRAM) TOPICAL ONCE
Refills: 0 | Status: COMPLETED | OUTPATIENT
Start: 2024-11-18 | End: 2024-11-18

## 2024-11-18 RX ORDER — INSULIN GLARGINE 100 [IU]/ML
8 INJECTION, SOLUTION SUBCUTANEOUS
Qty: 1 | Refills: 0
Start: 2024-11-18 | End: 2024-12-17

## 2024-11-18 RX ADMIN — Medication 2: at 12:08

## 2024-11-18 RX ADMIN — Medication 3 UNIT(S): at 12:09

## 2024-11-18 RX ADMIN — APIXABAN 5 MILLIGRAM(S): 2.5 TABLET, FILM COATED ORAL at 06:06

## 2024-11-18 RX ADMIN — METOPROLOL TARTRATE 50 MILLIGRAM(S): 100 TABLET, FILM COATED ORAL at 06:06

## 2024-11-18 RX ADMIN — Medication 81 MILLIGRAM(S): at 12:09

## 2024-11-18 RX ADMIN — Medication 500 MILLIGRAM(S): at 06:06

## 2024-11-18 RX ADMIN — Medication 1 SUPPOSITORY(S): at 14:52

## 2024-11-18 RX ADMIN — DILTIAZEM HYDROCHLORIDE 180 MILLIGRAM(S): 240 CAPSULE, COATED, EXTENDED RELEASE ORAL at 06:06

## 2024-11-18 NOTE — PROGRESS NOTE ADULT - SUBJECTIVE AND OBJECTIVE BOX
Interval Events:  pt in nad    Allergies    No Known Allergies    Intolerances      Endocrine/Metabolic Medications:  atorvastatin 40 milliGRAM(s) Oral at bedtime  insulin glargine Injectable (LANTUS) 8 Unit(s) SubCutaneous at bedtime  insulin lispro (ADMELOG) corrective regimen sliding scale   SubCutaneous three times a day before meals  insulin lispro (ADMELOG) corrective regimen sliding scale   SubCutaneous at bedtime  insulin lispro Injectable (ADMELOG) 3 Unit(s) SubCutaneous three times a day before meals      Vital Signs Last 24 Hrs  T(C): 36.2 (18 Nov 2024 05:30), Max: 36.4 (17 Nov 2024 14:45)  T(F): 97.2 (18 Nov 2024 05:30), Max: 97.6 (17 Nov 2024 14:45)  HR: 88 (18 Nov 2024 05:30) (71 - 115)  BP: 142/89 (18 Nov 2024 05:30) (104/71 - 146/85)  BP(mean): --  RR: 18 (18 Nov 2024 05:30) (17 - 18)  SpO2: 92% (18 Nov 2024 05:30) (92% - 98%)    Parameters below as of 18 Nov 2024 05:30  Patient On (Oxygen Delivery Method): room air          PHYSICAL EXAM  All physical exam findings normal, except those marked:  General:	Alert, active, cooperative, NAD, well hydrated  .		[] Abnormal:  Neck		Normal: supple, no cervical adenopathy, no palpable thyroid  .		[] Abnormal:  Cardiovascular	Normal: regular rate, normal S1, S2, no murmurs  .		[] Abnormal:  Respiratory	Normal: no chest wall deformity, normal respiratory pattern, CTA B/L  .		[] Abnormal:  Abdominal	Normal: soft, ND, NT, bowel sounds present, no masses, no organomegaly  .		[] Abnormal:  		Normal normal genitalia, testes descended, circumcised/uncircumcised  .		Freddie stage:			Breast freddie:  .		Menstrual history:  .		[] Abnormal:  Extremities	Normal: FROM x4  .		[] Abnormal:  Skin		Normal: intact and not indurated, no rash, no acanthosis nigricans  .		[] Abnormal:  Neurologic	Normal: grossly intact  .		[] Abnormal:    LABS                        15.8   5.35  )-----------( 193      ( 18 Nov 2024 06:50 )             46.7                               139    |  105    |  23                  Calcium: 10.0  / iCa: x      (11-18 @ 06:50)    ----------------------------<  158       Magnesium: 1.9                              3.7     |  30     |  0.72             Phosphorous: 2.3      TPro  6.4    /  Alb  2.2    /  TBili  0.7    /  DBili  x      /  AST  12     /  ALT  12     /  AlkPhos  86     18 Nov 2024 06:50    CAPILLARY BLOOD GLUCOSE      POCT Blood Glucose.: 161 mg/dL (18 Nov 2024 07:43)  POCT Blood Glucose.: 144 mg/dL (17 Nov 2024 21:00)  POCT Blood Glucose.: 240 mg/dL (17 Nov 2024 16:59)  POCT Blood Glucose.: 176 mg/dL (17 Nov 2024 11:39)        Assesment/plan      82-year-old female with a past medical history of atrial fibrillation on anticoagulation presents to the ED with slurred speech and apraxia after a fall yesterday.  Found to have CVA  Endocrine consulted for dm management. Pt states her fsg is well controlled and admits to hypos on and off. On januvia/ metformin and glimiperide          Problem/Recommendation - 1:  ·  Problem: Uncontrolled diabetes mellitus with hyperglycemia.   ·  Recommendation: pt with multiple oral agents as out pt  tightly controlled with a1c- 6.5 and occ hypoglycemia  now with hyperglycemia  cont lantus 8 units  admelog 3 ac tid  fsg ac and hs  nutrition eval  adjust meds upon d/c  d/w prim team and son at bedside.     Problem/Recommendation - 2:  ·  Problem: Acute ischemic right MCA stroke.   ·  Recommendation: tx per neuro  s/p EEG monitoring.

## 2024-11-18 NOTE — PROGRESS NOTE ADULT - PROBLEM SELECTOR PLAN 8
on home glimepiride, Januvia, jardiance and metformin   on ISS while in hospital  endo Dr. Hdz consulted for discharge recs for concern for outpatient polypharmacy   rec lantus 8 units  admelog 2 ac tid
DVT: Eliquis  GI: PPI
on home glimepiride, Januvia, jardiance and metformin   on ISS while in hospital  endo Dr. Hdz consulted for discharge recs for concern for outpatient polypharmacy    rec lantus 8 units  admelog 2 ac tid

## 2024-11-18 NOTE — PROGRESS NOTE ADULT - PROBLEM SELECTOR PROBLEM 5
HLD (hyperlipidemia)
Hypothyroidism
HLD (hyperlipidemia)

## 2024-11-18 NOTE — PROGRESS NOTE ADULT - REASON FOR ADMISSION
acute stroke

## 2024-11-18 NOTE — DISCHARGE NOTE NURSING/CASE MANAGEMENT/SOCIAL WORK - FINANCIAL ASSISTANCE
[facility] provides services at a reduced cost to those who are determined to be eligible through [facility]’s financial assistance program. Information regarding [facility]’s financial assistance program can be found by going to [email or web address] or by calling [phone].

## 2024-11-18 NOTE — PROGRESS NOTE ADULT - PROBLEM SELECTOR PROBLEM 7
Peripheral venous insufficiency
Prophylactic measure

## 2024-11-18 NOTE — PROGRESS NOTE ADULT - PROBLEM SELECTOR PROBLEM 8
Prophylactic measure
Diabetes mellitus
Diabetes mellitus
Prophylactic measure

## 2024-11-18 NOTE — PROGRESS NOTE ADULT - PROBLEM SELECTOR PLAN 7
DVT: Eliquis  GI: PPI
- History of venous insufficiency  -b/l LE US dopplers: neg

## 2024-11-18 NOTE — PROGRESS NOTE ADULT - PROBLEM SELECTOR PLAN 2
h/o HTN on home Cardizem 180mg qd and metoprolol ER 200mg  -c/w home meds of Cardizem 180mg qd   -c/w Metoprolol tartrate 50mg BID with holding parameters   -monitor BP  -adjust antihypertensive meds as appropriate

## 2024-11-18 NOTE — PROGRESS NOTE ADULT - PROBLEM SELECTOR PLAN 6
- History of Hypothyroidism
- History of venous insufficiency  -b/l LE US dopplers: neg
- History of Hypothyroidism
- History of Hypothyroidism

## 2024-11-18 NOTE — PROGRESS NOTE ADULT - ATTENDING COMMENTS
Patient seen and examined at bedside this morning. She is seen more awake and answering questions with son at bedside translating in [place]uran. He notes she had a BM and some rectal pain afterwards, given cortisone suppository. Pt otherwise with stable vitals, labs, and optimized for transfer to [facility] for further rehab. Please see d/c summary from 11/18/2024.

## 2024-11-18 NOTE — PROGRESS NOTE ADULT - PROBLEM SELECTOR PROBLEM 6
Hypothyroidism
Hypothyroidism
Peripheral venous insufficiency
Hypothyroidism

## 2024-11-18 NOTE — DISCHARGE NOTE NURSING/CASE MANAGEMENT/SOCIAL WORK - PATIENT PORTAL LINK FT
You can access the FollowMyHealth Patient Portal offered by [facility] by registering at the following website: [email or web address]. By joining [employer]’s FollowMyHealth portal, you will also be able to view your health information using other applications (apps) compatible with our system.

## 2024-11-18 NOTE — PROGRESS NOTE ADULT - PROVIDER SPECIALTY LIST ADULT
Internal Medicine
Neurology
Internal Medicine
Neurology
Neurology
Critical Care
Endocrinology
Endocrinology
Internal Medicine
Neurology
Critical Care
Cardiology
Internal Medicine
Cardiology
Cardiology
Internal Medicine

## 2024-11-18 NOTE — DISCHARGE NOTE NURSING/CASE MANAGEMENT/SOCIAL WORK - NSDCPEFALRISK_GEN_ALL_CORE
For information on Fall & Injury Prevention, visit: [email or web address] OR  [email or web address] OR  https://www.cdc.gov/steadi/patient.html

## 2024-11-18 NOTE — PROGRESS NOTE ADULT - PROBLEM SELECTOR PLAN 3
- History of A. Fib  - Continue home Cardizem, Metoprolol, and Eliquis  - EKG showing A. Fib  - can be due to ?Eliquis failure
- History of A. Fib  - Continue home Cardizem, Metoprolol, and Eliquis  - EKG showing A. Fib  - ?Eliquis failure
- History of A. Fib  - Continue home Cardizem, Metoprolol, and Eliquis  - EKG showing A. Fib  - ?Eliquis failure
- History of A. Fib  - Continue home Cardizem, Metoprolol, and Eliquis  - EKG showing A. Fib  - can be due to ?Eliquis failure

## 2024-11-18 NOTE — PROGRESS NOTE ADULT - PROBLEM SELECTOR PROBLEM 1
Acute ischemic right MCA stroke

## 2024-11-18 NOTE — PROGRESS NOTE ADULT - PROBLEM SELECTOR PLAN 5
h/o HLD on atorvastatin  LDL: 92  -c/w atorvastatin 80mg qhs  -DASH diet
h/o HLD on atorvastatin  LDL: 92  -c/w atorvastatin 80mg qhs  -DASH diet
- History of Hypothyroidism   - Confirm home med rec, no levothyroxine listed in surescripts
h/o HLD on atorvastatin  LDL: 92  -c/w atorvastatin 80mg qhs  -DASH diet

## 2024-11-18 NOTE — PROGRESS NOTE ADULT - PROBLEM SELECTOR PLAN 1
- Presented with an unwitnessed fall   - Hemodynamically stable and afebrile  - due to ?Eliquis failure  - Code Stroke in ED  - CT Head showing Acute evolving right caudate head/basal ganglia/corona radiata infarction. No evidence of hemorrhagic transformation.  - CTA Head and Neck showing Moderate stenosis of the right M1 segment   - MRI: Acute right MCA territory infarctions  - EKG A. Fib  - Admit to tele  - LDL: 92, A1c: 6.5%  - b/l LE US dopplers: neg  - passsed bedside dysphagia  - TTE: EF: 55-60%, neg intracardiac shunt    - c/w Asprin 81mg qd and Atorvastatin 80mg qd  - EEG completed, Given waxing and waning mental status, would recommend transfer to ICU for continuous EEG for at least 24 hours to screen for subclinical seizures and guide antiepileptic medication dosing, on PO depakote 500mg bid. COMPLETED - showed bifrontal sharp waves but no subclinical seizures or status epilepticus  - Dr. Leach neuro following  - PT Consulted - rec: MEG

## 2025-02-24 NOTE — ED ADULT NURSE NOTE - SUICIDE SCREENING QUESTION 1
Medication:  Atorvastatin Calcium 40 MG Oral Tablet           Hmg CoA Reductase Inhibitors (Statin) Refill Protocol - 12 Month Protocol [id number]/2025 09:33 PM   Protocol Details Lipid Panel or Direct LDL resulted within last 15 months -- IF CRITERIA FAILED REFER TO PROTOCOL DETAILS    Patient is NOT on Gemfibrozil    Seen by prescribing provider or same department within the last 12 months or has a future appt in 3 months - IF FAILED PLEASE LOOK AT CHART REVIEW FOR LAST VISIT AND PROCEED ACCORDINGLY    Request is NOT for Simvastatin 80 mg or Vytorin 10-80 mg    Medication (including dose and sig) on current meds list       To be filled at: [facility] - [address]            Last office visit date: 2/6/25 NOV 4/4/25  Medication Refill Protocol Failed.  Failed criteria: Rx. Sent to clinician to review.      No

## 2025-03-06 ENCOUNTER — INPATIENT (INPATIENT)
Facility: HOSPITAL | Age: 80
LOS: 4 days | Discharge: HOME | DRG: 690 | End: 2025-03-11
Attending: STUDENT IN AN ORGANIZED HEALTH CARE EDUCATION/TRAINING PROGRAM | Admitting: STUDENT IN AN ORGANIZED HEALTH CARE EDUCATION/TRAINING PROGRAM
Payer: MEDICARE

## 2025-03-06 ENCOUNTER — NON-APPOINTMENT (OUTPATIENT)
Age: 80
End: 2025-03-06

## 2025-03-06 VITALS
TEMPERATURE: 98 F | RESPIRATION RATE: 18 BRPM | WEIGHT: 156.97 LBS | SYSTOLIC BLOOD PRESSURE: 160 MMHG | HEIGHT: 62 IN | OXYGEN SATURATION: 95 % | DIASTOLIC BLOOD PRESSURE: 110 MMHG | HEART RATE: 121 BPM

## 2025-03-06 DIAGNOSIS — E03.9 HYPOTHYROIDISM, UNSPECIFIED: ICD-10-CM

## 2025-03-06 DIAGNOSIS — I10 ESSENTIAL (PRIMARY) HYPERTENSION: ICD-10-CM

## 2025-03-06 DIAGNOSIS — I48.91 UNSPECIFIED ATRIAL FIBRILLATION: ICD-10-CM

## 2025-03-06 DIAGNOSIS — A41.9 SEPSIS, UNSPECIFIED ORGANISM: ICD-10-CM

## 2025-03-06 DIAGNOSIS — G93.40 ENCEPHALOPATHY, UNSPECIFIED: ICD-10-CM

## 2025-03-06 DIAGNOSIS — Z29.9 ENCOUNTER FOR PROPHYLACTIC MEASURES, UNSPECIFIED: ICD-10-CM

## 2025-03-06 DIAGNOSIS — N39.0 URINARY TRACT INFECTION, SITE NOT SPECIFIED: ICD-10-CM

## 2025-03-06 DIAGNOSIS — E11.9 TYPE 2 DIABETES MELLITUS WITHOUT COMPLICATIONS: ICD-10-CM

## 2025-03-06 DIAGNOSIS — Z86.73 PERSONAL HISTORY OF TRANSIENT ISCHEMIC ATTACK (TIA), AND CEREBRAL INFARCTION WITHOUT RESIDUAL DEFICITS: ICD-10-CM

## 2025-03-06 LAB
ALBUMIN SERPL ELPH-MCNC: 3.3 G/DL — LOW (ref 3.5–5)
ALP SERPL-CCNC: 87 U/L — SIGNIFICANT CHANGE UP (ref 40–120)
ALT FLD-CCNC: 21 U/L DA — SIGNIFICANT CHANGE UP (ref 10–60)
ANION GAP SERPL CALC-SCNC: 5 MMOL/L — SIGNIFICANT CHANGE UP (ref 5–17)
ANION GAP SERPL CALC-SCNC: 5 MMOL/L — SIGNIFICANT CHANGE UP (ref 5–17)
APPEARANCE UR: CLEAR — SIGNIFICANT CHANGE UP
APTT BLD: 34.3 SEC — SIGNIFICANT CHANGE UP (ref 24.5–35.6)
AST SERPL-CCNC: 20 U/L — SIGNIFICANT CHANGE UP (ref 10–40)
BASOPHILS # BLD AUTO: 0.04 K/UL — SIGNIFICANT CHANGE UP (ref 0–0.2)
BASOPHILS NFR BLD AUTO: 0.4 % — SIGNIFICANT CHANGE UP (ref 0–2)
BILIRUB SERPL-MCNC: 0.8 MG/DL — SIGNIFICANT CHANGE UP (ref 0.2–1.2)
BILIRUB UR-MCNC: NEGATIVE — SIGNIFICANT CHANGE UP
BUN SERPL-MCNC: 14 MG/DL — SIGNIFICANT CHANGE UP (ref 7–18)
BUN SERPL-MCNC: 8 MG/DL — SIGNIFICANT CHANGE UP (ref 7–18)
CALCIUM SERPL-MCNC: 10 MG/DL — SIGNIFICANT CHANGE UP (ref 8.4–10.5)
CALCIUM SERPL-MCNC: 9 MG/DL — SIGNIFICANT CHANGE UP (ref 8.4–10.5)
CHLORIDE SERPL-SCNC: 105 MMOL/L — SIGNIFICANT CHANGE UP (ref 96–108)
CHLORIDE SERPL-SCNC: 110 MMOL/L — HIGH (ref 96–108)
CO2 SERPL-SCNC: 26 MMOL/L — SIGNIFICANT CHANGE UP (ref 22–31)
CO2 SERPL-SCNC: 28 MMOL/L — SIGNIFICANT CHANGE UP (ref 22–31)
COLOR SPEC: YELLOW — SIGNIFICANT CHANGE UP
CREAT SERPL-MCNC: 0.7 MG/DL — SIGNIFICANT CHANGE UP (ref 0.5–1.3)
CREAT SERPL-MCNC: 0.98 MG/DL — SIGNIFICANT CHANGE UP (ref 0.5–1.3)
DIFF PNL FLD: NEGATIVE — SIGNIFICANT CHANGE UP
EGFR: 59 ML/MIN/1.73M2 — LOW
EGFR: 59 ML/MIN/1.73M2 — LOW
EGFR: 88 ML/MIN/1.73M2 — SIGNIFICANT CHANGE UP
EGFR: 88 ML/MIN/1.73M2 — SIGNIFICANT CHANGE UP
EOSINOPHIL # BLD AUTO: 0.04 K/UL — SIGNIFICANT CHANGE UP (ref 0–0.5)
EOSINOPHIL NFR BLD AUTO: 0.4 % — SIGNIFICANT CHANGE UP (ref 0–6)
FLUAV AG NPH QL: SIGNIFICANT CHANGE UP
FLUBV AG NPH QL: SIGNIFICANT CHANGE UP
GLUCOSE SERPL-MCNC: 157 MG/DL — HIGH (ref 70–99)
GLUCOSE SERPL-MCNC: 196 MG/DL — HIGH (ref 70–99)
GLUCOSE UR QL: 250 MG/DL
HCT VFR BLD CALC: 37.7 % — SIGNIFICANT CHANGE UP (ref 34.5–45)
HGB BLD-MCNC: 12.9 G/DL — SIGNIFICANT CHANGE UP (ref 11.5–15.5)
IMM GRANULOCYTES NFR BLD AUTO: 0.4 % — SIGNIFICANT CHANGE UP (ref 0–0.9)
INR BLD: 1.94 RATIO — HIGH (ref 0.85–1.16)
KETONES UR-MCNC: ABNORMAL MG/DL
LACTATE SERPL-SCNC: 1.5 MMOL/L — SIGNIFICANT CHANGE UP (ref 0.7–2)
LACTATE SERPL-SCNC: 2.3 MMOL/L — HIGH (ref 0.7–2)
LEUKOCYTE ESTERASE UR-ACNC: ABNORMAL
LYMPHOCYTES # BLD AUTO: 0.94 K/UL — LOW (ref 1–3.3)
LYMPHOCYTES # BLD AUTO: 8.9 % — LOW (ref 13–44)
MAGNESIUM SERPL-MCNC: 1.3 MG/DL — LOW (ref 1.6–2.6)
MCHC RBC-ENTMCNC: 32 PG — SIGNIFICANT CHANGE UP (ref 27–34)
MCHC RBC-ENTMCNC: 34.2 G/DL — SIGNIFICANT CHANGE UP (ref 32–36)
MCV RBC AUTO: 93.5 FL — SIGNIFICANT CHANGE UP (ref 80–100)
MONOCYTES # BLD AUTO: 0.76 K/UL — SIGNIFICANT CHANGE UP (ref 0–0.9)
MONOCYTES NFR BLD AUTO: 7.2 % — SIGNIFICANT CHANGE UP (ref 2–14)
NEUTROPHILS # BLD AUTO: 8.8 K/UL — HIGH (ref 1.8–7.4)
NEUTROPHILS NFR BLD AUTO: 82.7 % — HIGH (ref 43–77)
NITRITE UR-MCNC: POSITIVE
NRBC BLD AUTO-RTO: 0 /100 WBCS — SIGNIFICANT CHANGE UP (ref 0–0)
PH UR: 6.5 — SIGNIFICANT CHANGE UP (ref 5–8)
PHOSPHATE SERPL-MCNC: 2.5 MG/DL — SIGNIFICANT CHANGE UP (ref 2.5–4.5)
PLATELET # BLD AUTO: 184 K/UL — SIGNIFICANT CHANGE UP (ref 150–400)
POTASSIUM SERPL-MCNC: 3.4 MMOL/L — LOW (ref 3.5–5.3)
POTASSIUM SERPL-MCNC: 4 MMOL/L — SIGNIFICANT CHANGE UP (ref 3.5–5.3)
POTASSIUM SERPL-SCNC: 3.4 MMOL/L — LOW (ref 3.5–5.3)
POTASSIUM SERPL-SCNC: 4 MMOL/L — SIGNIFICANT CHANGE UP (ref 3.5–5.3)
PROT SERPL-MCNC: 7 G/DL — SIGNIFICANT CHANGE UP (ref 6–8.3)
PROT UR-MCNC: NEGATIVE MG/DL — SIGNIFICANT CHANGE UP
PROTHROM AB SERPL-ACNC: 22.6 SEC — HIGH (ref 9.9–13.4)
RBC # BLD: 4.03 M/UL — SIGNIFICANT CHANGE UP (ref 3.8–5.2)
RBC # FLD: 14.3 % — SIGNIFICANT CHANGE UP (ref 10.3–14.5)
RSV RNA NPH QL NAA+NON-PROBE: SIGNIFICANT CHANGE UP
SARS-COV-2 RNA SPEC QL NAA+PROBE: SIGNIFICANT CHANGE UP
SODIUM SERPL-SCNC: 136 MMOL/L — SIGNIFICANT CHANGE UP (ref 135–145)
SODIUM SERPL-SCNC: 143 MMOL/L — SIGNIFICANT CHANGE UP (ref 135–145)
SP GR SPEC: 1.01 — SIGNIFICANT CHANGE UP (ref 1–1.03)
TROPONIN I, HIGH SENSITIVITY RESULT: 8.9 NG/L — SIGNIFICANT CHANGE UP
UROBILINOGEN FLD QL: 0.2 MG/DL — SIGNIFICANT CHANGE UP (ref 0.2–1)
WBC # BLD: 10.62 K/UL — HIGH (ref 3.8–10.5)
WBC # FLD AUTO: 10.62 K/UL — HIGH (ref 3.8–10.5)

## 2025-03-06 PROCEDURE — 93010 ELECTROCARDIOGRAM REPORT: CPT

## 2025-03-06 PROCEDURE — 71045 X-RAY EXAM CHEST 1 VIEW: CPT | Mod: 26

## 2025-03-06 PROCEDURE — 70450 CT HEAD/BRAIN W/O DYE: CPT | Mod: 26

## 2025-03-06 PROCEDURE — 99223 1ST HOSP IP/OBS HIGH 75: CPT

## 2025-03-06 PROCEDURE — 99285 EMERGENCY DEPT VISIT HI MDM: CPT

## 2025-03-06 RX ORDER — ATORVASTATIN CALCIUM 80 MG/1
20 TABLET, FILM COATED ORAL AT BEDTIME
Refills: 0 | Status: DISCONTINUED | OUTPATIENT
Start: 2025-03-06 | End: 2025-03-11

## 2025-03-06 RX ORDER — VANCOMYCIN HCL IN 5 % DEXTROSE 1.5G/250ML
1000 PLASTIC BAG, INJECTION (ML) INTRAVENOUS ONCE
Refills: 0 | Status: COMPLETED | OUTPATIENT
Start: 2025-03-06 | End: 2025-03-06

## 2025-03-06 RX ORDER — ONDANSETRON HCL/PF 4 MG/2 ML
4 VIAL (ML) INJECTION EVERY 8 HOURS
Refills: 0 | Status: DISCONTINUED | OUTPATIENT
Start: 2025-03-06 | End: 2025-03-11

## 2025-03-06 RX ORDER — CEFTRIAXONE 500 MG/1
1000 INJECTION, POWDER, FOR SOLUTION INTRAMUSCULAR; INTRAVENOUS ONCE
Refills: 0 | Status: COMPLETED | OUTPATIENT
Start: 2025-03-06 | End: 2025-03-06

## 2025-03-06 RX ORDER — SENNA 187 MG
2 TABLET ORAL AT BEDTIME
Refills: 0 | Status: DISCONTINUED | OUTPATIENT
Start: 2025-03-06 | End: 2025-03-11

## 2025-03-06 RX ORDER — CEFTRIAXONE 500 MG/1
1000 INJECTION, POWDER, FOR SOLUTION INTRAMUSCULAR; INTRAVENOUS EVERY 24 HOURS
Refills: 0 | Status: COMPLETED | OUTPATIENT
Start: 2025-03-07 | End: 2025-03-09

## 2025-03-06 RX ORDER — ACETAMINOPHEN 500 MG/5ML
650 LIQUID (ML) ORAL EVERY 6 HOURS
Refills: 0 | Status: DISCONTINUED | OUTPATIENT
Start: 2025-03-06 | End: 2025-03-11

## 2025-03-06 RX ORDER — MAGNESIUM OXIDE 400 MG
400 TABLET ORAL ONCE
Refills: 0 | Status: DISCONTINUED | OUTPATIENT
Start: 2025-03-06 | End: 2025-03-06

## 2025-03-06 RX ORDER — MAGNESIUM SULFATE 500 MG/ML
1 SYRINGE (ML) INJECTION ONCE
Refills: 0 | Status: COMPLETED | OUTPATIENT
Start: 2025-03-06 | End: 2025-03-06

## 2025-03-06 RX ORDER — SITAGLIPTIN 100 MG/1
1 TABLET, FILM COATED ORAL
Refills: 0 | DISCHARGE

## 2025-03-06 RX ORDER — INSULIN LISPRO 100 U/ML
INJECTION, SOLUTION INTRAVENOUS; SUBCUTANEOUS
Refills: 0 | Status: DISCONTINUED | OUTPATIENT
Start: 2025-03-06 | End: 2025-03-11

## 2025-03-06 RX ORDER — SOD PHOS DI, MONO/K PHOS MONO 250 MG
1 TABLET ORAL ONCE
Refills: 0 | Status: COMPLETED | OUTPATIENT
Start: 2025-03-06 | End: 2025-03-06

## 2025-03-06 RX ORDER — METOPROLOL SUCCINATE 50 MG/1
200 TABLET, EXTENDED RELEASE ORAL DAILY
Refills: 0 | Status: DISCONTINUED | OUTPATIENT
Start: 2025-03-06 | End: 2025-03-11

## 2025-03-06 RX ORDER — LEVOTHYROXINE SODIUM 300 MCG
25 TABLET ORAL DAILY
Refills: 0 | Status: DISCONTINUED | OUTPATIENT
Start: 2025-03-06 | End: 2025-03-11

## 2025-03-06 RX ORDER — METOPROLOL SUCCINATE 50 MG/1
1 TABLET, EXTENDED RELEASE ORAL
Refills: 0 | DISCHARGE

## 2025-03-06 RX ORDER — ASPIRIN 325 MG
81 TABLET ORAL DAILY
Refills: 0 | Status: DISCONTINUED | OUTPATIENT
Start: 2025-03-06 | End: 2025-03-11

## 2025-03-06 RX ORDER — SODIUM CHLORIDE 9 G/1000ML
1000 INJECTION, SOLUTION INTRAVENOUS
Refills: 0 | Status: DISCONTINUED | OUTPATIENT
Start: 2025-03-06 | End: 2025-03-11

## 2025-03-06 RX ORDER — ACETAMINOPHEN 500 MG/5ML
1000 LIQUID (ML) ORAL ONCE
Refills: 0 | Status: COMPLETED | OUTPATIENT
Start: 2025-03-06 | End: 2025-03-06

## 2025-03-06 RX ORDER — LEVOTHYROXINE SODIUM 300 MCG
1 TABLET ORAL
Refills: 0 | DISCHARGE

## 2025-03-06 RX ORDER — METFORMIN HYDROCHLORIDE 850 MG/1
1 TABLET ORAL
Refills: 0 | DISCHARGE

## 2025-03-06 RX ORDER — GLIMEPIRIDE 4 MG/1
1 TABLET ORAL
Refills: 0 | DISCHARGE

## 2025-03-06 RX ORDER — INFLUENZA A VIRUS A/IDAHO/07/2018 (H1N1) ANTIGEN (MDCK CELL DERIVED, PROPIOLACTONE INACTIVATED, INFLUENZA A VIRUS A/INDIANA/08/2018 (H3N2) ANTIGEN (MDCK CELL DERIVED, PROPIOLACTONE INACTIVATED), INFLUENZA B VIRUS B/SINGAPORE/INFTT-16-0610/2016 ANTIGEN (MDCK CELL DERIVED, PROPIOLACTONE INACTIVATED), INFLUENZA B VIRUS B/IOWA/06/2017 ANTIGEN (MDCK CELL DERIVED, PROPIOLACTONE INACTIVATED) 15; 15; 15; 15 UG/.5ML; UG/.5ML; UG/.5ML; UG/.5ML
0.5 INJECTION, SUSPENSION INTRAMUSCULAR ONCE
Refills: 0 | Status: DISCONTINUED | OUTPATIENT
Start: 2025-03-06 | End: 2025-03-11

## 2025-03-06 RX ORDER — INSULIN LISPRO 100 U/ML
INJECTION, SOLUTION INTRAVENOUS; SUBCUTANEOUS AT BEDTIME
Refills: 0 | Status: DISCONTINUED | OUTPATIENT
Start: 2025-03-06 | End: 2025-03-11

## 2025-03-06 RX ORDER — APIXABAN 2.5 MG/1
5 TABLET, FILM COATED ORAL EVERY 12 HOURS
Refills: 0 | Status: DISCONTINUED | OUTPATIENT
Start: 2025-03-06 | End: 2025-03-11

## 2025-03-06 RX ORDER — POTASSIUM PHOSPHATE, MONOBASIC POTASSIUM PHOSPHATE, DIBASIC INJECTION, 236; 224 MG/ML; MG/ML
15 SOLUTION, CONCENTRATE INTRAVENOUS ONCE
Refills: 0 | Status: COMPLETED | OUTPATIENT
Start: 2025-03-06 | End: 2025-03-06

## 2025-03-06 RX ADMIN — Medication 2200 MILLILITER(S): at 16:05

## 2025-03-06 RX ADMIN — Medication 400 MILLIGRAM(S): at 18:47

## 2025-03-06 RX ADMIN — CEFTRIAXONE 100 MILLIGRAM(S): 500 INJECTION, POWDER, FOR SOLUTION INTRAMUSCULAR; INTRAVENOUS at 16:11

## 2025-03-06 RX ADMIN — Medication 250 MILLIGRAM(S): at 18:47

## 2025-03-06 RX ADMIN — Medication 1 PACKET(S): at 18:47

## 2025-03-06 NOTE — PATIENT PROFILE ADULT - FALL HARM RISK - HARM RISK INTERVENTIONS

## 2025-03-06 NOTE — H&P ADULT - NSHPPHYSICALEXAM_GEN_ALL_CORE
Vital Signs Last 24 Hrs  T(C): 36.8 (06 Mar 2025 19:33), Max: 37.8 (06 Mar 2025 16:20)  T(F): 98.2 (06 Mar 2025 19:33), Max: 100.1 (06 Mar 2025 16:20)  HR: 72 (06 Mar 2025 19:33) (69 - 121)  BP: 138/78 (06 Mar 2025 19:33) (135/80 - 160/110)  BP(mean): 98 (06 Mar 2025 18:30) (98 - 107)  RR: 18 (06 Mar 2025 19:33) (17 - 19)  SpO2: 98% (06 Mar 2025 19:33) (95% - 100%)    Parameters below as of 06 Mar 2025 19:33  Patient On (Oxygen Delivery Method): room air    GENERAL: NAD  HEAD:  Atraumatic, Normocephalic  EYES: EOMI, PERRLA, conjunctiva and sclera clear  ENMT: +dry mucus membranes. No tonsillar erythema, exudates, or enlargement  NECK: Supple, normal appearance, No JVD; Normal thyroid; Trachea midline  NERVOUS SYSTEM:  Alert & Oriented X2,  Motor Strength 5/5 Right upper and lower extremities, 3/5 Left upper and lower extremities. Sensation intact  CHEST/LUNG: Lungs clear to auscultation bilaterally, No rales, rhonchi, wheezing   HEART: Regular rate and rhythm; No murmurs, rubs, or gallops  ABDOMEN: Soft, Nontender, Nondistended; Bowel sounds present  EXTREMITIES:  2+ Peripheral Pulses, No clubbing, cyanosis, or edema  LYMPH: No lymphadenopathy noted  SKIN: +hyperpigmentation, chronic b/l lower shins.

## 2025-03-06 NOTE — ED PROVIDER NOTE - CLINICAL SUMMARY MEDICAL DECISION MAKING FREE TEXT BOX
79 female with hx of DM, HLD, A-fib on apixaban, hypothyroid, & CVA with residual left-sided weakness.   Patient presenting to the ED with son reporting confusion since yesterday afternoon.  No fall or trauma.    Vitals with tachycardia initially & fever on core body temperature.  Nontoxic appearing, n/v intact.  Airway intact, no respiratory distress, no hypoxia.  No abdominal or CVA tenderness.  Baseline neurologic status unchanged according to patient's son.  Symptoms concerning for possible infectious etiology/sepsis.    Plan to obtain:  -Labs, EKG, CXR, CT head R/O intracranial hemorrhage or mass, IV fluids, analgesia/antipyretics as needed, ABX, admit    ED Course:  Lab values demonstrate no acute/emergent pathology.  My independent interpretation of the EKG: A-fib@77, left axis, normal intervals, nonspecific ST/T, LVH  My independent interpretation of XR: [No consolidation/effusion/pntx.]    [***]    [Patient advised regarding need for close outpatient follow up.  Patient stable for further care in outpatient setting. No indication for inpatient admission at this time. Patient advised regarding symptomatic & supportive care and symptoms to prompt ED return. Strict return precautions provided.]    [Patient requires inpatient admission for further care & stabilization. Care signed out to inpatient team.] 79 female with hx of DM, HLD, A-fib on apixaban, hypothyroid, & CVA with residual left-sided weakness.   Patient presenting to the ED with son reporting confusion since yesterday afternoon.  No fall or trauma.    Vitals with tachycardia initially & fever on core body temperature.  Nontoxic appearing, n/v intact.  Airway intact, no respiratory distress, no hypoxia.  No abdominal or CVA tenderness.  Baseline neurologic status unchanged according to patient's son.  Symptoms concerning for possible infectious etiology/sepsis.    Plan to obtain:  -Labs, EKG, CXR, CT head R/O intracranial hemorrhage or mass, IV fluids, analgesia/antipyretics as needed, ABX, admit    ED Course:  Lab values demonstrate no acute/emergent pathology.  My independent interpretation of the EKG: A-fib@77, left axis, normal intervals, nonspecific ST/T, LVH  My independent interpretation of XR: No consolidation/effusion/pntx.    [***]    [Patient advised regarding need for close outpatient follow up.  Patient stable for further care in outpatient setting. No indication for inpatient admission at this time. Patient advised regarding symptomatic & supportive care and symptoms to prompt ED return. Strict return precautions provided.]    [Patient requires inpatient admission for further care & stabilization. Care signed out to inpatient team.] 79 female with hx of DM, HLD, A-fib on apixaban, hypothyroid, & CVA with residual left-sided weakness.   Patient presenting to the ED with son reporting confusion since yesterday afternoon.  No fall or trauma.    Vitals with tachycardia initially & fever on core body temperature.  Nontoxic appearing, n/v intact.  Airway intact, no respiratory distress, no hypoxia.  No abdominal or CVA tenderness.  Baseline neurologic status unchanged according to patient's son.  Symptoms concerning for possible infectious etiology/sepsis.    Plan to obtain:  -Labs, EKG, CXR, CT head R/O intracranial hemorrhage or mass, IV fluids, analgesia/antipyretics as needed, ABX, admit    ED Course:  Lab values w elevated lactate. +UTI. Electrolytes, renal function, & LFT ok.  My independent interpretation of the EKG: A-fib@77, left axis, normal intervals, nonspecific ST/T, LVH  My independent interpretation of XR: No consolidation/effusion/pntx.  IV fluids, antipyretics, & Abx given.  Patient requires inpatient admission for further care & stabilization. Care signed out to inpatient team.

## 2025-03-06 NOTE — H&P ADULT - HISTORY OF PRESENT ILLNESS
79F, from home, ambulates with walker, PMH HTN, HLD, T2DM, hypothyroidism, Afib (on Elqiuis), hx of CVA (with L sided weakness). Presenting with confusion x1d. Collateral obtained from son at bedside. States that patient is AAOx3 at baseline. Yesterday in the afternoon, patient stated getting progressively more confused, "not making sense." Son states this is usually what happens when she gets urinary tract infections. At the time of exam, patient is AAOx2 (to self and place), but does not know why she is in the hospital. Son states that she seems "less confused" than when she first arrived but is still not at her baseline. Denies fever, chills, chest pain, palpitations, n/v/d, abd pain. Endorses throat discomfort and dry cough for the past 3d.     No recent travel or sick contacts. Of note, on prior hopsitalization, patient was dicharged on Depakote for seizure ppx  79F, from home, ambulates with walker, PMH HTN, HLD, T2DM, hypothyroidism, Afib (on Eliquis hx of CVA (with L sided weakness), peripheral venous insufficiency. Presenting with confusion x1d. Collateral obtained from son at bedside. States that patient is AAOx3 at baseline. Yesterday in the afternoon, patient stated getting progressively more confused, "not making sense." Son states this is usually what happens when she gets urinary tract infections. At the time of exam, patient is AAOx2 (to self and place), but does not know why she is in the hospital. Son states that she seems "less confused" than when she first arrived but is still not at her baseline. Denies fever, chills, chest pain, palpitations, dysuria, hematuria, SOB, n/v/d, abd pain. Endorses throat discomfort and dry cough for the past 3d.     No recent travel or sick contacts. Of note, on prior hospitalization patient was discharged on Depakote for seizure ppx. Patient no longer takes Depakote as her the son because it made her feel "very bad." States no seizures since being off Depakote.  79F, from home, ambulates with walker, PMH HTN, HLD, T2DM, hypothyroidism, Afib (on Eliquis hx of CVA (with L sided weakness), peripheral venous insufficiency. Presenting with confusion x1d. Collateral obtained from son at bedside. States that patient is AAOx3 at baseline. Yesterday in the afternoon, patient started getting progressively more confused, "not making sense." Son states this is usually what happens when she gets urinary tract infections. At the time of exam, patient is AAOx2 (to self and place), but does not know why she is in the hospital. Son states that she seems "less confused" than when she first arrived but is still not at her baseline. Denies fever, chills, chest pain, palpitations, dysuria, hematuria, SOB, n/v/d, abd pain. Endorses throat discomfort and dry cough for the past 3d.     No recent travel or sick contacts. Of note, on prior hospitalization patient was discharged on Depakote for seizure ppx. Patient no longer takes Depakote as her the son because it made her feel "very bad." States no seizures since being off Depakote.

## 2025-03-06 NOTE — H&P ADULT - PROBLEM SELECTOR PLAN 8
hx of stroke with residual left sided deficits.  home meds: aspirin, Eliquis    - c/w home meds  - Of note, on prior hospitalization patient was discharged on Depakote for seizure ppx. Patient no longer takes Depakote as her the son because it made her feel "very bad." States no seizures since being off Depakote.

## 2025-03-06 NOTE — PATIENT PROFILE ADULT - MST SCORE
NEUROSURGERY Michelle Smith  [id number]   1952 7/8/2021    Requesting physician: Nehemiah Woods MD    Reason for consultation: brain mass     History of present illness: The patient is a 76 y.o. female with medical history of HTN and colon cancer, treated in 2000 with partial colectomy and chemotherapy, who presents to [facility] with persistent right sided weakness/lethargy. Patient reports her symptoms started on July 3. She was at her boyfriends house and could hardly stay awake, she then noticed difficulty sitting up on her own and some right sided weakness. She did have a fall at that time, felt she got dizzy and blacked out. She was seen in ER at outside hospital on 7/4 where a CT head was done, it was concerning for a mass in her left parietal lobe. Patient did not want to be admitted at that time and she was told to f/u as an outpatient by the ER provider. She returned to the ER because she states she could not get an MRI scheduled soon enough. She reports that she has had no change in her original symptoms, feels that her lethargy is somewhat improved, but her right side still feels \"off\". She denies any headache, blurry vision, falls, N/V/D. No chest or abdominal pain. She has not followed up with oncology recently for h/o colon cancer. She was transferred to [facility] for neurosurgical evaluation. ROS:   GENERAL:  Denies fever or recent illness.  Denies weight changes   EYES:  Denies vision change or diplopia  EARS:  Denies hearing loss  CARDIAC:  Denies chest pain  RESPIRATORY:  Denies shortness of breath  SKIN:  Denies rash or lesions   HEM:  Denies excessive bruising  PSYCH:  Denies anxiety or depression  NEURO:  + right sided weakness/numbness, + lethargy    :  Denies urinary difficulty  GI: Denies nausea, vomiting, diarrhea or constipation  MUSCULOSKELETAL:  No arthralgias    No Known Allergies    Past Medical History:   Diagnosis Date    Colon cancer ([facility] [address]     High blood pressure         Past Surgical History:   Procedure Laterality Date    BREAST BIOPSY      COLON SURGERY      TUBAL LIGATION         Social History     Occupational History    Not on file   Tobacco Use    Smoking status: Former Smoker     Packs/day: 0.25     Years: 3.00     Pack years: 0.75     Types: Cigarettes     Quit date: 1986     Years since quittin.9    Smokeless tobacco: Never Used   Vaping Use    Vaping Use: Never used   Substance and Sexual Activity    Alcohol use: No    Drug use: No    Sexual activity: Not on file        Family History   Problem Relation Age of Onset    No Known Problems Other         lung disease         Outpatient Medications Marked as Taking for the 21 encounter [facility] Encounter)   Medication Sig Dispense Refill    amLODIPine (NORVASC) 10 MG tablet Take 10 mg by mouth daily      losartan (COZAAR) 100 MG tablet Take 100 mg by mouth daily      levothyroxine (SYNTHROID) 25 MCG tablet Take 25 mcg by mouth Daily      MULTIPLE VITAMIN PO Take 1 tablet by mouth daily         Current Facility-Administered Medications   Medication Dose Route Frequency Provider Last Rate Last Admin    amLODIPine (NORVASC) tablet 10 mg  10 mg Oral Daily Nehemiah Woods MD   10 mg at 21 0901    levothyroxine (SYNTHROID) tablet 25 mcg  25 mcg Oral Daily Nehemiah Woods MD   25 mcg at 21 0546    losartan (COZAAR) tablet 100 mg  100 mg Oral Daily Karen Hanna MD   100 mg at 21 0900    sodium chloride flush 0.9 % injection 5-40 mL  5-40 mL Intravenous 2 times per day Nehemiah Woods MD   10 mL at 21 0848    sodium chloride flush 0.9 % injection 5-40 mL  5-40 mL Intravenous PRN Karen Hanna MD        0.9 % sodium chloride infusion  25 mL Intravenous PRN Nehemiah Woods MD        ondansetron (ZOFRAN-ODT) disintegrating tablet 4 mg  4 mg Oral Q8H PRN Nehemiah Woods MD        Or    ondansetron 0 the patient's imaging which consists of a CT dated 7/4/2021 and an MRI dated 7/7/2021. These demonstrate multiple enhancing masses in the left posterior frontal region with surrounding vasogenic edema. Labs  Recent Labs     07/06/21  1725 07/08/21  0608    136    100   CO2 23 26   BUN 23* 9   CREATININE 1.1 0.8   GLUCOSE 111* 160*     Recent Labs     07/06/21  1725 07/08/21  0608 07/08/21  0823   WBC 13.0* 9.6  --    RBC 4.32 4.56  --    INR  --  0.96 1.00       Patient Active Problem List    Diagnosis Date Noted    Brain mass 07/06/2021    Chronic cough 08/09/2019    Asthma 04/02/2019    Sleep apnea 04/02/2019    SOB (shortness of breath)        Assessment:  75 yo female with PMH of and colon CA, admitted with right sided weakness/numbness. Head CT with concern for brain metastasis in left parietal lobe. Plan:        1. No emergent neurosurgical intervention indicated        2. Neurologic exams frequency: q 4   3. CT chest abdomen pelvis negative for other malignancy  4. Cerebral edema:  - Keep sodium WNL  - Keep HOB >30 degrees  - NO dextrose in IVF's or in IV drips  5. Seizure prophylaxis: Keppra 500 mg BID  6. Oncology following  7. Plan to OR for biopsy of left frontal lesion. Discussed indications, particulars, risks and benefits with patient and family. These include but are not limited to, bleeding, infection, worsened neurologic outcome, need for additional procedures, non-diagnostic biopsy, anesthetic risks, and even death. 8.   NPO for procedure         9.    Preop labs pending      Darren Urbina MD, PhD  [address], [address] [place], [place], [address] [phone] (c), [phone] (o)

## 2025-03-06 NOTE — H&P ADULT - ASSESSMENT
79F, from home, ambulates with walker, PMH HTN, HLD, T2DM, hypothyroidism, Afib (on Eliquis hx of CVA (with L sided weakness), peripheral venous insufficiency. Presenting with confusion x1d. Collateral obtained from son at bedside. States that patient is AAOx3 at baseline. Yesterday in the afternoon, patient stated getting progressively more confused, "not making sense." Son states this is usually what happens when she gets urinary tract infections. At the time of exam, patient is AAOx2 (to self and place), but does not know why she is in the hospital. Son states that she seems "less confused" than when she first arrived but is still not at her baseline. Denies fever, chills, chest pain, palpitations, dysuria, hematuria, SOB, n/v/d, abd pain. Endorses throat discomfort and dry cough for the past 3d.     No recent travel or sick contacts. Of note, on prior hospitalization patient was discharged on Depakote for seizure ppx. Patient no longer takes Depakote as her the son because it made her feel "very bad." States no seizures since being off Depakote.    79F, from home, ambulates with walker, PMH HTN, HLD, T2DM, hypothyroidism, Afib (on Eliquis hx of CVA (with L sided weakness), peripheral venous insufficiency. Presenting with confusion x1d. Found to have UA+. Met sepsis criteria. Admitted for sepsis and acute encephalopathy 2/2 UTI.

## 2025-03-06 NOTE — H&P ADULT - NSHPREVIEWOFSYSTEMS_GEN_ALL_CORE
CONSTITUTIONAL: No fever, weight loss, or fatigue  RESPIRATORY: +cough, +throat discomfort. No wheezing, chills, or hemoptysis; No shortness of breath  CARDIOVASCULAR: No chest pain, palpitations, dizziness, or leg swelling  GASTROINTESTINAL: No abdominal pain. No nausea, vomiting, or hematemesis. No diarrhea or constipation. No melena or hematochezia.  GENITOURINARY: No dysuria or hematuria, urinary frequency  NEUROLOGICAL: No headaches, memory loss, loss of strength, numbness, or tremors  ENDOCRINE: No polyuria, polydipsia, or heat/cold intolerance  MUSKULOSKELETAL: No muscle aches, joint pains  HEME: no easy bruisability, no tender or enlarged lymph nodes  SKIN: No itching, burning, rashes, or lesions.

## 2025-03-06 NOTE — ED PROVIDER NOTE - NS ED ROS FT
Constitutional: (-) fever (-) chills  HENT: (-) congestion (-) rhinorrhea   Respiratory: (-) cough (-) shortness of breath   Cardiovascular: (-) chest pain (-) leg swelling  Gastrointestinal: (-) abdominal pain (-) diarrhea (-) vomiting  Genitourinary: (-) hematuria  Skin: (-) rash  Neurological: L sided deficits unchanged according to son (-) headaches  Psychiatric/Behavioral: (+) confusion

## 2025-03-06 NOTE — ED ADULT NURSE NOTE - ISOLATION TYPE:
Notes   Spoke to lab- JYOTSNA qualtitaive ordered instead of JYOTSNA reflex- changed to screen/reflex. GGTP elevated. Ceruloplasmin and Alph1 anti-trypsin added. Unable to add smooth mucsle antibody- needs to be frozen. Await JYOTSNA screen with reflex...  Signature   Electronically signed by : AZIZA MILLER; 02/24/2017 11:16 AM CST.  
None

## 2025-03-06 NOTE — ED ADULT NURSE NOTE - NSFALLRISKINTERV_ED_ALL_ED
Assistance OOB with selected safe patient handling equipment if applicable/Assistance with ambulation/Communicate fall risk and risk factors to all staff, patient, and family/Monitor gait and stability/Monitor for mental status changes and reorient to person, place, and time, as needed/Provide visual cue: yellow wristband, yellow gown, etc/Reinforce activity limits and safety measures with patient and family/Toileting schedule using arm’s reach rule for commode and bathroom/Use of alarms - bed, stretcher, chair and/or video monitoring/Call bell, personal items and telephone in reach/Instruct patient to call for assistance before getting out of bed/chair/stretcher/Non-slip footwear applied when patient is off stretcher/[place] to call system/Physically safe environment - no spills, clutter or unnecessary equipment/Purposeful Proactive Rounding/Room/bathroom lighting operational, light cord in reach

## 2025-03-06 NOTE — PATIENT PROFILE ADULT - TOBACCO USE
On pravastatin.  The current medical regimen is effective;  continue present plan and medications.    84 Never smoker

## 2025-03-06 NOTE — H&P ADULT - PROBLEM SELECTOR PLAN 4
home meds: metoprolol succinate , diltiazem 180    - resume metoprolol.  - holding diltiazem for now iso sepsis.

## 2025-03-06 NOTE — H&P ADULT - PROBLEM SELECTOR PLAN 1
Met sepsis criteria.  UA+   s/p 2.2L NS bolus, CTX, vanco in the ED.    - sepsis 2/2 UTI.  - BCX.  - UCX.  - IVF maintenance.

## 2025-03-06 NOTE — H&P ADULT - PROBLEM SELECTOR PLAN 3
Presenting with confusion x1d.  UA+  covid, rsv, flu - neg.  CXR - without infiltrates or consolidations.  CT head - No acute intracranial process identified. Interval evolution of chronic infarcts right corona radiata-ganglia capsular region greater than right caudate nucleus. Smaller, subcentimeter chronic lacunar infarct left corona radiata again noted.     - normally AAOx3. Currently AAOx2.   - encephalopathy iso acute UTI.  - patient also with hx of stroke with multiple chronic infarcts.  - TSH

## 2025-03-06 NOTE — PATIENT PROFILE ADULT - STATED REASON FOR ADMISSION
Per son, patient was confused, and speech was slurred, son was worried that she was having a stroke.

## 2025-03-06 NOTE — ED PROVIDER NOTE - PHYSICAL EXAMINATION
Gen:  Awake, alert, NAD, elderly/frail, NCAT, non-toxic appearing.   Eyes:  PERRL, EOMI, no icterus, normal lids/lashes, normal conjunctivae.  ENT:  External inspection normal, pink/dry membranes.   CV:  S1S2, regular rate and irregular rhythm, no murmur/gallops/rubs, no JVD, 2+ pulses b/l, no edema/cords/homans, good capillary refill, warm/well-perfused.  Resp:  Normal respiratory rate/effort, no respiratory distress, lungs clear to auscultation b/l, no wheezing/rales/rhonchi, no retractions, no stridor.  Abd:  Soft abdomen, no tender/distended/guarding/rebound, no pulsatile mass, no CVA tender.   Musculoskeletal:  N/V intact, no juliana tender/deformity.  Neck:  FROM neck, supple, trachea midline, no meningismus.   Skin:  Color normal for race, warm and dry, no rash.  Neuro:  Alert/interactive, CN 2-12 intact (grossly), baseline L sided deficits, unchanged according to son. Moving both sides equally in response to tactile stimuli.  Psych:  Behavior cooperative

## 2025-03-06 NOTE — ED PROVIDER NOTE - OBJECTIVE STATEMENT
79 female with hx of DM, HLD, A-fib on apixaban, hypothyroid, & CVA with residual left-sided weakness.   Patient presenting to the ED with son reporting confusion since yesterday afternoon.  No fall or trauma.

## 2025-03-06 NOTE — ED PROVIDER NOTE - ADMIT DISPOSITION PRESENT ON ADMISSION SEPSIS Q2 - IDEAL BODY WEIGHT USED FOR CRYSTALLOID FLUIDS
I called the pharmacy and gave them a verbal on the protonix and the spacer I sent it to the pharmacy [facility] and also faxed the order for the inhaler mouth piece
Patient son called he said the Rx for Protonix 40 mg wasn't called into the pharmacy he would like it called into [facility] at [phone] [facility] and also needs  RX for aero chamber Albuterol also     TY
Not applicable

## 2025-03-06 NOTE — H&P ADULT - ATTENDING COMMENTS
Vital Signs Last 24 Hrs  T(C): 36.7 (06 Mar 2025 23:48), Max: 37.8 (06 Mar 2025 16:20)  T(F): 98.1 (06 Mar 2025 23:48), Max: 100.1 (06 Mar 2025 16:20)  HR: 65 (06 Mar 2025 23:48) (65 - 121)  BP: 122/78 (06 Mar 2025 23:48) (122/78 - 160/110)  BP(mean): 98 (06 Mar 2025 18:30) (98 - 107)  RR: 16 (06 Mar 2025 23:48) (16 - 19)  SpO2: 95% (06 Mar 2025 23:48) (95% - 100%)  Parameters below as of 06 Mar 2025 23:48  Patient On (Oxygen Delivery Method): room air    Labs reviewed  wbc 10.6 with greanulocytosis  INR 1.94    lactate 2.3 --> 1.5  Mg 1.3    UA   wbc 20   mod LE  +ve nitrite    CXR   No acute finding.   Probable heart enlargement. Reverse left shoulder replacement.       CT head  1. No acute intracranial process identified.  2. Additional findings, including interval evolution of chronic infarcts,   as described above.    Impression   - Acute encephalopathy   - Sepsis   - Acute UTI  - Remote hx of CVA with residual deficit   - Other chronic medical problems as above     Plan   Admit to Medicine   Sepsis work up  Empiric antibiotics with IV ceftriaxone 1g daily  Gentle IVF hydration  Fall precaution   Med reconciliation; resume appropriate home meds  Insulin therapy  Other plans as above  Will need Neurology input regarding self discontinuation of AE>

## 2025-03-06 NOTE — ED ADULT NURSE NOTE - OBJECTIVE STATEMENT
Patient is BIBA and son for Increased lethargy since this morning. Left sided residual from prior CVA, a-fib. Patient is on cardiac monitor and HR 76, Afib noted. Fall bundle activated and all measures in place. No acute distress noted and MD bedside.

## 2025-03-07 LAB
A1C WITH ESTIMATED AVERAGE GLUCOSE RESULT: 5.8 % — HIGH (ref 4–5.6)
ALBUMIN SERPL ELPH-MCNC: 2.6 G/DL — LOW (ref 3.5–5)
ALP SERPL-CCNC: 69 U/L — SIGNIFICANT CHANGE UP (ref 40–120)
ALT FLD-CCNC: 15 U/L DA — SIGNIFICANT CHANGE UP (ref 10–60)
ANION GAP SERPL CALC-SCNC: 8 MMOL/L — SIGNIFICANT CHANGE UP (ref 5–17)
AST SERPL-CCNC: 10 U/L — SIGNIFICANT CHANGE UP (ref 10–40)
BASOPHILS # BLD AUTO: 0.03 K/UL — SIGNIFICANT CHANGE UP (ref 0–0.2)
BASOPHILS NFR BLD AUTO: 0.5 % — SIGNIFICANT CHANGE UP (ref 0–2)
BILIRUB SERPL-MCNC: 0.5 MG/DL — SIGNIFICANT CHANGE UP (ref 0.2–1.2)
BUN SERPL-MCNC: 7 MG/DL — SIGNIFICANT CHANGE UP (ref 7–18)
CALCIUM SERPL-MCNC: 9.2 MG/DL — SIGNIFICANT CHANGE UP (ref 8.4–10.5)
CHLORIDE SERPL-SCNC: 110 MMOL/L — HIGH (ref 96–108)
CO2 SERPL-SCNC: 26 MMOL/L — SIGNIFICANT CHANGE UP (ref 22–31)
CREAT SERPL-MCNC: 0.57 MG/DL — SIGNIFICANT CHANGE UP (ref 0.5–1.3)
EGFR: 92 ML/MIN/1.73M2 — SIGNIFICANT CHANGE UP
EGFR: 92 ML/MIN/1.73M2 — SIGNIFICANT CHANGE UP
EOSINOPHIL # BLD AUTO: 0.31 K/UL — SIGNIFICANT CHANGE UP (ref 0–0.5)
EOSINOPHIL NFR BLD AUTO: 5.3 % — SIGNIFICANT CHANGE UP (ref 0–6)
ESTIMATED AVERAGE GLUCOSE: 120 MG/DL — HIGH (ref 68–114)
GLUCOSE BLDC GLUCOMTR-MCNC: 107 MG/DL — HIGH (ref 70–99)
GLUCOSE BLDC GLUCOMTR-MCNC: 125 MG/DL — HIGH (ref 70–99)
GLUCOSE BLDC GLUCOMTR-MCNC: 139 MG/DL — HIGH (ref 70–99)
GLUCOSE BLDC GLUCOMTR-MCNC: 157 MG/DL — HIGH (ref 70–99)
GLUCOSE BLDC GLUCOMTR-MCNC: 98 MG/DL — SIGNIFICANT CHANGE UP (ref 70–99)
GLUCOSE SERPL-MCNC: 129 MG/DL — HIGH (ref 70–99)
HCT VFR BLD CALC: 30.5 % — LOW (ref 34.5–45)
HGB BLD-MCNC: 10.5 G/DL — LOW (ref 11.5–15.5)
IMM GRANULOCYTES NFR BLD AUTO: 0.3 % — SIGNIFICANT CHANGE UP (ref 0–0.9)
LYMPHOCYTES # BLD AUTO: 1.66 K/UL — SIGNIFICANT CHANGE UP (ref 1–3.3)
LYMPHOCYTES # BLD AUTO: 28.5 % — SIGNIFICANT CHANGE UP (ref 13–44)
MAGNESIUM SERPL-MCNC: 1.7 MG/DL — SIGNIFICANT CHANGE UP (ref 1.6–2.6)
MCHC RBC-ENTMCNC: 32.7 PG — SIGNIFICANT CHANGE UP (ref 27–34)
MCHC RBC-ENTMCNC: 34.4 G/DL — SIGNIFICANT CHANGE UP (ref 32–36)
MCV RBC AUTO: 95 FL — SIGNIFICANT CHANGE UP (ref 80–100)
MONOCYTES # BLD AUTO: 0.81 K/UL — SIGNIFICANT CHANGE UP (ref 0–0.9)
MONOCYTES NFR BLD AUTO: 13.9 % — SIGNIFICANT CHANGE UP (ref 2–14)
NEUTROPHILS # BLD AUTO: 3 K/UL — SIGNIFICANT CHANGE UP (ref 1.8–7.4)
NEUTROPHILS NFR BLD AUTO: 51.5 % — SIGNIFICANT CHANGE UP (ref 43–77)
NRBC BLD AUTO-RTO: 0 /100 WBCS — SIGNIFICANT CHANGE UP (ref 0–0)
PHOSPHATE SERPL-MCNC: 3.1 MG/DL — SIGNIFICANT CHANGE UP (ref 2.5–4.5)
PLATELET # BLD AUTO: 151 K/UL — SIGNIFICANT CHANGE UP (ref 150–400)
POTASSIUM SERPL-MCNC: 3.1 MMOL/L — LOW (ref 3.5–5.3)
POTASSIUM SERPL-SCNC: 3.1 MMOL/L — LOW (ref 3.5–5.3)
PROT SERPL-MCNC: 5.8 G/DL — LOW (ref 6–8.3)
RBC # BLD: 3.21 M/UL — LOW (ref 3.8–5.2)
RBC # FLD: 14.5 % — SIGNIFICANT CHANGE UP (ref 10.3–14.5)
SODIUM SERPL-SCNC: 144 MMOL/L — SIGNIFICANT CHANGE UP (ref 135–145)
WBC # BLD: 5.83 K/UL — SIGNIFICANT CHANGE UP (ref 3.8–10.5)
WBC # FLD AUTO: 5.83 K/UL — SIGNIFICANT CHANGE UP (ref 3.8–10.5)

## 2025-03-07 RX ADMIN — Medication 81 MILLIGRAM(S): at 11:47

## 2025-03-07 RX ADMIN — Medication 100 GRAM(S): at 00:54

## 2025-03-07 RX ADMIN — Medication 40 MILLIEQUIVALENT(S): at 10:29

## 2025-03-07 RX ADMIN — POTASSIUM PHOSPHATE, MONOBASIC POTASSIUM PHOSPHATE, DIBASIC INJECTION, 62.5 MILLIMOLE(S): 236; 224 SOLUTION, CONCENTRATE INTRAVENOUS at 00:54

## 2025-03-07 RX ADMIN — APIXABAN 5 MILLIGRAM(S): 2.5 TABLET, FILM COATED ORAL at 05:52

## 2025-03-07 RX ADMIN — SODIUM CHLORIDE 75 MILLILITER(S): 9 INJECTION, SOLUTION INTRAVENOUS at 00:55

## 2025-03-07 RX ADMIN — Medication 40 MILLIEQUIVALENT(S): at 13:15

## 2025-03-07 RX ADMIN — APIXABAN 5 MILLIGRAM(S): 2.5 TABLET, FILM COATED ORAL at 17:02

## 2025-03-07 RX ADMIN — ATORVASTATIN CALCIUM 20 MILLIGRAM(S): 80 TABLET, FILM COATED ORAL at 21:42

## 2025-03-07 RX ADMIN — METOPROLOL SUCCINATE 200 MILLIGRAM(S): 50 TABLET, EXTENDED RELEASE ORAL at 05:51

## 2025-03-07 RX ADMIN — CEFTRIAXONE 100 MILLIGRAM(S): 500 INJECTION, POWDER, FOR SOLUTION INTRAMUSCULAR; INTRAVENOUS at 05:52

## 2025-03-07 RX ADMIN — Medication 2 TABLET(S): at 21:43

## 2025-03-07 RX ADMIN — ATORVASTATIN CALCIUM 20 MILLIGRAM(S): 80 TABLET, FILM COATED ORAL at 00:21

## 2025-03-07 RX ADMIN — INSULIN LISPRO 1: 100 INJECTION, SOLUTION INTRAVENOUS; SUBCUTANEOUS at 11:47

## 2025-03-07 RX ADMIN — Medication 25 MICROGRAM(S): at 05:51

## 2025-03-07 NOTE — PROGRESS NOTE ADULT - PROBLEM SELECTOR PLAN 1
Met sepsis criteria.  UA+   s/p 2.2L NS bolus, CTX, Vanco in the ED.    - sepsis 2/2 UTI.  - F/U pending BCX/UCX   -C/w Ceftriaxone  - c/w IVF maintenance.

## 2025-03-07 NOTE — PROGRESS NOTE ADULT - SUBJECTIVE AND OBJECTIVE BOX
NP Note discussed with Primary Attending    Patient is a 79y old Female who presents with a chief complaint of AMS (07 Mar 2025 11:00)      INTERVAL HPI/OVERNIGHT EVENTS: no new complaints at time of eval. Pt's son Reece Hall at bedside and updated him.       MEDICATIONS  (STANDING):  apixaban 5 milliGRAM(s) Oral every 12 hours  aspirin enteric coated 81 milliGRAM(s) Oral daily  atorvastatin 20 milliGRAM(s) Oral at bedtime  cefTRIAXone   IVPB 1000 milliGRAM(s) IV Intermittent every 24 hours  influenza  Vaccine (HIGH DOSE) 0.5 milliLiter(s) IntraMuscular once  insulin lispro (ADMELOG) corrective regimen sliding scale   SubCutaneous three times a day before meals  insulin lispro (ADMELOG) corrective regimen sliding scale   SubCutaneous at bedtime  lactated ringers. 1000 milliLiter(s) (75 mL/Hr) IV Continuous <Continuous>  levothyroxine 25 MICROGram(s) Oral daily  metoprolol succinate  milliGRAM(s) Oral daily  senna 2 Tablet(s) Oral at bedtime    MEDICATIONS  (PRN):  acetaminophen     Tablet .. 650 milliGRAM(s) Oral every 6 hours PRN Temp greater or equal to 38C (100.4F), Mild Pain (1 - 3)  ondansetron Injectable 4 milliGRAM(s) IV Push every 8 hours PRN Nausea and/or Vomiting      __________________________________________________  REVIEW OF SYSTEMS:    CONSTITUTIONAL: No fever,   EYES: no acute visual disturbances  NECK: No pain or stiffness  RESPIRATORY: No cough; No shortness of breath  CARDIOVASCULAR: No chest pain, no palpitations  GASTROINTESTINAL: No pain. No nausea or vomiting; No diarrhea   NEUROLOGICAL: No headache or numbness, no tremors  MUSCULOSKELETAL: No joint pain, no muscle pain  GENITOURINARY: no dysuria, no frequency, no hesitancy  PSYCHIATRY: no depression , no anxiety  ALL OTHER  ROS negative        Vital Signs Last 24 Hrs  T(C): 36.9 (07 Mar 2025 12:12), Max: 37.8 (06 Mar 2025 16:20)  T(F): 98.5 (07 Mar 2025 12:12), Max: 100.1 (06 Mar 2025 16:20)  HR: 75 (07 Mar 2025 12:12) (65 - 75)  BP: 149/82 (07 Mar 2025 12:12) (122/78 - 149/82)  BP(mean): 98 (06 Mar 2025 18:30) (98 - 98)  RR: 16 (07 Mar 2025 12:12) (16 - 18)  SpO2: 95% (07 Mar 2025 12:12) (94% - 100%)    Parameters below as of 07 Mar 2025 12:12  Patient On (Oxygen Delivery Method): room air        ________________________________________________  PHYSICAL EXAM:  GENERAL: NAD, resting comfortably in bed  HEENT: Normocephalic; conjunctivae and sclerae clear; moist mucous membranes;   NECK : supple  CHEST/LUNG: Clear to auscultation bilaterally with good air entry   HEART: S1 S2 regular; no murmurs, gallops or rubs  ABDOMEN: obese abd, soft, Nontender, Nondistended; Bowel sounds present  EXTREMITIES: no cyanosis; no edema; no calf tenderness  SKIN: warm and dry; no rash  NERVOUS SYSTEM:  Awake and alert; Oriented x 2 to self, at baseline as per son     _________________________________________________  LABS:                        10.5   5.83  )-----------( 151      ( 07 Mar 2025 07:03 )             30.5     03-07    144  |  110[H]  |  7   ----------------------------<  129[H]  3.1[L]   |  26  |  0.57    Ca    9.2      07 Mar 2025 07:03  Phos  3.1     03-07  Mg     1.7     03-07    TPro  5.8[L]  /  Alb  2.6[L]  /  TBili  0.5  /  DBili  x   /  AST  10  /  ALT  15  /  AlkPhos  69  03-07    PT/INR - ( 06 Mar 2025 15:55 )   PT: 22.6 sec;   INR: 1.94 ratio         PTT - ( 06 Mar 2025 15:55 )  PTT:34.3 sec  Urinalysis Basic - ( 07 Mar 2025 07:03 )    Color: x / Appearance: x / SG: x / pH: x  Gluc: 129 mg/dL / Ketone: x  / Bili: x / Urobili: x   Blood: x / Protein: x / Nitrite: x   Leuk Esterase: x / RBC: x / WBC x   Sq Epi: x / Non Sq Epi: x / Bacteria: x      CAPILLARY BLOOD GLUCOSE      POCT Blood Glucose.: 157 mg/dL (07 Mar 2025 11:27)  POCT Blood Glucose.: 125 mg/dL (07 Mar 2025 07:44)  POCT Blood Glucose.: 139 mg/dL (07 Mar 2025 00:24)  POCT Blood Glucose.: 178 mg/dL (06 Mar 2025 16:18)        RADIOLOGY & ADDITIONAL TESTS:    Imaging  Reviewed:  YES/NO    Consultant(s) Notes Reviewed:   YES/ No      Plan of care was discussed with patient and /or primary care giver; all questions and concerns were addressed

## 2025-03-07 NOTE — PROGRESS NOTE ADULT - ASSESSMENT
79F, from home, ambulates with walker, PMH HTN, HLD, T2DM, hypothyroidism, Afib (on Eliquis hx of CVA (with L sided weakness), peripheral venous insufficiency. Presenting with confusion x1d. Found to have UA+. Met sepsis criteria. Admitted for sepsis and acute encephalopathy 2/2 UTI.  Pt is currently on Ceftriaxone with blood and urine culture pending.

## 2025-03-08 LAB
ANION GAP SERPL CALC-SCNC: 7 MMOL/L — SIGNIFICANT CHANGE UP (ref 5–17)
BUN SERPL-MCNC: 6 MG/DL — LOW (ref 7–18)
CALCIUM SERPL-MCNC: 9.2 MG/DL — SIGNIFICANT CHANGE UP (ref 8.4–10.5)
CHLORIDE SERPL-SCNC: 107 MMOL/L — SIGNIFICANT CHANGE UP (ref 96–108)
CO2 SERPL-SCNC: 25 MMOL/L — SIGNIFICANT CHANGE UP (ref 22–31)
CREAT SERPL-MCNC: 0.58 MG/DL — SIGNIFICANT CHANGE UP (ref 0.5–1.3)
EGFR: 92 ML/MIN/1.73M2 — SIGNIFICANT CHANGE UP
EGFR: 92 ML/MIN/1.73M2 — SIGNIFICANT CHANGE UP
GLUCOSE BLDC GLUCOMTR-MCNC: 143 MG/DL — HIGH (ref 70–99)
GLUCOSE BLDC GLUCOMTR-MCNC: 160 MG/DL — HIGH (ref 70–99)
GLUCOSE BLDC GLUCOMTR-MCNC: 168 MG/DL — HIGH (ref 70–99)
GLUCOSE BLDC GLUCOMTR-MCNC: 207 MG/DL — HIGH (ref 70–99)
GLUCOSE SERPL-MCNC: 149 MG/DL — HIGH (ref 70–99)
HCT VFR BLD CALC: 32.5 % — LOW (ref 34.5–45)
HGB BLD-MCNC: 11.5 G/DL — SIGNIFICANT CHANGE UP (ref 11.5–15.5)
MAGNESIUM SERPL-MCNC: 1.5 MG/DL — LOW (ref 1.6–2.6)
MCHC RBC-ENTMCNC: 33.1 PG — SIGNIFICANT CHANGE UP (ref 27–34)
MCHC RBC-ENTMCNC: 35.4 G/DL — SIGNIFICANT CHANGE UP (ref 32–36)
MCV RBC AUTO: 93.7 FL — SIGNIFICANT CHANGE UP (ref 80–100)
NRBC BLD AUTO-RTO: 0 /100 WBCS — SIGNIFICANT CHANGE UP (ref 0–0)
PLATELET # BLD AUTO: 160 K/UL — SIGNIFICANT CHANGE UP (ref 150–400)
POTASSIUM SERPL-MCNC: 3.1 MMOL/L — LOW (ref 3.5–5.3)
POTASSIUM SERPL-SCNC: 3.1 MMOL/L — LOW (ref 3.5–5.3)
RBC # BLD: 3.47 M/UL — LOW (ref 3.8–5.2)
RBC # FLD: 14 % — SIGNIFICANT CHANGE UP (ref 10.3–14.5)
SODIUM SERPL-SCNC: 139 MMOL/L — SIGNIFICANT CHANGE UP (ref 135–145)
WBC # BLD: 5.15 K/UL — SIGNIFICANT CHANGE UP (ref 3.8–10.5)
WBC # FLD AUTO: 5.15 K/UL — SIGNIFICANT CHANGE UP (ref 3.8–10.5)

## 2025-03-08 PROCEDURE — 99232 SBSQ HOSP IP/OBS MODERATE 35: CPT

## 2025-03-08 RX ORDER — MAGNESIUM SULFATE 500 MG/ML
2 SYRINGE (ML) INJECTION ONCE
Refills: 0 | Status: COMPLETED | OUTPATIENT
Start: 2025-03-08 | End: 2025-03-08

## 2025-03-08 RX ADMIN — CEFTRIAXONE 100 MILLIGRAM(S): 500 INJECTION, POWDER, FOR SOLUTION INTRAMUSCULAR; INTRAVENOUS at 05:34

## 2025-03-08 RX ADMIN — APIXABAN 5 MILLIGRAM(S): 2.5 TABLET, FILM COATED ORAL at 05:34

## 2025-03-08 RX ADMIN — ATORVASTATIN CALCIUM 20 MILLIGRAM(S): 80 TABLET, FILM COATED ORAL at 22:03

## 2025-03-08 RX ADMIN — METOPROLOL SUCCINATE 200 MILLIGRAM(S): 50 TABLET, EXTENDED RELEASE ORAL at 05:34

## 2025-03-08 RX ADMIN — Medication 25 GRAM(S): at 11:52

## 2025-03-08 RX ADMIN — Medication 25 MICROGRAM(S): at 05:34

## 2025-03-08 RX ADMIN — APIXABAN 5 MILLIGRAM(S): 2.5 TABLET, FILM COATED ORAL at 17:01

## 2025-03-08 RX ADMIN — INSULIN LISPRO 1: 100 INJECTION, SOLUTION INTRAVENOUS; SUBCUTANEOUS at 16:55

## 2025-03-08 RX ADMIN — Medication 2 TABLET(S): at 22:04

## 2025-03-08 RX ADMIN — Medication 81 MILLIGRAM(S): at 11:52

## 2025-03-08 RX ADMIN — Medication 40 MILLIEQUIVALENT(S): at 08:09

## 2025-03-08 RX ADMIN — INSULIN LISPRO 2: 100 INJECTION, SOLUTION INTRAVENOUS; SUBCUTANEOUS at 11:52

## 2025-03-08 NOTE — DISCHARGE NOTE PROVIDER - CARE PROVIDERS DIRECT ADDRESSES
[email or web address]

## 2025-03-08 NOTE — DISCHARGE NOTE PROVIDER - NSDCMRMEDTOKEN_GEN_ALL_CORE_FT
aspirin 81 mg oral delayed release tablet: 1 tab(s) orally once a day  atorvastatin 20 mg oral tablet: 1 tab(s) orally once a day (at bedtime)  DilTIAZem (Eqv-Cardizem CD) 180 mg/24 hours oral capsule, extended release: 1 cap(s) orally once a day  Eliquis 5 mg oral tablet: 1 tab(s) orally 2 times a day  glimepiride 1 mg oral tablet: 1 tab(s) orally once a day  Januvia 100 mg oral tablet: 1 tab(s) orally once a day  levothyroxine 25 mcg (0.025 mg) oral tablet: 1 tab(s) orally once a day  metFORMIN 500 mg oral tablet: 1 tab(s) orally 2 times a day  metoprolol succinate 200 mg oral capsule, extended release: 1 cap(s) orally once a day  senna leaf extract oral tablet: 2 tab(s) orally once a day (at bedtime)   aspirin 81 mg oral delayed release tablet: 1 tab(s) orally once a day  atorvastatin 20 mg oral tablet: 1 tab(s) orally once a day (at bedtime)  DilTIAZem (Eqv-Cardizem CD) 180 mg/24 hours oral capsule, extended release: 1 cap(s) orally once a day  Eliquis 5 mg oral tablet: 1 tab(s) orally 2 times a day  glimepiride 1 mg oral tablet: 1 tab(s) orally once a day  Januvia 100 mg oral tablet: 1 tab(s) orally once a day  levETIRAcetam 250 mg oral tablet: 1 tab(s) orally 2 times a day  levothyroxine 25 mcg (0.025 mg) oral tablet: 1 tab(s) orally once a day  Macrobid 100 mg oral capsule: 1 cap(s) orally 2 times a day  metFORMIN 500 mg oral tablet: 1 tab(s) orally 2 times a day  metoprolol succinate 200 mg oral capsule, extended release: 1 cap(s) orally once a day  senna leaf extract oral tablet: 2 tab(s) orally once a day (at bedtime)   aspirin 81 mg oral delayed release tablet: 1 tab(s) orally once a day  atorvastatin 20 mg oral tablet: 1 tab(s) orally once a day (at bedtime)  DilTIAZem (Eqv-Cardizem CD) 180 mg/24 hours oral capsule, extended release: 1 cap(s) orally once a day  Eliquis 5 mg oral tablet: 1 tab(s) orally 2 times a day  glimepiride 1 mg oral tablet: 1 tab(s) orally once a day  Januvia 100 mg oral tablet: 1 tab(s) orally once a day  levETIRAcetam 250 mg oral tablet: 1 tab(s) orally 2 times a day  levothyroxine 25 mcg (0.025 mg) oral tablet: 1 tab(s) orally once a day  Macrobid 100 mg oral capsule: 1 cap(s) orally 2 times a day  metFORMIN 500 mg oral tablet: 1 tab(s) orally 2 times a day  metoprolol succinate 200 mg oral capsule, extended release: 1 cap(s) orally once a day  polyethylene glycol 3350 oral powder for reconstitution: 17 gram(s) orally once a day  senna leaf extract oral tablet: 2 tab(s) orally once a day (at bedtime)

## 2025-03-08 NOTE — DISCHARGE NOTE PROVIDER - NSDCCPCAREPLAN_GEN_ALL_CORE_FT
PRINCIPAL DISCHARGE DIAGNOSIS  Diagnosis: Sepsis  Assessment and Plan of Treatment: You were treated for sepsis. Sepsis is the body's extreme response to an infection. The cause of your sepsis is a urinary tract infection. Take all antibiotics as ordered.  Call you Health care provider upon arrival home to make a one week follow up appointment.  If you develop fever, chills, malaise, or change in mental status call your Health Care Provider or go to the Emergency Department.  Nutrition is important, eat small frequent meals to help ensure you get adequate calories.  Do not stay in bed all day!  Increase your activity daily as tolerated.        SECONDARY DISCHARGE DIAGNOSES  Diagnosis: Acute encephalopathy  Assessment and Plan of Treatment: You presented to the hospital with decreased oral intake and change in mental status. The cause of your encephalopathy is likely due to infection. Encephalopathy is a term used to describe brain disease or brain damage. It usually develops because of a health condition such an infection.   Call 911 or have someone else call for any of the following: You cannot be woken. You had a seizure.  Seek care immediately if: You had a seizure. You feel confused, dizzy, or lightheaded.  Your heart is beating faster than is normal for you. You have sudden shortness of breath.  Contact your healthcare provider if: You have a fever. You are sleeping more than usual.  Manage encephalopathy:  Take your medication as prescribed. Do not drink alcohol.   Follow up with your healthcare provider      Diagnosis: Acute UTI  Assessment and Plan of Treatment: You were treated for a urinary tract infection with IV antibiotics. If you were prescribed antibiotics, take them exactly as your caregiver instructs you. Finish the medication even if you feel better after you have only taken some of the medication.  Drink enough water and fluids to keep your urine clear or pale yellow.  Avoid caffeine, tea, and carbonated beverages. They tend to irritate your bladder.  Empty your bladder often. Avoid holding urine for long periods of time.  After a bowel movement, women should cleanse from front to back. Use each tissue only once.  SEEK IMMEDIATE MEDICAL CARE IF:  You have severe back pain or lower abdominal pain.  You develop chills.  You have nausea or vomiting.  You have continued burning or discomfort with urination.      Diagnosis: Hypothyroidism  Assessment and Plan of Treatment: you do not make enough thyroid hormone  signs & symptoms of low levels of thyroid hormone - tired, getting cold easily, coarse or thin hair, constipation, shortness of breath, swelling, irregular periods  your doctor will do thyroid hormone blood tests at least once a year to monitor if medication dose is adequate  take your thyroid medicine as directed by your doctor & on empty stomach      Diagnosis: HTN (hypertension)  Assessment and Plan of Treatment: You have a history of high blood pressure. High blood pressure is a condition that puts you at risk for heart attack, stroke and kidney disease. Please continue to take your medications as prescribed. You can also help control your blood pressure by maintaining a healthy weight, eating a diet low in fat and rich in fruits and vegetables, reduce the amount of salt in your diet. Also, reduce alcohol and try to include some form of physical activity daily for at least 30 mins. Follow up with your medical doctor to establish long term blood pressure treatment goals.  Notify your doctor if you have any of the following symptoms:   Dizziness, Lightheadedness, Blurry vision, Headache, Chest pain, Shortness of breath      Diagnosis: T2DM (type 2 diabetes mellitus)  Assessment and Plan of Treatment: Continue to follow with your primary care MD or your endocrinologist. Discuss what the goal hemoglobin A1C level is for you.  Follow a heart healthy diabetic diet. If you check your fingerstick glucose at home, call your MD if it is greater than 250mg/dL on 2 occasions or less than 100mg/dL on 2 occasions. Know signs of low blood sugar, such as: dizziness, shakiness, sweating, confusion, hunger, nervousness- drink 4 ounces apple juice if occurs and call your doctor. Know early signs of high blood sugar, such as: frequent urination, increased thirst, blurry vision, fatigue, headache - call your doctor if this occurs.      Diagnosis: Afib  Assessment and Plan of Treatment: Atrial fibrillation is the most common heart rhythm problem & has the risk of stroke & heart attack  It helps if you control your blood pressure, not drink more than 1-2 alcohol drinks per day, cut down on caffeine, getting treatment for over active thyroid gland, & getting exercise  Call your doctor if you feel your heart racing or beating unusually, chest tightness or pain, lightheaded, faint, shortness of breath especially with exercise  It is important to take your heart medication as prescribed  You may be on anticoagulation which is very important to take as directed - you may need blood work to monitor drug levels       PRINCIPAL DISCHARGE DIAGNOSIS  Diagnosis: Sepsis  Assessment and Plan of Treatment: You were treated for sepsis. Sepsis is the body's extreme response to an infection. The cause of your sepsis is a urinary tract infection. Your urine culture grew 50-99k ESBL e.coli. You were encourgaed to stay in the hospital to recieve IV antibiotics. Your son declined and wished to sign you out against medial advice. Take all antibiotics as ordered. ( MACROBID 100mg twice a day for 7 days)  Call you Health care provider upon arrival home to make a one week follow up appointment.  If you develop fever, chills, malaise, or change in mental status call your Health Care Provider or go to the Emergency Department.  Nutrition is important, eat small frequent meals to help ensure you get adequate calories.  Do not stay in bed all day!  Increase your activity daily as tolerated.        SECONDARY DISCHARGE DIAGNOSES  Diagnosis: Acute UTI  Assessment and Plan of Treatment: You were treated for a urinary tract infection with IV antibiotics. If you were prescribed antibiotics, take them exactly as your caregiver instructs you. Finish the medication even if you feel better after you have only taken some of the medication.  Drink enough water and fluids to keep your urine clear or pale yellow.  Avoid caffeine, tea, and carbonated beverages. They tend to irritate your bladder.  Empty your bladder often. Avoid holding urine for long periods of time.  After a bowel movement, women should cleanse from front to back. Use each tissue only once.  SEEK IMMEDIATE MEDICAL CARE IF:  You have severe back pain or lower abdominal pain.  You develop chills.  You have nausea or vomiting.  You have continued burning or discomfort with urination.      Diagnosis: Acute encephalopathy  Assessment and Plan of Treatment: You presented to the hospital with decreased oral intake and change in mental status. The cause of your encephalopathy is likely due to infection. Encephalopathy is a term used to describe brain disease or brain damage. It usually develops because of a health condition such an infection.   Call 911 or have someone else call for any of the following: You cannot be woken. You had a seizure.  Seek care immediately if: You had a seizure. You feel confused, dizzy, or lightheaded.  Your heart is beating faster than is normal for you. You have sudden shortness of breath.  Contact your healthcare provider if: You have a fever. You are sleeping more than usual.  Manage encephalopathy:  Take your medication as prescribed. Do not drink alcohol.   Follow up with your healthcare provider      Diagnosis: HTN (hypertension)  Assessment and Plan of Treatment: You have a history of high blood pressure. High blood pressure is a condition that puts you at risk for heart attack, stroke and kidney disease. Please continue to take your medications as prescribed. You can also help control your blood pressure by maintaining a healthy weight, eating a diet low in fat and rich in fruits and vegetables, reduce the amount of salt in your diet. Also, reduce alcohol and try to include some form of physical activity daily for at least 30 mins. Follow up with your medical doctor to establish long term blood pressure treatment goals.  Notify your doctor if you have any of the following symptoms:   Dizziness, Lightheadedness, Blurry vision, Headache, Chest pain, Shortness of breath      Diagnosis: Hypothyroidism  Assessment and Plan of Treatment: you do not make enough thyroid hormone  signs & symptoms of low levels of thyroid hormone - tired, getting cold easily, coarse or thin hair, constipation, shortness of breath, swelling, irregular periods  your doctor will do thyroid hormone blood tests at least once a year to monitor if medication dose is adequate  take your thyroid medicine as directed by your doctor & on empty stomach      Diagnosis: Afib  Assessment and Plan of Treatment: Atrial fibrillation is the most common heart rhythm problem & has the risk of stroke & heart attack  It helps if you control your blood pressure, not drink more than 1-2 alcohol drinks per day, cut down on caffeine, getting treatment for over active thyroid gland, & getting exercise  Call your doctor if you feel your heart racing or beating unusually, chest tightness or pain, lightheaded, faint, shortness of breath especially with exercise  It is important to take your heart medication as prescribed  You may be on anticoagulation which is very important to take as directed - you may need blood work to monitor drug levels      Diagnosis: T2DM (type 2 diabetes mellitus)  Assessment and Plan of Treatment: Continue to follow with your primary care MD or your endocrinologist. Discuss what the goal hemoglobin A1C level is for you.  Follow a heart healthy diabetic diet. If you check your fingerstick glucose at home, call your MD if it is greater than 250mg/dL on 2 occasions or less than 100mg/dL on 2 occasions. Know signs of low blood sugar, such as: dizziness, shakiness, sweating, confusion, hunger, nervousness- drink 4 ounces apple juice if occurs and call your doctor. Know early signs of high blood sugar, such as: frequent urination, increased thirst, blurry vision, fatigue, headache - call your doctor if this occurs.       PRINCIPAL DISCHARGE DIAGNOSIS  Diagnosis: Sepsis  Assessment and Plan of Treatment: You were treated for sepsis. Sepsis is the body's extreme response to an infection. The cause of your sepsis is a urinary tract infection. Your urine culture grew 50-99k ESBL e.coli. You completed your antibiotic course on ???  Call you Health care provider upon arrival home to make a one week follow up appointment.  If you develop fever, chills, malaise, or change in mental status call your Health Care Provider or go to the Emergency Department.  Nutrition is important, eat small frequent meals to help ensure you get adequate calories.  Do not stay in bed all day!  Increase your activity daily as tolerated.        SECONDARY DISCHARGE DIAGNOSES  Diagnosis: Acute UTI  Assessment and Plan of Treatment: You were treated for a urinary tract infection with IV antibiotics. If you were prescribed antibiotics, take them exactly as your caregiver instructs you. Finish the medication even if you feel better after you have only taken some of the medication.  Drink enough water and fluids to keep your urine clear or pale yellow.  Avoid caffeine, tea, and carbonated beverages. They tend to irritate your bladder.  Empty your bladder often. Avoid holding urine for long periods of time.  After a bowel movement, women should cleanse from front to back. Use each tissue only once.  SEEK IMMEDIATE MEDICAL CARE IF:  You have severe back pain or lower abdominal pain.  You develop chills.  You have nausea or vomiting.  You have continued burning or discomfort with urination.      Diagnosis: Acute encephalopathy  Assessment and Plan of Treatment: You presented to the hospital with decreased oral intake and change in mental status. The cause of your encephalopathy is likely due to infection. Encephalopathy is a term used to describe brain disease or brain damage. It usually develops because of a health condition such an infection.   Call 911 or have someone else call for any of the following: You cannot be woken. You had a seizure.  Seek care immediately if: You had a seizure. You feel confused, dizzy, or lightheaded.  Your heart is beating faster than is normal for you. You have sudden shortness of breath.  Contact your healthcare provider if: You have a fever. You are sleeping more than usual.  Manage encephalopathy:  Take your medication as prescribed. Do not drink alcohol.   Follow up with your healthcare provider      Diagnosis: HTN (hypertension)  Assessment and Plan of Treatment: You have a history of high blood pressure. High blood pressure is a condition that puts you at risk for heart attack, stroke and kidney disease. Please continue to take your medications as prescribed. You can also help control your blood pressure by maintaining a healthy weight, eating a diet low in fat and rich in fruits and vegetables, reduce the amount of salt in your diet. Also, reduce alcohol and try to include some form of physical activity daily for at least 30 mins. Follow up with your medical doctor to establish long term blood pressure treatment goals.  Notify your doctor if you have any of the following symptoms:   Dizziness, Lightheadedness, Blurry vision, Headache, Chest pain, Shortness of breath      Diagnosis: Hypothyroidism  Assessment and Plan of Treatment: you do not make enough thyroid hormone  signs & symptoms of low levels of thyroid hormone - tired, getting cold easily, coarse or thin hair, constipation, shortness of breath, swelling, irregular periods  your doctor will do thyroid hormone blood tests at least once a year to monitor if medication dose is adequate  take your thyroid medicine as directed by your doctor & on empty stomach      Diagnosis: Afib  Assessment and Plan of Treatment: Atrial fibrillation is the most common heart rhythm problem & has the risk of stroke & heart attack  It helps if you control your blood pressure, not drink more than 1-2 alcohol drinks per day, cut down on caffeine, getting treatment for over active thyroid gland, & getting exercise  Call your doctor if you feel your heart racing or beating unusually, chest tightness or pain, lightheaded, faint, shortness of breath especially with exercise  It is important to take your heart medication as prescribed  You may be on anticoagulation which is very important to take as directed - you may need blood work to monitor drug levels      Diagnosis: T2DM (type 2 diabetes mellitus)  Assessment and Plan of Treatment: Continue to follow with your primary care MD or your endocrinologist. Discuss what the goal hemoglobin A1C level is for you.  Follow a heart healthy diabetic diet. If you check your fingerstick glucose at home, call your MD if it is greater than 250mg/dL on 2 occasions or less than 100mg/dL on 2 occasions. Know signs of low blood sugar, such as: dizziness, shakiness, sweating, confusion, hunger, nervousness- drink 4 ounces apple juice if occurs and call your doctor. Know early signs of high blood sugar, such as: frequent urination, increased thirst, blurry vision, fatigue, headache - call your doctor if this occurs.       PRINCIPAL DISCHARGE DIAGNOSIS  Diagnosis: Sepsis  Assessment and Plan of Treatment: You were treated for sepsis. Sepsis is the body's extreme response to an infection. The cause of your sepsis is a urinary tract infection. Your urine culture grew 50-99k ESBL e.coli. You completed your antibiotic course:  Macrobid 100mg twice a day until 3/13/25.   Call you Health care provider upon arrival home to make a one week follow up appointment.  If you develop fever, chills, malaise, or change in mental status call your Health Care Provider or go to the Emergency Department.  Nutrition is important, eat small frequent meals to help ensure you get adequate calories.  Do not stay in bed all day!  Increase your activity daily as tolerated.        SECONDARY DISCHARGE DIAGNOSES  Diagnosis: Acute UTI  Assessment and Plan of Treatment: Continue Macrobid 100mg twice a day until 3/13/25.  SEEK IMMEDIATE MEDICAL CARE IF:  You have severe back pain or lower abdominal pain.  You develop chills.  You have nausea or vomiting.  You have continued burning or discomfort with urination.      Diagnosis: H/O: stroke  Assessment and Plan of Treatment: You are recommend to take Keppra for seizure prophylaxis.   Please follow up with a neurologist in 1 week for further medication and medical work up.    Diagnosis: Acute encephalopathy  Assessment and Plan of Treatment: Your mental status is back to baseline.   You are recommend to take Keppra for seizure prophylaxis.   Please follow up with a neurologist in 1 week for further medication and medical work up.       Diagnosis: HTN (hypertension)  Assessment and Plan of Treatment: You have a history of high blood pressure. High blood pressure is a condition that puts you at risk for heart attack, stroke and kidney disease. Please continue to take your medications as prescribed. You can also help control your blood pressure by maintaining a healthy weight, eating a diet low in fat and rich in fruits and vegetables, reduce the amount of salt in your diet. Also, reduce alcohol and try to include some form of physical activity daily for at least 30 mins. Follow up with your medical doctor to establish long term blood pressure treatment goals.  Notify your doctor if you have any of the following symptoms:   Dizziness, Lightheadedness, Blurry vision, Headache, Chest pain, Shortness of breath      Diagnosis: Hypothyroidism  Assessment and Plan of Treatment: you do not make enough thyroid hormone  signs & symptoms of low levels of thyroid hormone - tired, getting cold easily, coarse or thin hair, constipation, shortness of breath, swelling, irregular periods  your doctor will do thyroid hormone blood tests at least once a year to monitor if medication dose is adequate  take your thyroid medicine as directed by your doctor & on empty stomach      Diagnosis: Afib  Assessment and Plan of Treatment: Atrial fibrillation is the most common heart rhythm problem & has the risk of stroke & heart attack  It helps if you control your blood pressure, not drink more than 1-2 alcohol drinks per day, cut down on caffeine, getting treatment for over active thyroid gland, & getting exercise  Call your doctor if you feel your heart racing or beating unusually, chest tightness or pain, lightheaded, faint, shortness of breath especially with exercise  It is important to take your heart medication as prescribed  You may be on anticoagulation which is very important to take as directed - you may need blood work to monitor drug levels      Diagnosis: T2DM (type 2 diabetes mellitus)  Assessment and Plan of Treatment: Continue to follow with your primary care MD or your endocrinologist. Discuss what the goal hemoglobin A1C level is for you.  Follow a heart healthy diabetic diet. If you check your fingerstick glucose at home, call your MD if it is greater than 250mg/dL on 2 occasions or less than 100mg/dL on 2 occasions. Know signs of low blood sugar, such as: dizziness, shakiness, sweating, confusion, hunger, nervousness- drink 4 ounces apple juice if occurs and call your doctor. Know early signs of high blood sugar, such as: frequent urination, increased thirst, blurry vision, fatigue, headache - call your doctor if this occurs.      Diagnosis: Constipation  Assessment and Plan of Treatment: You were given a tap water enema.  Please continue stool softners and Miralax to prevent constipation.  Please follow up with PCP in 1 week for further medical management.

## 2025-03-08 NOTE — DISCHARGE NOTE PROVIDER - HOSPITAL COURSE
79F, from home, ambulates with walker, PMH HTN, HLD, T2DM, hypothyroidism, Afib (on Eliquis hx of CVA (with L sided weakness), peripheral venous insufficiency. Presenting with confusion x1d. Collateral obtained from son at bedside. States that patient is AAOx3 at baseline. CTH- No acute intracranial process identified. Vitals with tachycardia initially & fever on core body temperature. Met sepsis criteria. Found to have UA+.  Admitted for sepsis and acute encephalopathy 2/2 UTI. UCx grew ??    completed abx course ?????        incomplete 3/8/25---->>   79F, from home, ambulates with walker, PMH HTN, HLD, T2DM, hypothyroidism, Afib (on Eliquis hx of CVA (with L sided weakness), peripheral venous insufficiency. Presenting with confusion x1d. Collateral obtained from son at bedside. States that patient is AAOx3 at baseline. CTH- No acute intracranial process identified. Vitals with tachycardia initially & fever on core body temperature. Met sepsis criteria. Found to have UA+.  Admitted for sepsis and acute encephalopathy 2/2 UTI. UCx grew 50-99K esbl e.coli. ID Rodrick consulted.    The patient's son ( Tommy) wishes to leave against medical advice.  I have discussed the risks, benefits and alternatives (including the possibility of worsening of disease, pain, permanent disability, and/or death) with the patient and his/her family (if available).  The patient voices understanding of these risks, benefits, and alternatives and still wishes to sign out against medical advice.  The patient is awake, alert, oriented  x 3 and has demonstrated capacity to refuse/direct care.  I have advised the patient that they can and should return immediately should they develop any worse/different/additional symptoms, or if they change their mind and want to continue their care.    Pt. will be discharged with 1 week of Macrobid and to follow - up with urology and PCP.    Case discussed with Dr. Ferreira.   79F, from home, ambulates with walker, PMH HTN, HLD, T2DM, hypothyroidism, Afib (on Eliquis hx of CVA (with L sided weakness), peripheral venous insufficiency. Presenting with confusion x1d. Collateral obtained from son at bedside. States that patient is AAOx3 at baseline. CTH- No acute intracranial process identified. Vitals with tachycardia initially & fever on core body temperature. Met sepsis criteria. Found to have UA+.  Admitted for sepsis and acute encephalopathy 2/2 UTI. UCx grew 50-99K esbl e.coli. ID Rodrick consulted.      pt completed abx on ?????    Case discussed with Dr. Ferreira.      incomplete 3/10 79F, from home, ambulates with walker, PMH HTN, HLD, T2DM, hypothyroidism, Afib (on Eliquis hx of CVA (with L sided weakness), peripheral venous insufficiency. Presenting with confusion x1d. Collateral obtained from son at bedside. States that patient is AAOx3 at baseline. CTH- No acute intracranial process identified. Vitals with tachycardia initially & fever on core body temperature. Met sepsis criteria. Found to have UA+.  Admitted for sepsis and acute encephalopathy 2/2 UTI. UCx grew 50-99K esbl e.coli. ID Rodrick consulted.  Patient to be discharged on Macrobid 100mg BID until 3/13/25. Family agrees patient mental status back to baseline. Patient constipated, tap water enema 3/11/25.    Patient seen and examined at bedside, discussed with medical attending. Patient medically cleared for discharge to home, family will reinstate 24 hour care.

## 2025-03-08 NOTE — PROGRESS NOTE ADULT - ASSESSMENT
#ERIKA 2/2 UTI   #Hypokalemia  #Hypomagnesemia  #Seizure d/o off AE  #Type 2 DM   #A-fib on Eliquis   #Hx CVA  #HTN  #HLD   #DVT ppx     79yF with PMH of a-fib on Eliquis, HTN, type 2 DM, HLD, seizure disorder, who presented from home with acute onset confusion, likely in setting of UTI. Admitted for UTI.     -c/w ceftriaxone  -UCx positive for E. coli, finals results pending  -tolerating diet   -fall precautions   -replace electrolytes, monitor daily  -ACHS FS, ISS  -c/w home meds for chronic conditions   -DVT ppx

## 2025-03-08 NOTE — PROGRESS NOTE ADULT - SUBJECTIVE AND OBJECTIVE BOX
S:    REVIEW OF SYSTEMS:  CONSTITUTIONAL: No weakness, fevers or chills  EYES: No visual changes  ENT: No vertigo or throat pain   NECK: No pain or stiffness  RESPIRATORY: No cough, wheezing, hemoptysis; No shortness of breath  CARDIOVASCULAR: No chest pain or palpitations  GASTROINTESTINAL: No abdominal or epigastric pain. No nausea, vomiting, or hematemesis; No diarrhea or constipation. No melena or hematochezia.  GENITOURINARY: No dysuria, frequency or hematuria  NEUROLOGICAL: No numbness or weakness  SKIN: No itching, rashes  PSYCH: No depression, anxiety    O:  Vital Signs Last 24 Hrs  T(C): 36.7 (08 Mar 2025 05:13), Max: 36.9 (07 Mar 2025 12:12)  T(F): 98.1 (08 Mar 2025 05:13), Max: 98.5 (07 Mar 2025 12:12)  HR: 83 (08 Mar 2025 05:13) (75 - 83)  BP: 167/96 (08 Mar 2025 05:13) (149/82 - 167/96)  BP(mean): 107 (07 Mar 2025 20:40) (107 - 107)  RR: 18 (08 Mar 2025 05:13) (16 - 18)  SpO2: 95% (08 Mar 2025 05:13) (95% - 97%)    Parameters below as of 08 Mar 2025 05:13  Patient On (Oxygen Delivery Method): room air        GENERAL: NAD, well-developed  HEAD:  Atraumatic, Normocephalic  EYES: EOMI, anicteric sclera bilaterally  NECK: Supple, No JVD  CHEST/LUNG: Clear to auscultation bilaterally, symmetrical chest rise  HEART: Regular rate and rhythm; No murmurs, rubs, or gallops  ABDOMEN: Soft, Nontender, Nondistended; Bowel sounds present  EXTREMITIES:  2+ Peripheral Pulses, No clubbing, cyanosis, or edema  PSYCH: AAOx3, normal affect  NEUROLOGY: cranial nerves grossly intact, moves all extremities  SKIN: No rashes or lesions, dry, warm    acetaminophen     Tablet .. 650 milliGRAM(s) Oral every 6 hours PRN  apixaban 5 milliGRAM(s) Oral every 12 hours  aspirin enteric coated 81 milliGRAM(s) Oral daily  atorvastatin 20 milliGRAM(s) Oral at bedtime  cefTRIAXone   IVPB 1000 milliGRAM(s) IV Intermittent every 24 hours  influenza  Vaccine (HIGH DOSE) 0.5 milliLiter(s) IntraMuscular once  insulin lispro (ADMELOG) corrective regimen sliding scale   SubCutaneous three times a day before meals  insulin lispro (ADMELOG) corrective regimen sliding scale   SubCutaneous at bedtime  lactated ringers. 1000 milliLiter(s) IV Continuous <Continuous>  levothyroxine 25 MICROGram(s) Oral daily  metoprolol succinate  milliGRAM(s) Oral daily  ondansetron Injectable 4 milliGRAM(s) IV Push every 8 hours PRN  senna 2 Tablet(s) Oral at bedtime                            11.5   5.15  )-----------( 160      ( 08 Mar 2025 05:45 )             32.5       03-08    139  |  107  |  6[L]  ----------------------------<  149[H]  3.1[L]   |  25  |  0.58    Ca    9.2      08 Mar 2025 05:45  Phos  3.1     03-07  Mg     1.5     03-08    TPro  5.8[L]  /  Alb  2.6[L]  /  TBili  0.5  /  DBili  x   /  AST  10  /  ALT  15  /  AlkPhos  69  03-07       S: Patient seen and examined at bedside. No acute events overnight. Resting in bed, eating lunch. States that she feels like she is on fire. Concerned about her UTI.     REVIEW OF SYSTEMS:  CONSTITUTIONAL: No weakness, fevers or chills  EYES: No visual changes  ENT: No vertigo or throat pain   NECK: No pain or stiffness  RESPIRATORY: No cough, wheezing, hemoptysis; No shortness of breath  CARDIOVASCULAR: No chest pain or palpitations  GASTROINTESTINAL: No abdominal or epigastric pain. No nausea, vomiting, or hematemesis; No diarrhea or constipation. No melena or hematochezia.  GENITOURINARY: No dysuria, frequency or hematuria  NEUROLOGICAL: No numbness or weakness  SKIN: No itching, rashes  PSYCH: No depression, anxiety    O:  Vital Signs Last 24 Hrs  T(C): 36.7 (08 Mar 2025 05:13), Max: 36.9 (07 Mar 2025 12:12)  T(F): 98.1 (08 Mar 2025 05:13), Max: 98.5 (07 Mar 2025 12:12)  HR: 83 (08 Mar 2025 05:13) (75 - 83)  BP: 167/96 (08 Mar 2025 05:13) (149/82 - 167/96)  BP(mean): 107 (07 Mar 2025 20:40) (107 - 107)  RR: 18 (08 Mar 2025 05:13) (16 - 18)  SpO2: 95% (08 Mar 2025 05:13) (95% - 97%)    Parameters below as of 08 Mar 2025 05:13  Patient On (Oxygen Delivery Method): room air    Constitutional/General: Elderly, NAD, vitals reviewed  EYE: Symmetrical pupils, conjunctiva clear   ENT: Good dentition, oropharynx clear  NECK: No visual masses, no JVD  CHEST: No respiratory distress, bilateral symmetrical chest rise  ABDOMEN: Nondistended, no visual masses  SKIN: No rash, warm, dry  NEURO: A+Ox1-2, Cranial nerves grossly intact, moves all extremities, follows commands    acetaminophen     Tablet .. 650 milliGRAM(s) Oral every 6 hours PRN  apixaban 5 milliGRAM(s) Oral every 12 hours  aspirin enteric coated 81 milliGRAM(s) Oral daily  atorvastatin 20 milliGRAM(s) Oral at bedtime  cefTRIAXone   IVPB 1000 milliGRAM(s) IV Intermittent every 24 hours  influenza  Vaccine (HIGH DOSE) 0.5 milliLiter(s) IntraMuscular once  insulin lispro (ADMELOG) corrective regimen sliding scale   SubCutaneous three times a day before meals  insulin lispro (ADMELOG) corrective regimen sliding scale   SubCutaneous at bedtime  lactated ringers. 1000 milliLiter(s) IV Continuous <Continuous>  levothyroxine 25 MICROGram(s) Oral daily  metoprolol succinate  milliGRAM(s) Oral daily  ondansetron Injectable 4 milliGRAM(s) IV Push every 8 hours PRN  senna 2 Tablet(s) Oral at bedtime                            11.5   5.15  )-----------( 160      ( 08 Mar 2025 05:45 )             32.5       03-08    139  |  107  |  6[L]  ----------------------------<  149[H]  3.1[L]   |  25  |  0.58    Ca    9.2      08 Mar 2025 05:45  Phos  3.1     03-07  Mg     1.5     03-08    TPro  5.8[L]  /  Alb  2.6[L]  /  TBili  0.5  /  DBili  x   /  AST  10  /  ALT  15  /  AlkPhos  69  03-07

## 2025-03-08 NOTE — DISCHARGE NOTE PROVIDER - ATTENDING DISCHARGE PHYSICAL EXAMINATION:
Exam:  NAD, lying in bed able to speak full sentences   S1 S2 B[place]soe no RMG  Clear to auscultation B/l, normal chest expansion   Abdomen soft NT BS+  Ext warm and well perfused no edema  Neuro alert and orientated x3 no FND

## 2025-03-08 NOTE — DISCHARGE NOTE PROVIDER - CARE PROVIDER_API CALL
Jaime Foster Mason  Internal Medicine  [address] [place] [address], [address]  [place] [address]  Phone: [phone]  Fax: [phone]  Follow Up Time:     Sheree Nunez  Neurology  [address] [place] [address], [address]  [place] [address]  Phone: [phone]  Fax: [phone]  Follow Up Time:

## 2025-03-09 LAB
ANION GAP SERPL CALC-SCNC: 7 MMOL/L — SIGNIFICANT CHANGE UP (ref 5–17)
BUN SERPL-MCNC: 13 MG/DL — SIGNIFICANT CHANGE UP (ref 7–18)
CALCIUM SERPL-MCNC: 9.5 MG/DL — SIGNIFICANT CHANGE UP (ref 8.4–10.5)
CHLORIDE SERPL-SCNC: 108 MMOL/L — SIGNIFICANT CHANGE UP (ref 96–108)
CO2 SERPL-SCNC: 25 MMOL/L — SIGNIFICANT CHANGE UP (ref 22–31)
CREAT SERPL-MCNC: 0.64 MG/DL — SIGNIFICANT CHANGE UP (ref 0.5–1.3)
EGFR: 90 ML/MIN/1.73M2 — SIGNIFICANT CHANGE UP
EGFR: 90 ML/MIN/1.73M2 — SIGNIFICANT CHANGE UP
GLUCOSE BLDC GLUCOMTR-MCNC: 153 MG/DL — HIGH (ref 70–99)
GLUCOSE BLDC GLUCOMTR-MCNC: 164 MG/DL — HIGH (ref 70–99)
GLUCOSE BLDC GLUCOMTR-MCNC: 176 MG/DL — HIGH (ref 70–99)
GLUCOSE BLDC GLUCOMTR-MCNC: 178 MG/DL — HIGH (ref 70–99)
GLUCOSE SERPL-MCNC: 180 MG/DL — HIGH (ref 70–99)
MAGNESIUM SERPL-MCNC: 1.9 MG/DL — SIGNIFICANT CHANGE UP (ref 1.6–2.6)
PHOSPHATE SERPL-MCNC: 2.4 MG/DL — LOW (ref 2.5–4.5)
POTASSIUM SERPL-MCNC: 3.3 MMOL/L — LOW (ref 3.5–5.3)
POTASSIUM SERPL-SCNC: 3.3 MMOL/L — LOW (ref 3.5–5.3)
SODIUM SERPL-SCNC: 140 MMOL/L — SIGNIFICANT CHANGE UP (ref 135–145)

## 2025-03-09 PROCEDURE — 99232 SBSQ HOSP IP/OBS MODERATE 35: CPT

## 2025-03-09 PROCEDURE — 99223 1ST HOSP IP/OBS HIGH 75: CPT

## 2025-03-09 RX ORDER — SOD PHOS DI, MONO/K PHOS MONO 250 MG
2 TABLET ORAL ONCE
Refills: 0 | Status: COMPLETED | OUTPATIENT
Start: 2025-03-09 | End: 2025-03-09

## 2025-03-09 RX ORDER — ERTAPENEM SODIUM 1 G/1
1000 INJECTION, POWDER, LYOPHILIZED, FOR SOLUTION INTRAMUSCULAR; INTRAVENOUS EVERY 24 HOURS
Refills: 0 | Status: DISCONTINUED | OUTPATIENT
Start: 2025-03-10 | End: 2025-03-11

## 2025-03-09 RX ORDER — ERTAPENEM SODIUM 1 G/1
1000 INJECTION, POWDER, LYOPHILIZED, FOR SOLUTION INTRAMUSCULAR; INTRAVENOUS ONCE
Refills: 0 | Status: COMPLETED | OUTPATIENT
Start: 2025-03-09 | End: 2025-03-09

## 2025-03-09 RX ORDER — ERTAPENEM SODIUM 1 G/1
INJECTION, POWDER, LYOPHILIZED, FOR SOLUTION INTRAMUSCULAR; INTRAVENOUS
Refills: 0 | Status: DISCONTINUED | OUTPATIENT
Start: 2025-03-09 | End: 2025-03-11

## 2025-03-09 RX ADMIN — APIXABAN 5 MILLIGRAM(S): 2.5 TABLET, FILM COATED ORAL at 05:40

## 2025-03-09 RX ADMIN — ATORVASTATIN CALCIUM 20 MILLIGRAM(S): 80 TABLET, FILM COATED ORAL at 22:03

## 2025-03-09 RX ADMIN — METOPROLOL SUCCINATE 200 MILLIGRAM(S): 50 TABLET, EXTENDED RELEASE ORAL at 05:40

## 2025-03-09 RX ADMIN — Medication 81 MILLIGRAM(S): at 11:45

## 2025-03-09 RX ADMIN — INSULIN LISPRO 1: 100 INJECTION, SOLUTION INTRAVENOUS; SUBCUTANEOUS at 08:08

## 2025-03-09 RX ADMIN — Medication 2 TABLET(S): at 22:03

## 2025-03-09 RX ADMIN — INSULIN LISPRO 1: 100 INJECTION, SOLUTION INTRAVENOUS; SUBCUTANEOUS at 11:45

## 2025-03-09 RX ADMIN — Medication 2 TABLET(S): at 08:09

## 2025-03-09 RX ADMIN — ERTAPENEM SODIUM 120 MILLIGRAM(S): 1 INJECTION, POWDER, LYOPHILIZED, FOR SOLUTION INTRAMUSCULAR; INTRAVENOUS at 17:32

## 2025-03-09 RX ADMIN — INSULIN LISPRO 1: 100 INJECTION, SOLUTION INTRAVENOUS; SUBCUTANEOUS at 16:59

## 2025-03-09 RX ADMIN — APIXABAN 5 MILLIGRAM(S): 2.5 TABLET, FILM COATED ORAL at 17:00

## 2025-03-09 RX ADMIN — Medication 25 MICROGRAM(S): at 05:40

## 2025-03-09 RX ADMIN — CEFTRIAXONE 100 MILLIGRAM(S): 500 INJECTION, POWDER, FOR SOLUTION INTRAMUSCULAR; INTRAVENOUS at 05:40

## 2025-03-09 NOTE — PROGRESS NOTE ADULT - SUBJECTIVE AND OBJECTIVE BOX
S: Patient seen and examined at bedside. No acute events overnight. Resting in bed, eating lunch. States that she feels like she is on fire. Concerned about her UTI.     REVIEW OF SYSTEMS:  CONSTITUTIONAL: No weakness, fevers or chills  EYES: No visual changes  ENT: No vertigo or throat pain   NECK: No pain or stiffness  RESPIRATORY: No cough, wheezing, hemoptysis; No shortness of breath  CARDIOVASCULAR: No chest pain or palpitations  GASTROINTESTINAL: No abdominal or epigastric pain. No nausea, vomiting, or hematemesis; No diarrhea or constipation. No melena or hematochezia.  GENITOURINARY: No dysuria, frequency or hematuria  NEUROLOGICAL: No numbness or weakness  SKIN: No itching, rashes  PSYCH: No depression, anxiety    O:  Vital Signs Last 24 Hrs  T(C): 36.7 (08 Mar 2025 05:13), Max: 36.9 (07 Mar 2025 12:12)  T(F): 98.1 (08 Mar 2025 05:13), Max: 98.5 (07 Mar 2025 12:12)  HR: 83 (08 Mar 2025 05:13) (75 - 83)  BP: 167/96 (08 Mar 2025 05:13) (149/82 - 167/96)  BP(mean): 107 (07 Mar 2025 20:40) (107 - 107)  RR: 18 (08 Mar 2025 05:13) (16 - 18)  SpO2: 95% (08 Mar 2025 05:13) (95% - 97%)    Parameters below as of 08 Mar 2025 05:13  Patient On (Oxygen Delivery Method): room air    Constitutional/General: Elderly, NAD, vitals reviewed  EYE: Symmetrical pupils, conjunctiva clear   ENT: Good dentition, oropharynx clear  NECK: No visual masses, no JVD  CHEST: No respiratory distress, bilateral symmetrical chest rise  ABDOMEN: Nondistended, no visual masses  SKIN: No rash, warm, dry  NEURO: A+Ox1-2, Cranial nerves grossly intact, moves all extremities, follows commands    acetaminophen     Tablet .. 650 milliGRAM(s) Oral every 6 hours PRN  apixaban 5 milliGRAM(s) Oral every 12 hours  aspirin enteric coated 81 milliGRAM(s) Oral daily  atorvastatin 20 milliGRAM(s) Oral at bedtime  cefTRIAXone   IVPB 1000 milliGRAM(s) IV Intermittent every 24 hours  influenza  Vaccine (HIGH DOSE) 0.5 milliLiter(s) IntraMuscular once  insulin lispro (ADMELOG) corrective regimen sliding scale   SubCutaneous three times a day before meals  insulin lispro (ADMELOG) corrective regimen sliding scale   SubCutaneous at bedtime  lactated ringers. 1000 milliLiter(s) IV Continuous <Continuous>  levothyroxine 25 MICROGram(s) Oral daily  metoprolol succinate  milliGRAM(s) Oral daily  ondansetron Injectable 4 milliGRAM(s) IV Push every 8 hours PRN  senna 2 Tablet(s) Oral at bedtime                            11.5   5.15  )-----------( 160      ( 08 Mar 2025 05:45 )             32.5       03-08    139  |  107  |  6[L]  ----------------------------<  149[H]  3.1[L]   |  25  |  0.58    Ca    9.2      08 Mar 2025 05:45  Phos  3.1     03-07  Mg     1.5     03-08    TPro  5.8[L]  /  Alb  2.6[L]  /  TBili  0.5  /  DBili  x   /  AST  10  /  ALT  15  /  AlkPhos  69  03-07       S: Patient seen and examined at bedside. No acute events overnight. Resting in bed, eating lunch. States that she feels like she is on fire. Concerned about her UTI.   5 uti's since november   pt eval - walks w walker and hha   hha 24h/7d  SCDs    REVIEW OF SYSTEMS:  CONSTITUTIONAL: No weakness, fevers or chills  EYES: No visual changes  ENT: No vertigo or throat pain   NECK: No pain or stiffness  RESPIRATORY: No cough, wheezing, hemoptysis; No shortness of breath  CARDIOVASCULAR: No chest pain or palpitations  GASTROINTESTINAL: No abdominal or epigastric pain. No nausea, vomiting, or hematemesis; No diarrhea or constipation. No melena or hematochezia.  GENITOURINARY: No dysuria, frequency or hematuria  NEUROLOGICAL: No numbness or weakness  SKIN: No itching, rashes  PSYCH: No depression, anxiety    O:  Vital Signs Last 24 Hrs  T(C): 36.7 (08 Mar 2025 05:13), Max: 36.9 (07 Mar 2025 12:12)  T(F): 98.1 (08 Mar 2025 05:13), Max: 98.5 (07 Mar 2025 12:12)  HR: 83 (08 Mar 2025 05:13) (75 - 83)  BP: 167/96 (08 Mar 2025 05:13) (149/82 - 167/96)  BP(mean): 107 (07 Mar 2025 20:40) (107 - 107)  RR: 18 (08 Mar 2025 05:13) (16 - 18)  SpO2: 95% (08 Mar 2025 05:13) (95% - 97%)    Parameters below as of 08 Mar 2025 05:13  Patient On (Oxygen Delivery Method): room air    Constitutional/General: Elderly, NAD, vitals reviewed  EYE: Symmetrical pupils, conjunctiva clear   ENT: Good dentition, oropharynx clear  NECK: No visual masses, no JVD  CHEST: No respiratory distress, bilateral symmetrical chest rise  ABDOMEN: Nondistended, no visual masses  SKIN: No rash, warm, dry  NEURO: A+Ox1-2, Cranial nerves grossly intact, moves all extremities, follows commands    acetaminophen     Tablet .. 650 milliGRAM(s) Oral every 6 hours PRN  apixaban 5 milliGRAM(s) Oral every 12 hours  aspirin enteric coated 81 milliGRAM(s) Oral daily  atorvastatin 20 milliGRAM(s) Oral at bedtime  cefTRIAXone   IVPB 1000 milliGRAM(s) IV Intermittent every 24 hours  influenza  Vaccine (HIGH DOSE) 0.5 milliLiter(s) IntraMuscular once  insulin lispro (ADMELOG) corrective regimen sliding scale   SubCutaneous three times a day before meals  insulin lispro (ADMELOG) corrective regimen sliding scale   SubCutaneous at bedtime  lactated ringers. 1000 milliLiter(s) IV Continuous <Continuous>  levothyroxine 25 MICROGram(s) Oral daily  metoprolol succinate  milliGRAM(s) Oral daily  ondansetron Injectable 4 milliGRAM(s) IV Push every 8 hours PRN  senna 2 Tablet(s) Oral at bedtime                            11.5   5.15  )-----------( 160      ( 08 Mar 2025 05:45 )             32.5       03-08    139  |  107  |  6[L]  ----------------------------<  149[H]  3.1[L]   |  25  |  0.58    Ca    9.2      08 Mar 2025 05:45  Phos  3.1     03-07  Mg     1.5     03-08    TPro  5.8[L]  /  Alb  2.6[L]  /  TBili  0.5  /  DBili  x   /  AST  10  /  ALT  15  /  AlkPhos  69  03-07       S: Patient seen and examined at bedside. No acute events overnight. Resting in bed. Denies any complaints.     REVIEW OF SYSTEMS:  CONSTITUTIONAL: No weakness, fevers or chills  EYES: No visual changes  ENT: No vertigo or throat pain   NECK: No pain or stiffness  RESPIRATORY: No cough, wheezing, hemoptysis; No shortness of breath  CARDIOVASCULAR: No chest pain or palpitations  GASTROINTESTINAL: No abdominal or epigastric pain. No nausea, vomiting, or hematemesis; No diarrhea or constipation. No melena or hematochezia.  GENITOURINARY: No dysuria, frequency or hematuria  NEUROLOGICAL: No numbness or weakness  SKIN: No itching, rashes  PSYCH: No depression, anxiety    O:  Vital Signs Last 24 Hrs  T(C): 36.7 (08 Mar 2025 05:13), Max: 36.9 (07 Mar 2025 12:12)  T(F): 98.1 (08 Mar 2025 05:13), Max: 98.5 (07 Mar 2025 12:12)  HR: 83 (08 Mar 2025 05:13) (75 - 83)  BP: 167/96 (08 Mar 2025 05:13) (149/82 - 167/96)  BP(mean): 107 (07 Mar 2025 20:40) (107 - 107)  RR: 18 (08 Mar 2025 05:13) (16 - 18)  SpO2: 95% (08 Mar 2025 05:13) (95% - 97%)    Parameters below as of 08 Mar 2025 05:13  Patient On (Oxygen Delivery Method): room air    Constitutional/General: Elderly, NAD, vitals reviewed  EYE: Symmetrical pupils, conjunctiva clear   ENT: Good dentition, oropharynx clear  NECK: No visual masses, no JVD  CHEST: No respiratory distress, bilateral symmetrical chest rise  ABDOMEN: Nondistended, no visual masses  SKIN: No rash, warm, dry  NEURO: A+Ox1-2, Cranial nerves grossly intact, moves all extremities, follows commands    acetaminophen     Tablet .. 650 milliGRAM(s) Oral every 6 hours PRN  apixaban 5 milliGRAM(s) Oral every 12 hours  aspirin enteric coated 81 milliGRAM(s) Oral daily  atorvastatin 20 milliGRAM(s) Oral at bedtime  cefTRIAXone   IVPB 1000 milliGRAM(s) IV Intermittent every 24 hours  influenza  Vaccine (HIGH DOSE) 0.5 milliLiter(s) IntraMuscular once  insulin lispro (ADMELOG) corrective regimen sliding scale   SubCutaneous three times a day before meals  insulin lispro (ADMELOG) corrective regimen sliding scale   SubCutaneous at bedtime  lactated ringers. 1000 milliLiter(s) IV Continuous <Continuous>  levothyroxine 25 MICROGram(s) Oral daily  metoprolol succinate  milliGRAM(s) Oral daily  ondansetron Injectable 4 milliGRAM(s) IV Push every 8 hours PRN  senna 2 Tablet(s) Oral at bedtime                            11.5   5.15  )-----------( 160      ( 08 Mar 2025 05:45 )             32.5       03-08    139  |  107  |  6[L]  ----------------------------<  149[H]  3.1[L]   |  25  |  0.58    Ca    9.2      08 Mar 2025 05:45  Phos  3.1     03-07  Mg     1.5     03-08    TPro  5.8[L]  /  Alb  2.6[L]  /  TBili  0.5  /  DBili  x   /  AST  10  /  ALT  15  /  AlkPhos  69  03-07

## 2025-03-09 NOTE — CONSULT NOTE ADULT - SUBJECTIVE AND OBJECTIVE BOX
***TEMPLATE ONLY***      Patient is a 79y old  Female who presents with a chief complaint of AMS (09 Mar 2025 09:01)      HPI:  79F, from home, ambulates with walker, PMH HTN, HLD, T2DM, hypothyroidism, Afib (on Eliquis hx of CVA (with L sided weakness), peripheral venous insufficiency. Presenting with confusion x1d. Collateral obtained from son at bedside. States that patient is AAOx3 at baseline. Yesterday in the afternoon, patient started getting progressively more confused, "not making sense." Son states this is usually what happens when she gets urinary tract infections. At the time of exam, patient is AAOx2 (to self and place), but does not know why she is in the hospital. Son states that she seems "less confused" than when she first arrived but is still not at her baseline. Denies fever, chills, chest pain, palpitations, dysuria, hematuria, SOB, n/v/d, abd pain. Endorses throat discomfort and dry cough for the past 3d.     No recent travel or sick contacts. Of note, on prior hospitalization patient was discharged on Depakote for seizure ppx. Patient no longer takes Depakote as her the son because it made her feel "very bad." States no seizures since being off Depakote.  (06 Mar 2025 21:26)         Neurological Review of Systems:  No difficulty with language.  No vision loss or double vision.  No dizziness, vertigo or new hearing loss.  No difficulty with speech or swallowing.  No focal weakness.  No focal sensory changes.  No numbness or tingling in the bilateral lower extremities.  No difficulty with balance.  No difficulty with ambulation.        MEDICATIONS  (STANDING):  apixaban 5 milliGRAM(s) Oral every 12 hours  aspirin enteric coated 81 milliGRAM(s) Oral daily  atorvastatin 20 milliGRAM(s) Oral at bedtime  ertapenem  IVPB      influenza  Vaccine (HIGH DOSE) 0.5 milliLiter(s) IntraMuscular once  insulin lispro (ADMELOG) corrective regimen sliding scale   SubCutaneous three times a day before meals  insulin lispro (ADMELOG) corrective regimen sliding scale   SubCutaneous at bedtime  lactated ringers. 1000 milliLiter(s) (75 mL/Hr) IV Continuous <Continuous>  levothyroxine 25 MICROGram(s) Oral daily  metoprolol succinate  milliGRAM(s) Oral daily  senna 2 Tablet(s) Oral at bedtime    MEDICATIONS  (PRN):  acetaminophen     Tablet .. 650 milliGRAM(s) Oral every 6 hours PRN Temp greater or equal to 38C (100.4F), Mild Pain (1 - 3)  ondansetron Injectable 4 milliGRAM(s) IV Push every 8 hours PRN Nausea and/or Vomiting    Allergies    No Known Allergies    Intolerances      PAST MEDICAL & SURGICAL HISTORY:  HTN (hypertension)      Atrial fibrillation      OA (osteoarthritis)      HLD (hyperlipidemia)      Diabetes      Obesity      Anxiety      Hypothyroidism      Peripheral venous insufficiency      No significant past surgical history        FAMILY HISTORY:  Family history of thrombosis (Mother)      SOCIAL HISTORY: non smoker/ former smoker/ active smoker    Review of Systems:  Constitutional: No generalized weakness. No fevers or chills.                    Eyes, Ears, Mouth, Throat: No vision loss   Respiratory: No shortness of breath or cough.                                Cardiovascular: No chest pain or palpitations  Gastrointestinal: No nausea or vomiting.                                         Genitourinary: No urinary incontinence or burning on urination.  Musculoskeletal: No joint pain.                                                           Dermatologic: No rash.  Neurological: as per HPI                                                                      Psychiatric: No behavioral problems.  Endocrine: No known hypoglycemia.               Hematologic/Lymphatic: No easy bleeding.    O:  Vital Signs Last 24 Hrs  T(C): 36.6 (09 Mar 2025 12:31), Max: 36.8 (09 Mar 2025 05:20)  T(F): 97.8 (09 Mar 2025 12:31), Max: 98.2 (09 Mar 2025 05:20)  HR: 75 (09 Mar 2025 12:31) (74 - 78)  BP: 136/88 (09 Mar 2025 12:31) (136/88 - 164/89)  BP(mean): 104 (09 Mar 2025 12:31) (104 - 104)  RR: 17 (09 Mar 2025 12:31) (17 - 18)  SpO2: 94% (09 Mar 2025 12:31) (94% - 95%)    Parameters below as of 09 Mar 2025 12:31  Patient On (Oxygen Delivery Method): room air        General Exam:   General appearance: No acute distress                 Cardiovascular: Pedal dorsalis pulses intact bilaterally    Mental Status: Orientated to self, date and place.  Attention intact.  No dysarthria, aphasia or neglect.  Knowledge intact.  Registration intact.  Short and long term memory grossly intact.      Cranial Nerves: CN I - not tested.  PERRL, EOMI, VFF, no nystagmus or diplopia.  No APD.  Fundi not visualized.  CN V1-3 intact to light touch and pinprick.  No facial asymmetry.  Hearing intact to finger rub bilaterally.  Tongue, uvula and palate midline.  Sternocleidomastoid and Trapezius intact bilaterally.    Motor:   Tone: normal.                  Strength intact throughout  No pronator drift bilaterally                      No dysmetria on finger-nose-finger or heel-shin-heel  No truncal ataxia.  No resting, postural or action tremor.  No myoclonus.    Sensation: intact to light touch, pinprick, vibration and proprioception    Deep Tendon Reflexes: 1+ bilateral biceps, triceps, brachioradialis, knee and ankle  Toes flexor bilaterally    Gait: normal and stable.  Rhomberg -gustavo.    Other:     LABS:                        11.5   5.15  )-----------( 160      ( 08 Mar 2025 05:45 )             32.5     03-09    140  |  108  |  13  ----------------------------<  180[H]  3.3[L]   |  25  |  0.64    Ca    9.5      09 Mar 2025 05:45  Phos  2.4     03-09  Mg     1.9     03-09        Urinalysis Basic - ( 09 Mar 2025 05:45 )    Color: x / Appearance: x / SG: x / pH: x  Gluc: 180 mg/dL / Ketone: x  / Bili: x / Urobili: x   Blood: x / Protein: x / Nitrite: x   Leuk Esterase: x / RBC: x / WBC x   Sq Epi: x / Non Sq Epi: x / Bacteria: x          RADIOLOGY & ADDITIONAL STUDIES:    < from: CT Head No Cont (03.06.25 @ 16:59) >  1. No acute intracranial process identified.  2. Additional findings, including interval evolution of chronic infarcts,   as described above.  3. If clinical symptoms persist consider further imaging with MRI,   provided there are no medical contraindications.    < end of copied text >      -Risk of focal-onset seizures from the bilateral frontal regions vs. risk of generalized seizures which can be seen in toxic-metabolic encephalopathies, with shifting left > right predominance due to structural lesion.  -Right hemispheric focal cerebral dysfunction, likely structural in etiology.  -Mild-moderate diffuse cerebral dysfunction is nonspecific in etiology.   -No seizures  -Event of irregular L hand shaking is less likely to be epileptic in etiology. Should seizures remain a clinical concern, continued EEG monitoring can be considered.   Patient is a 79y old  Female who presents with a chief complaint of AMS (09 Mar 2025 09:01)      HPI:  79F, from home, ambulates with walker, PMH HTN, HLD, T2DM, hypothyroidism, Afib (on Eliquis hx of CVA (with L sided weakness), peripheral venous insufficiency pw confusion x1d. Collateral obtained from son and chart. States that patient is AAOx3 at baseline.  Afternoon prior to admission, patient started getting progressively more confused, "not making sense." Son states this is usually what happens when she gets urinary tract infections. At the time of exam, patient is AAOx2 (to self and place), but does not know why she is in the hospital. Son states that she seems "less confused" than when she first arrived but is still not at her baseline. Denies fever, chills, chest pain, palpitations, dysuria, hematuria, SOB, n/v/d, abd pain. Endorses throat discomfort and dry cough for the past 3d.     No recent travel or sick contacts. Of note, on prior hospitalization patient was discharged on Depakote for seizure ppx. Patient no longer takes Depakote as her the son because it made her feel "very bad." States no seizures since being off Depakote.  (06 Mar 2025 21:26)    Neurological Review of Systems:  No difficulty with language.  No vision loss or double vision.  No dizziness, vertigo or new hearing loss.  No difficulty with speech or swallowing.  No focal weakness.  No focal sensory changes.   No difficulty with balance.  walks with walker.      MEDICATIONS  (STANDING):  apixaban 5 milliGRAM(s) Oral every 12 hours  aspirin enteric coated 81 milliGRAM(s) Oral daily  atorvastatin 20 milliGRAM(s) Oral at bedtime  ertapenem  IVPB      influenza  Vaccine (HIGH DOSE) 0.5 milliLiter(s) IntraMuscular once  insulin lispro (ADMELOG) corrective regimen sliding scale   SubCutaneous three times a day before meals  insulin lispro (ADMELOG) corrective regimen sliding scale   SubCutaneous at bedtime  lactated ringers. 1000 milliLiter(s) (75 mL/Hr) IV Continuous <Continuous>  levothyroxine 25 MICROGram(s) Oral daily  metoprolol succinate  milliGRAM(s) Oral daily  senna 2 Tablet(s) Oral at bedtime    MEDICATIONS  (PRN):  acetaminophen     Tablet .. 650 milliGRAM(s) Oral every 6 hours PRN Temp greater or equal to 38C (100.4F), Mild Pain (1 - 3)  ondansetron Injectable 4 milliGRAM(s) IV Push every 8 hours PRN Nausea and/or Vomiting    Allergies    No Known Allergies    Intolerances      PAST MEDICAL & SURGICAL HISTORY:  HTN (hypertension)      Atrial fibrillation      OA (osteoarthritis)      HLD (hyperlipidemia)      Diabetes      Obesity      Anxiety      Hypothyroidism      Peripheral venous insufficiency      No significant past surgical history        FAMILY HISTORY:  Family history of thrombosis (Mother)      SOCIAL HISTORY: non smoker    Review of Systems:  Constitutional: No fevers or chills.                    Eyes, Ears, Mouth, Throat: No vision loss   Respiratory: No cough.                                Cardiovascular: No chest pain   Gastrointestinal: No vomiting.                                         Genitourinary: No urinary incontinence  Musculoskeletal: No joint pain.                                                           Dermatologic: No rash.  Neurological: as per HPI                                                                      Psychiatric: No behavioral problems.  Endocrine: No known hypoglycemia.               Hematologic/Lymphatic: No easy bleeding.    O:  Vital Signs Last 24 Hrs  T(C): 36.6 (09 Mar 2025 12:31), Max: 36.8 (09 Mar 2025 05:20)  T(F): 97.8 (09 Mar 2025 12:31), Max: 98.2 (09 Mar 2025 05:20)  HR: 75 (09 Mar 2025 12:31) (74 - 78)  BP: 136/88 (09 Mar 2025 12:31) (136/88 - 164/89)  BP(mean): 104 (09 Mar 2025 12:31) (104 - 104)  RR: 17 (09 Mar 2025 12:31) (17 - 18)  SpO2: 94% (09 Mar 2025 12:31) (94% - 95%)    Parameters below as of 09 Mar 2025 12:31  Patient On (Oxygen Delivery Method): room air        General Exam:   General appearance: No acute distress                 Cardiovascular: Pedal dorsalis pulses intact bilaterally    Mental Status: Orientated to self, and place but not to date.  Attention intact.  No dysarthria, aphasia or neglect.  Knowledge intact.  Registration intact.  Short and long term memory grossly intact.      Cranial Nerves: CN I - not tested.  PERRL, EOMI, VFF, no nystagmus or diplopia.  No APD.  Fundi not visualized.  CN V1-3 intact to light touch and pinprick.  No facial asymmetry.  Hearing intact to finger rub bilaterally.  Tongue, uvula and palate midline.  Sternocleidomastoid and Trapezius intact bilaterally.    Motor:   Tone: normal.                  Strength intact throughout  No pronator drift bilaterally                      No dysmetria on finger-nose-finger or heel-shin-heel  No truncal ataxia.  No resting, postural or action tremor.  No myoclonus.    Sensation: intact to light touch    Deep Tendon Reflexes: 1+ bilateral biceps, triceps, brachioradialis, knee and ankle  Toes flexor bilaterally    Gait: patient declines  Other:     LABS:                        11.5   5.15  )-----------( 160      ( 08 Mar 2025 05:45 )             32.5     03-09    140  |  108  |  13  ----------------------------<  180[H]  3.3[L]   |  25  |  0.64    Ca    9.5      09 Mar 2025 05:45  Phos  2.4     03-09  Mg     1.9     03-09        Urinalysis Basic - ( 09 Mar 2025 05:45 )    Color: x / Appearance: x / SG: x / pH: x  Gluc: 180 mg/dL / Ketone: x  / Bili: x / Urobili: x   Blood: x / Protein: x / Nitrite: x   Leuk Esterase: x / RBC: x / WBC x   Sq Epi: x / Non Sq Epi: x / Bacteria: x          RADIOLOGY & ADDITIONAL STUDIES:    < from: CT Head No Cont (03.06.25 @ 16:59) >  1. No acute intracranial process identified.  2. Additional findings, including interval evolution of chronic infarcts,   as described above.  3. If clinical symptoms persist consider further imaging with MRI,   provided there are no medical contraindications.    < end of copied text >      -Risk of focal-onset seizures from the bilateral frontal regions vs. risk of generalized seizures which can be seen in toxic-metabolic encephalopathies, with shifting left > right predominance due to structural lesion.  -Right hemispheric focal cerebral dysfunction, likely structural in etiology.  -Mild-moderate diffuse cerebral dysfunction is nonspecific in etiology.   -No seizures  -Event of irregular L hand shaking is less likely to be epileptic in etiology. Should seizures remain a clinical concern, continued EEG monitoring can be considered.

## 2025-03-09 NOTE — PROGRESS NOTE ADULT - ASSESSMENT
#ERIKA 2/2 UTI   #Hypokalemia  #Hypomagnesemia  #Seizure d/o off AE  #Type 2 DM   #A-fib on Eliquis   #Hx CVA  #HTN  #HLD   #DVT ppx     79yF with PMH of a-fib on Eliquis, HTN, type 2 DM, HLD, seizure disorder, who presented from home with acute onset confusion, likely in setting of UTI. Admitted for UTI.     -c/w ceftriaxone  -UCx positive for E. coli, finals results pending  -tolerating diet   -fall precautions   -replace electrolytes, monitor daily  -ACHS FS, ISS  -c/w home meds for chronic conditions   -DVT ppx    #ERIKA 2/2 UTI   #Hypokalemia  #Hypomagnesemia  #Seizure d/o off AE  #Type 2 DM   #A-fib on Eliquis   #Hx CVA  #HTN  #HLD   #DVT ppx     79yF with PMH of a-fib on Eliquis, HTN, type 2 DM, HLD, seizure disorder, who presented from home with acute onset confusion, likely in setting of UTI. Admitted for UTI.     -c/w ceftriaxone  -UCx positive for E. coli, finals results pending  -tolerating diet   -fall precautions   -replace electrolytes, monitor daily  -ACHS FS, ISS  -c/w home meds for chronic conditions   -PT eval  -discussed care with patient's son, Tommy, he stated that patient has had 5 UTIs since last November, at the same time that she had her stroke; she has a 24/7 HHA, and has home PT, though she has become more debilitated over time  -neuro Dr. Nunez consulted as patient with seizure disorder, and self-discontinued her antiepileptic medication   -DVT ppx

## 2025-03-09 NOTE — CONSULT NOTE ADULT - ASSESSMENT
AMS  UTI  h/o CVA, was on VPA    treat UTI  get EEG  start Keppra 250mg bid for AED  sz and fall ppx Altered mental status in setting of UTI cw toxic encephalopathy  h/o CVA, was on VPA now stopped due to side effects    Recommendations:  treat UTI as per primary team  start Keppra 250mg bid for AED  sz and fall ppx    pls call back with questions  spent 80min

## 2025-03-10 DIAGNOSIS — Z75.8 OTHER PROBLEMS RELATED TO MEDICAL FACILITIES AND OTHER HEALTH CARE: ICD-10-CM

## 2025-03-10 LAB
ANION GAP SERPL CALC-SCNC: 8 MMOL/L — SIGNIFICANT CHANGE UP (ref 5–17)
BUN SERPL-MCNC: 14 MG/DL — SIGNIFICANT CHANGE UP (ref 7–18)
CALCIUM SERPL-MCNC: 9.6 MG/DL — SIGNIFICANT CHANGE UP (ref 8.4–10.5)
CHLORIDE SERPL-SCNC: 106 MMOL/L — SIGNIFICANT CHANGE UP (ref 96–108)
CO2 SERPL-SCNC: 26 MMOL/L — SIGNIFICANT CHANGE UP (ref 22–31)
CREAT SERPL-MCNC: 0.8 MG/DL — SIGNIFICANT CHANGE UP (ref 0.5–1.3)
EGFR: 75 ML/MIN/1.73M2 — SIGNIFICANT CHANGE UP
EGFR: 75 ML/MIN/1.73M2 — SIGNIFICANT CHANGE UP
GLUCOSE BLDC GLUCOMTR-MCNC: 158 MG/DL — HIGH (ref 70–99)
GLUCOSE BLDC GLUCOMTR-MCNC: 181 MG/DL — HIGH (ref 70–99)
GLUCOSE BLDC GLUCOMTR-MCNC: 181 MG/DL — HIGH (ref 70–99)
GLUCOSE BLDC GLUCOMTR-MCNC: 188 MG/DL — HIGH (ref 70–99)
GLUCOSE SERPL-MCNC: 201 MG/DL — HIGH (ref 70–99)
MAGNESIUM SERPL-MCNC: 1.6 MG/DL — SIGNIFICANT CHANGE UP (ref 1.6–2.6)
PHOSPHATE SERPL-MCNC: 2.6 MG/DL — SIGNIFICANT CHANGE UP (ref 2.5–4.5)
POTASSIUM SERPL-MCNC: 3.5 MMOL/L — SIGNIFICANT CHANGE UP (ref 3.5–5.3)
POTASSIUM SERPL-SCNC: 3.5 MMOL/L — SIGNIFICANT CHANGE UP (ref 3.5–5.3)
SODIUM SERPL-SCNC: 140 MMOL/L — SIGNIFICANT CHANGE UP (ref 135–145)

## 2025-03-10 PROCEDURE — 99232 SBSQ HOSP IP/OBS MODERATE 35: CPT | Mod: FS

## 2025-03-10 RX ORDER — DILTIAZEM HYDROCHLORIDE 240 MG/1
180 TABLET, EXTENDED RELEASE ORAL DAILY
Refills: 0 | Status: DISCONTINUED | OUTPATIENT
Start: 2025-03-10 | End: 2025-03-11

## 2025-03-10 RX ORDER — NITROFURANTOIN MACROCRYSTAL 100 MG
1 CAPSULE ORAL
Qty: 14 | Refills: 0
Start: 2025-03-10 | End: 2025-03-16

## 2025-03-10 RX ORDER — POLYETHYLENE GLYCOL 3350 17 G/17G
17 POWDER, FOR SOLUTION ORAL DAILY
Refills: 0 | Status: DISCONTINUED | OUTPATIENT
Start: 2025-03-10 | End: 2025-03-11

## 2025-03-10 RX ORDER — LEVETIRACETAM 10 MG/ML
1 INJECTION, SOLUTION INTRAVENOUS
Qty: 60 | Refills: 0
Start: 2025-03-10 | End: 2025-04-08

## 2025-03-10 RX ORDER — LEVETIRACETAM 10 MG/ML
250 INJECTION, SOLUTION INTRAVENOUS
Refills: 0 | Status: DISCONTINUED | OUTPATIENT
Start: 2025-03-10 | End: 2025-03-11

## 2025-03-10 RX ADMIN — INSULIN LISPRO 1: 100 INJECTION, SOLUTION INTRAVENOUS; SUBCUTANEOUS at 16:47

## 2025-03-10 RX ADMIN — Medication 25 MICROGRAM(S): at 05:44

## 2025-03-10 RX ADMIN — INSULIN LISPRO 1: 100 INJECTION, SOLUTION INTRAVENOUS; SUBCUTANEOUS at 11:43

## 2025-03-10 RX ADMIN — INSULIN LISPRO 1: 100 INJECTION, SOLUTION INTRAVENOUS; SUBCUTANEOUS at 07:47

## 2025-03-10 RX ADMIN — DILTIAZEM HYDROCHLORIDE 180 MILLIGRAM(S): 240 TABLET, EXTENDED RELEASE ORAL at 17:15

## 2025-03-10 RX ADMIN — METOPROLOL SUCCINATE 200 MILLIGRAM(S): 50 TABLET, EXTENDED RELEASE ORAL at 05:44

## 2025-03-10 RX ADMIN — LEVETIRACETAM 250 MILLIGRAM(S): 10 INJECTION, SOLUTION INTRAVENOUS at 17:15

## 2025-03-10 RX ADMIN — ATORVASTATIN CALCIUM 20 MILLIGRAM(S): 80 TABLET, FILM COATED ORAL at 21:08

## 2025-03-10 RX ADMIN — POLYETHYLENE GLYCOL 3350 17 GRAM(S): 17 POWDER, FOR SOLUTION ORAL at 13:22

## 2025-03-10 RX ADMIN — Medication 2 TABLET(S): at 21:08

## 2025-03-10 RX ADMIN — Medication 650 MILLIGRAM(S): at 21:14

## 2025-03-10 RX ADMIN — APIXABAN 5 MILLIGRAM(S): 2.5 TABLET, FILM COATED ORAL at 05:44

## 2025-03-10 RX ADMIN — Medication 81 MILLIGRAM(S): at 11:44

## 2025-03-10 RX ADMIN — APIXABAN 5 MILLIGRAM(S): 2.5 TABLET, FILM COATED ORAL at 17:15

## 2025-03-10 RX ADMIN — ERTAPENEM SODIUM 120 MILLIGRAM(S): 1 INJECTION, POWDER, LYOPHILIZED, FOR SOLUTION INTRAMUSCULAR; INTRAVENOUS at 11:43

## 2025-03-10 RX ADMIN — Medication 650 MILLIGRAM(S): at 22:14

## 2025-03-10 NOTE — PHYSICAL THERAPY INITIAL EVALUATION ADULT - LEVEL OF CONSCIOUSNESS, REHAB EVAL
Problem: Mobility Impaired (Adult and Pediatric)  Goal: *Acute Goals and Plan of Care (Insert Text)  Physical Therapy Goals  Short Term Goals  Initiated 8/8/2017 and to be accomplished within 5-7 day(s) (8/15/2017)  1. Patient will move from supine to sit and sit to supine , scoot up and down and roll side to side in bed with modified independence. 2. Patient will transfer from bed to chair and chair to bed with contact guard assist using the least restrictive device. 3. Patient will perform sit to stand with contact guard assist.  4. Patient will ambulate with contact guard assist for 150 feet with the least restrictive device. 5. Patient will ascend/descend 4-6 stairs with B handrail(s) with minimal assistance/contact guard assist.    Long Term Goals  Initiated 8/8/2017 and to be accomplished within 10-14 day(s) (8/22/2017)  1. Patient will move from supine to sit and sit to supine , scoot up and down and roll side to side in bed with independence. 2. Patient will transfer from bed to chair and chair to bed with modified independence using the least restrictive device. 3. Patient will perform sit to stand with modified independence. 4. Patient will ambulate with modified independence for 300 feet with the least restrictive device. 5. Patient will ascend/descend 12 stairs with B handrail(s) with modified independence. PHYSICAL THERAPY DAILY NOTE  Patient Name:Mj Guillen  Time In: 6023  Time Out: 5408  Patient Seen For: Wheelchair mobility;Transfer training;Gait training (bed mobility)   Time In: 1334  Time Out: 1435  Patient Seen For: Transfer training; Therapeutic exercise (bed mobility)  Diagnosis: Sepsis  Sepsis associated with internal vascular access, subsequent encounter ([facility] [address] Sepsis associated with internal vascular access [facility])  Precautions: fall risk, wound vac     Subjective: Pt reports pain in posterior calf and of foot on LLE.  At start of second session pt reports pain not much better after taking pain meds but states \"I'm high as a kite but my leg is still killing me. \"     Pain at start of tx: 8/10  Pain at stop of tx: 10/10 after ambulation     Patient identified with name and : yes          Objective:       BED/MAT MOBILITY Daily Assessment     Rolling Right : 5 (Supervision)  Supine to Sit : 5 (Supervision)  Sit to Supine : 5 (Supervision)  Pt presents supine in bed and performed supine to sit on R side with S/Cecy. Pt performed bed mobility on L side of mat table during second session and performed sit<->supine with S/Cecy. TRANSFERS Daily Assessment     Transfer Type: Other  Other: stand step without AD  Transfer Assistance : 4 (Minimal assistance)  Sit to Stand Assistance: Minimal assistance   Pt required [place] for sit to stand from EOB and [place] for safety and balance with stand step txfr bed to w/c without use of RW. In second session, pt performed stand step txfr w/c<->mat table with RW and CGA for balance. GAIT Daily Assessment     Amount of Assistance: 4 (Minimal assistance)  Distance (ft): 50 Feet (ft)  Assistive Device: Walker, rolling   Pt ambulated 50ft with RW and initially CGA for balance but several times required [place] to prevent posterior LOB. Pt not anteriorly wt shifting over LLE due to pain and required [place] to prevent posterior LOB with LLE stance several times. BALANCE Daily Assessment     Sitting - Static: Good (unsupported)  Sitting - Dynamic: Fair (occasional)  Standing - Static: Fair  Standing - Dynamic : Impaired          WHEELCHAIR MOBILITY Daily Assessment     Able to Propel (ft): 232 feet  Functional Level: 5  Curbs/Ramps Assist Required (FIM Score): 0 (Not tested)  Wheelchair Setup Assist Required : 4 (Minimal assistance)  Wheelchair Management: Manages left brake;Manages right brake   Pt propelled w/c from room to gym during first session, beginning with attempting to use BLE to propel but due to pain required use of leg rests. LOWER EXTREMITY EXERCISES Daily Assessment      Supine BLE hip abd x15, heel slides 2x15  Seated BLE LAQ, hip flex and hip add with ball 2x15, for strengthening and increasing activity tolerance. Assessment: Pt's pain was a barrier today with gait and balance. Pt's pain meds in PM caused pt to be very sleepy, not being safe to perform more gait and he required mod v/c to stay on task during exercises. Plan of Care: Continue with current POC to improve independence with functional mobility and increase safety awareness.       Jody Gonzalez, PTA  8/13/2017 lethargic/somnolent

## 2025-03-10 NOTE — CHART NOTE - NSCHARTNOTEFT_GEN_A_CORE
Spoke to patient's son SRAVAN today, updated on clinical status, treatment plan/options and discharge plan/options, all questions answered

## 2025-03-10 NOTE — CONSULT NOTE ADULT - ASSESSMENT
UTI - with a ESBL E. Coli      Plan - Cont Invanz 1gm iv q24hrs x 3 days in total   if being discharged tomorrow then can Switch to Nitrofurantoin 100mgs po BID x 2 days after tomorrow's invanz dose.  her AMS could be secondary to her having had a seizure at home also.

## 2025-03-10 NOTE — PHYSICAL THERAPY INITIAL EVALUATION ADULT - ADL SKILLS, REHAB EVAL
Patient is not a community ambulator./needed assist Patient is not a community ambulator./needs device and assist

## 2025-03-10 NOTE — PROGRESS NOTE ADULT - PROBLEM SELECTOR PLAN 1
Met sepsis criteria. ( tachycardic & febrile)  -UA+   -s/p 2.2L NS bolus, CTX, Vanco in the ED.  -Ucx- 50-99K ESBL E.coli  - Bcx (3/6)- NGTD  -Abx changed to ertapenem  -ID Rodrick consulted, f/u reccs

## 2025-03-10 NOTE — PHYSICAL THERAPY INITIAL EVALUATION ADULT - PERTINENT HX OF CURRENT PROBLEM, REHAB EVAL
Patient is admitted to [facility] due to UTI. As per patient's son, patient has experienced 5 UTIs in the past and all have made the patient seem confused, but they have made a full recovery each time.     PMH HTN, HLD, T2DM, hypothyroidism, Afib (on Eliquis hx of CVA (with L sided weakness), Patient is admitted to [facility] due to UTI. As per patient's son, patient has experienced 5 UTIs in the past and all have made the patient seem confused, but she made a full recovery each time.     PMH HTN, HLD, T2DM, hypothyroidism, Afib (on Eliquis hx of CVA (with L sided weakness),

## 2025-03-10 NOTE — PHYSICAL THERAPY INITIAL EVALUATION ADULT - PASSIVE RANGE OF MOTION EXAMINATION, REHAB EVAL
Patient left shoulder limited to approx. 120 degrees of flexion w/ firm end-feel./Right UE Passive ROM was WNL (within normal limits)/bilateral lower extremity Passive ROM was WNL/deficits as listed below

## 2025-03-10 NOTE — PHYSICAL THERAPY INITIAL EVALUATION ADULT - IMPAIRMENTS FOUND, PT EVAL
cognitive impairment/gait, locomotion, and balance/muscle strength/posture/ROM aerobic capacity/endurance/cognitive impairment/gait, locomotion, and balance/gross motor/joint integrity and mobility/muscle strength/posture/ROM

## 2025-03-10 NOTE — PHYSICAL THERAPY INITIAL EVALUATION ADULT - PLANNED THERAPY INTERVENTIONS, PT EVAL
bed mobility training/gait training/ROM/strengthening/transfer training balance training/bed mobility training/gait training/ROM/strengthening/transfer training

## 2025-03-10 NOTE — PHYSICAL THERAPY INITIAL EVALUATION ADULT - LIVES WITH, PROFILE
Lives in a 2nd floor apartment (houses elevator), with a 24hr HHA and grandson./alone Lives in a 2nd floor apartment (houses elevator), with a 24hr HHA and grandson.

## 2025-03-10 NOTE — PHYSICAL THERAPY INITIAL EVALUATION ADULT - ORIENTATION, REHAB EVAL
oriented to person, place, time and situation forgetful at times/oriented to person, place, time and situation

## 2025-03-10 NOTE — PROGRESS NOTE ADULT - SUBJECTIVE AND OBJECTIVE BOX
Patient is a 79y old  Female who presents with a chief complaint of AMS (09 Mar 2025 20:12)      INTERVAL HPI/OVERNIGHT EVENTS: no overnight events    I&O's Summary    09 Mar 2025 07:01  -  10 Mar 2025 07:00  --------------------------------------------------------  IN: 0 mL / OUT: 550 mL / NET: -550 mL      Vital Signs Last 24 Hrs  T(C): 36.9 (10 Mar 2025 05:12), Max: 36.9 (10 Mar 2025 05:12)  T(F): 98.5 (10 Mar 2025 05:12), Max: 98.5 (10 Mar 2025 05:12)  HR: 99 (10 Mar 2025 05:12) (75 - 99)  BP: 156/91 (10 Mar 2025 05:12) (136/88 - 160/81)  BP(mean): 104 (09 Mar 2025 12:31) (104 - 104)  RR: 16 (10 Mar 2025 05:12) (16 - 17)  SpO2: 95% (10 Mar 2025 05:12) (94% - 95%)    Parameters below as of 10 Mar 2025 05:12  Patient On (Oxygen Delivery Method): room air      PAST MEDICAL & SURGICAL HISTORY:  HTN (hypertension)      Atrial fibrillation      OA (osteoarthritis)      HLD (hyperlipidemia)      Diabetes      Obesity      Anxiety      Hypothyroidism      Peripheral venous insufficiency      No significant past surgical history          SOCIAL HISTORY  Alcohol:  Tobacco:  Illicit substance use:      FAMILY HISTORY:      LABS:    03-10    140  |  106  |  14  ----------------------------<  201[H]  3.5   |  26  |  0.80    Ca    9.6      10 Mar 2025 06:20  Phos  2.6     03-10  Mg     1.6     03-10        Urinalysis Basic - ( 10 Mar 2025 06:20 )    Color: x / Appearance: x / SG: x / pH: x  Gluc: 201 mg/dL / Ketone: x  / Bili: x / Urobili: x   Blood: x / Protein: x / Nitrite: x   Leuk Esterase: x / RBC: x / WBC x   Sq Epi: x / Non Sq Epi: x / Bacteria: x      CAPILLARY BLOOD GLUCOSE      POCT Blood Glucose.: 181 mg/dL (10 Mar 2025 07:39)  POCT Blood Glucose.: 178 mg/dL (09 Mar 2025 21:03)  POCT Blood Glucose.: 176 mg/dL (09 Mar 2025 16:35)  POCT Blood Glucose.: 153 mg/dL (09 Mar 2025 11:33)        Urinalysis Basic - ( 10 Mar 2025 06:20 )    Color: x / Appearance: x / SG: x / pH: x  Gluc: 201 mg/dL / Ketone: x  / Bili: x / Urobili: x   Blood: x / Protein: x / Nitrite: x   Leuk Esterase: x / RBC: x / WBC x   Sq Epi: x / Non Sq Epi: x / Bacteria: x        MEDICATIONS  (STANDING):  apixaban 5 milliGRAM(s) Oral every 12 hours  aspirin enteric coated 81 milliGRAM(s) Oral daily  atorvastatin 20 milliGRAM(s) Oral at bedtime  ertapenem  IVPB 1000 milliGRAM(s) IV Intermittent every 24 hours  ertapenem  IVPB      influenza  Vaccine (HIGH DOSE) 0.5 milliLiter(s) IntraMuscular once  insulin lispro (ADMELOG) corrective regimen sliding scale   SubCutaneous three times a day before meals  insulin lispro (ADMELOG) corrective regimen sliding scale   SubCutaneous at bedtime  lactated ringers. 1000 milliLiter(s) (75 mL/Hr) IV Continuous <Continuous>  levETIRAcetam 250 milliGRAM(s) Oral two times a day  levothyroxine 25 MICROGram(s) Oral daily  metoprolol succinate  milliGRAM(s) Oral daily  senna 2 Tablet(s) Oral at bedtime    MEDICATIONS  (PRN):  acetaminophen     Tablet .. 650 milliGRAM(s) Oral every 6 hours PRN Temp greater or equal to 38C (100.4F), Mild Pain (1 - 3)  ondansetron Injectable 4 milliGRAM(s) IV Push every 8 hours PRN Nausea and/or Vomiting      REVIEW OF SYSTEMS: Limited  EYES: No eye pain, visual disturbances  ENMT: No throat pain  NECK: No pain   RESPIRATORY: No cough,; No shortness of breath  CARDIOVASCULAR: No chest pain  GASTROINTESTINAL: No abdominal pain.   GENITOURINARY: No dysuria  NEUROLOGICAL: No headaches  SKIN: No itching      RADIOLOGY & ADDITIONAL TESTS: < from: CT Head No Cont (03.06.25 @ 16:59) >  ACC: [id number] EXAM:  CT BRAIN   ORDERED BY: LAURY VIGIL     PROCEDURE DATE:  03/06/2025          INTERPRETATION:  CLINICAL INFORMATION: Altered mental status. Previous   right MCA territory infarct.    COMPARISON: CT brain 11/9/2024.    CONTRAST:  IV Contrast: NONE    TECHNIQUE:  Serial axial images were obtained from the skull base to the   vertex using multi-slice helical technique. Sagittal and coronal   reformats were obtained.    FINDINGS:    VENTRICLES AND SULCI: Age-appropriate atrophy.Interval ex vacuo   dilatation right frontal horn.  INTRA-AXIAL: No evidence of mass effect, acute hemorrhage, or midline   shift.  No definitive acute territorial infarct. Interval evolution of   chronic infarcts right corona radiata-ganglia capsular region greater   than right caudate nucleus. Smaller, subcentimeter chronic lacunar   infarct left corona radiata again noted. Again seen are mild-moderate   white matter hypodensities; a nonspecific finding which statistically   reflects chronic microvascular ischemic change. Subcentimeter coarse   juxtacortical calcification right parietal lobe.  EXTRA-AXIAL: No mass or fluid collection. Basal cisterns are normal in   appearance.    VISUALIZED SINUSES:  Scattered mucosal thickening. No air-fluid levels.   Rightward deviation nasal septum.  TYMPANOMASTOID CAVITIES:  No effusion.  VISUALIZED ORBITS: Grossly unremarkable.  CALVARIUM: Intact.    MISCELLANEOUS: None.    IMPRESSION:  1. No acute intracranial process identified.  2. Additional findings, including interval evolution of chronic infarcts,   as described above.  3. If clinical symptoms persist consider further imaging with MRI,   provided there are no medical contraindications.    --- End of Report ---        < end of copied text >    ACC: [id number] EXAM:  XR CHEST AP OR PA 1V   ORDERED BY: KARLI DESIR     PROCEDURE DATE:  03/06/2025          INTERPRETATION:  AP chest on March 6, 2025 at 5:06 PM. Patient has   weakness and altered mental status.    COMPARISON: None available.    Heart suggests enlargement.    Lungs are clear.    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    IMPRESSION: Probable heart enlargement. Reverse left shoulder   replacement. No acute finding.    --- End of Report ---      Imaging Personally Reviewed:  [x ] YES  [ ] NO    Consultant(s) Notes Reviewed:  [x ] YES  [ ] NO    PHYSICAL EXAM:  GENERAL: NAD  HEAD:  Atraumatic, Normocephalic  EYES: conjunctiva and sclera clear  ENMT:  Moist mucous membranes  NECK: Supple  NERVOUS SYSTEM:  Alert & Oriented   CHEST/LUNG: CTA bilaterally; No rales, rhonchi, wheezing  HEART: Regular rate   ABDOMEN: Soft, Nontender, Nondistended; Bowel sounds present  EXTREMITIES: No clubbing, cyanosis, or edema  SKIN: No rashes     Care Collaborated Discussed with Consultants/Other Providers [x ] YES  [ ] NO

## 2025-03-10 NOTE — PROGRESS NOTE ADULT - ASSESSMENT
79F, from home, ambulates with walker, PMH HTN, HLD, T2DM, hypothyroidism, Afib (on Eliquis hx of CVA (with L sided weakness), peripheral venous insufficiency. Presenting with confusion x1d. Found to have UA+. Met sepsis criteria. Admitted for sepsis and acute encephalopathy 2/2 UTI.

## 2025-03-10 NOTE — PHYSICAL THERAPY INITIAL EVALUATION ADULT - GENERAL OBSERVATIONS, REHAB EVAL
Patient is received lying in bed with HOB elevated. Patient appears lethargic as she struggles to maintain eyes open during evaluation. Patient appears alert and oriented to person, time, place, and situation. Patient states she will provide as much information as possible, but she would prefer PT to consult with grandson.

## 2025-03-10 NOTE — PROGRESS NOTE ADULT - PROBLEM SELECTOR PLAN 10
1. pending PT eval  2. Pending ID reccs ( ESBL e.coli Uti) 1.  PT aleksandral MEG ( family wants pt home)  2. Pending ID reccs ( ESBL e.coli Uti)

## 2025-03-10 NOTE — CONSULT NOTE ADULT - SUBJECTIVE AND OBJECTIVE BOX
HPI:  79F, from home, ambulates with walker, PMH HTN, HLD, T2DM, hypothyroidism, Afib (on Eliquis hx of CVA (with L sided weakness), peripheral venous insufficiency. Presenting with confusion x1d. Collateral obtained from son at bedside. States that patient is AAOx3 at baseline. Yesterday in the afternoon, patient started getting progressively more confused, "not making sense." Son states this is usually what happens when she gets urinary tract infections. At the time of exam, patient is AAOx2 (to self and place), but does not know why she is in the hospital. Son states that she seems "less confused" than when she first arrived but is still not at her baseline. Denies fever, chills, chest pain, palpitations, dysuria, hematuria, SOB, n/v/d, abd pain. Endorses throat discomfort and dry cough for the past 3d.     No recent travel or sick contacts. Of note, on prior hospitalization patient was discharged on Depakote for seizure ppx. Patient no longer takes Depakote as her the son because it made her feel "very bad." States no seizures since being off Depakote.  (06 Mar 2025 21:26)      PAST MEDICAL & SURGICAL HISTORY:  HTN (hypertension)      Atrial fibrillation      OA (osteoarthritis)      HLD (hyperlipidemia)      Diabetes      Obesity      Anxiety      Hypothyroidism      Peripheral venous insufficiency      No significant past surgical history          No Known Allergies      Meds:  acetaminophen     Tablet .. 650 milliGRAM(s) Oral every 6 hours PRN  apixaban 5 milliGRAM(s) Oral every 12 hours  aspirin enteric coated 81 milliGRAM(s) Oral daily  atorvastatin 20 milliGRAM(s) Oral at bedtime  diltiazem    milliGRAM(s) Oral daily  ertapenem  IVPB 1000 milliGRAM(s) IV Intermittent every 24 hours  ertapenem  IVPB      influenza  Vaccine (HIGH DOSE) 0.5 milliLiter(s) IntraMuscular once  insulin lispro (ADMELOG) corrective regimen sliding scale   SubCutaneous three times a day before meals  insulin lispro (ADMELOG) corrective regimen sliding scale   SubCutaneous at bedtime  lactated ringers. 1000 milliLiter(s) IV Continuous <Continuous>  levETIRAcetam 250 milliGRAM(s) Oral two times a day  levothyroxine 25 MICROGram(s) Oral daily  metoprolol succinate  milliGRAM(s) Oral daily  ondansetron Injectable 4 milliGRAM(s) IV Push every 8 hours PRN  polyethylene glycol 3350 17 Gram(s) Oral daily  senna 2 Tablet(s) Oral at bedtime      SOCIAL HISTORY:  Smoker:  YES / NO        PACK YEARS:                         WHEN QUIT?  ETOH use:  YES / NO               FREQUENCY / QUANTITY:  Ilicit Drug use:  YES / NO  Occupation:  Assisted device use (Cane / Walker):  Live with:    FAMILY HISTORY:  Family history of thrombosis (Mother)        VITALS:  Vital Signs Last 24 Hrs  T(C): 36.6 (10 Mar 2025 12:29), Max: 36.9 (10 Mar 2025 05:12)  T(F): 97.9 (10 Mar 2025 12:29), Max: 98.5 (10 Mar 2025 05:12)  HR: 93 (10 Mar 2025 12:29) (80 - 99)  BP: 156/89 (10 Mar 2025 12:29) (131/82 - 160/81)  BP(mean): 111 (10 Mar 2025 12:29) (98 - 112)  RR: 17 (10 Mar 2025 12:29) (16 - 17)  SpO2: 98% (10 Mar 2025 12:29) (95% - 98%)    Parameters below as of 10 Mar 2025 12:29  Patient On (Oxygen Delivery Method): room air        LABS/DIAGNOSTIC TESTS:      WBC Count: 5.15 K/uL (03-08 @ 05:45)      03-10    140  |  106  |  14  ----------------------------<  201[H]  3.5   |  26  |  0.80    Ca    9.6      10 Mar 2025 06:20  Phos  2.6     03-10  Mg     1.6     03-10        Urinalysis Basic - ( 10 Mar 2025 06:20 )    Color: x / Appearance: x / SG: x / pH: x  Gluc: 201 mg/dL / Ketone: x  / Bili: x / Urobili: x   Blood: x / Protein: x / Nitrite: x   Leuk Esterase: x / RBC: x / WBC x   Sq Epi: x / Non Sq Epi: x / Bacteria: x                LACTATE:    ABG -     CULTURES:   Clean Catch  03-06 @ 19:00   50,000 - 99,000 CFU/mL Escherichia coli ESBL  --  Escherichia coli ESBL      Blood Blood-Peripheral  03-06 @ 16:00   No growth at 72 Hours  --  --      Blood Blood-Peripheral  03-06 @ 15:55   No growth at 72 Hours  --  --          RADIOLOGY:< from: Xray Chest 1 View AP/PA (03.06.25 @ 17:16) >  ACC: [id number] EXAM:  XR CHEST AP OR PA 1V   ORDERED BY: KARLI DESIR     PROCEDURE DATE:  03/06/2025          INTERPRETATION:  AP chest on March 6, 2025 at 5:06 PM. Patient has   weakness and altered mental status.    COMPARISON: None available.    Heart suggests enlargement.    Lungs are clear.    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    IMPRESSION: Probable heart enlargement. Reverse left shoulder   replacement. No acute finding.    --- End of Report ---            ALEJANDRA CORADO MD; Attending Radiologist  This document has been electronically signed. Mar  6 2025  5:45PM    < end of copied text >  ----------------------------------------------------------------------------------------------------------------------------------    ACC: [id number] EXAM:  CT BRAIN   ORDERED BY: LAURY VIGIL     PROCEDURE DATE:  03/06/2025          INTERPRETATION:  CLINICAL INFORMATION: Altered mental status. Previous   right MCA territory infarct.    COMPARISON: CT brain 11/9/2024.    CONTRAST:  IV Contrast: NONE    TECHNIQUE:  Serial axial images were obtained from the skull base to the   vertex using multi-slice helical technique. Sagittal and coronal   reformats were obtained.    FINDINGS:    VENTRICLES AND SULCI: Age-appropriate atrophy.Interval ex vacuo   dilatation right frontal horn.  INTRA-AXIAL: No evidence of mass effect, acute hemorrhage, or midline   shift.  No definitive acute territorial infarct. Interval evolution of   chronic infarcts right corona radiata-ganglia capsular region greater   than right caudate nucleus. Smaller, subcentimeter chronic lacunar   infarct left corona radiata again noted. Again seen are mild-moderate   white matter hypodensities; a nonspecific finding which statistically   reflects chronic microvascular ischemic change. Subcentimeter coarse   juxtacortical calcification right parietal lobe.  EXTRA-AXIAL: No mass or fluid collection. Basal cisterns are normal in   appearance.    VISUALIZED SINUSES:  Scattered mucosal thickening. No air-fluid levels.   Rightward deviation nasal septum.  TYMPANOMASTOID CAVITIES:  No effusion.  VISUALIZED ORBITS: Grossly unremarkable.  CALVARIUM: Intact.    MISCELLANEOUS: None.    IMPRESSION:  1. No acute intracranial process identified.  2. Additional findings, including interval evolution of chronic infarcts,   as described above.  3. If clinical symptoms persist consider further imaging with MRI,   provided there are no medical contraindications.    --- End of Report ---            NADIA BARKER M.D., ATTENDING RADIOLOGIST  This document has been electronically signed. Mar  6 2025  5:24PM    < end of copied text >        ROS  [  ] UNABLE TO ELICIT               HPI:  79F, from home, ambulates with walker, PMH HTN, HLD, T2DM, hypothyroidism, Afib (on Eliquis hx of CVA (with L sided weakness), peripheral venous insufficiency. Presenting with confusion x1d. Collateral obtained from son at bedside. States that patient is AAOx3 at baseline. Yesterday in the afternoon, patient started getting progressively more confused, "not making sense." Son states this is usually what happens when she gets urinary tract infections. At the time of exam, patient is AAOx2 (to self and place), but does not know why she is in the hospital. Son states that she seems "less confused" than when she first arrived but is still not at her baseline. Denies fever, chills, chest pain, palpitations, dysuria, hematuria, SOB, n/v/d, abd pain. Endorses throat discomfort and dry cough for the past 3d.     No recent travel or sick contacts. Of note, on prior hospitalization patient was discharged on Depakote for seizure ppx. Patient no longer takes Depakote as her the son because it made her feel "very bad." States no seizures since being off Depakote.  (06 Mar 2025 21:26)      History as above, asked to see this patient who presents from home with AMS x 1 day, she has a H/O seizures as she has had a CVA in the past but her son stopped her the seizure meds on his own, she also has a H/O getting frequent UTI's and apparently becomes like this when confused as per her son in the history. She was found to have a UTI here with an ESBL E. Coli and so was started om Invanz here, the son wanted to take her back home today but did not want to take her out AMA. She has no fevers , she is lethargic and confused still and can barely answer any questions. She has denies any abdominal pain or coughing but unable to get more history than that out of her.         PAST MEDICAL & SURGICAL HISTORY:  HTN (hypertension)      Atrial fibrillation      OA (osteoarthritis)      HLD (hyperlipidemia)      Diabetes      Obesity      Anxiety      Hypothyroidism      Peripheral venous insufficiency      No significant past surgical history          No Known Allergies      Meds:  acetaminophen     Tablet .. 650 milliGRAM(s) Oral every 6 hours PRN  apixaban 5 milliGRAM(s) Oral every 12 hours  aspirin enteric coated 81 milliGRAM(s) Oral daily  atorvastatin 20 milliGRAM(s) Oral at bedtime  diltiazem    milliGRAM(s) Oral daily  ertapenem  IVPB 1000 milliGRAM(s) IV Intermittent every 24 hours  ertapenem  IVPB      influenza  Vaccine (HIGH DOSE) 0.5 milliLiter(s) IntraMuscular once  insulin lispro (ADMELOG) corrective regimen sliding scale   SubCutaneous three times a day before meals  insulin lispro (ADMELOG) corrective regimen sliding scale   SubCutaneous at bedtime  lactated ringers. 1000 milliLiter(s) IV Continuous <Continuous>  levETIRAcetam 250 milliGRAM(s) Oral two times a day  levothyroxine 25 MICROGram(s) Oral daily  metoprolol succinate  milliGRAM(s) Oral daily  ondansetron Injectable 4 milliGRAM(s) IV Push every 8 hours PRN  polyethylene glycol 3350 17 Gram(s) Oral daily  senna 2 Tablet(s) Oral at bedtime      SOCIAL HISTORY: unknown    FAMILY HISTORY:  Family history of thrombosis (Mother)        VITALS:  Vital Signs Last 24 Hrs  T(C): 36.6 (10 Mar 2025 12:29), Max: 36.9 (10 Mar 2025 05:12)  T(F): 97.9 (10 Mar 2025 12:29), Max: 98.5 (10 Mar 2025 05:12)  HR: 93 (10 Mar 2025 12:29) (80 - 99)  BP: 156/89 (10 Mar 2025 12:29) (131/82 - 160/81)  BP(mean): 111 (10 Mar 2025 12:29) (98 - 112)  RR: 17 (10 Mar 2025 12:29) (16 - 17)  SpO2: 98% (10 Mar 2025 12:29) (95% - 98%)    Parameters below as of 10 Mar 2025 12:29  Patient On (Oxygen Delivery Method): room air        LABS/DIAGNOSTIC TESTS:      WBC Count: 5.15 K/uL (03-08 @ 05:45)      03-10    140  |  106  |  14  ----------------------------<  201[H]  3.5   |  26  |  0.80    Ca    9.6      10 Mar 2025 06:20  Phos  2.6     03-10  Mg     1.6     03-10        Urine Microscopic-Add On (NC) (03.06.25 @ 19:00)   White Blood Cell - Urine: 20 /HPF  Red Blood Cell - Urine: 0 /HPF  Bacteria: Many /HPF  Squamous Epithelial Cells: Present  Comment - Urine: many branched yeast seen.Urinalysis with Rflx Culture (03.06.25 @ 19:00)   Urine Appearance: Clear  Color: Yellow  Specific Gravity: 1.012  pH Urine: 6.5  Protein, Urine: Negative mg/dL  Glucose Qualitative, Urine: 250 mg/dL  Ketone - Urine: Trace mg/dL  Blood, Urine: Negative  Bilirubin: Negative  Urobilinogen: 0.2 mg/dL  Leukocyte Esterase Concentration: Moderate  Nitrite: Positive              LACTATE:    ABG -     CULTURES:   Clean Catch  03-06 @ 19:00   50,000 - 99,000 CFU/mL Escherichia coli ESBL  --  Escherichia coli ESBL      Blood Blood-Peripheral  03-06 @ 16:00   No growth at 72 Hours  --  --      Blood Blood-Peripheral  03-06 @ 15:55   No growth at 72 Hours  --  --          RADIOLOGY:< from: Xray Chest 1 View AP/PA (03.06.25 @ 17:16) >  ACC: [id number] EXAM:  XR CHEST AP OR PA 1V   ORDERED BY: KARLI DESIR     PROCEDURE DATE:  03/06/2025          INTERPRETATION:  AP chest on March 6, 2025 at 5:06 PM. Patient has   weakness and altered mental status.    COMPARISON: None available.    Heart suggests enlargement.    Lungs are clear.    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    IMPRESSION: Probable heart enlargement. Reverse left shoulder   replacement. No acute finding.    --- End of Report ---            ALEJANDRA CORADO MD; Attending Radiologist  This document has been electronically signed. Mar  6 2025  5:45PM    < end of copied text >  ----------------------------------------------------------------------------------------------------------------------------------    ACC: [id number] EXAM:  CT BRAIN   ORDERED BY: LAURY YARITZA     PROCEDURE DATE:  03/06/2025          INTERPRETATION:  CLINICAL INFORMATION: Altered mental status. Previous   right MCA territory infarct.    COMPARISON: CT brain 11/9/2024.    CONTRAST:  IV Contrast: NONE    TECHNIQUE:  Serial axial images were obtained from the skull base to the   vertex using multi-slice helical technique. Sagittal and coronal   reformats were obtained.    FINDINGS:    VENTRICLES AND SULCI: Age-appropriate atrophy.Interval ex vacuo   dilatation right frontal horn.  INTRA-AXIAL: No evidence of mass effect, acute hemorrhage, or midline   shift.  No definitive acute territorial infarct. Interval evolution of   chronic infarcts right corona radiata-ganglia capsular region greater   than right caudate nucleus. Smaller, subcentimeter chronic lacunar   infarct left corona radiata again noted. Again seen are mild-moderate   white matter hypodensities; a nonspecific finding which statistically   reflects chronic microvascular ischemic change. Subcentimeter coarse   juxtacortical calcification right parietal lobe.  EXTRA-AXIAL: No mass or fluid collection. Basal cisterns are normal in   appearance.    VISUALIZED SINUSES:  Scattered mucosal thickening. No air-fluid levels.   Rightward deviation nasal septum.  TYMPANOMASTOID CAVITIES:  No effusion.  VISUALIZED ORBITS: Grossly unremarkable.  CALVARIUM: Intact.    MISCELLANEOUS: None.    IMPRESSION:  1. No acute intracranial process identified.  2. Additional findings, including interval evolution of chronic infarcts,   as described above.  3. If clinical symptoms persist consider further imaging with MRI,   provided there are no medical contraindications.    --- End of Report ---            NADIA BARKER M.D., ATTENDING RADIOLOGIST  This document has been electronically signed. Mar  6 2025  5:24PM    < end of copied text >        ROS  [  ] UNABLE TO ELICIT, very limited due to her lethargy and confusion

## 2025-03-10 NOTE — PHYSICAL THERAPY INITIAL EVALUATION ADULT - DIAGNOSIS, PT EVAL
Yes Patient presents with confusion due to UTI which can lead to decreased safety awareness and decreased independence in transfer, ambulation, and bed mobility tasks.

## 2025-03-11 ENCOUNTER — TRANSCRIPTION ENCOUNTER (OUTPATIENT)
Age: 80
End: 2025-03-11

## 2025-03-11 VITALS
DIASTOLIC BLOOD PRESSURE: 77 MMHG | RESPIRATION RATE: 18 BRPM | OXYGEN SATURATION: 95 % | SYSTOLIC BLOOD PRESSURE: 121 MMHG | HEART RATE: 64 BPM | TEMPERATURE: 98 F

## 2025-03-11 LAB
CULTURE RESULTS: SIGNIFICANT CHANGE UP
CULTURE RESULTS: SIGNIFICANT CHANGE UP
GLUCOSE BLDC GLUCOMTR-MCNC: 145 MG/DL — HIGH (ref 70–99)
GLUCOSE BLDC GLUCOMTR-MCNC: 181 MG/DL — HIGH (ref 70–99)
GLUCOSE BLDC GLUCOMTR-MCNC: 242 MG/DL — HIGH (ref 70–99)
SPECIMEN SOURCE: SIGNIFICANT CHANGE UP
SPECIMEN SOURCE: SIGNIFICANT CHANGE UP

## 2025-03-11 PROCEDURE — 80053 COMPREHEN METABOLIC PANEL: CPT

## 2025-03-11 PROCEDURE — 87086 URINE CULTURE/COLONY COUNT: CPT

## 2025-03-11 PROCEDURE — 84443 ASSAY THYROID STIM HORMONE: CPT

## 2025-03-11 PROCEDURE — 83735 ASSAY OF MAGNESIUM: CPT

## 2025-03-11 PROCEDURE — 87040 BLOOD CULTURE FOR BACTERIA: CPT

## 2025-03-11 PROCEDURE — 85730 THROMBOPLASTIN TIME PARTIAL: CPT

## 2025-03-11 PROCEDURE — 71045 X-RAY EXAM CHEST 1 VIEW: CPT

## 2025-03-11 PROCEDURE — 87637 SARSCOV2&INF A&B&RSV AMP PRB: CPT

## 2025-03-11 PROCEDURE — 99285 EMERGENCY DEPT VISIT HI MDM: CPT

## 2025-03-11 PROCEDURE — 85027 COMPLETE CBC AUTOMATED: CPT

## 2025-03-11 PROCEDURE — 97162 PT EVAL MOD COMPLEX 30 MIN: CPT

## 2025-03-11 PROCEDURE — 82962 GLUCOSE BLOOD TEST: CPT

## 2025-03-11 PROCEDURE — 81001 URINALYSIS AUTO W/SCOPE: CPT

## 2025-03-11 PROCEDURE — 70450 CT HEAD/BRAIN W/O DYE: CPT | Mod: MC

## 2025-03-11 PROCEDURE — 83036 HEMOGLOBIN GLYCOSYLATED A1C: CPT

## 2025-03-11 PROCEDURE — 80048 BASIC METABOLIC PNL TOTAL CA: CPT

## 2025-03-11 PROCEDURE — 84100 ASSAY OF PHOSPHORUS: CPT

## 2025-03-11 PROCEDURE — 36415 COLL VENOUS BLD VENIPUNCTURE: CPT

## 2025-03-11 PROCEDURE — 85025 COMPLETE CBC W/AUTO DIFF WBC: CPT

## 2025-03-11 PROCEDURE — 96375 TX/PRO/DX INJ NEW DRUG ADDON: CPT

## 2025-03-11 PROCEDURE — 99239 HOSP IP/OBS DSCHRG MGMT >30: CPT

## 2025-03-11 PROCEDURE — 85610 PROTHROMBIN TIME: CPT

## 2025-03-11 PROCEDURE — 87186 SC STD MICRODIL/AGAR DIL: CPT

## 2025-03-11 PROCEDURE — 84484 ASSAY OF TROPONIN QUANT: CPT

## 2025-03-11 PROCEDURE — 93005 ELECTROCARDIOGRAM TRACING: CPT

## 2025-03-11 PROCEDURE — 83605 ASSAY OF LACTIC ACID: CPT

## 2025-03-11 PROCEDURE — 96374 THER/PROPH/DIAG INJ IV PUSH: CPT

## 2025-03-11 RX ORDER — POLYETHYLENE GLYCOL 3350 17 G/17G
17 POWDER, FOR SOLUTION ORAL
Qty: 510 | Refills: 0
Start: 2025-03-11 | End: 2025-04-09

## 2025-03-11 RX ADMIN — Medication 650 MILLIGRAM(S): at 10:08

## 2025-03-11 RX ADMIN — ERTAPENEM SODIUM 120 MILLIGRAM(S): 1 INJECTION, POWDER, LYOPHILIZED, FOR SOLUTION INTRAMUSCULAR; INTRAVENOUS at 16:35

## 2025-03-11 RX ADMIN — INSULIN LISPRO 2: 100 INJECTION, SOLUTION INTRAVENOUS; SUBCUTANEOUS at 12:05

## 2025-03-11 RX ADMIN — Medication 81 MILLIGRAM(S): at 13:12

## 2025-03-11 RX ADMIN — APIXABAN 5 MILLIGRAM(S): 2.5 TABLET, FILM COATED ORAL at 05:41

## 2025-03-11 RX ADMIN — METOPROLOL SUCCINATE 200 MILLIGRAM(S): 50 TABLET, EXTENDED RELEASE ORAL at 05:40

## 2025-03-11 RX ADMIN — Medication 25 MICROGRAM(S): at 05:40

## 2025-03-11 RX ADMIN — LEVETIRACETAM 250 MILLIGRAM(S): 10 INJECTION, SOLUTION INTRAVENOUS at 05:40

## 2025-03-11 RX ADMIN — APIXABAN 5 MILLIGRAM(S): 2.5 TABLET, FILM COATED ORAL at 17:28

## 2025-03-11 RX ADMIN — INSULIN LISPRO 1: 100 INJECTION, SOLUTION INTRAVENOUS; SUBCUTANEOUS at 08:29

## 2025-03-11 RX ADMIN — Medication 650 MILLIGRAM(S): at 10:44

## 2025-03-11 RX ADMIN — DILTIAZEM HYDROCHLORIDE 180 MILLIGRAM(S): 240 TABLET, EXTENDED RELEASE ORAL at 05:41

## 2025-03-11 NOTE — DISCHARGE NOTE NURSING/CASE MANAGEMENT/SOCIAL WORK - PATIENT PORTAL LINK FT
You can access the FollowMyHealth Patient Portal offered by [facility] by registering at the following website: [email or web address]. By joining [facility]’s FollowMyHealth portal, you will also be able to view your health information using other applications (apps) compatible with our system.

## 2025-03-11 NOTE — DISCHARGE NOTE NURSING/CASE MANAGEMENT/SOCIAL WORK - FINANCIAL ASSISTANCE
[facility] provides services at a reduced cost to those who are determined to be eligible through [facility]’s financial assistance program. Information regarding [facility]’s financial assistance program can be found by going to [email or web address] or by calling [phone].

## 2025-03-11 NOTE — DISCHARGE NOTE NURSING/CASE MANAGEMENT/SOCIAL WORK - NSDCPEFALRISK_GEN_ALL_CORE
For information on Fall & Injury Prevention, visit: [email or web address] OR  [email or web address] OR  https://www.cdc.gov/steadi/patient.html

## 2025-03-11 NOTE — PROGRESS NOTE ADULT - PROBLEM SELECTOR PLAN 8
hx of stroke with residual left sided deficits.  home meds: aspirin, Eliquis    - c/w home meds  - Of note, on prior hospitalization patient was discharged on Depakote for seizure ppx. Patient no longer takes Depakote as her the son because it made her feel "very bad." States no seizures since being off Depakote.
hx of stroke with residual left sided deficits.  home meds: aspirin, Eliquis    - c/w home meds  - Of note, on prior hospitalization patient was discharged on Depakote for seizure ppx. Patient no longer takes Depakote as her the son because it made her feel "very bad." States no seizures since being off Depakote.  -Neuro Dr. Nunez consulted reccs: Keppra 250mg BID
hx of stroke with residual left sided deficits.  continue aspirin, Eliquis  - Of note, on prior hospitalization patient was discharged on Depakote for seizure ppx. Patient no longer takes Depakote as her the son because it made her feel "very bad." States no seizures since being off Depakote.  -Neuro Dr. Nunez consulted reccs: Keppra 250mg BID

## 2025-03-11 NOTE — PROGRESS NOTE ADULT - PROBLEM SELECTOR PLAN 3
likely from sepsis ESBL E coli UTI  CT head - No acute intracranial process identified.   back to baseline
Presenting with confusion x1d.  UA+  covid, rsv, flu - neg.  CXR - without infiltrates or consolidations.  CT head - No acute intracranial process identified. Interval evolution of chronic infarcts right corona radiata-ganglia capsular region greater than right caudate nucleus. Smaller, subcentimeter chronic lacunar infarct left corona radiata again noted.     - normally AAOx3. Currently AAOx2.   - encephalopathy iso acute UTI.  - patient also with hx of stroke with multiple chronic infarcts.  - TSH wnl
Presenting with confusion x1d.  UA+  covid, rsv, flu - neg.  CXR - without infiltrates or consolidations.  CT head - No acute intracranial process identified.     - normally AAOx3. Currently AAOx2.   - encephalopathy iso acute UTI.  - patient also with hx of stroke with multiple chronic infarcts.  - TSH wnl

## 2025-03-11 NOTE — DISCHARGE NOTE NURSING/CASE MANAGEMENT/SOCIAL WORK - NSDCFUADDAPPT_GEN_ALL_CORE_FT
[facility] [place] [phone]/ [phone]. FAX: [phone].  A Vendor - [facility] - [place]da[place] - [phone].  [facility] - [facility].  [phone]

## 2025-03-11 NOTE — PROGRESS NOTE ADULT - PROBLEM SELECTOR PLAN 7
home meds: Eliquis 5 BID  - c/w home meds
home meds: Eliquis 5 BID  - c/w home meds  - controlled
home meds: Eliquis 5 BID  - controlled  continue eliquis and metoprolol

## 2025-03-11 NOTE — PROGRESS NOTE ADULT - SUBJECTIVE AND OBJECTIVE BOX
NP Note discussed with  Primary Attending    Patient is a 79y old  Female who presents with a chief complaint of AMS (10 Mar 2025 17:13)      INTERVAL HPI/OVERNIGHT EVENTS: no new complaints    MEDICATIONS  (STANDING):  apixaban 5 milliGRAM(s) Oral every 12 hours  aspirin enteric coated 81 milliGRAM(s) Oral daily  atorvastatin 20 milliGRAM(s) Oral at bedtime  diltiazem    milliGRAM(s) Oral daily  ertapenem  IVPB 1000 milliGRAM(s) IV Intermittent every 24 hours  ertapenem  IVPB      influenza  Vaccine (HIGH DOSE) 0.5 milliLiter(s) IntraMuscular once  insulin lispro (ADMELOG) corrective regimen sliding scale   SubCutaneous three times a day before meals  insulin lispro (ADMELOG) corrective regimen sliding scale   SubCutaneous at bedtime  lactated ringers. 1000 milliLiter(s) (75 mL/Hr) IV Continuous <Continuous>  levETIRAcetam 250 milliGRAM(s) Oral two times a day  levothyroxine 25 MICROGram(s) Oral daily  metoprolol succinate  milliGRAM(s) Oral daily  polyethylene glycol 3350 17 Gram(s) Oral daily  senna 2 Tablet(s) Oral at bedtime    MEDICATIONS  (PRN):  acetaminophen     Tablet .. 650 milliGRAM(s) Oral every 6 hours PRN Temp greater or equal to 38C (100.4F), Mild Pain (1 - 3)  ondansetron Injectable 4 milliGRAM(s) IV Push every 8 hours PRN Nausea and/or Vomiting      __________________________________________________  REVIEW OF SYSTEMS:    CONSTITUTIONAL: No fever,   EYES: no acute visual disturbances  NECK: No pain or stiffness  RESPIRATORY: No cough; No shortness of breath  CARDIOVASCULAR: No chest pain, no palpitations  GASTROINTESTINAL: No pain. No nausea or vomiting; No diarrhea   NEUROLOGICAL: No headache or numbness, no tremors  MUSCULOSKELETAL: No joint pain, no muscle pain  GENITOURINARY: no dysuria, no frequency, no hesitancy  PSYCHIATRY: no depression , no anxiety  ALL OTHER  ROS negative        Vital Signs Last 24 Hrs  T(C): 36.4 (11 Mar 2025 05:02), Max: 36.8 (10 Mar 2025 20:30)  T(F): 97.6 (11 Mar 2025 05:02), Max: 98.3 (10 Mar 2025 20:30)  HR: 82 (11 Mar 2025 05:02) (82 - 93)  BP: 149/88 (11 Mar 2025 05:02) (131/74 - 156/89)  BP(mean): 108 (11 Mar 2025 05:02) (93 - 111)  RR: 18 (11 Mar 2025 05:02) (16 - 18)  SpO2: 97% (11 Mar 2025 05:02) (96% - 98%)    Parameters below as of 11 Mar 2025 05:02  Patient On (Oxygen Delivery Method): room air        ________________________________________________  PHYSICAL EXAM:  GENERAL: NAD  HEENT: Normocephalic;  conjunctivae and sclerae clear; moist mucous membranes;   NECK : supple  CHEST/LUNG: Clear to auscultation bilaterally with good air entry   HEART: S1 S2  regular; no murmurs, gallops or rubs  ABDOMEN: Soft, Nontender, Nondistended; Bowel sounds present  EXTREMITIES: no cyanosis; no edema; no calf tenderness  SKIN: warm and dry; no rash  NERVOUS SYSTEM:  Awake and alert; to voice    _________________________________________________  LABS:    03-10    140  |  106  |  14  ----------------------------<  201[H]  3.5   |  26  |  0.80    Ca    9.6      10 Mar 2025 06:20  Phos  2.6     03-10  Mg     1.6     03-10        Urinalysis Basic - ( 10 Mar 2025 06:20 )    Color: x / Appearance: x / SG: x / pH: x  Gluc: 201 mg/dL / Ketone: x  / Bili: x / Urobili: x   Blood: x / Protein: x / Nitrite: x   Leuk Esterase: x / RBC: x / WBC x   Sq Epi: x / Non Sq Epi: x / Bacteria: x      CAPILLARY BLOOD GLUCOSE      POCT Blood Glucose.: 181 mg/dL (11 Mar 2025 07:57)  POCT Blood Glucose.: 188 mg/dL (10 Mar 2025 21:09)  POCT Blood Glucose.: 158 mg/dL (10 Mar 2025 16:39)  POCT Blood Glucose.: 181 mg/dL (10 Mar 2025 11:33)        RADIOLOGY & ADDITIONAL TESTS:  < from: Xray Chest 1 View AP/PA (03.06.25 @ 17:16) >  ACC: [id number] EXAM:  XR CHEST AP OR PA 1V   ORDERED BY: KARLI DESIR     PROCEDURE DATE:  03/06/2025          INTERPRETATION:  AP chest on March 6, 2025 at 5:06 PM. Patient has   weakness and altered mental status.    COMPARISON: None available.    Heart suggests enlargement.    Lungs are clear.    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    IMPRESSION: Probable heart enlargement. Reverse left shoulder   replacement. No acute finding.    --- End of Report ---    < end of copied text >  < from: CT Head No Cont (03.06.25 @ 16:59) >  ACC: [id number] EXAM:  CT BRAIN   ORDERED BY: LAURY VIGIL     PROCEDURE DATE:  03/06/2025          INTERPRETATION:  CLINICAL INFORMATION: Altered mental status. Previous   right MCA territory infarct.    COMPARISON: CT brain 11/9/2024.    CONTRAST:  IV Contrast: NONE    TECHNIQUE:  Serial axial images were obtained from the skull base to the   vertex using multi-slice helical technique. Sagittal and coronal   reformats were obtained.    FINDINGS:    VENTRICLES AND SULCI: Age-appropriate atrophy.Interval ex vacuo   dilatation right frontal horn.  INTRA-AXIAL: No evidence of mass effect, acute hemorrhage, or midline   shift.  No definitive acute territorial infarct. Interval evolution of   chronic infarcts right corona radiata-ganglia capsular region greater   than right caudate nucleus. Smaller, subcentimeter chronic lacunar   infarct left corona radiata again noted. Again seen are mild-moderate   white matter hypodensities; a nonspecific finding which statistically   reflects chronic microvascular ischemic change. Subcentimeter coarse   juxtacortical calcification right parietal lobe.  EXTRA-AXIAL: No mass or fluid collection. Basal cisterns are normal in   appearance.    VISUALIZED SINUSES:  Scattered mucosal thickening. No air-fluid levels.   Rightward deviation nasal septum.  TYMPANOMASTOID CAVITIES:  No effusion.  VISUALIZED ORBITS: Grossly unremarkable.  CALVARIUM: Intact.    MISCELLANEOUS: None.    IMPRESSION:  1. No acute intracranial process identified.  2. Additional findings, including interval evolution of chronic infarcts,   as described above.  3. If clinical symptoms persist consider further imaging with MRI,   provided there are no medical contraindications.    --- End of Report ---    < end of copied text >    Imaging  Reviewed:  YES    Consultant(s) Notes Reviewed:   YES      Plan of care was discussed with patient and /or primary care giver; all questions and concerns were addressed

## 2025-03-11 NOTE — PROGRESS NOTE ADULT - TIME BILLING
Time spent includes direct patient care  (interview and examination of patient), discussion with other providers, support staff and/or patient's family members, review of medical records, ordering diagnostic tests and analyzing results, and documentation excluding time spent doing teaching and procedures.

## 2025-03-11 NOTE — PROGRESS NOTE ADULT - PROBLEM SELECTOR PLAN 6
home meds: levothyroxine 25  - c/w home meds
home meds: levothyroxine 25  - c/w home meds
home meds: levothyroxine 25  continue levothyroxine

## 2025-03-11 NOTE — PROGRESS NOTE ADULT - PROBLEM SELECTOR PLAN 1
Met sepsis criteria. ( tachycardic & febrile)  -s/p 2.2L NS bolus, CTX, Vanco in the ED.  -Ucx- 50-99K ESBL E.coli  - Bcx (3/6)- NGTD  -Abx changed to ertapenem- dc on nitrofurantion   -ID Dr. Mensah Met sepsis criteria. ( tachycardic & febrile)  -s/p 2.2L NS bolus, CTX, Vanco in the ED.  -Ucx- 50-99K ESBL E.coli  - Bcx (3/6)- NGTD  -Abx changed to ertapenem 3/13- dc on nitrofurantion   -ID Dr. Mensah

## 2025-03-11 NOTE — PROGRESS NOTE ADULT - PROBLEM SELECTOR PLAN 4
home meds: metoprolol succinate , diltiazem 180  continue metoprolol and diltiazem
home meds: metoprolol succinate , diltiazem 180    - resume home meds with parameters.
home meds: metoprolol succinate , diltiazem 180    - resume metoprolol.  - holding diltiazem for now iso sepsis.

## 2025-03-11 NOTE — PROGRESS NOTE ADULT - NS ATTEND AMEND GEN_ALL_CORE FT
#ERIKA 2/2 UTI   #Seizure d/o off AE  #Type 2 DM   #A-fib on Eliquis   #Hx CVA  #HTN  #HLD   #DVT ppx     79yF with PMH of a-fib on Eliquis, HTN, type 2 DM, HLD, seizure disorder, who presented from home with acute onset confusion, likely in setting of UTI. Admitted for UTI. Patient seen at bedside on 03/07. Resting comfortably, appears confused. Tolerating diet.     PE: as above; vitals reviewed, elderly, NAD, abdomen soft/nontender, A+Ox1-2   Labs reviewed: CBC, BMP, Mg, Phos; monitor daily     -c/w ceftriaxone, pending UCx  -dc fluids, tolerating diet   -fall precautions   -ACHS FS, ISS  -c/
#ERIKA 2/2 UTI   #Hypokalemia  #Hypomagnesemia  #Seizure d/o off AE  #Type 2 DM   #A-fib on Eliquis   #Hx CVA  #HTN  #HLD   #DVT ppx     79yF with PMH of a-fib on Eliquis, HTN, type 2 DM, HLD, seizure disorder, who presented from home with acute onset confusion, likely in setting of UTI. Admitted for UTI. Patient seen at bedside, sleeping. Responds to verbal stimuli, but falling asleep while talking.     -now on ertapenem   -UCx positive for ESBL E. coli  -ID Dr. Mensah following, can likely transition to nitrofurantoin 03/11   -tolerating diet   -fall precautions   -replace electrolytes, monitor daily  -ACHS FS, ISS  -c/w home meds for chronic conditions   -PT rec: home therapy  -neuro following, AE resumed   -DVT ppx
78 yo F (from home, ambulates with walker) w/ PMHx HTN, HLD, T2DM, hypothyroidism, Afib (on Eliquis), CVA (with L sided weakness), peripheral venous insufficiency who p/w confusion x1d. Found to have UA+ now admitted for sepsis and acute encephalopathy 2/2 UTI.     Evaluated at bedside feeling very sleepy today but later on in the day family reports is similar to baseline. Will need to reinstate 24/7 HHA so will be dc'ed tomorrow.     Plan:  #ERIKA 2/2 UTI   #Hypokalemia  #Hypomagnesemia  #Seizure d/o off AE  #Type 2 DM   #A-fib on Eliquis   #Hx CVA  #HTN  #HLD   #DVT ppx     -f/u BM after enema   -c/w IV ertapenem   -UCx positive for ESBL E. coli  -ID Dr. Mensah following, can transition to nitrofurantoin until 3/13 100 BID  -fall precautions   -replace electrolytes, monitor daily  -ACHS FS, ISS  -c/w home meds for chronic conditions   -PT rec: home therapy  -neuro following, AE resumed   -DVT ppx Eliquis

## 2025-03-11 NOTE — PROGRESS NOTE ADULT - PROBLEM SELECTOR PLAN 5
home meds: metformin 500 BID    - holding home meds  - ISS  - FS ACHS  - A1c pending
home meds: metformin 500 BID, glimepiride 1mg daily & Januvia 100mg daily  - c/w ISS FS ACHS  - A1c 5.8%  - controlled
home meds: metformin 500 BID, glimepiride 1mg daily & Januvia 100mg daily    - holding home meds  - c/w ISS FS ACHS  - A1c 5.8%  - controlled

## 2025-03-11 NOTE — PROGRESS NOTE ADULT - ASSESSMENT
79F, from home, ambulates with walker, PMH HTN, HLD, T2DM, hypothyroidism, Afib (on Eliquis hx of CVA (with L sided weakness), peripheral venous insufficiency. Presenting with confusion x1d. Found to have UA+. Met sepsis criteria. Admitted for sepsis and acute encephalopathy 2/2 ESBL E. Coli UTI sensitive to ertapenem. Blood culture negative.

## 2025-03-12 RX ORDER — NITROFURANTOIN MACROCRYSTAL 100 MG
1 CAPSULE ORAL
Qty: 4 | Refills: 0
Start: 2025-03-12 | End: 2025-03-13

## (undated) DEVICE — SUT NDL MAYO CATGUT 1/2 CIRCLE TAPER POINT 0.050" X 1.248"

## (undated) DEVICE — ELCTR AQUAMANTYS BIPOLAR SEALER 6.0

## (undated) DEVICE — PACK TOTAL HIP FH.

## (undated) DEVICE — MEDICATION LABELS W MARKER

## (undated) DEVICE — ARTHREX FINGERSHIELD

## (undated) DEVICE — GLV 8.5 PROTEXIS (BLUE)

## (undated) DEVICE — SUT FIBERWIRE #2 38" STRAND 1 BLUE T-5 TAPER

## (undated) DEVICE — VENODYNE/SCD SLEEVE CALF MEDIUM

## (undated) DEVICE — FOR-ESU VALLEYLAB T7E14999DX: Type: DURABLE MEDICAL EQUIPMENT

## (undated) DEVICE — STRYKER MIXEVAC 3 BONE CEMENT MIXER

## (undated) DEVICE — ELCTR GROUNDING PAD ADULT COVIDIEN

## (undated) DEVICE — SAW BLADE STRYKER SAGITTAL DUAL CUT 18MMX90MMX1.27MM

## (undated) DEVICE — DRAPE 1/2 SHEET 40X57"

## (undated) DEVICE — FRAZIER SUCTION TIP 10FR

## (undated) DEVICE — DRILL BIT BRASSELER USA TWIST QUICK CONN 2X100MM

## (undated) DEVICE — DRAPE MAYO STAND 23"

## (undated) DEVICE — DRSG COBAN 6"

## (undated) DEVICE — GOWN XXL

## (undated) DEVICE — WARMING BLANKET LOWER ADULT

## (undated) DEVICE — GLV 8 PROTEXIS (CREAM) MICRO

## (undated) DEVICE — SOL IRR BAG NS 0.9% 3000ML

## (undated) DEVICE — SUT CONMED SUTURE SAVER KIT

## (undated) DEVICE — POSITIONER STIRRUP STRAP W SLIP RING 19X3.5"

## (undated) DEVICE — SOL IRR POUR NS 0.9% 1500ML

## (undated) DEVICE — SUT HEWSON RETRIEVER

## (undated) DEVICE — SYR CATH TIP 2 OZ

## (undated) DEVICE — Device

## (undated) DEVICE — PACK SHOULDER ARTHROSCOPY

## (undated) DEVICE — HOOD FLYTE STRYKER HELMET SHIELD

## (undated) DEVICE — SAW BLADE STRYKER SAGITTAL OSCILLATING 9X31X0.38MM

## (undated) DEVICE — SLING SHOULDER IMMOBILIZER CLINIC LARGE